# Patient Record
Sex: MALE | Race: WHITE | Employment: OTHER | URBAN - METROPOLITAN AREA
[De-identification: names, ages, dates, MRNs, and addresses within clinical notes are randomized per-mention and may not be internally consistent; named-entity substitution may affect disease eponyms.]

---

## 2022-05-06 ENCOUNTER — APPOINTMENT (OUTPATIENT)
Dept: CT IMAGING | Age: 87
DRG: 023 | End: 2022-05-06
Payer: MEDICARE

## 2022-05-06 ENCOUNTER — HOSPITAL ENCOUNTER (OUTPATIENT)
Age: 87
Discharge: HOME | End: 2022-05-06
Payer: MEDICARE

## 2022-05-06 ENCOUNTER — HOSPITAL ENCOUNTER (INPATIENT)
Age: 87
LOS: 11 days | Discharge: NURSING, REHAB OR LONG-TERM CARE | DRG: 023 | End: 2022-05-18
Attending: EMERGENCY MEDICINE | Admitting: INTERNAL MEDICINE
Payer: MEDICARE

## 2022-05-06 DIAGNOSIS — I63.9 CEREBROVASCULAR ACCIDENT (CVA), UNSPECIFIED MECHANISM (HCC): Primary | ICD-10-CM

## 2022-05-06 DIAGNOSIS — R41.82 ALTERED MENTAL STATUS, UNSPECIFIED ALTERED MENTAL STATUS TYPE: ICD-10-CM

## 2022-05-06 DIAGNOSIS — R53.1 RIGHT SIDED WEAKNESS: ICD-10-CM

## 2022-05-06 DIAGNOSIS — D64.9 ANEMIA, UNSPECIFIED TYPE: ICD-10-CM

## 2022-05-06 PROCEDURE — A0427 ALS1-EMERGENCY: HCPCS

## 2022-05-06 PROCEDURE — 6360000002 HC RX W HCPCS: Performed by: STUDENT IN AN ORGANIZED HEALTH CARE EDUCATION/TRAINING PROGRAM

## 2022-05-06 PROCEDURE — 82565 ASSAY OF CREATININE: CPT

## 2022-05-06 PROCEDURE — 6360000004 HC RX CONTRAST MEDICATION: Performed by: FAMILY MEDICINE

## 2022-05-06 PROCEDURE — 4A03X5D MEASUREMENT OF ARTERIAL FLOW, INTRACRANIAL, EXTERNAL APPROACH: ICD-10-PCS | Performed by: PSYCHIATRY & NEUROLOGY

## 2022-05-06 PROCEDURE — 99285 EMERGENCY DEPT VISIT HI MDM: CPT

## 2022-05-06 PROCEDURE — 51702 INSERT TEMP BLADDER CATH: CPT

## 2022-05-06 PROCEDURE — 0042T CT BRAIN PERFUSION: CPT

## 2022-05-06 PROCEDURE — 70450 CT HEAD/BRAIN W/O DYE: CPT

## 2022-05-06 PROCEDURE — A0425 GROUND MILEAGE: HCPCS

## 2022-05-06 PROCEDURE — 70498 CT ANGIOGRAPHY NECK: CPT

## 2022-05-06 PROCEDURE — 36620 INSERTION CATHETER ARTERY: CPT

## 2022-05-06 PROCEDURE — 99223 1ST HOSP IP/OBS HIGH 75: CPT | Performed by: PSYCHIATRY & NEUROLOGY

## 2022-05-06 PROCEDURE — 96365 THER/PROPH/DIAG IV INF INIT: CPT

## 2022-05-06 RX ADMIN — IOPAMIDOL 125 ML: 755 INJECTION, SOLUTION INTRAVENOUS at 23:54

## 2022-05-06 RX ADMIN — ALTEPLASE 7.4 MG: KIT at 23:11

## 2022-05-07 ENCOUNTER — ANESTHESIA (OUTPATIENT)
Dept: INTERVENTIONAL RADIOLOGY/VASCULAR | Age: 87
DRG: 023 | End: 2022-05-07
Payer: MEDICARE

## 2022-05-07 ENCOUNTER — APPOINTMENT (OUTPATIENT)
Dept: INTERVENTIONAL RADIOLOGY/VASCULAR | Age: 87
DRG: 023 | End: 2022-05-07
Payer: MEDICARE

## 2022-05-07 ENCOUNTER — ANESTHESIA EVENT (OUTPATIENT)
Dept: INTERVENTIONAL RADIOLOGY/VASCULAR | Age: 87
DRG: 023 | End: 2022-05-07
Payer: MEDICARE

## 2022-05-07 ENCOUNTER — APPOINTMENT (OUTPATIENT)
Dept: CT IMAGING | Age: 87
DRG: 023 | End: 2022-05-07
Payer: MEDICARE

## 2022-05-07 ENCOUNTER — APPOINTMENT (OUTPATIENT)
Dept: MRI IMAGING | Age: 87
DRG: 023 | End: 2022-05-07
Payer: MEDICARE

## 2022-05-07 VITALS
RESPIRATION RATE: 7 BRPM | OXYGEN SATURATION: 97 % | DIASTOLIC BLOOD PRESSURE: 87 MMHG | SYSTOLIC BLOOD PRESSURE: 139 MMHG

## 2022-05-07 PROBLEM — I63.9 ACUTE ISCHEMIC STROKE (HCC): Status: ACTIVE | Noted: 2022-05-07

## 2022-05-07 LAB
ABSOLUTE EOS #: 0.04 K/UL (ref 0–0.44)
ABSOLUTE EOS #: 0.31 K/UL (ref 0–0.44)
ABSOLUTE IMMATURE GRANULOCYTE: 0.17 K/UL (ref 0–0.3)
ABSOLUTE IMMATURE GRANULOCYTE: 0.24 K/UL (ref 0–0.3)
ABSOLUTE LYMPH #: 1.69 K/UL (ref 1.1–3.7)
ABSOLUTE LYMPH #: 2.33 K/UL (ref 1.1–3.7)
ABSOLUTE MONO #: 0.65 K/UL (ref 0.1–1.2)
ABSOLUTE MONO #: 0.73 K/UL (ref 0.1–1.2)
ANION GAP SERPL CALCULATED.3IONS-SCNC: 12 MMOL/L (ref 9–17)
ANION GAP SERPL CALCULATED.3IONS-SCNC: 8 MMOL/L (ref 9–17)
BASOPHILS # BLD: 0 % (ref 0–2)
BASOPHILS # BLD: 0 % (ref 0–2)
BASOPHILS ABSOLUTE: <0.03 K/UL (ref 0–0.2)
BASOPHILS ABSOLUTE: <0.03 K/UL (ref 0–0.2)
BILIRUBIN URINE: NEGATIVE
BUN BLDV-MCNC: 16 MG/DL (ref 8–23)
BUN BLDV-MCNC: 20 MG/DL (ref 8–23)
CALCIUM SERPL-MCNC: 8.5 MG/DL (ref 8.6–10.4)
CALCIUM SERPL-MCNC: 9.1 MG/DL (ref 8.6–10.4)
CHLORIDE BLD-SCNC: 109 MMOL/L (ref 98–107)
CHLORIDE BLD-SCNC: 112 MMOL/L (ref 98–107)
CHOLESTEROL/HDL RATIO: 2.6
CHOLESTEROL: 133 MG/DL
CO2: 20 MMOL/L (ref 20–31)
CO2: 26 MMOL/L (ref 20–31)
COLOR: YELLOW
COMMENT UA: ABNORMAL
CREAT SERPL-MCNC: 0.68 MG/DL (ref 0.7–1.2)
CREAT SERPL-MCNC: 0.96 MG/DL (ref 0.7–1.2)
EOSINOPHILS RELATIVE PERCENT: 1 % (ref 1–4)
EOSINOPHILS RELATIVE PERCENT: 7 % (ref 1–4)
GFR AFRICAN AMERICAN: >60 ML/MIN
GFR AFRICAN AMERICAN: >60 ML/MIN
GFR NON-AFRICAN AMERICAN: >60 ML/MIN
GFR NON-AFRICAN AMERICAN: >60 ML/MIN
GFR SERPL CREATININE-BSD FRML MDRD: ABNORMAL ML/MIN/{1.73_M2}
GFR SERPL CREATININE-BSD FRML MDRD: ABNORMAL ML/MIN/{1.73_M2}
GLUCOSE BLD-MCNC: 126 MG/DL (ref 70–99)
GLUCOSE BLD-MCNC: 137 MG/DL (ref 70–99)
GLUCOSE URINE: NEGATIVE
HCT VFR BLD CALC: 32.8 % (ref 40.7–50.3)
HCT VFR BLD CALC: 34.4 % (ref 40.7–50.3)
HCT VFR BLD CALC: 34.8 % (ref 40.7–50.3)
HDLC SERPL-MCNC: 52 MG/DL
HEMOGLOBIN: 10.9 G/DL (ref 13–17)
HEMOGLOBIN: 11 G/DL (ref 13–17)
HEMOGLOBIN: 11.4 G/DL (ref 13–17)
IMMATURE GRANULOCYTES: 3 %
IMMATURE GRANULOCYTES: 5 %
INR BLD: 1.1
KETONES, URINE: NEGATIVE
LDL CHOLESTEROL: 71 MG/DL (ref 0–130)
LEUKOCYTE ESTERASE, URINE: NEGATIVE
LV EF: 13 %
LVEF MODALITY: NORMAL
LYMPHOCYTES # BLD: 31 % (ref 24–43)
LYMPHOCYTES # BLD: 50 % (ref 24–43)
MCH RBC QN AUTO: 31.5 PG (ref 25.2–33.5)
MCH RBC QN AUTO: 31.6 PG (ref 25.2–33.5)
MCH RBC QN AUTO: 32.5 PG (ref 25.2–33.5)
MCHC RBC AUTO-ENTMCNC: 32 G/DL (ref 28.4–34.8)
MCHC RBC AUTO-ENTMCNC: 32.8 G/DL (ref 28.4–34.8)
MCHC RBC AUTO-ENTMCNC: 33.2 G/DL (ref 28.4–34.8)
MCV RBC AUTO: 95.1 FL (ref 82.6–102.9)
MCV RBC AUTO: 98.6 FL (ref 82.6–102.9)
MCV RBC AUTO: 99.1 FL (ref 82.6–102.9)
MONOCYTES # BLD: 12 % (ref 3–12)
MONOCYTES # BLD: 16 % (ref 3–12)
MYOGLOBIN: 38 NG/ML (ref 28–72)
NITRITE, URINE: NEGATIVE
NRBC AUTOMATED: 0 PER 100 WBC
PARTIAL THROMBOPLASTIN TIME: 21.9 SEC (ref 20.5–30.5)
PDW BLD-RTO: 12.7 % (ref 11.8–14.4)
PDW BLD-RTO: 12.8 % (ref 11.8–14.4)
PDW BLD-RTO: 12.8 % (ref 11.8–14.4)
PH UA: 7 (ref 5–8)
PLATELET # BLD: ABNORMAL K/UL (ref 138–453)
PLATELET, FLUORESCENCE: 126 K/UL (ref 138–453)
PLATELET, FLUORESCENCE: 127 K/UL (ref 138–453)
PLATELET, FLUORESCENCE: 130 K/UL (ref 138–453)
PLATELET, IMMATURE FRACTION: 4.1 % (ref 1.1–10.3)
PLATELET, IMMATURE FRACTION: 5.1 % (ref 1.1–10.3)
PLATELET, IMMATURE FRACTION: 6.2 % (ref 1.1–10.3)
POTASSIUM SERPL-SCNC: 4.1 MMOL/L (ref 3.7–5.3)
POTASSIUM SERPL-SCNC: 4.3 MMOL/L (ref 3.7–5.3)
PROTEIN UA: NEGATIVE
PROTHROMBIN TIME: 11.5 SEC (ref 9.1–12.3)
RBC # BLD: 3.45 M/UL (ref 4.21–5.77)
RBC # BLD: 3.49 M/UL (ref 4.21–5.77)
RBC # BLD: 3.51 M/UL (ref 4.21–5.77)
SARS-COV-2, RAPID: NOT DETECTED
SEG NEUTROPHILS: 23 % (ref 36–65)
SEG NEUTROPHILS: 53 % (ref 36–65)
SEGMENTED NEUTROPHILS ABSOLUTE COUNT: 1.07 K/UL (ref 1.5–8.1)
SEGMENTED NEUTROPHILS ABSOLUTE COUNT: 2.9 K/UL (ref 1.5–8.1)
SODIUM BLD-SCNC: 143 MMOL/L (ref 135–144)
SODIUM BLD-SCNC: 144 MMOL/L (ref 135–144)
SPECIFIC GRAVITY UA: 1.05 (ref 1–1.03)
SPECIMEN DESCRIPTION: NORMAL
THYROXINE, FREE: 1.19 NG/DL (ref 0.93–1.7)
TOTAL CK: 113 U/L (ref 39–308)
TRIGL SERPL-MCNC: 50 MG/DL
TROPONIN, HIGH SENSITIVITY: 229 NG/L (ref 0–22)
TROPONIN, HIGH SENSITIVITY: 250 NG/L (ref 0–22)
TROPONIN, HIGH SENSITIVITY: 269 NG/L (ref 0–22)
TROPONIN, HIGH SENSITIVITY: 330 NG/L (ref 0–22)
TSH SERPL DL<=0.05 MIU/L-ACNC: 1.3 UIU/ML (ref 0.3–5)
TURBIDITY: CLEAR
URINE HGB: NEGATIVE
UROBILINOGEN, URINE: NORMAL
WBC # BLD: 4.7 K/UL (ref 3.5–11.3)
WBC # BLD: 5 K/UL (ref 3.5–11.3)
WBC # BLD: 5.5 K/UL (ref 3.5–11.3)

## 2022-05-07 PROCEDURE — 84443 ASSAY THYROID STIM HORMONE: CPT

## 2022-05-07 PROCEDURE — 3700000000 HC ANESTHESIA ATTENDED CARE

## 2022-05-07 PROCEDURE — 6370000000 HC RX 637 (ALT 250 FOR IP): Performed by: STUDENT IN AN ORGANIZED HEALTH CARE EDUCATION/TRAINING PROGRAM

## 2022-05-07 PROCEDURE — 2580000003 HC RX 258: Performed by: HEALTH CARE PROVIDER

## 2022-05-07 PROCEDURE — 6360000002 HC RX W HCPCS: Performed by: NURSE ANESTHETIST, CERTIFIED REGISTERED

## 2022-05-07 PROCEDURE — C1769 GUIDE WIRE: HCPCS

## 2022-05-07 PROCEDURE — 82553 CREATINE MB FRACTION: CPT

## 2022-05-07 PROCEDURE — 85027 COMPLETE CBC AUTOMATED: CPT

## 2022-05-07 PROCEDURE — C1889 IMPLANT/INSERT DEVICE, NOC: HCPCS

## 2022-05-07 PROCEDURE — 2500000003 HC RX 250 WO HCPCS: Performed by: NURSE PRACTITIONER

## 2022-05-07 PROCEDURE — 81003 URINALYSIS AUTO W/O SCOPE: CPT

## 2022-05-07 PROCEDURE — 80048 BASIC METABOLIC PNL TOTAL CA: CPT

## 2022-05-07 PROCEDURE — 82550 ASSAY OF CK (CPK): CPT

## 2022-05-07 PROCEDURE — 2580000003 HC RX 258: Performed by: NURSE PRACTITIONER

## 2022-05-07 PROCEDURE — 85610 PROTHROMBIN TIME: CPT

## 2022-05-07 PROCEDURE — 70551 MRI BRAIN STEM W/O DYE: CPT

## 2022-05-07 PROCEDURE — 83874 ASSAY OF MYOGLOBIN: CPT

## 2022-05-07 PROCEDURE — 85025 COMPLETE CBC W/AUTO DIFF WBC: CPT

## 2022-05-07 PROCEDURE — 61645 PERQ ART M-THROMBECT &/NFS: CPT

## 2022-05-07 PROCEDURE — 85730 THROMBOPLASTIN TIME PARTIAL: CPT

## 2022-05-07 PROCEDURE — 3700000001 HC ADD 15 MINUTES (ANESTHESIA)

## 2022-05-07 PROCEDURE — 80061 LIPID PANEL: CPT

## 2022-05-07 PROCEDURE — 6370000000 HC RX 637 (ALT 250 FOR IP): Performed by: PSYCHIATRY & NEUROLOGY

## 2022-05-07 PROCEDURE — 84439 ASSAY OF FREE THYROXINE: CPT

## 2022-05-07 PROCEDURE — 6360000004 HC RX CONTRAST MEDICATION: Performed by: PSYCHIATRY & NEUROLOGY

## 2022-05-07 PROCEDURE — 2580000003 HC RX 258: Performed by: NURSE ANESTHETIST, CERTIFIED REGISTERED

## 2022-05-07 PROCEDURE — 93306 TTE W/DOPPLER COMPLETE: CPT

## 2022-05-07 PROCEDURE — 03CG3Z7 EXTIRPATION OF MATTER FROM INTRACRANIAL ARTERY USING STENT RETRIEVER, PERCUTANEOUS APPROACH: ICD-10-PCS | Performed by: PSYCHIATRY & NEUROLOGY

## 2022-05-07 PROCEDURE — 61645 PERQ ART M-THROMBECT &/NFS: CPT | Performed by: PSYCHIATRY & NEUROLOGY

## 2022-05-07 PROCEDURE — 6360000002 HC RX W HCPCS: Performed by: HEALTH CARE PROVIDER

## 2022-05-07 PROCEDURE — 93005 ELECTROCARDIOGRAM TRACING: CPT | Performed by: NURSE PRACTITIONER

## 2022-05-07 PROCEDURE — 6360000002 HC RX W HCPCS: Performed by: NURSE PRACTITIONER

## 2022-05-07 PROCEDURE — 2709999900 HC NON-CHARGEABLE SUPPLY

## 2022-05-07 PROCEDURE — C1887 CATHETER, GUIDING: HCPCS

## 2022-05-07 PROCEDURE — 2500000003 HC RX 250 WO HCPCS: Performed by: NURSE ANESTHETIST, CERTIFIED REGISTERED

## 2022-05-07 PROCEDURE — 84484 ASSAY OF TROPONIN QUANT: CPT

## 2022-05-07 PROCEDURE — 2000000003 HC NEURO ICU R&B

## 2022-05-07 PROCEDURE — C1760 CLOSURE DEV, VASC: HCPCS

## 2022-05-07 PROCEDURE — 6360000002 HC RX W HCPCS: Performed by: STUDENT IN AN ORGANIZED HEALTH CARE EDUCATION/TRAINING PROGRAM

## 2022-05-07 PROCEDURE — 92523 SPEECH SOUND LANG COMPREHEN: CPT

## 2022-05-07 PROCEDURE — 83036 HEMOGLOBIN GLYCOSYLATED A1C: CPT

## 2022-05-07 PROCEDURE — C1894 INTRO/SHEATH, NON-LASER: HCPCS

## 2022-05-07 PROCEDURE — 70450 CT HEAD/BRAIN W/O DYE: CPT

## 2022-05-07 PROCEDURE — 99291 CRITICAL CARE FIRST HOUR: CPT | Performed by: PSYCHIATRY & NEUROLOGY

## 2022-05-07 PROCEDURE — 2500000003 HC RX 250 WO HCPCS: Performed by: HEALTH CARE PROVIDER

## 2022-05-07 PROCEDURE — 85055 RETICULATED PLATELET ASSAY: CPT

## 2022-05-07 PROCEDURE — 87635 SARS-COV-2 COVID-19 AMP PRB: CPT

## 2022-05-07 RX ORDER — FENTANYL CITRATE 50 UG/ML
INJECTION, SOLUTION INTRAMUSCULAR; INTRAVENOUS PRN
Status: DISCONTINUED | OUTPATIENT
Start: 2022-05-07 | End: 2022-05-07 | Stop reason: SDUPTHER

## 2022-05-07 RX ORDER — IODIXANOL 270 MG/ML
100 INJECTION, SOLUTION INTRAVASCULAR
Status: COMPLETED | OUTPATIENT
Start: 2022-05-07 | End: 2022-05-07

## 2022-05-07 RX ORDER — MELOXICAM 15 MG/1
15 TABLET ORAL DAILY
Status: ON HOLD | COMMUNITY
End: 2022-05-17 | Stop reason: HOSPADM

## 2022-05-07 RX ORDER — LABETALOL HYDROCHLORIDE 5 MG/ML
10 INJECTION, SOLUTION INTRAVENOUS EVERY 4 HOURS PRN
Status: DISCONTINUED | OUTPATIENT
Start: 2022-05-07 | End: 2022-05-18 | Stop reason: HOSPADM

## 2022-05-07 RX ORDER — ATORVASTATIN CALCIUM 80 MG/1
80 TABLET, FILM COATED ORAL NIGHTLY
Status: DISCONTINUED | OUTPATIENT
Start: 2022-05-07 | End: 2022-05-18 | Stop reason: HOSPADM

## 2022-05-07 RX ORDER — SODIUM CHLORIDE, SODIUM LACTATE, POTASSIUM CHLORIDE, CALCIUM CHLORIDE 600; 310; 30; 20 MG/100ML; MG/100ML; MG/100ML; MG/100ML
INJECTION, SOLUTION INTRAVENOUS CONTINUOUS
Status: DISCONTINUED | OUTPATIENT
Start: 2022-05-07 | End: 2022-05-07

## 2022-05-07 RX ORDER — ONDANSETRON 4 MG/1
4 TABLET, ORALLY DISINTEGRATING ORAL EVERY 8 HOURS PRN
Status: DISCONTINUED | OUTPATIENT
Start: 2022-05-07 | End: 2022-05-09 | Stop reason: SDUPTHER

## 2022-05-07 RX ORDER — HYDRALAZINE HYDROCHLORIDE 20 MG/ML
10 INJECTION INTRAMUSCULAR; INTRAVENOUS EVERY 6 HOURS PRN
Status: DISCONTINUED | OUTPATIENT
Start: 2022-05-07 | End: 2022-05-18 | Stop reason: HOSPADM

## 2022-05-07 RX ORDER — MIDAZOLAM HYDROCHLORIDE 2 MG/2ML
1 INJECTION, SOLUTION INTRAMUSCULAR; INTRAVENOUS ONCE
Status: COMPLETED | OUTPATIENT
Start: 2022-05-07 | End: 2022-05-07

## 2022-05-07 RX ORDER — CLOBETASOL PROPIONATE 0.5 MG/G
CREAM TOPICAL 2 TIMES DAILY
COMMUNITY

## 2022-05-07 RX ORDER — ASPIRIN 81 MG/1
81 TABLET ORAL DAILY
Status: DISCONTINUED | OUTPATIENT
Start: 2022-05-08 | End: 2022-05-07

## 2022-05-07 RX ORDER — FENTANYL CITRATE 50 UG/ML
50 INJECTION, SOLUTION INTRAMUSCULAR; INTRAVENOUS ONCE
Status: COMPLETED | OUTPATIENT
Start: 2022-05-07 | End: 2022-05-07

## 2022-05-07 RX ORDER — ASPIRIN 300 MG/1
600 SUPPOSITORY RECTAL ONCE
Status: COMPLETED | OUTPATIENT
Start: 2022-05-07 | End: 2022-05-07

## 2022-05-07 RX ORDER — ONDANSETRON 2 MG/ML
4 INJECTION INTRAMUSCULAR; INTRAVENOUS EVERY 6 HOURS PRN
Status: DISCONTINUED | OUTPATIENT
Start: 2022-05-07 | End: 2022-05-09 | Stop reason: SDUPTHER

## 2022-05-07 RX ORDER — ACETAMINOPHEN 325 MG/1
650 TABLET ORAL EVERY 4 HOURS PRN
Status: DISCONTINUED | OUTPATIENT
Start: 2022-05-07 | End: 2022-05-07 | Stop reason: SDUPTHER

## 2022-05-07 RX ORDER — ACETAMINOPHEN 325 MG/1
650 TABLET ORAL EVERY 4 HOURS PRN
Status: DISCONTINUED | OUTPATIENT
Start: 2022-05-07 | End: 2022-05-18 | Stop reason: HOSPADM

## 2022-05-07 RX ORDER — ONDANSETRON 2 MG/ML
4 INJECTION INTRAMUSCULAR; INTRAVENOUS EVERY 6 HOURS PRN
Status: DISCONTINUED | OUTPATIENT
Start: 2022-05-07 | End: 2022-05-18 | Stop reason: HOSPADM

## 2022-05-07 RX ORDER — ASPIRIN 300 MG/1
300 SUPPOSITORY RECTAL DAILY
Status: DISCONTINUED | OUTPATIENT
Start: 2022-05-08 | End: 2022-05-07

## 2022-05-07 RX ORDER — SODIUM CHLORIDE 9 MG/ML
INJECTION, SOLUTION INTRAVENOUS CONTINUOUS PRN
Status: DISCONTINUED | OUTPATIENT
Start: 2022-05-07 | End: 2022-05-07 | Stop reason: SDUPTHER

## 2022-05-07 RX ORDER — ONDANSETRON 4 MG/1
4 TABLET, ORALLY DISINTEGRATING ORAL EVERY 8 HOURS PRN
Status: DISCONTINUED | OUTPATIENT
Start: 2022-05-07 | End: 2022-05-18 | Stop reason: HOSPADM

## 2022-05-07 RX ORDER — POLYETHYLENE GLYCOL 3350 17 G/17G
17 POWDER, FOR SOLUTION ORAL DAILY PRN
Status: DISCONTINUED | OUTPATIENT
Start: 2022-05-07 | End: 2022-05-18 | Stop reason: HOSPADM

## 2022-05-07 RX ORDER — LIDOCAINE HYDROCHLORIDE 10 MG/ML
INJECTION, SOLUTION EPIDURAL; INFILTRATION; INTRACAUDAL; PERINEURAL PRN
Status: DISCONTINUED | OUTPATIENT
Start: 2022-05-07 | End: 2022-05-07 | Stop reason: SDUPTHER

## 2022-05-07 RX ORDER — CALCIPOTRIENE 0.05 MG/ML
SOLUTION TOPICAL 2 TIMES DAILY
COMMUNITY

## 2022-05-07 RX ORDER — SODIUM CHLORIDE 9 MG/ML
INJECTION, SOLUTION INTRAVENOUS CONTINUOUS
Status: DISCONTINUED | OUTPATIENT
Start: 2022-05-07 | End: 2022-05-09

## 2022-05-07 RX ADMIN — SODIUM CHLORIDE 75 ML/HR: 9 INJECTION, SOLUTION INTRAVENOUS at 07:00

## 2022-05-07 RX ADMIN — LIDOCAINE HYDROCHLORIDE 2 ML: 10 INJECTION, SOLUTION EPIDURAL; INFILTRATION; INTRACAUDAL; PERINEURAL at 00:53

## 2022-05-07 RX ADMIN — Medication 2000 MG: at 09:23

## 2022-05-07 RX ADMIN — FENTANYL CITRATE 50 MCG: 50 INJECTION, SOLUTION INTRAMUSCULAR; INTRAVENOUS at 09:22

## 2022-05-07 RX ADMIN — ASPIRIN 600 MG: 300 SUPPOSITORY RECTAL at 01:45

## 2022-05-07 RX ADMIN — OLANZAPINE 5 MG: 10 INJECTION, POWDER, LYOPHILIZED, FOR SOLUTION INTRAMUSCULAR at 22:40

## 2022-05-07 RX ADMIN — MIDAZOLAM HYDROCHLORIDE 1 MG: 1 INJECTION, SOLUTION INTRAMUSCULAR; INTRAVENOUS at 16:15

## 2022-05-07 RX ADMIN — SODIUM CHLORIDE, POTASSIUM CHLORIDE, SODIUM LACTATE AND CALCIUM CHLORIDE: 600; 310; 30; 20 INJECTION, SOLUTION INTRAVENOUS at 04:03

## 2022-05-07 RX ADMIN — Medication 10 MG: at 18:30

## 2022-05-07 RX ADMIN — SODIUM CHLORIDE: 9 INJECTION, SOLUTION INTRAVENOUS at 00:41

## 2022-05-07 RX ADMIN — Medication 10 MG: at 08:07

## 2022-05-07 RX ADMIN — HYDRALAZINE HYDROCHLORIDE 10 MG: 20 INJECTION, SOLUTION INTRAMUSCULAR; INTRAVENOUS at 21:11

## 2022-05-07 RX ADMIN — FENTANYL CITRATE 25 MCG: 50 INJECTION, SOLUTION INTRAMUSCULAR; INTRAVENOUS at 01:08

## 2022-05-07 RX ADMIN — FENTANYL CITRATE 25 MCG: 50 INJECTION, SOLUTION INTRAMUSCULAR; INTRAVENOUS at 01:45

## 2022-05-07 RX ADMIN — IODIXANOL 59 ML: 270 INJECTION, SOLUTION INTRAVASCULAR at 01:42

## 2022-05-07 RX ADMIN — FENTANYL CITRATE 50 MCG: 50 INJECTION, SOLUTION INTRAMUSCULAR; INTRAVENOUS at 01:29

## 2022-05-07 RX ADMIN — ATORVASTATIN CALCIUM 80 MG: 80 TABLET, FILM COATED ORAL at 20:53

## 2022-05-07 ASSESSMENT — PULMONARY FUNCTION TESTS
PIF_VALUE: 0
PIF_VALUE: 1
PIF_VALUE: 0
PIF_VALUE: 4
PIF_VALUE: 0

## 2022-05-07 ASSESSMENT — PAIN - FUNCTIONAL ASSESSMENT: PAIN_FUNCTIONAL_ASSESSMENT: NONE - DENIES PAIN

## 2022-05-07 ASSESSMENT — PAIN SCALES - GENERAL: PAINLEVEL_OUTOF10: 0

## 2022-05-07 NOTE — SEDATION DOCUMENTATION
Transferred over to IR table, monitors applied, artline being placed by anesthesia, bilateral pedal pulses marked, 2+ bilaterally awake, alert, expressive and receptive aphasia continues, at times will say \"ok\":,told family \"see you later\" ,  otherwise makes sounds and some words that do not make sense , speech slurred, right facial droop, right arm drift, does not follow any commands, does not say yes or nod to correct orientation questions when choices given

## 2022-05-07 NOTE — ANESTHESIA PROCEDURE NOTES
Arterial Line:    An arterial line was placed using ultrasound guidance and surface landmarks, in the OR for the following indication(s): continuous blood pressure monitoring and blood sampling needed. A 20 gauge (size), 1 and 3/4 inch (length), Arrow (type) catheter was placed, Seldinger technique used, into the left radial artery, secured by Tegaderm and tape. Anesthesia type: Local  Local infiltration: Injection    Events:  patient tolerated procedure well with no complications. 5/6/2022 11:50 PM5/6/2022 11:55 PM  Anesthesiologist: Peter Grigsby MD  Preanesthetic Checklist  Completed: patient identified, IV checked, site marked, risks and benefits discussed, surgical consent, monitors and equipment checked, pre-op evaluation, timeout performed, anesthesia consent given, oxygen available and patient being monitored

## 2022-05-07 NOTE — PROGRESS NOTES
Speech Language Pathology  Facility/Department: [address]  Initial Speech/Language/Cognitive Assessment    NAME: Imelda Mayes  : 1934   MRN: [id number]  ADMISSION DATE: 2022  ADMITTING DIAGNOSIS: has Cerebrovascular accident (CVA) ([facility] [address] and Acute ischemic stroke ([facility] [address] on their problem list.      Date of Eval: 2022   Evaluating Therapist: Lilliam Mattson      Primary Complaint:   The patient is a 80 y.o. male with no significant past medical history, not on anticoagulation who was brought into the emergency department via mobile stroke unit after waking up confused, having difficulty speaking, facial droop and weakness per wife. Last known well was 9:30 PM.  Initial NIH was found to be 12 per mobile stroke team.  Contra CT head done in route and tPA started prior to arrival.  Patient is awake and moving all extremities, but unable to obtain history from patient due to confusion and dysarthria      Pain:  Pain Assessment  Pain Assessment: None - Denies Pain  Pain Level: 0    Assessment:  Aphasia Diagnosis: Several global aphasia   Diagnosis: Pt presents with severe global aphasia. Difficulty with confrontation and convergent naming. Did not attempt divergent naming as some direction following is also impaired. Benefits from small chunks of infomration presented to him at a time, also benefits from repetition. Attempt strategies to assist with confrontation naming such as using pictures directly in front of him. Difficulty with simple yes/no questions -- answered \"yes\" for \"is a lemon sweet? \" and \"I don't know\" for \"is a talita a flower? \" even after prompted with picture of a talita. ST will continue to follow and provide therapy at this time. Recommendations:  Requires SLP Intervention: Yes     D/C Recommendations: Further therapy recommended at discharge. Goals:  Short-term Goals  Goal 1: Pt will complete confrontation naming tasks with 80% accuracy.   Goal 2: Pt will answer simple yes/no questions with 80% accuracy. Goal 3: Pt will orient x4. Goal 4: Pt will use word finding strategies with 80% accuracy. Goal 5: Pt will complete automatic speech tasks with 80% accuracy. Patient/family involved in developing goals and treatment plan: Discussed with pt and RN. Subjective:     General  Chart Reviewed: Yes  Family / Caregiver Present: No  Subjective  Subjective: Pt oriented to self. Alert and cooperative throughout evaluation. Reviewed information with RN prior to and after evaluation. Social/Functional History  Type of Home: House  Occupation: Retired  Vision  Vision: Within Functional Limits  Hearing  Hearing: Within functional limits           Objective:     Oral/Motor  Oral Hygiene: Moist;Clean  Gag: No Impairment    Auditory Comprehension  Comprehension: Exceptions  Basic Questions: Severe  One Step Commands: Moderate  Two Step Commands: Severe  Common Objects: Moderate  Pictures: Severe  L/R Discrimination: Moderate  Conversation: Severe         Expression  Primary Mode of Expression: Verbal    Verbal Expression  Verbal Expression: Exceptions to functional limits  Repetition: [facility]  Automatic Speech: Moderate (counting to 10 independent; when given the first target word for days/month, able to complete days of the week independently; months of the year is able to say to \"May\" after initial prompt then starts backs at [place]")  Confrontation: Moderate (2/5 items; pen and watch)  Convergent: Severe (extreme difficulty searching for items in room; if not in direct eye sight, unable to identifying.)  Divergent: Severe  Conversation: Severe  Interfering Components: Jargon;Paraphasia; Perseverations  Effective Techniques: Provide extra time; Word retrived strategies (use of pictures on phone for items out of sight, continued to have difficulty with this strategy)    Written Expression  Written Expression: Unable to assess              Cognition:      Orientation  Overall Orientation Status:

## 2022-05-07 NOTE — PROGRESS NOTES
Endovascular Neurosurgery Progress Note    SUBJECTIVE:   No acute events post procedure. Patient denied headache, no focal weakness and numbness. Review of Systems:  CONSTITUTIONAL:  negative for fevers, chills, fatigue and malaise    EYES:  negative for double vision, blurred vision and photophobia     HEENT:  negative for tinnitus, epistaxis and sore throat    RESPIRATORY:  negative for cough, shortness of breath, wheezing    CARDIOVASCULAR:  negative for chest pain, palpitations, syncope, edema    GASTROINTESTINAL:  negative for nausea, vomiting    GENITOURINARY:  negative for incontinence    MUSCULOSKELETAL:  negative for neck or back pain    NEUROLOGICAL:  Negative for weakness and tingling  negative for headaches and dizziness    PSYCHIATRIC:  negative for anxiety      Review of systems otherwise negative. OBJECTIVE:     Vitals:    05/07/22 0640   BP: (!) 165/87   Pulse: 82   Resp: 14   Temp: 98 °F (36.7 °C)   SpO2: 95%        General:  Gen: normal habitus, NAD  HEENT: NCAT, mucosa moist  Cvs: RRR, S1 S2 normal  Resp: symmetric unlabored breathing  Abd: s/nd/nt  Ext: no edema  Skin: no lesions seen, warm and dry    Neuro:  Gen: awake and alert, oriented x1  Lang/speech: Mild aphasia, naming impaired, follow some commands intermittently, dysarthria  CN: PERRL, EOMI, VFF, V1-3 intact, right facial weakness, hearing intact, shoulder shrug symmetric, tongue midline  Motor: Right UE 4/5, Right LE 4/5, Left UE and LE 5/5   Sense: reduced sensation to touch in right UE and LE  Coord: impaired on right UE and LE  DTR: deferred  Gait: narrow base gait    NIH Stroke Scale:   1a  Level of consciousness: 0 - alert; keenly responsive   1b. LOC questions:  2 - answers neither question correctly   1c. LOC commands: 1 - performs one task correctly   2. Best Gaze: 0 - normal   3. Visual: 0 - no visual loss   4. Facial Palsy: 1 - minor paralysis (flattened nasolabial fold, asymmetric on smiling)   5a.  Motor left arm: 0 - no drift, limb holds 90 (or 45) degrees for full 10 seconds   5b. Motor right arm: 1 - drift, limb holds 90 (or 45) degrees but drifts down before full 10 seconds: does not hit bed   6a. Motor left le - no drift; leg holds 30 degree position for full 5 seconds   6b  Motor right le - drift; leg falls by the end of the 5 second period but does not hit bed   7. Limb Ataxia: 0 - absent   8. Sensory: 1 - mild to moderate sensory loss; patient feels pinprick is less sharp or is dull on the affected side; there is a loss of superficial pain with pinprick but patient is aware of being touched    9. Best Language:  2 - severe aphasia; all communication is through fragmentary expression; great need for inference, questioning, and guessing by the listener. Range of information that can be exchanged is limited; listener carries burden of communication. Examiner cannot identify materials provided from patient response. 10. Dysarthria: 1 - mild to moderate, patient slurs at least some words and at worst, can be understood with some difficulty   11. Extinction and Inattention: 0 - no abnormality         Total:   10     MRS: 05      LABS:   Reviewed. Lab Results   Component Value Date    HGB 11.0 (L) 2022    WBC 5.0 2022    PLT See Reflexed IPF Result 2022     2022    BUN 16 2022    CREATININE 0.68 (L) 2022    APTT 21.9 2022    INR 1.1 2022      Lab Results   Component Value Date    COVID19 Not Detected 2022       RADIOLOGY:   Images were personally reviewed including:     IR:   1. Acute Left MCA proximal M-1 occlusion TICI score 0   2. The above lesion was treated with 8 fr short sheath, flow gate bgc, prowler 21 microcatheter, embo trap 5 mm x 37 mm stent retriever first pass. Final reperfusion score TICI 03      CTA head and neck:   1. No acute intracranial abnormality.    2. Left MCA territory perfusion mismatch with ratio 11.1 and mismatch volume   142 mL. 3. 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 4. 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 5. No significant stenosis or occlusion of the cervical carotid arteries. 6. Thyroid nodules measuring up to 1.3 cm.  No further imaging follow-up is   required. ASSESSMENT:   79 y/o M with pmh significant for LEXII presented with acute onset speech difficulty, right sided weakness, NIHSS 10, left MCA M1 acute occlusion s/p tPA,  emergent MT with TICI 03 reperfusion score, first pass on 05/07. Post-procedure NIHSS remained 10. CT head 05/07: no acute infract or intracranial hemorrhage. Femoral sheath removed. Etiology: underlying symptomatic ICAD vs cardioembolic  PLAN:   --Post tPA management per protocol   --24 hr post tPA ct head, if no hemorrhage then load with Plavix tonight   --c/w ASA 81 mg and Plavix 75 mg from tomorrow. --MRI brain   --Further stroke work up including TTE, Lipid panel, HbA1C   --PT/OT     Case discussed with Dr. Shaina Lilly attending.     Alejo Swartz MD  Stroke, [facility]  [address] [place]  Electronically signed 5/7/2022 at 10:41 AM

## 2022-05-07 NOTE — BRIEF OP NOTE
Brief Postoperative Note                    [facility]    NEUROENDOVASCULAR SERVICE: POST-OP NOTE: 5/7/2022    Pt Name: Benjie Zaman  MRN: [id number]  YOB: 1934  Date of Procedure: 5/7/2022  Primary Care Physician: Ethelene Bumpers, MD        Pre-Procedural Diagnosis:Left MCA M-1 occlusion   Post-Procedural Diagnosis:same as above       Procedure Performed:Cerebral angiogram with mechanical thrombectomy of the left MCA M-1 thrombus     Surgeon:   Daymon Kocher, MD    Fellow:  Nisha Ya MD     Assisting Tech:  Jennifer Lloyd    PRE-PROCEDURAL EXAM:  Prestroke baseline mRS MODIFIED PETER SCORE: 0 - No symptoms at all. Neurological exam performed and unchanged from initial H&P or consult  CT head ASPECT score: 9    Anesthesia: MAC anesthesia  Complications: none    Intra-Operative EXAM:  Neurological exam performed and unchanged from initial H&P or consult    EBL: < less than 50       Cc            Specimens: Were not Obtained  Contrast:     Visipaque 270 low osmolar 59  Cc             Fluoro: 14.2 min    Findings:  Please see dictated Radiology note for further details  1. Acute Left MCA proximal M-1 occlusion TICI score 0   2. The above lesion was treated with 8 fr short sheath, flow gate bgc, prowler 21 microcatheter, embo trap 5 mm x 37 mm stent retriever first pass. Final reperfusion score TICI 03      POST-PROCEDURAL EXAM :   Stable neurological Exam  Neurological exam performed and unchanged from initial H&P or consult    Sheath was sutured and maintained in place. POST-PROCEDURAL MONITORING : see orders  Disposition: Neuro ICU      Recommendations:  1. Back to Neuro ICU  2. Do not bend right leg for 3 hours. 3. Groin checks per protocol. 4. Peripheral pulse checks per protocol. 5. SBP goal 130-170 mm hg   6. Rectal  mg x 1 (administered in angio suite)  7. CT head in am  8.  Follow up with Nisha Ya MD  2 weeks after discharge and Dr. Tonny Haynes 3 months after discharge.         MD Blanca Vigil MD   Pager: [phone]  Stroke, [facility]  [facility] [address]  Electronically signed 5/7/2022 at 1:57 AM

## 2022-05-07 NOTE — PLAN OF CARE
Problem: Safety - Adult  Goal: Free from fall injury  Outcome: Progressing     Problem: ABCDS Injury Assessment  Goal: Absence of physical injury  Outcome: Progressing   Patient assessed for fall risk and fall precautions initiated as needed. Patient and family instructed about safety devices and allowed to make decisions related to safey. Environment kept free of clutter and adequate lighting provided. Bed in lowest position with brakes locked. Call light in reach. Patient ID band correct and in place. Patient transferred with appropriate methods. Will continue to monitor. Problem: Skin/Tissue Integrity  Goal: Absence of new skin breakdown  Description: 1. Monitor for areas of redness and/or skin breakdown  2. Assess vascular access sites hourly  3. Every 4-6 hours minimum:  Change oxygen saturation probe site  4. Every 4-6 hours:  If on nasal continuous positive airway pressure, respiratory therapy assess nares and determine need for appliance change or resting period. Outcome: Progressing   Skin assessed for breakdown and redness, patient turned regularly, and heels elevated off of bed on pillows.

## 2022-05-07 NOTE — FLOWSHEET NOTE
[facility] CARE DEPARTMENT - [facility]     Emergency/Trauma Note    PATIENT NAME: Stephen Connors    Shift date: 5/06/2022  Shift day: Friday   Shift # 3    Room # CT SCAN   Name: Stephen Connors            Age: 80 y.o. Gender: male          Mandaen: [employer]   Place of Anglican: Unknown    Trauma/Incident type: Stroke Alert  Admit Date & Time: 5/6/2022 11:29 PM  TRAUMA NAME: N/A    ADVANCE DIRECTIVES IN CHART? No    NAME OF DECISION MAKER: Per next of kin [facility] [place] ([phone]), is patient's decision maker, unless otherwise specified. RELATIONSHIP OF DECISION MAKER TO PATIENT: Patient's Spouse    PATIENT/EVENT DESCRIPTION:  Stephen Connors is an 80 y.o. male who arrived to CT Scan from Mobile Stroke Unit and was paged out as a \"Stroke Alert Critical.\" Patient was taken to [facility] for intervention. \" Patient was later admitted to the hospital and transported to [facility] [address]. Patient was resting when  attempted visit. Pt to be admitted to 0521/0521-01. SPIRITUAL ASSESSMENT/INTERVENTION:   responded to page after patient had been moved off of the floor.  visited with bedside nurse on NeuroICU and learned about patient's procedure. Per nurse, patient is \"showing signs of improvement. \" Per nurse, patient's spouse had been at bedside and had just left the unit to go back home. Patient was sleeping when  attempted visit and he did not wake upon  calling his name. PATIENT BELONGINGS:  No belongings noted    ANY BELONGINGS OF SIGNIFICANT VALUE NOTED:  N/A    REGISTRATION STAFF NOTIFIED? Yes      WHAT IS YOUR SPIRITUAL CARE PLAN FOR THIS PATIENT?:  [facility] can make follow-up visit, per request. Kika Alcocer can be reached 24/7 via [facility].     Electronically signed by Norbert Mccullough on 5/7/2022 at 8:03 AM.  Tom Chase  [phone]

## 2022-05-07 NOTE — ED NOTES
..    5/7/2022 12:40 AM    Patient: Chema Nelson, 80 y.o., male Race: Caucasion  Patient Address: [address] [address]  Incident Address:  [employer] [address] [address].     [] [facility]  [] [facility]  [] [facility] AT [place]   [x] KELLY BROOKE JR. [facility]  [] [facility]  Patient Phone #: [phone]   Insurance: Payor: /   Artie Anika:  []  Yes   [x]  No  Emergency Contact: Extended Emergency Contact Information  Primary Emergency Contact: Renée Harriet  Address: [facility] [place]note 2[facility], [address] [place] [address] Phone: [phone]  Mobile Phone: [phone]  Relation: Spouse  Secondary Emergency Contact: [address] [place] [address], [id number] [place] [address] Phone: [phone]  Relation: Child  MRN: [id number]  # [id number]  YOB: 1934  Primary Care Physician: Laquita Lu MD  Advance Directive: [x] Full Code   []DNR-CC   []DNR-CCA    MSU Crew: TOMMIE Magallon - CT TechKO - Paramedic, Eli Sullivan - KODY    Pt Transported To:  [x] [facility]  [] [facility]  [] [facility]  [] Ramona Cleary   [] [facility]  [] [facility]  [facility] [] [facility] [] [facility]    Was patient transported to closest facility? []Yes  [x]No (if No specify reason)   []   Patient/family request    [x]   Divert to specialist       Response Code   [] 2  [x] 3  []   Change  [] 2  [] 3   Transport Code   [x] 2  [] 3  []   Change  [] 2  [] 3     Mileage:  [address] [facility] [id number]   Total Miles 22.4     Call Received 5/6/2022 2223   Dispatched 5/6/2022 2224   Enroute 5/6/2022 2227   Arrived Scene 5/6/2022 2247   At Patient 5/6/2022 2250   Decision to Scan 5/6/2022 2253   Scan 5/6/2022 2257   Departed Scene 5/6/2022 [address] 5/6/2022 2337   Departed Hospital 5/7/2022 0001   In Service 5/7/2022 0001         Allergies  [] Updated/Reviewed by MSU  [x] Historical Data Only  [] Unavailable  has no allergies on file.   Medications  [] Updated/Reviewed by MSU  [x] Historical Data Only  [] Unavailable  Prior to Admission medications    Not on File      Past Medical History  [] Updated/Reviewed by MSU  [x] Historical Data Only  [] Unavailable   has no past medical history on file. Patient Weight: 180 lbs   [x]   Estimated   []   Stated          VITALS          Time BP Pulse   Resp  Rate N-normal  S-shallow  D-deep Monitor SpO2 O2    LPM BGL   Temp Pain   2255 150/84 80 21 N NSR 96%  96.4    2300 148/80 80 21 N NSR 97% RA      2315 139/68 81 23 N NSR 94% RA      2330 130/79 78 21 N NSR 95% RA                      Pupils GCS   Time R L E  4 V  5 M  6 T  15   2255 2 3 4 3 6 13   2300 2 3 4 3 6 13   2315 2 3 4 3 6 13   2330 2 3 4 3 6 13                NIH -   record on all transports and Q15 min after tPA administration    NIHSS       Time 2255 2315 2330    1a. LOC - Arousal Status 0 0 0    1b. LOC - Questions 2 2 2    1c. LOC - Commands 0 0 0    2. Eye Movements (gaze) 0 0 0    3. Visual Fields 2 2 2    4. Facial Palsy 1 1 1    5a. Motor Left Arm 0 0 0    5b. Motor Right Arm 2 2 2    6a. Motor Left Leg 0 0 0    6b. Motor Right Leg 1 1 1    7. Limb Ataxia 0 0 0    8. Sensory 0 0 0    9. Language/Aphasia 2 2 2    10. Dysarthria 2 2 2    11. Neglect 0 0 0    TOTAL 12 12 12        Research:    RACE Score: 6 Time: 5015  StrokeVAN Score: POS Time: 5157    Time Last Known Well: 2130 5/6/22    Narrative:    Dispatched to possible CVA per  dispatch to meet [address] [facility]. Arrived to find Jerrod Ann, 80 y.o., male c/o right sided deficits, aphasia. Report received from [address] [place] [address] EMS at patients side. Per EMS, pt was sat in chair at 2130 and fell asleep. When he woke his wife reported that pt was confused, and had trouble using right side. EMS called and MSU called to rendezvous. Upon MSU arrival pt found in back of [facility]. Pt noted to be leaning to the right. Right sided facial droop, right arm drift to bed, right leg with weakness. Pt also has dysarthria, and expressive aphasia.  Unable to states name or wifes name. Unable to recognize and speak name of objects shown. Decision made to scan. Dr. Ramona Abdalla at patient side via telemedicine unit. Decision made to give tPA. Risks and benefits explained to pt. Pt verbally states \"YES\" when asked permission to administer medication. Remainder of transport, pt has no change in condition. Patient received the following medications prior to MSU arrival: NONE  Patient has the following lines prior to MSU arrival: 18g RT [facility] [[email or web address]]R [facility] [place]  Patient has the following airway placed prior to MSU arrival: NONE    Patient was transferred to cot via 2 person assist and secured with straps x3. Zoll ECG, SpO2, and NIBP applied and monitored throughout encounter. HOB maintained at 30 degrees. Patient was transferred to ambulance, cot secured in scan position. Non contrast CT scan performed. After scan cot secured in transport position. IV tPA inclusion/exclusion criteria reviewed with patient and physician. Candidate for IV tPA therapy? [x] Yes   [] No - due to the following exclusion criteria:    [] ICH   [] Taking anticoagulant -   [] Beyond last time known well window  [] At or returned to baseline   [] Marked improvement of symptoms  [] No disabling symptoms  [] Other -     Patient is being transported to [place] [address] . During transport patient rests comfortably. Cabin temp maintained at 70-72 °F throughout transport. Patient was transferred to CT scanner via 4 person sheet lift. Patient care handoff completed with Hien Suárez and Stroke Team at bedside.        Imaging:  Images were obtained onboard the MSU including:  [x] CT brain without contrast - see results below:      CT HEAD WO CONTRAST    Result Date: 5/6/2022  EXAMINATION: CT OF THE HEAD WITHOUT CONTRAST  5/6/2022 10:52 pm TECHNIQUE: CT of the head was performed without the administration of intravenous contrast. Dose modulation, iterative reconstruction, and/or weight based adjustment of the mA/kV was utilized to reduce the radiation dose to as low as reasonably achievable. COMPARISON: None. HISTORY: ORDERING SYSTEM PROVIDED HISTORY: Stroke TECHNOLOGIST PROVIDED HISTORY: Stroke Decision Support Exception - unselect if not a suspected or confirmed emergency medical condition->Emergency Medical Condition (MA) FINDINGS: BRAIN/VENTRICLES:Motion degraded exam. Coronal and sagittal reformats were not available at the time of this dictation. No acute hemorrhage given limitation. Periventricular and subcortical hypoattenuation is nonspecific and may be related to microvascular disease. Artifact partially obscures the vianey. Artifact partially obscures the cerebellum. Slight relative decreased attenuation of the right temporal lobe with overall suboptimal evaluation for gray-white differentiation due to motion artifact. Slight relative decreased attenuation of the left temporal lobe anteriorly. Ventricles are within normal limits in size. There is no midline shift. Basal cisterns appear patent. ORBITS: Visualized orbits appear unremarkable on this unenhanced exam. SINUSES: Mild mucosal thickening of the ethmoid and left maxillary sinuses. Visualized mastoid air cells appear clear. SOFT TISSUES/SKULL: No depressed calvarial fracture. Motion degraded exam. No acute hemorrhage given limitation. No midline shift. Slight relative decreased attenuation of the right temporal lobe with overall suboptimal evaluation for gray-white differentiation due to motion artifact. Slight relative decreased attenuation of the left temporal lobe anteriorly. Correlate with presenting symptoms.        Physical assessment performed:    Neuro: Right sided facial droop, right arm drift, right leg weakness, expressive aphasia, dysarthria  Resp: lungs clear to auscultation bilaterally, normal effort  Cardiac: regular rate, no murmur, 12 lead ECG shows NSR wit possible RBBB  Muscular/skeletal/peripheral vascular: no edema, no redness or tenderness in the calves, pulses present in 4 extremities   Abd/GI/: abd flat, soft, non tender   Skin: normal color, warm, dry      IV LINES   Time Size Site # Attempts Performed By   2300 18g LT AC 1 NIKI Payan RN                     Total Amount of IV Fluids Infused: 150 ml    MEDS / ELECTRICAL RX   Time Therapy Dosage Route Response Performed By   8561 .9 NS 1000ml IV  KO Hagen EMT-P                                               TPA Administration    Time Bolus Dose Infusion Dose Waste   2311 7.4     2313  66.2 26.4       [x] tPA dosing double checked by: David Spears EMT-P        ACUTE STROKE DYSPHAGIA SCREEN              YES NO   1 GCS less than 13? X   2 Facial Asymmetry or Weakness? X    3 Tongue Asymmetry or Weakness? 4 Palatal Asymmetry or Weakness? 5 Signs of aspiration during 3oz water test?*                 If answers to questions 1-4 are NO proceed to 3oz water test               *Administer 3oz of water for sequential drinks, note any throat clearing, cough, or change in vocal quality immediately after and 1 minute following the swallow.                If answers to questions 1-5 are NO proceed with ordered PO meds       POC LABS      TIME 2309     Test (reference range)      FSBG ()      PT (11-13.5)      INR (0.8-1.1)      Na (138-146)      K (3.5-4.9)      Cl ()      iCa (1.2-1.32)      TCO2 (24-29)      Glu ()      BUN (8.0-26)      Crea (0.6-1.3) 1.1     Hct (38-51)      Hb (12.0-17)      AnGap 10.0-20)                Assistance:   []    Fire -     []    Police    [x]    Other EMS (See Below)    Abrahan Kyle Notification:    Galina Antonio 15 -  [x] Vinicius Agustin 83  [] [facility]  [] [facility]  [] [facility]  [] [facility] [] [facility] [] [facility] [] [facility]  [] [employer]     [x]    [employer]:     []    Cell Phone     Med Control Orders Received:   [x] No  []  Yes:     Med Control Physician (if on line medical direction received)  -        Hospital Team Alert:   []    Trauma Alert    []    STEMI Alert         []    Stroke Alert    [x]    RACE Alert      Description Of Valuables: Blue Vest, wallet in back left pocket    Patient Valuables:   [x]    With Patient    []    Not Received    [x]    ER / Lab     Call Outcome:   [x]    Transport to Facility    []    Care Transferred to [facility]    []    Cancelled    []    Patient Refusal    []                           Mobile Stroke Unit    Electronically signed by Olive Jovel RN on 5/7/22 at 12:40 AM EDT     Olive Jovel RN  05/07/22 0106

## 2022-05-07 NOTE — PROCEDURES
Procedure Note      Date: 5/7/2022 10:40 AM  Procedure: right groin access closure  Proceduralist: Lucho Mancuso    Description:  Patient identity and procedure site confirmed in time out. Antibiotics (Ancef 2g IV) and pain medication (fentanyl 25ug iv) were given prior to procedure. The right groin was cleaned and draped in a sterile fashion. The sheath was then removed from the right groin and an Angiosesal 8F device was used to close the access site. Pressure was held for 20 minutes to achieve hemostasis. The wound was covered with QuickClot, gauze, Tegaderm, and safeguard. Patient tolerated the procedure well with no complications.     Complications:  None    Blood loss:  Minimal    Lucho Mancuso MD  Stroke, [facility]  [address] [address]  Electronically signed 5/7/2022 at 10:40 AM

## 2022-05-07 NOTE — ANESTHESIA POSTPROCEDURE EVALUATION
Department of Anesthesiology  Postprocedure Note    Patient: Leonidas Dunn  MRN: [id number]  YOB: 1934  Date of evaluation: 5/7/2022  Time:  7:38 AM     Procedure Summary     Date: 05/07/22 Room / Location: [facility] Special Procedures    Anesthesia Start: 0041 Anesthesia Stop: 9142    Procedure: IR ANGIOGRAM CAROTID CEREBRAL BILATERAL Diagnosis: (thrombectomy)    Scheduled Providers:  Responsible Provider: Jesus Castelan MD    Anesthesia Type: MAC, TIVA ASA Status: 4 - Emergent          Anesthesia Type: No value filed. Jonatan Phase I:      Jonatan Phase II:      Last vitals: Reviewed and per EMR flowsheets.      POST-OP ANESTHESIA NOTE       BP (!) 165/87   Pulse 82   Temp 98 °F (36.7 °C) (Oral)   Resp 14   Ht 5' 9\" (1.753 m)   Wt 175 lb (79.4 kg)   SpO2 95%   BMI 25.84 kg/m²    Pain Assessment: None - Denies Pain            Anesthesia Post Evaluation    Patient location during evaluation: ICU  Patient participation: complete - patient cannot participate  Level of consciousness: awake and obtunded/minimal responses  Pain score: 0  Airway patency: patent  Nausea & Vomiting: no nausea and no vomiting  Complications: no  Cardiovascular status: hemodynamically stable  Respiratory status: acceptable  Hydration status: stable

## 2022-05-07 NOTE — ED PROVIDER NOTES
[facility]  Emergency Department Encounter  EmergencyMedicine Resident     Pt Name:Yunior Martinez  MRN: [id number]  [place] 1934  Date of evaluation: 5/6/22  PCP:  Angy Hannon MD    This patient was evaluated in the Emergency Department for symptoms described in the history of present illness. The patient was evaluated in the context of the global COVID-19 pandemic, which necessitated consideration that the patient might be at risk for infection with the SARS-CoV-2 virus that causes COVID-19. Institutional protocols and algorithms that pertain to the evaluation of patients at risk for COVID-19 are in a state of rapid change based on information released by regulatory bodies including the CDC and federal and state organizations. These policies and algorithms were followed during the patient's care in the ED. CHIEF COMPLAINT       Chief Complaint   Patient presents with    Altered Mental Status       HISTORY OF PRESENT ILLNESS  (Location/Symptom, Timing/Onset, Context/Setting, Quality, Duration, Modifying Factors, Severity.)      Cammy Shah is a 80 y.o. male who presents with [facility] for concern for stroke and right sided deficits. LKW 2100. Patient went to take a nap in a recliner and upon waking at 2130 wasn't able to get up from his chair. Family noted right-sided deficits. At the time mobile stroke unit made contact reporting dysarthria and aphasia, right upper and lower extremity weakness, visual field deficit, and reported an NIH score of 12. No history of anticoagulant use. tPA was started PTA. Neuro was present upon arrival.     PAST MEDICAL / SURGICAL / SOCIAL / FAMILY HISTORY      has no past medical history on file. has no past surgical history on file.       Social History     Socioeconomic History    Marital status:      Spouse name: Not on file    Number of children: Not on file    Years of education: Not on file    Highest education level: Not on file Occupational History    Not on file   Tobacco Use    Smoking status: Not on file    Smokeless tobacco: Not on file   Substance and Sexual Activity    Alcohol use: Not on file    Drug use: Not on file    Sexual activity: Not on file   Other Topics Concern    Not on file   Social History Narrative    Not on file     Social Determinants of Health     Financial Resource Strain:     Difficulty of Paying Living Expenses: Not on file   Food Insecurity:     Worried About Running Out of Food in the Last Year: Not on file    Irving of Food in the Last Year: Not on file   Transportation Needs:     Lack of Transportation (Medical): Not on file    Lack of Transportation (Non-Medical): Not on file   Physical Activity:     Days of Exercise per Week: Not on file    Minutes of Exercise per Session: Not on file   Stress:     Feeling of Stress : Not on file   Social Connections:     Frequency of Communication with Friends and Family: Not on file    Frequency of Social Gatherings with Friends and Family: Not on file    Attends [employer] Services: Not on file    Active Member of [address] [place] [address] or Organizations: Not on file    Attends Club or Organization Meetings: Not on file    Marital Status: Not on file   Intimate Partner Violence:     Fear of Current or Ex-Partner: Not on file    Emotionally Abused: Not on file    Physically Abused: Not on file    Sexually Abused: Not on file   Housing Stability:     Unable to Pay for Housing in the Last Year: Not on file    Number of [place] in the Last Year: Not on file    Unstable Housing in the Last Year: Not on file       No family history on file. Allergies:  Patient has no allergy information on record.     Home Medications:  Prior to Admission medications    Not on File       REVIEW OF SYSTEMS    (2-9 systems for level 4, 10 or more for level 5)      Review of Systems   Unable to perform ROS: Other   - confusion, dysarthria, aphasia    PHYSICAL EXAM   (up to 7 for level 4, 8 or more for level 5)      INITIAL VITALS:   BP (!) 164/73   Pulse 73   Temp 98 °F (36.7 °C) (Oral)   Resp 15   Ht 5' 9\" (1.753 m)   Wt 175 lb (79.4 kg)   SpO2 95%   BMI 25.84 kg/m²     Physical Exam  Vitals reviewed. Constitutional:       General: He is not in acute distress. HENT:      Head: Normocephalic and atraumatic. Ears:      Comments: Hearing grossly normal     Nose: Nose normal.      Mouth/Throat:      Mouth: Mucous membranes are moist.      Pharynx: Oropharynx is clear. Eyes:      General: No scleral icterus. Conjunctiva/sclera: Conjunctivae normal.      Pupils: Pupils are equal, round, and reactive to light. Cardiovascular:      Rate and Rhythm: Normal rate and regular rhythm. Pulses: Normal pulses. Pulmonary:      Effort: Pulmonary effort is normal. No respiratory distress. Breath sounds: Normal breath sounds. Abdominal:      General: There is no distension. Palpations: Abdomen is soft. Tenderness: There is no abdominal tenderness. There is no guarding. Musculoskeletal:      Cervical back: No muscular tenderness. Right lower leg: No edema. Left lower leg: No edema. Skin:     General: Skin is warm and dry. Capillary Refill: Capillary refill takes less than 2 seconds. Neurological:      General: No focal deficit present. Mental Status: He is alert and oriented to person, place, and time. Mental status is at baseline. Comments: Patient is alert and responding to questions and commands but is unable to answer any questions correctly or perform tasks.   Visually tracking across midline  Minor flattening of the right nasolabial fold, asymmetric smile  Left upper and left lower extremity and right leg without motor drift  Right upper extremity is not flaccid but there is motor drift  Difficulty performing cerebellar tests  No sensory deficit  Severe aphasia, disarthria   No extinction    NIH on arrival of 9 DIFFERENTIAL  DIAGNOSIS     PLAN (LABS / IMAGING / EKG):  Orders Placed This Encounter   Procedures    COVID-19, Rapid    CT HEAD WO CONTRAST    CTA HEAD NECK W CONTRAST    CT HEAD WO CONTRAST    CT BRAIN PERFUSION    IR ANGIOGRAM CAROTID C EREBRAL BILATERAL    CT HEAD WO CONTRAST    STROKE PANEL    Immature Platelet Fraction    Urinalysis    CBC    Hemoglobin A1c    Lipid panel - fasting    Basic Metabolic Panel w/ Reflex to MG    Diet NPO    Intake and output    Neuro checks    Vital signs (specify frequency)    Notify physician    Bedrest    Do Not Elevate Head of Bed    Do not bend leg with puncture site    Avoid Active Movement in Bed    No lifting over 10 lbs for 24 hours    Assess  Arterial/Venous Catheter Site and Distal Pulses    Misc nursing order (specify)    Vital signs during and post alteplase (tPA)    Notify physician during and post alteplase (tPA)    Notify physician during and post alteplase (tPA)    Bedrest during and post alteplase (tPA)    Adv Diet as Tolerated (nurse communication)    NIH Stroke Scale (NIHSS) during and post alteplase (tPA)    Implement alteplase (tPA) hemorrhage/bleeding precautions    Avoid antiplatelet and antithrombotic medications for 24 hours post administration of alteplase (tPA)    No Anticoagulants for 24 hours after alteplase (tPA)    Implement suspected intracerebral hemorrhage (ICH) guidelines    Swallow screen by nursing before diet and oral medications started.     Stroke education    Encourage deep breathing and coughing every two hours while awake    Place intermittent pneumatic compression device    Telemetry monitoring - continuous duration    Insert indwelling urinary catheter    Discontinue indwelling urinary catheter when documented indications no longer apply per hospital policy    Full Code    Inpatient consult to Neurocritical care    Inpatient consult to Stroke Team    Consult to Physical Medicine [address] [place] [address] to Dose: Other - See Comments: The pharmacist will review this patient's medication profile to evaluate IV medications and change all base solutions to 0.9% sodium chloride if possible based on compatibility and product availability. This. .. [address] to change base solutions.  OT eval and treat    PT evaluation and treat    Initiate Oxygen Therapy Protocol    Pulse Oximetry Spot Check    Initiate Oxygen Therapy Protocol    Incentive spirometry    Pulse oximetry, continuous    Speech Language Pathology (SLP) eval and treat    EKG 12 Lead    Initiate minimum 2 IV lines for alteplase (tPA) infusion    ADMIT TO INPATIENT    ADMIT TO INPATIENT    Restraints non-violent or non-self-destructive    Fall precautions       MEDICATIONS ORDERED:  Orders Placed This Encounter   Medications    alteplase (ACTIVASE) injection    alteplase (ACTIVASE) injection    iopamidol (ISOVUE-370) 76 % injection 125 mL    acetaminophen (TYLENOL) tablet 650 mg    OR Linked Order Group     ondansetron (ZOFRAN-ODT) disintegrating tablet 4 mg     ondansetron (ZOFRAN) injection 4 mg    iodixanol (VISIPAQUE) injection 100 mL    OR Linked Order Group     ondansetron (ZOFRAN-ODT) disintegrating tablet 4 mg     ondansetron (ZOFRAN) injection 4 mg    polyethylene glycol (GLYCOLAX) packet 17 g    OR Linked Order Group     aspirin EC tablet 81 mg     aspirin suppository 300 mg    atorvastatin (LIPITOR) tablet 80 mg    aspirin suppository 600 mg    lactated ringers infusion       DIAGNOSTIC RESULTS / EMERGENCY DEPARTMENT COURSE / MDM   LAB RESULTS:  Results for orders placed or performed during the hospital encounter of 05/06/22   COVID-19, Rapid    Specimen: Nasopharyngeal Swab   Result Value Ref Range    Specimen Description . NASOPHARYNGEAL SWAB     SARS-CoV-2, Rapid Not Detected Not Detected   STROKE PANEL   Result Value Ref Range    Glucose 137 (H) 70 - 99 mg/dL    BUN 20 8 - 23 mg/dL CREATININE 0.96 0.70 - 1.20 mg/dL    Calcium 9.1 8.6 - 10.4 mg/dL    Sodium 143 135 - 144 mmol/L    Potassium 4.1 3.7 - 5.3 mmol/L    Chloride 109 (H) 98 - 107 mmol/L    CO2 26 20 - 31 mmol/L    Anion Gap 8 (L) 9 - 17 mmol/L    GFR Non-African American >60 >60 mL/min    GFR African American >60 >60 mL/min    GFR Comment          WBC 4.7 3.5 - 11.3 k/uL    RBC 3.51 (L) 4.21 - 5.77 m/uL    Hemoglobin 11.4 (L) 13.0 - 17.0 g/dL    Hematocrit 34.8 (L) 40.7 - 50.3 %    MCV 99.1 82.6 - 102.9 fL    MCH 32.5 25.2 - 33.5 pg    MCHC 32.8 28.4 - 34.8 g/dL    RDW 12.7 11.8 - 14.4 %    Platelets See Reflexed IPF Result 138 - 453 k/uL    NRBC Automated 0.0 0.0 per 100 WBC    Total  39 - 308 U/L    Seg Neutrophils 23 (L) 36 - 65 %    Lymphocytes 50 (H) 24 - 43 %    Monocytes 16 (H) 3 - 12 %    Eosinophils % 7 (H) 1 - 4 %    Basophils 0 0 - 2 %    Immature Granulocytes 5 (H) 0 %    Segs Absolute 1.07 (L) 1.50 - 8.10 k/uL    Absolute Lymph # 2.33 1.10 - 3.70 k/uL    Absolute Mono # 0.73 0.10 - 1.20 k/uL    Absolute Eos # 0.31 0.00 - 0.44 k/uL    Basophils Absolute <0.03 0.00 - 0.20 k/uL    Absolute Immature Granulocyte 0.24 0.00 - 0.30 k/uL    Myoglobin 38 28 - 72 ng/mL    Protime 11.5 9.1 - 12.3 sec    INR 1.1     PTT 21.9 20.5 - 30.5 sec    Troponin, High Sensitivity 330 (HH) 0 - 22 ng/L   Immature Platelet Fraction   Result Value Ref Range    Platelet, Immature Fraction 6.2 1.1 - 10.3 %    Platelet, Fluorescence 127 (L) 138 - 453 k/uL       IMPRESSION: Zoe Weber is a 80 y.o. man presenting for acute CVA s/p tPA. Initial NIH of 12 improved to 9 on arrival after initiation of tPA. No airway concerns. Neuro was present upon arrival for assessment. No history of anticoagulation use. Will obtain CTA, stroke work-up and anticipate admission to neuro ICU for post tPA protocol. RADIOLOGY:  CT HEAD WO CONTRAST    Result Date: 5/7/2022  1. No acute intracranial abnormality.  2. Left MCA territory perfusion mismatch with ratio 11.1 and mismatch volume 142 mL. 3. 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 4. 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 5. No significant stenosis or occlusion of the cervical carotid arteries. 6. Thyroid nodules measuring up to 1.3 cm. No further imaging follow-up is required. Critical results were called by Dr. May Heller MD to Dr. Eric Hughes On 5/7/2022 at 00:25. CT HEAD WO CONTRAST    Result Date: 5/6/2022  Motion degraded exam. No acute hemorrhage given limitation. No midline shift. Slight relative decreased attenuation of the right temporal lobe with overall suboptimal evaluation for gray-white differentiation due to motion artifact. Slight relative decreased attenuation of the left temporal lobe anteriorly. Correlate with presenting symptoms. CT BRAIN PERFUSION    Result Date: 5/7/2022  1. No acute intracranial abnormality. 2. Left MCA territory perfusion mismatch with ratio 11.1 and mismatch volume 142 mL. 3. 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 4. 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 5. No significant stenosis or occlusion of the cervical carotid arteries. 6. Thyroid nodules measuring up to 1.3 cm. No further imaging follow-up is required. Critical results were called by Dr. May Heller MD to Dr. Eric Hughes On 5/7/2022 at 00:25. CTA HEAD NECK W CONTRAST    Result Date: 5/7/2022  1. No acute intracranial abnormality. 2. Left MCA territory perfusion mismatch with ratio 11.1 and mismatch volume 142 mL. 3. 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 4. 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 5. No significant stenosis or occlusion of the cervical carotid arteries. 6. Thyroid nodules measuring up to 1.3 cm. No further imaging follow-up is required. Critical results were called by Dr. May Heller MD to Dr. Eric Hughes On 5/7/2022 at 00:25. EKG  Normal rate, sinus, occassional PVC, normal intervals, T wave inversions in I, aVR,  And V4-V6, left axis, No ST segment elevations or depressions, no prior EKG for comparison    All EKG's are interpreted by the Emergency Department Physician who either signs or Co-signs this chart in the absence of a cardiologist.    EMERGENCY DEPARTMENT COURSE:  Patient seen and evaluated, VSS and nontoxic in appearance. Airway intact. On arrival went to CTA and CT perfusion which demonstrates left MCA territory perfusion mismatch and complete occlusion of the left M1 MCA. Neuro present on arrival.  Biochemical assessment demonstrates no leukocytosis, hemoglobin 11.4, normal kidney function and electrolytes, normal coags, initial troponin 330. Seen by neuro endovascular and transported to lab for angiogram and mechanical thrombectomy. Admitted to neuro critical care for post tPA and post thrombectomy care. No notes of EC Admission Criteria type on file. PROCEDURES:  none    CONSULTS:  IP CONSULT TO NEUROCRITICAL CARE  IP CONSULT TO STROKE TEAM  IP CONSULT TO PHYSICAL MEDICINE REHAB  IP CONSULT TO PHARMACY  PHARMACY TO CHANGE BASE FLUIDS    CRITICAL CARE:  See attending note    FINAL IMPRESSION      1. Cerebrovascular accident (CVA), unspecified mechanism ([address]    2. Altered mental status, unspecified altered mental status type    3. Right sided weakness          DISPOSITION / PLAN     DISPOSITION Admitted 05/07/2022 02:19:52 AM      PATIENT REFERRED TO:  No follow-up provider specified. DISCHARGE MEDICATIONS:  There are no discharge medications for this patient.       Maile Pinedo DO  Emergency Medicine Resident    (Please note that portions of thisnote were completed with a voice recognition program.  Efforts were made to edit the dictations but occasionally words are mis-transcribed.)       Maile Pinedo DO  Resident  05/07/22 1101

## 2022-05-07 NOTE — CONSULTS
Department of Endovascular Neurosurgery  Resident Consult Note  Stroke Alert paged @ 1128PM ETA 8 min  ER Room # 15  Arrival to patient bedside @ 1140PM        Reason for Consult:  Stroke alert  Requesting Physician:  ED  Endovascular Neurosurgeon:   []Dr. Delmer Bal  [x]Dr. Katerina Brooks  []Dr. Farzana June   []    History Obtained From:  patient, spouse, electronic medical record    CHIEF COMPLAINT:       Rt sided weakness, dysarthria    HISTORY OF PRESENT ILLNESS:       The patient is a 80 y.o. male with no significant PMH and not presently on [facility], [place] who presented via EMS after being noted to have acute onset right sided weakness and speech difficulty. Wife reports he fell asleep on the recliner at 9:30pm and woke up around 10pm and couldn't get up from the chair. Family also noted right sided weakness. EMS was called and activated stroke alert. Patient was evaluated by mobile stroke unit. He was noted to have aphasia, dysarthria, and right arm and leg weakness. Last know well: 5/6/2022 @ 2130    On presentation:  BP: 155/97  BSL: 137    Prior to arrival patient was on  Antiplatelets/anticoagulants: no  Statins: no       PAST MEDICAL HISTORY :       Past Medical History:    No past medical history on file. Past Surgical History:    No past surgical history on file.     Social History:   Social History     Socioeconomic History    Marital status:      Spouse name: Not on file    Number of children: Not on file    Years of education: Not on file    Highest education level: Not on file   Occupational History    Not on file   Tobacco Use    Smoking status: Not on file    Smokeless tobacco: Not on file   Substance and Sexual Activity    Alcohol use: Not on file    Drug use: Not on file    Sexual activity: Not on file   Other Topics Concern    Not on file   Social History Narrative    Not on file     Social Determinants of Health     Financial Resource Strain:     Difficulty of Paying Living Expenses: Not on file Food Insecurity:     Worried About Running Out of Food in the Last Year: Not on file    [place] of Food in the Last Year: Not on file   Transportation Needs:     Lack of Transportation (Medical): Not on file    Lack of Transportation (Non-Medical): Not on file   Physical Activity:     Days of Exercise per Week: Not on file    Minutes of Exercise per Session: Not on file   Stress:     Feeling of Stress : Not on file   Social Connections:     Frequency of Communication with Friends and Family: Not on file    Frequency of Social Gatherings with Friends and Family: Not on file    Attends Latter-day Services: Not on file    Active Member of [address] [place] [address] [employer] or Organizations: Not on file    Attends Club or Organization Meetings: Not on file    Marital Status: Not on file   Intimate Partner Violence:     Fear of Current or Ex-Partner: Not on file    Emotionally Abused: Not on file    Physically Abused: Not on file    Sexually Abused: Not on file   Housing Stability:     Unable to Pay for Housing in the Last Year: Not on file    Number of [place] in the Last Year: Not on file    Unstable Housing in the Last Year: Not on file       Family History:   No family history on file. Allergies:  Patient has no allergy information on record.     Home Medications:  Prior to Admission medications    Not on File       Current Medications:   Current Facility-Administered Medications: alteplase (ACTIVASE) injection, , IntraVENous, Continuous PRN  alteplase (ACTIVASE) injection, , IntraVENous, Continuous PRN    REVIEW OF SYSTEMS:       CONSTITUTIONAL: negative for fatigue and malaise   EYES: negative for double vision and photophobia    HEENT: negative for tinnitus and sore throat   RESPIRATORY: negative for cough, shortness of breath   CARDIOVASCULAR: negative for chest pain, palpitations   GASTROINTESTINAL: negative for nausea, vomiting   GENITOURINARY: negative for incontinence   MUSCULOSKELETAL: negative for neck or back pain   NEUROLOGICAL: negative for seizures   PSYCHIATRIC: negative for fatigue     Review of systems otherwise negative. PHYSICAL EXAM:       BP (!) 161/96   Pulse 94   Temp 98 °F (36.7 °C)   Resp 21   Ht 5' 9\" (1.753 m)   Wt 175 lb (79.4 kg)   SpO2 93%   BMI 25.84 kg/m²     CONSTITUTIONAL:  Well developed, well nourished, alert (orientation unable to assess due to dysarthria), in no acute distress. GCS 15, nontoxic. + dysarthria, + aphasia. HEAD:  normocephalic, atraumatic    EYES:  PERRLA, EOMI.   ENT:  moist mucous membranes   NECK:  supple, symmetric, no midline tenderness to palpation    BACK:  without midline tenderness, step-offs or deformities    LUNGS:  Equal air entry bilaterally   CARDIOVASCULAR:  normal s1 / s2   ABDOMEN:  Soft, no rigidity   NEUROLOGIC:  Mental Status:  Alert, orientation questions hard to asses due to dysarthria             Cranial Nerves:    cranial nerves II-XII are grossly intact except for rt facial droop    Motor Exam:    Drift:  present - RUE  Tone:  normal    Motor exam is 5 out of 5 right upper and right lower extremities  Motor exam is 3 out of 5 left upper and left lower extremities    Sensory:    Touch:    Right Upper Extremity:  normal  Left Upper Extremity:  normal  Right Lower Extremity:  normal  Left Lower Extremity:  normal    Deep Tendon Reflexes:    Right Bicep:  2+  Left Bicep:  2+  Right Knee:  2+  Left Knee:  2+    Plantar Response:  Right:  downgoing  Left:  downgoing    Clonus:  N/A  Brandon's:  N/A    Coordination/Dysmetria:  Unable to assess due to condition    Gait:  Unable to assess due to condition    INITIAL NIH STROKE SCALE:    Time Performed:  1797 PM     1a. Level of consciousness:  0 - alert; keenly responsive  1b. Level of consciousness questions:  2 - answers neither question correctly  1c. Level of consciousness questions:  2 - performs neither task correctly  2. Best Gaze:  0 - normal  3. Visual:  0 - no visual loss  4. Facial Palsy:  1 - minor paralysis (flattened nasolabial fold, asymmetric on smiling)  5a. Motor left arm:  0 - no drift, limb holds 90 (or 45) degrees for full 10 seconds  5b. Motor right arm:  1 - drift, limb holds 90 (or 45) degrees but drifts down before full 10 seconds: does not hit bed  6a. Motor left le - no drift; leg holds 30 degree position for full 5 seconds  6b. Motor right le - no drift; leg holds 30 degree position for full 5 seconds  7. Limb Ataxia:  0 - absent  8. Sensory:  1 - mild to moderate sensory loss; patient feels pinprick is less sharp or is dull on the affected side; there is a loss of superficial pain with pinprick but patient is aware of being touched   9. Best Language:  2 - severe aphasia; all communication is through fragmentary expression; great need for inference, questioning, and guessing by the listener. Range of information that can be exchanged is limited; listener carries burden of communication. Examiner cannot identify materials provided from patient response. 10.  Dysarthria:  1 - mild to moderate, patient slurs at least some words and at worst, can be understood with some difficulty  11.   Extinction and Inattention:  0 - no abnormality    TOTAL:  10     SKIN:  no rash      Modified Tu Score Scale:     [x] Zero: No symptoms at all   [] 1: No significant disability despite symptoms; able to carry out all usual duties and activities   [] 2: Slight disability; unable to carry out all previous activities, but able to look after own affairs without assistance   [] 3:Moderate disability; requiring some help, but able to walk without assistance   [] 4: Moderately severe disability; unable to walk and attend to bodily needs without assistance   [] 5:Severe disability; bedridden, incontinent and requiring constant nursing care and attention    LABS AND IMAGING:     CBC with Differential:    Lab Results   Component Value Date    WBC 4.7 2022    RBC 3.51 05/07/2022    HGB 11.4 05/07/2022    HCT 34.8 05/07/2022    PLT See Reflexed IPF Result 05/07/2022    MCV 99.1 05/07/2022    MCH 32.5 05/07/2022    MCHC 32.8 05/07/2022    RDW 12.7 05/07/2022    LYMPHOPCT 50 05/07/2022    MONOPCT 16 05/07/2022    BASOPCT 0 05/07/2022    MONOSABS 0.73 05/07/2022    LYMPHSABS 2.33 05/07/2022    EOSABS 0.31 05/07/2022    BASOSABS <0.03 05/07/2022         BMP:    Lab Results   Component Value Date    NA PENDING 05/07/2022    K PENDING 05/07/2022    CL PENDING 05/07/2022    CO2 PENDING 05/07/2022    BUN PENDING 05/07/2022    CREATININE PENDING 05/07/2022    CALCIUM PENDING 05/07/2022    GFRAA PENDING 05/07/2022    LABGLOM PENDING 05/07/2022    GLUCOSE PENDING 05/07/2022       Radiology Review:    1.) CT brain without contrast:   Impression   1. No acute intracranial abnormality. 2. Left MCA territory perfusion mismatch with ratio 11.1 and mismatch volume   142 mL. 3. 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 4. 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 5. No significant stenosis or occlusion of the cervical carotid arteries. 6. Thyroid nodules measuring up to 1.3 cm.  No further imaging follow-up is   required. Critical results were called by Dr. Ana Maria Joshi MD to Dr. Juani Duke On   5/7/2022 at 00:25.       2.) CTA Head/Neck:  Impression   1. No acute intracranial abnormality. 2. Left MCA territory perfusion mismatch with ratio 11.1 and mismatch volume   142 mL. 3. 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 4. 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 5. No significant stenosis or occlusion of the cervical carotid arteries. 6. Thyroid nodules measuring up to 1.3 cm.  No further imaging follow-up is   required. Critical results were called by Dr. Ana Maria Joshi MD to Dr. Juani Duke On   5/7/2022 at 00:25.       3.) CT Brain Perfusion:  Impression   1.  No acute intracranial protocol  - Atorvastatin 40 mg nightly     Labs  - Fasting Lipid panel  - HgbA1c lab      - PT, OT, Speech eval   - Hydration  - Telemetry   - Neuro checks per protocol  - We recommend SBP <180  - Blood glucose goal less than 180  - Please avoid dextrose containing solutions        Additional recommendations may follow    Please contact [facility] with any changes in patients neurologic status. Thank you for your consult.        Arely Spencer MD   PGY 3 Neurology Resident  5/6/2022 @ 1145PM

## 2022-05-07 NOTE — CONSULTS
Endovascular Neurosurgery Consult    Pt Name: Hieu Edwards  MRN: [id number]  YOB: 1934  Date of evaluation: 5/7/2022  Primary Care Physician: Sunny Lemus MD  Patient evaluated at the request of ED   Reason for evaluation: stroke    SUBJECTIVE:   History of Chief Complaint:    Hieu Edwards is a 80 y.o. male with no significant PMH and not presently on [facility] who presented via EMS after being noted to have acute onset right sided weakness and speech difficulty. Wife reports he fell asleep on the recliner at 9:30pm and woke up around 10om and couldn't get up from the chair. Family also noted right sided weakness. EMS was called and activated stroke alert. Patient was evaluated by mobile stroke unit. He was noted to have aphasia, dysarthria, and right arm and leg weakness. LKW: 9:30PM  NIH: 14    Non contrast CT Head obtained. Was given tPa bolus dose at 2311 and infusion started 2313. Allergies  has no allergies on file. Medications  Prior to Admission medications    Not on File    Scheduled Meds:  Continuous Infusions:   alteplase      alteplase Stopped (05/07/22 0026)     PRN Meds:.alteplase, alteplase  Past Medical History   has no past medical history on file. Past Surgical History   has no past surgical history on file. Social History   has no history on file for tobacco use.   has no history on file for alcohol use.   has no history on file for drug use. Family History  family history is not on file.     Review of Systems:  CONSTITUTIONAL:  negative for fevers, chills, fatigue and malaise    EYES:  negative for double vision, blurred vision and photophobia     HEENT:  negative for tinnitus, epistaxis and sore throat    RESPIRATORY:  negative for cough, shortness of breath, wheezing    CARDIOVASCULAR:  negative for chest pain, palpitations, syncope, edema    GASTROINTESTINAL:  negative for nausea, vomiting    GENITOURINARY:  negative for incontinence    MUSCULOSKELETAL:  negative for neck or back pain    NEUROLOGICAL:  Negative for weakness and tingling  negative for headaches and dizziness    PSYCHIATRIC:  negative for anxiety      Review of systems otherwise negative. OBJECTIVE:   Vitals: BP (!) 161/96   Pulse 94   Temp 98 °F (36.7 °C)   Resp 21   Ht 5' 9\" (1.753 m)   Wt 175 lb (79.4 kg)   SpO2 93%   BMI 25.84 kg/m²   General appearance: Lying in bed, NAD  HEENT: Head: Normal, normocephalic, atraumatic. Neck: supple, symmetrical, trachea midline  Lungs: clear to auscultation bilaterally  Heart: regular rate and rhythm, S1, S2 normal, no murmur, click, rub or gallop  Abdomen: soft, non-tender; nondistended, bowel sounds normal  Extremities: extremities normal, atraumatic, no cyanosis or edema  Neurologic: Mental status: Alert, orientation question hard to assess due to dysarthria  CN: 2-12 intact except for rt facial droop   MOTOR: 5/5 LUE and LLE, RUE 3/5 RLE 3/5  SENSORY: intact to light touch  COORDINATION: unable to assess    NIH Stroke Scale Total (if not done complete detailed one below):    1a.  Level of consciousness:  0 - alert; keenly responsive  1b. Level of consciousness questions:  2 - answers neither question correctly  1c. Level of consciousness questions:  2 - performs neither task correctly  2. Best Gaze:  0 - normal  3. Visual:  0 - no visual loss  4. Facial Palsy:  1 - minor paralysis (flattened nasolabial fold, asymmetric on smiling)  5a. Motor left arm:  0 - no drift, limb holds 90 (or 45) degrees for full 10 seconds  5b. Motor right arm:  1 - drift, limb holds 90 (or 45) degrees but drifts down before full 10 seconds: does not hit bed  6a. Motor left le - no drift; leg holds 30 degree position for full 5 seconds  6b. Motor right le - no drift; leg holds 30 degree position for full 5 seconds  7. Limb Ataxia:  0 - absent  8. Sensory:  0 - normal; no sensory loss  9.     Best Language:  2 - severe aphasia; all communication is through fragmentary expression; great need for inference, questioning, and guessing by the listener. Range of information that can be exchanged is limited; listener carries burden of communication. Examiner cannot identify materials provided from patient response. 10.  Dysarthria:  1 - mild to moderate, patient slurs at least some words and at worst, can be understood with some difficulty  11. Extinction and Inattention:  0 - no abnormality     Total: 9    Modified [place] Score Scale:      [x]? Zero: No symptoms at all   []? 1: No significant disability despite symptoms; able to carry out all usual duties and activities   []? 2: Slight disability; unable to carry out all previous activities, but able to look after own affairs without assistance   []? 3:Moderate disability; requiring some help, but able to walk without assistance   []? 4: Moderately severe disability; unable to walk and attend to bodily needs without assistance   []? 5:Severe disability; bedridden, incontinent and requiring constant nursing care and attention    LABS:     Recent Labs     05/07/22  0000   WBC 4.7   HGB 11.4*   HCT 34.8*   PLT See Reflexed IPF Result   NA PENDING   K PENDING   CL PENDING   CO2 PENDING   BUN PENDING   CREATININE PENDING   CALCIUM PENDING   INR 1.1     No results for input(s): ALKPHOS, ALT, AST, BILITOT, BILIDIR, LABALBU, AMYLASE, LIPASE in the last 72 hours. RADIOLOGY:   Images were personally reviewed including:  CT brain without contrast:  Impression   1. No acute intracranial abnormality. 2. Left MCA territory perfusion mismatch with ratio 11.1 and mismatch volume   142 mL. 3. 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 4. 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 5. No significant stenosis or occlusion of the cervical carotid arteries. 6. Thyroid nodules measuring up to 1.3 cm.  No further imaging follow-up is   required.    Critical results were called by Dr. Jason Garcia Aldo Ricks MD to Dr. Eric Hughes On   5/7/2022 at 00:25. CTA/CTP imaging:  Impression   1. No acute intracranial abnormality. 2. Left MCA territory perfusion mismatch with ratio 11.1 and mismatch volume   142 mL. 3. 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 4. 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 5. No significant stenosis or occlusion of the cervical carotid arteries. 6. Thyroid nodules measuring up to 1.3 cm.  No further imaging follow-up is   required. Critical results were called by Dr. May Heller MD to Dr. Eric Hughes On   5/7/2022 at 00:25. IMPRESSIONS:   80 y.o. male with no significant PMH and not presently on [facility] who presented via EMS after being noted to have acute onset right sided weakness and speech difficulty. 1. Initial NIH 14 and given tpA  2. Left M1 occlusion  3. 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 PLANS:   1. Given tpA  2. Will take to lab for angiogram and MT  3. Consent obtained    Thank you for the interesting evaluation. Further recommendations to follow.         Amanda Ramey MD  Pager [phone]  Neurology PGY-3  Endovascular Neurosurgery Service  Electronically signed 5/7/2022 at 12:30 AM

## 2022-05-07 NOTE — SEDATION DOCUMENTATION
Transferred over to bed, all lines and tubes maintained  Awake, alert, slight right facial droop, slurred speech, global aphasia, at times will get simple words out but mostly not able to understand what he is saying, mostly responds yes to all questions, no following of commands but at times will mimic, slight is any drift of right arm noted, unable to assess right leg mobility due to sheath being in place and need to keep leg straight,   Right access site with sheath sutured in place, thrombix with some bloody drainage noted, Dr. Zan Larson aware, no hematoma noted, right pedal pulse 2+, feel cool

## 2022-05-07 NOTE — PROGRESS NOTES
Physical Therapy        Physical Therapy Cancel Note      DATE: 2022    NAME: Kenyatta Domingo  MRN: [id number]   : 1934      Patient not seen this date for Physical Therapy due to: Other: Bedrest until 1235, will check back on .       Electronically signed by Avery Alexis PT on 2022 at 11:38 AM

## 2022-05-07 NOTE — ANESTHESIA PRE PROCEDURE
Department of Anesthesiology  Preprocedure Note       Name:  Jluis Barber   Age:  80 y.o.  :  1934                                          MRN:  [id number]         Date:  2022      Surgeon: * No surgeons listed *    Procedure: IR ANGIOGRAM CAROTID CEREBRAL BILATERAL        Medications prior to admission:   Prior to Admission medications    Not on File       Current medications:    Current Facility-Administered Medications   Medication Dose Route Frequency Provider Last Rate Last Admin    alteplase (ACTIVASE) injection   IntraVENous Continuous PRN Adrienne Rodas MD   Stopped at 22 0032    alteplase (ACTIVASE) injection   IntraVENous Continuous PRN Adrienne Rodas MD   Stopped at 22 0026     No current outpatient medications on file. Allergies:  Not on File    Problem List:    Patient Active Problem List   Diagnosis Code    Acute ischemic stroke ([facility] [address] I63.9       Past Medical History:  No past medical history on file. Past Surgical History:  No past surgical history on file. Social History:    Social History     Tobacco Use    Smoking status: Not on file    Smokeless tobacco: Not on file   Substance Use Topics    Alcohol use: Not on file                                Counseling given: Not Answered      Vital Signs (Current):   Vitals:    22 0000 22 0009   BP: (!) 155/97 (!) 161/96   Pulse: 85 94   Resp: 20 21   Temp:  98 °F (36.7 °C)   SpO2: 94% 93%   Weight:  175 lb (79.4 kg)   Height:  5' 9\" (1.753 m)                                              BP Readings from Last 3 Encounters:   22 (!) 161/96       NPO Status:                                                                                 BMI:   Wt Readings from Last 3 Encounters:   22 175 lb (79.4 kg)     Body mass index is 25.84 kg/m².     CBC:   Lab Results   Component Value Date    WBC 4.7 2022    RBC 3.51 2022    HGB 11.4 2022    HCT 34.8 2022    MCV 99.1 2022 RDW 12.7 05/07/2022    PLT See Reflexed IPF Result 05/07/2022       CMP:   Lab Results   Component Value Date     05/07/2022    K 4.1 05/07/2022     05/07/2022    CO2 26 05/07/2022    BUN 20 05/07/2022    CREATININE 0.96 05/07/2022    GFRAA >60 05/07/2022    LABGLOM >60 05/07/2022    GLUCOSE 137 05/07/2022    CALCIUM 9.1 05/07/2022       POC Tests: No results for input(s): POCGLU, POCNA, POCK, POCCL, POCBUN, POCHEMO, POCHCT in the last 72 hours. Coags:   Lab Results   Component Value Date    PROTIME 11.5 05/07/2022    INR 1.1 05/07/2022    APTT 21.9 05/07/2022       HCG (If Applicable): No results found for: PREGTESTUR, PREGSERUM, HCG, HCGQUANT     ABGs: No results found for: PHART, PO2ART, FLI9VBT, MTH7IGM, BEART, X3LIFZDM     Type & Screen (If Applicable):  No results found for: LABABO, LABRH    Drug/Infectious Status (If Applicable):  No results found for: HIV, HEPCAB    COVID-19 Screening (If Applicable):   Lab Results   Component Value Date    COVID19 Not Detected 05/07/2022           Anesthesia Evaluation    Airway: Mallampati: III  TM distance: >3 FB     Mouth opening: > = 3 FB Dental:    (+) other      Pulmonary:normal exam                               Cardiovascular:                      Neuro/Psych:   (+) CVA:,             GI/Hepatic/Renal:             Endo/Other:                     Abdominal:             Vascular: Other Findings:             Anesthesia Plan      MAC and TIVA     ASA 4 - emergent       Induction: intravenous. arterial line  MIPS: Postoperative opioids intended. Anesthetic plan and risks discussed with patient. Plan discussed with CRNA.                   Cora Oliver MD   5/7/2022

## 2022-05-07 NOTE — VIRTUAL HEALTH
Consults  Patient Location:  [facility] Stroke Unit    Provider Location (City/State): [address] [facility] Stroke and Vascular Neurology Consult for  [facility] Stroke Unit Stroke Alert through [address] @ 10:41 pm  5/6/2022 11:07 PM  Pt Name: Kameron Mack  MRN: [id number]  YOB: 1934  Date of evaluation: 5/6/2022  Primary Care Physician: No primary care provider on file. Reason for Evaluation: Stroke Evaluation with Discussion with Ed or primary team with Telemedicine and stroke evaluation with Review of imaging and labs    Kameron Mack is a 80 y.o. male with no significant past medical history, not on any anticoagulation presented with acute onset right sided weakness and speech difficulty. Patient was at his usual state of health at around 9:30 pm when he went to take a nap, and then could not get up from his chair. Family noticed right sided weakness. EMS was called who activated stroke alert. Patient was examined by me in [facility]. Patient was noted to have aphasia, dysarthria and right arm and leg weakness. LKW: 09:30 pm   NIH:  10    Allergies  has no allergies on file. Medications  Prior to Admission medications    Not on File    Scheduled Meds:  Continuous Infusions:  PRN Meds:.  Past Medical History   has no past medical history on file.   Social History  Social History     Socioeconomic History    Marital status: Not on file     Spouse name: Not on file    Number of children: Not on file    Years of education: Not on file    Highest education level: Not on file   Occupational History    Not on file   Tobacco Use    Smoking status: Not on file    Smokeless tobacco: Not on file   Substance and Sexual Activity    Alcohol use: Not on file    Drug use: Not on file    Sexual activity: Not on file   Other Topics Concern    Not on file   Social History Narrative    Not on file     Social Determinants of Health Financial Resource Strain:     Difficulty of Paying Living Expenses: Not on file   Food Insecurity:     Worried About Running Out of Food in the Last Year: Not on file    [place] of Food in the Last Year: Not on file   Transportation Needs:     Lack of Transportation (Medical): Not on file    Lack of Transportation (Non-Medical): Not on file   Physical Activity:     Days of Exercise per Week: Not on file    Minutes of Exercise per Session: Not on file   Stress:     Feeling of Stress : Not on file   Social Connections:     Frequency of Communication with Friends and Family: Not on file    Frequency of Social Gatherings with Friends and Family: Not on file    Attends Adventism Services: Not on file    Active Member of [address] [place] [address] [employer] or Organizations: Not on file    Attends Club or Organization Meetings: Not on file    Marital Status: Not on file   Intimate Partner Violence:     Fear of Current or Ex-Partner: Not on file    Emotionally Abused: Not on file    Physically Abused: Not on file    Sexually Abused: Not on file   Housing Stability:     Unable to Pay for Housing in the Last Year: Not on file    Number of [place] in the Last Year: Not on file    Unstable Housing in the Last Year: Not on file     Family History  No family history on file. OBJECTIVE  There were no vitals taken for this visit. Pre-Morbid mRS: 0    Imaging:  Images were personally reviewed with CHERYL. STEVE used to review images including:  CT brain without contrast: reviewed, no evidence of acute intracranial hemorrhage   CTA imaging: pending     Assessment  79 y/o M with no significant pmh presented with acute onset right sided weakness, aphasia, dysarthria and right visual field loss, NIHSS was 10  Patient was a candidate for IV tPA. Recommendations:  Patient is an IV-tPA candidate:    No history of recent bleeding, no history of brain bleed/aneurysm/brain tumor, or recent surgery/trauma. IV-tPA Consent:  I have informed the patient and/or family of all the associated risks including 6% of sich/death and 1-3% of angioedema, benefits, and alternatives to IV t-PA. The patient and/or family voluntarily consent to the administration of IV t-PA. --Admit the patient to an intensive care or stroke unit for monitoring. --If the patient develops severe headache, acute hypertension, nausea, or vomiting or has a worsening neurological examination, discontinue the infusion (if IV rtPA is being administered) and obtain emergent CT scan. --Measure blood pressure and perform neurological assessments every 15 minutes during and after IV rtPA infusion for 2 hours, then every 30 minutes for 6 hours, then hourly until 24 hours after IV rtPA treatment. --Increase the frequency of blood pressure measurements if systolic blood pressure is >180 mmHg or if diastolic blood pressure is >105 mmHg; administer antihypertensive medications to maintain blood pressure at or below these levels  --Delay placement of nasogastric tubes, indwelling bladder catheters, or intra-arterial pressure catheters if the patient can be safely managed without them. --Obtain a follow-up CT or MRI scan at 24 hours after IV rtPA before starting anticoagulants or antiplatelet agents. No antiplatelet agent or SQ heparinoids for 24 hrs. --Keep HOB < 15 degrees  --Keep NPO unless passes bedside dysphagia screen or swallow evaluation.   --Obtain MRI brain w/o contrast, MRA or CTA of head and neck (carotid u/s if MRA and CTA not available), transthoracic echo-to r/o structural heart disease that can lead to source of emboli, fasting lipid panel, HgbA1c.   --Smoking cessation education and nicotine patch if indicated  --IVF with NS @ 100cc per hour  --PT/OT/SLP    - If stroke, initiate antiplatelet therapy (ASA 325mg po qday or Plavix 75mg po qday) after 24 hrs s/p TPA and imaging confirming no hemorrhagic transformation  - If stroke/TIA due to large vessel severe INTRACRANIAL stenosis: Consider  mg + Plavix 75 mg x 90 days, then ASA 81 mg or Plavix ALONE; administer after 24 hrs s/p TPA and imaging confirming no hemorrhagic transformation  - If stroke/TIA due to atrial fibrillation,  then in general: antiplatelet bridge for 6-74 days, then (re) anticoagulate (timing depending on size of stroke, blood on f/u imaging, TTE)  - If stroke/TIA and no clear etiology is found, consider outpatient 30 day event monitor to r/o PAFIB, especially if patient has CV risk factors  - Initiation of statin therapy is recommended to reduce risk of stroke and CV events, in patients with stroke/tia presumed to be of atherosclerotic origin and a LDL level >100mg/dl w/ or w/o evidence of CV disease. - Initiation of blood pressure therapy is indicated for previously untreated patients with stroke/tia who after the first several days have an established BP >995GGZK systolic or >13WYIQ diastolic. Recommend HCTZ +/- ACE-I/ARB for greater stroke prevention        Discussed with Stroke team    At least 24 min of critical care time spent through telemedicine robot at patient bedside (via interactive/real-time software) as patient is in imminent and life threatening deterioration with further treatment and evaluation. This Virtual Visit was conducted with patient's (and/or legal guardian's) consent, to provide telestroke consultation and necessary medical care. Time spent examining patient, reviewing the images personally, reviewing the chart, perform high complexity decision making and speaking with the nursing staff regarding recommendations      Anais Morel MD, MD   Stroke, Neurocritical Care And/or [address] [place] [address] Stroke [facility]   Electronically signed 5/6/2022 at 11:07 PM      This virtual visit was conducted via interactive/real-time audio/video.

## 2022-05-07 NOTE — H&P
Neuro ICU History & Physical    Patient Name: Heavenly Harris  Patient : 1934  Room/Bed: 15/15  Code Status: No Order  Allergies: Not on File    CHIEF COMPLAINT     Acute stroke    HPI    History Obtained From: EMR    The patient is a 80 y.o. male with no significant past medical history, not on anticoagulation who was brought into the emergency department via mobile stroke unit after waking up confused, having difficulty speaking, facial droop and weakness per wife. Last known well was 9:30 PM.  Initial NIH was found to be 12 per mobile stroke team.  Contra CT head done in route and tPA started prior to arrival.  Patient is awake and moving all extremities, but unable to obtain history from patient due to confusion and dysarthria. Admitted to ICU From: Endovascular lab  Reason for ICU Admission: Acute stroke status post tPA and endovascular intervention       PATIENT HISTORY   Past Medical History:    No past medical history on file. Past Surgical History:    No past surgical history on file. Social History:   Social History     Socioeconomic History    Marital status:      Spouse name: Not on file    Number of children: Not on file    Years of education: Not on file    Highest education level: Not on file   Occupational History    Not on file   Tobacco Use    Smoking status: Not on file    Smokeless tobacco: Not on file   Substance and Sexual Activity    Alcohol use: Not on file    Drug use: Not on file    Sexual activity: Not on file   Other Topics Concern    Not on file   Social History Narrative    Not on file     Social Determinants of Health     Financial Resource Strain:     Difficulty of Paying Living Expenses: Not on file   Food Insecurity:     Worried About Running Out of Food in the Last Year: Not on file    [place] of Food in the Last Year: Not on file   Transportation Needs:     Lack of Transportation (Medical):  Not on file    Lack of Transportation (Non-Medical): Not on file   Physical Activity:     Days of Exercise per Week: Not on file    Minutes of Exercise per Session: Not on file   Stress:     Feeling of Stress : Not on file   Social Connections:     Frequency of Communication with Friends and Family: Not on file    Frequency of Social Gatherings with Friends and Family: Not on file    Attends Sikhism Services: Not on file    Active Member of [address] [place] [address] or Organizations: Not on file    Attends Club or Organization Meetings: Not on file    Marital Status: Not on file   Intimate Partner Violence:     Fear of Current or Ex-Partner: Not on file    Emotionally Abused: Not on file    Physically Abused: Not on file    Sexually Abused: Not on file   Housing Stability:     Unable to Pay for Housing in the Last Year: Not on file    Number of [place] in the Last Year: Not on file    Unstable Housing in the Last Year: Not on file       Family History:   No family history on file. Allergies:    Patient has no allergy information on record. Medications Prior to Admission:    Not in a hospital admission. Current Medications:  Current Facility-Administered Medications: alteplase (ACTIVASE) injection, , IntraVENous, Continuous PRN  alteplase (ACTIVASE) injection, , IntraVENous, Continuous PRN    REVIEW OF SYSTEMS     Unable to obtain due to acuity of condition    PHYSICAL EXAM:     BP (!) 161/96   Pulse 94   Temp 98 °F (36.7 °C)   Resp 21   Ht 5' 9\" (1.753 m)   Wt 175 lb (79.4 kg)   SpO2 93%   BMI 25.84 kg/m²     PHYSICAL EXAM:  CONSTITUTIONAL:  Well developed, well nourished, alert    HEAD:  normocephalic, atraumatic    EYES:  PERRLA, EOMI.  Visual acuity and peripheral vision intact b/l   ENT:  moist mucous membranes   NECK:  supple, symmetric   LUNGS:  Equal air entry bilaterally   CARDIOVASCULAR:  normal s1 / s2, RRR, distal pulses intact   ABDOMEN:  Soft, no rigidity   NEUROLOGIC:  Mental Status: Awake and alert, but confused Cranial Nerves:    II: Visual acuity:  normal  II: Visual fields:  normal  III: Pupils:  equal, round, reactive to light  III,IV,VI: Extra Ocular Movements: intact  V: Facial sensation:  intact  VII: Facial strength: abnormal flattening of nasolabial fold  VIII: Hearing:  intact  IX: Palate:  intact  XI: Shoulder shrug:  intact  XII: Tongue movement:  normal    Motor Exam:    Drift:  present -right upper extremity drift, does not hit bed  Tone:  normal    MOTOR:  RUE: 3/5  LUE: 5/5  RLE: 5/5  LLE: 5/5    Sensory:    Touch:    Right Upper Extremity:  normal  Left Upper Extremity:  normal  Right Lower Extremity:  normal  Left Lower Extremity:  normal    Deep Tendon Reflexes:    Right Bicep:  2+  Left Bicep:  2+  Right Knee:  2+  Left Knee:  2+    Plantar Response:  Right:  downgoing  Left:  downgoing    Clonus:  absent         NIH Stroke Scale Total (if not done complete detailed one below):    1a.  Level of consciousness:  0 - alert; keenly responsive  1b. Level of consciousness questions:  2 - answers neither question correctly  1c. Level of consciousness questions:  2 - performs neither task correctly  2. Best Gaze:  1 - partial gaze palsy  3. Visual:  0 - no visual loss  4. Facial Palsy:  1 - minor paralysis (flattened nasolabial fold, asymmetric on smiling)  5a. Motor left arm:  0 - no drift, limb holds 90 (or 45) degrees for full 10 seconds  5b. Motor right arm:  1 - drift, limb holds 90 (or 45) degrees but drifts down before full 10 seconds: does not hit bed  6a. Motor left le - no drift; leg holds 30 degree position for full 5 seconds  6b. Motor right le - no drift; leg holds 30 degree position for full 5 seconds  7. Limb Ataxia:  0 - absent  8. Sensory:  0 - normal; no sensory loss  9. Best Language:  2 - severe aphasia; all communication is through fragmentary expression; great need for inference, questioning, and guessing by the listener.   Range of information that can be exchanged is limited; listener carries burden of communication. Examiner cannot identify materials provided from patient response. 10.  Dysarthria:  2 - severe; patient speech is so slurred as to be unintelligible in the absence of or our of proportion to any dysphagia, or is mute/anarthric   11. Extinction and Inattention:  0 - no abnormality    NIH = 11    LABS AND IMAGING:     RECENT LABS:  CBC with Differential:    Lab Results   Component Value Date    WBC 4.7 05/07/2022    RBC 3.51 05/07/2022    HGB 11.4 05/07/2022    HCT 34.8 05/07/2022    PLT See Reflexed IPF Result 05/07/2022    MCV 99.1 05/07/2022    MCH 32.5 05/07/2022    MCHC 32.8 05/07/2022    RDW 12.7 05/07/2022    LYMPHOPCT 50 05/07/2022    MONOPCT 16 05/07/2022    BASOPCT 0 05/07/2022    MONOSABS 0.73 05/07/2022    LYMPHSABS 2.33 05/07/2022    EOSABS 0.31 05/07/2022    BASOSABS <0.03 05/07/2022     BMP:    Lab Results   Component Value Date     05/07/2022    K 4.1 05/07/2022     05/07/2022    CO2 26 05/07/2022    BUN 20 05/07/2022    CREATININE 0.96 05/07/2022    CALCIUM 9.1 05/07/2022    GFRAA >60 05/07/2022    LABGLOM >60 05/07/2022    GLUCOSE 137 05/07/2022       RADIOLOGY:  CT HEAD WO CONTRAST   Final Result   Motion degraded exam. No acute hemorrhage given limitation. No midline shift. Slight relative decreased attenuation of the right temporal lobe with overall   suboptimal evaluation for gray-white differentiation due to motion artifact. Slight relative decreased attenuation of the left temporal lobe anteriorly. Correlate with presenting symptoms. CTA HEAD NECK W CONTRAST    (Results Pending)   CT HEAD WO CONTRAST    (Results Pending)   CT BRAIN PERFUSION    (Results Pending)             Labs and Images reviewed with:    [] Kashmir Valentine MD    [x] Segundo Lafleur MD  [] Hugh Wing MD  --[] there are no new interval images to review.      ASSESSMENT AND PLAN:         ASSESSMENT:     This is a 80 y.o. male

## 2022-05-07 NOTE — CARE COORDINATION
Social work is following to complete a PHQ 9 depression screening when pt is able to participate. Clothing

## 2022-05-07 NOTE — CODE DOCUMENTATION
Pt to ED straight to CT via mobile stroke ambulance with EMS team due to Stroke like symptoms. With an on going TPA infusion administered upon arrival at right [facility] gauge. 18. Wife witnessed slurring of speech, body weakness, confusion and facial droop. LKW at 2130H as per wife. Mobile stroke reports NIH stroke at 15, reports right side deficit and facial droop, loss of vision and aphasia. IV x2 PTA creatinine level of 1.1.

## 2022-05-07 NOTE — CARE COORDINATION
Case Management Initial Discharge Plan  Kusum Lee,             Talked  with:spouse/[place] on the phone to discuss discharge plans. Information verified: address, contacts, phone number, , insurance Yes  Insurance Provider: [facility]    Emergency Contact/Next of Kin name & number: wife Dcesqayb--5, daughter [phone]  Who are involved in patient's support system? Wife and daughter    PCP: Garrison Gallegos MD  Date of last visit: May 2022      Discharge Planning    Living Arrangements:    home with wife    Home has 2 stories  5 stairs to climb to get into front door  Location of bedroom/bathroom in home main level    Patient able to perform ADL's:Assisted    Current Services (outpatient & in home) none  DME equipment: none  DME provider:     Is patient receiving oral anticoagulation therapy? No      Does patient have any issues/concerns obtaining medications? No      Is there a preferred Pharmacy after hours or on weekends? Yes    If yes, which pharmacy? [facility]    Potential Assistance Needed:       Patient agreeable to home care: No  [place] of choice provided:  n/a    Prior SNF/Rehab Placement and Facility: N/A  Agreeable to SNF/Rehab: Yes  [place] of choice provided: yes     Evaluation: n/a    Expected Discharge date:       Patient expects to be discharged to:   home, open to SNF and ARU    If home: is the family and/or caregiver wiling & able to provide support at home? yes  Who will be providing this support? wife*    Follow Up Appointment: Best Day/ Time:      Transportation provider: daughter or ambulance  Transportation arrangements needed for discharge: Yes    Readmission Risk              Risk of Unplanned Readmission:  11             Does patient have a readmission risk score greater than 14?: No  If yes, follow-up appointment must be made within 7 days of discharge.      Goals of Care: safety      Educated wu Quiroz on transitional

## 2022-05-08 ENCOUNTER — APPOINTMENT (OUTPATIENT)
Dept: CT IMAGING | Age: 87
DRG: 023 | End: 2022-05-08
Payer: MEDICARE

## 2022-05-08 PROBLEM — I50.9 CHF (CONGESTIVE HEART FAILURE) (HCC): Status: ACTIVE | Noted: 2022-05-08

## 2022-05-08 PROBLEM — I21.4 NSTEMI (NON-ST ELEVATED MYOCARDIAL INFARCTION) (HCC): Status: ACTIVE | Noted: 2022-05-08

## 2022-05-08 LAB
ABSOLUTE EOS #: 0 K/UL (ref 0–0.4)
ABSOLUTE IMMATURE GRANULOCYTE: 0.2 K/UL (ref 0–0.3)
ABSOLUTE LYMPH #: 1.77 K/UL (ref 1–4.8)
ABSOLUTE MONO #: 0.82 K/UL (ref 0.1–0.8)
ANION GAP SERPL CALCULATED.3IONS-SCNC: 8 MMOL/L (ref 9–17)
BASOPHILS # BLD: 0 % (ref 0–2)
BASOPHILS ABSOLUTE: 0 K/UL (ref 0–0.2)
BUN BLDV-MCNC: 9 MG/DL (ref 8–23)
CALCIUM SERPL-MCNC: 8.6 MG/DL (ref 8.6–10.4)
CHLORIDE BLD-SCNC: 109 MMOL/L (ref 98–107)
CO2: 21 MMOL/L (ref 20–31)
CREAT SERPL-MCNC: 0.67 MG/DL (ref 0.7–1.2)
EOSINOPHILS RELATIVE PERCENT: 0 % (ref 1–4)
ESTIMATED AVERAGE GLUCOSE: 148 MG/DL
GFR AFRICAN AMERICAN: >60 ML/MIN
GFR NON-AFRICAN AMERICAN: >60 ML/MIN
GFR SERPL CREATININE-BSD FRML MDRD: ABNORMAL ML/MIN/{1.73_M2}
GLUCOSE BLD-MCNC: 131 MG/DL (ref 70–99)
HBA1C MFR BLD: 6.8 % (ref 4–6)
HCT VFR BLD CALC: 33.6 % (ref 40.7–50.3)
HEMOGLOBIN: 11 G/DL (ref 13–17)
IMMATURE GRANULOCYTES: 3 %
LYMPHOCYTES # BLD: 26 % (ref 24–44)
MCH RBC QN AUTO: 31.5 PG (ref 25.2–33.5)
MCHC RBC AUTO-ENTMCNC: 32.7 G/DL (ref 28.4–34.8)
MCV RBC AUTO: 96.3 FL (ref 82.6–102.9)
MONOCYTES # BLD: 12 % (ref 1–7)
MORPHOLOGY: NORMAL
NRBC AUTOMATED: 0 PER 100 WBC
PDW BLD-RTO: 13.1 % (ref 11.8–14.4)
PLATELET # BLD: ABNORMAL K/UL (ref 138–453)
PLATELET, FLUORESCENCE: 121 K/UL (ref 138–453)
PLATELET, IMMATURE FRACTION: 4.5 % (ref 1.1–10.3)
POTASSIUM SERPL-SCNC: 4 MMOL/L (ref 3.7–5.3)
RBC # BLD: 3.49 M/UL (ref 4.21–5.77)
SEG NEUTROPHILS: 59 % (ref 36–66)
SEGMENTED NEUTROPHILS ABSOLUTE COUNT: 4.01 K/UL (ref 1.8–7.7)
SODIUM BLD-SCNC: 138 MMOL/L (ref 135–144)
TROPONIN, HIGH SENSITIVITY: 236 NG/L (ref 0–22)
TROPONIN, HIGH SENSITIVITY: 279 NG/L (ref 0–22)
WBC # BLD: 6.8 K/UL (ref 3.5–11.3)

## 2022-05-08 PROCEDURE — 99233 SBSQ HOSP IP/OBS HIGH 50: CPT | Performed by: PSYCHIATRY & NEUROLOGY

## 2022-05-08 PROCEDURE — 97162 PT EVAL MOD COMPLEX 30 MIN: CPT

## 2022-05-08 PROCEDURE — 2500000003 HC RX 250 WO HCPCS: Performed by: NURSE PRACTITIONER

## 2022-05-08 PROCEDURE — 85055 RETICULATED PLATELET ASSAY: CPT

## 2022-05-08 PROCEDURE — 2580000003 HC RX 258: Performed by: HEALTH CARE PROVIDER

## 2022-05-08 PROCEDURE — 85025 COMPLETE CBC W/AUTO DIFF WBC: CPT

## 2022-05-08 PROCEDURE — 6370000000 HC RX 637 (ALT 250 FOR IP): Performed by: PSYCHIATRY & NEUROLOGY

## 2022-05-08 PROCEDURE — 6360000002 HC RX W HCPCS: Performed by: NURSE PRACTITIONER

## 2022-05-08 PROCEDURE — 6370000000 HC RX 637 (ALT 250 FOR IP): Performed by: NURSE PRACTITIONER

## 2022-05-08 PROCEDURE — 97116 GAIT TRAINING THERAPY: CPT

## 2022-05-08 PROCEDURE — 2500000003 HC RX 250 WO HCPCS: Performed by: HEALTH CARE PROVIDER

## 2022-05-08 PROCEDURE — 97535 SELF CARE MNGMENT TRAINING: CPT

## 2022-05-08 PROCEDURE — 2060000000 HC ICU INTERMEDIATE R&B

## 2022-05-08 PROCEDURE — 70450 CT HEAD/BRAIN W/O DYE: CPT

## 2022-05-08 PROCEDURE — 84484 ASSAY OF TROPONIN QUANT: CPT

## 2022-05-08 PROCEDURE — 80048 BASIC METABOLIC PNL TOTAL CA: CPT

## 2022-05-08 PROCEDURE — 97166 OT EVAL MOD COMPLEX 45 MIN: CPT

## 2022-05-08 PROCEDURE — 92610 EVALUATE SWALLOWING FUNCTION: CPT

## 2022-05-08 RX ORDER — ENOXAPARIN SODIUM 100 MG/ML
40 INJECTION SUBCUTANEOUS DAILY
Status: DISCONTINUED | OUTPATIENT
Start: 2022-05-08 | End: 2022-05-09

## 2022-05-08 RX ORDER — LISINOPRIL 5 MG/1
5 TABLET ORAL DAILY
Status: DISCONTINUED | OUTPATIENT
Start: 2022-05-08 | End: 2022-05-15

## 2022-05-08 RX ORDER — TAMSULOSIN HYDROCHLORIDE 0.4 MG/1
0.4 CAPSULE ORAL DAILY
Status: DISCONTINUED | OUTPATIENT
Start: 2022-05-08 | End: 2022-05-18 | Stop reason: HOSPADM

## 2022-05-08 RX ORDER — ASPIRIN 81 MG/1
81 TABLET, CHEWABLE ORAL DAILY
Status: DISCONTINUED | OUTPATIENT
Start: 2022-05-08 | End: 2022-05-18 | Stop reason: HOSPADM

## 2022-05-08 RX ADMIN — TAMSULOSIN HYDROCHLORIDE 0.4 MG: 0.4 CAPSULE ORAL at 10:40

## 2022-05-08 RX ADMIN — OLANZAPINE 5 MG: 10 INJECTION, POWDER, LYOPHILIZED, FOR SOLUTION INTRAMUSCULAR at 00:18

## 2022-05-08 RX ADMIN — METOPROLOL TARTRATE 25 MG: 25 TABLET ORAL at 10:40

## 2022-05-08 RX ADMIN — METOPROLOL TARTRATE 25 MG: 25 TABLET ORAL at 21:30

## 2022-05-08 RX ADMIN — ATORVASTATIN CALCIUM 80 MG: 80 TABLET, FILM COATED ORAL at 22:35

## 2022-05-08 RX ADMIN — LISINOPRIL 5 MG: 5 TABLET ORAL at 10:40

## 2022-05-08 RX ADMIN — Medication 10 MG: at 01:10

## 2022-05-08 RX ADMIN — ASPIRIN 81 MG: 81 TABLET, CHEWABLE ORAL at 10:40

## 2022-05-08 RX ADMIN — ENOXAPARIN SODIUM 40 MG: 100 INJECTION SUBCUTANEOUS at 10:40

## 2022-05-08 ASSESSMENT — PAIN SCALES - GENERAL
PAINLEVEL_OUTOF10: 0
PAINLEVEL_OUTOF10: 0

## 2022-05-08 NOTE — PLAN OF CARE
Pt assessed as a fall risk this shift. Remains free from falls and accidental injury at this time. Fall precautions in place, including falling star sign and fall risk band on pt. Floor free from obstacles, and bed is locked and in lowest position. Adequate lighting provided. Pt encouraged to call before getting Out Of Bed for any need. Bed alarm activated. Will continue to monitor needs during hourly rounding, and reinforce education on use of call light. Pain level assessed q shift and PRN. Patient able to state tolerable level of pain. PRN meds given per patient request. Pain reassessed after pain interventions. Will continue to monitor patient.

## 2022-05-08 NOTE — PLAN OF CARE
Problem: Discharge Planning  Goal: Discharge to home or other facility with appropriate resources  Outcome: Progressing     Problem: Chronic Conditions and Co-morbidities  Goal: Patient's chronic conditions and co-morbidity symptoms are monitored and maintained or improved  Outcome: Progressing     Problem: Safety - Adult  Goal: Free from fall injury  5/8/2022 0546 by Vinnie Vázquez RN  Outcome: Progressing  5/7/2022 1854 by Betty Silva RN  Outcome: Progressing     Problem: ABCDS Injury Assessment  Goal: Absence of physical injury  5/8/2022 0546 by Vinnie Vázquez RN  Outcome: Progressing  5/7/2022 1854 by Betty Silva RN  Outcome: Progressing     Problem: Skin/Tissue Integrity  Goal: Absence of new skin breakdown  Description: 1. Monitor for areas of redness and/or skin breakdown  2. Assess vascular access sites hourly  3. Every 4-6 hours minimum:  Change oxygen saturation probe site  4. Every 4-6 hours:  If on nasal continuous positive airway pressure, respiratory therapy assess nares and determine need for appliance change or resting period. 5/8/2022 0546 by Vinnie Vázquez RN  Outcome: Progressing  5/7/2022 1854 by Betty Silva RN  Outcome: Progressing     Problem: Safety - Medical Restraint  Goal: Remains free of injury from restraints (Restraint for Interference with Medical Device)  Description: INTERVENTIONS:  1. Determine that other, less restrictive measures have been tried or would not be effective before applying the restraint  2. Evaluate the patient's condition at the time of restraint application  3. Inform patient/family regarding the reason for restraint  4.  Q2H: Monitor safety, psychosocial status, comfort, nutrition and hydration  Outcome: Progressing  Flowsheets (Taken 5/8/2022 0008)  Remains free of injury from restraints (restraint for interference with medical device): Every 2 hours: Monitor safety, psychosocial status, comfort, nutrition and hydration

## 2022-05-08 NOTE — PLAN OF CARE
NIH Stroke Scale  Interval: Other (Comment)  Level of Consciousness (1a): Alert  LOC Questions (1b): Answers neither question correctly  LOC Commands (1c): Performs both tasks correctly  Best Gaze (2): Normal  Visual (3): No visual loss  Facial Palsy (4): Normal symmetrical movement  Motor Arm, Left (5a): No drift  Motor Arm, Right (5b): No drift  Motor Leg, Left (6a): No drift  Motor Leg, Right (6b):  No drift  Limb Ataxia (7): (!) Present in one limb  Sensory (8): Normal  Best Language (9): Mild to moderate aphasia  Dysarthria (10): Mild to moderate, slurs some words  Extinction and Inattention (11): No abnormality  Total: 5

## 2022-05-08 NOTE — CONSULTS
consulted because of elevated troponin. Troponin 330, 229 and 269  ECG showed normal sinus rhythm with Q waves in inferior leads and anterior leads along with ST depression in lateral leads. No history of coronary artery disease in the past.  Denies any chest pain or shortness of breath. Hemodynamically stable. Past Medical History:   has a past medical history of Arthritis and Psoriasis. Past Surgical History:   has a past surgical history that includes Prostate surgery. Home Medications:    Prior to Admission medications    Medication Sig Start Date End Date Taking? Authorizing Provider   calcipotriene (DOVONEX) 0.005 % solution Apply topically 2 times daily psoriasis   Yes Historical Provider, MD   clobetasol (TEMOVATE) 0.05 % cream Apply topically 2 times daily Indications: Psoriasis Apply topically 2 times daily. Yes Historical Provider, MD   meloxicam (MOBIC) 15 MG tablet Take 15 mg by mouth daily   Yes Historical Provider, MD      Current Facility-Administered Medications: ondansetron (ZOFRAN-ODT) disintegrating tablet 4 mg, 4 mg, Oral, Q8H PRN **OR** ondansetron (ZOFRAN) injection 4 mg, 4 mg, IntraVENous, Q6H PRN  ondansetron (ZOFRAN-ODT) disintegrating tablet 4 mg, 4 mg, Oral, Q8H PRN **OR** ondansetron (ZOFRAN) injection 4 mg, 4 mg, IntraVENous, Q6H PRN  polyethylene glycol (GLYCOLAX) packet 17 g, 17 g, Oral, Daily PRN  atorvastatin (LIPITOR) tablet 80 mg, 80 mg, Oral, Nightly  0.9 % sodium chloride infusion, , IntraVENous, Continuous  labetalol (NORMODYNE;TRANDATE) injection 10 mg, 10 mg, IntraVENous, Q4H PRN  acetaminophen (TYLENOL) tablet 650 mg, 650 mg, Oral, Q4H PRN    Allergies:  Patient has no known allergies. Social History:   reports that he has been smoking pipe. He does not have any smokeless tobacco history on file. Family History: family history is not on file. No h/o sudden cardiac death. No for premature CAD    REVIEW OF SYSTEMS:    · Constitutional: there has been no unanticipated weight loss. There's been No change in energy level, No change in activity level. · Eyes: No visual changes or diplopia. No scleral icterus. · ENT: No Headaches  · Cardiovascular: No cardiac history  · Respiratory: No previous pulmonary problems, No cough  · Gastrointestinal: No abdominal pain. No change in bowel or bladder habits. · Genitourinary: No dysuria, trouble voiding, or hematuria. · Musculoskeletal:  No gait disturbance, No weakness or joint complaints. · Integumentary: No rash or pruritis. PHYSICAL EXAM:      /72   Pulse 76   Temp 99.3 °F (37.4 °C) (Oral)   Resp 12   Ht 5' 9\" (1.753 m)   Wt 175 lb (79.4 kg)   SpO2 96%   BMI 25.84 kg/m²    Constitutional and General Appearance: alert, cooperative, no distress and appears stated age  HEENT: PERRL, no cervical lymphadenopathy. No masses palpable. Normal oral mucosa  Respiratory:  · Normal excursion and expansion without use of accessory muscles  · Resp Auscultation: Good respiratory effort. No for increased work of breathing. On auscultation: clear to auscultation bilaterally  Cardiovascular:  · The apical impulse is not displaced  · Heart tones are crisp and normal. regular S1 and S2.  · Jugular venous pulsation Normal  · The carotid upstroke is normal in amplitude and contour without delay or bruit  · Peripheral pulses are symmetrical and full   Abdomen:   · No masses or tenderness  · Bowel sounds present  Extremities:  ·  No Cyanosis or Clubbing  ·  Lower extremity edema: No  ·  Skin: Warm and dry      CBC:   Recent Labs     05/07/22  0630 05/07/22  1237   WBC 5.0 5.5   HGB 11.0* 10.9*   HCT 34.4* 32.8*   PLT See Reflexed IPF Result See Reflexed IPF Result     BMP:   Recent Labs     05/07/22  0000 05/07/22  0630    144   K 4.1 4.3   CO2 26 20   BUN 20 16   CREATININE 0.96 0.68*   LABGLOM >60 >60   GLUCOSE 137* 126*     BNP: No results for input(s): BNP in the last 72 hours.   PT/INR:   Recent Labs 05/07/22  0000   PROTIME 11.5   INR 1.1     APTT:  Recent Labs     05/07/22  0000   APTT 21.9     CARDIAC ENZYMES:  Recent Labs     05/07/22  0000   CKTOTAL 113     FASTING LIPID PANEL:  Lab Results   Component Value Date    HDL 52 05/07/2022    TRIG 50 05/07/2022     LIVER PROFILE:No results for input(s): AST, ALT, LABALBU in the last 72 hours. IMPRESSION:    Patient Active Problem List   Diagnosis    Cerebrovascular accident (CVA) ([facility] [address]    Acute ischemic stroke ([facility] [address]       Assessment:    1. Acute CVA s/p tPA and mechanical thrombectomy of left MCA thrombus  2 elevated troponin likely NSTEMI type I.    RECOMMENDATIONS:  1. ECG showed normal sinus rhythm with Q waves in inferior and anterior leads along with ST depression in V4 to V6. No history of coronary artery disease. 2. Obtain 2D echocardiography to rule out any structural and functional abnormality of the heart. 3. Needs clearance for antiplatelet(DAPT) and anticoagulation from neurology/neuro endovascular. 4. If patient is cleared for anticoagulation and antiplatelets we will plan for left heart cath tomorrow. Discussed with patient and Nurse.     Electronically signed by Sloan Cranker, MD on 5/7/2022 at 8:58 PM    [facility]      [phone]

## 2022-05-08 NOTE — PROGRESS NOTES
Progress Note  Neurology Service     Patient Name: Tricia Samano  Patient : 1934  Room/Bed: 0526/0526-01  Code Status: Full code   Allergies: No Known Allergies    CHIEF COMPLAINT:     Facial droop, acute onset right-sided weakness, difficulty speaking     INTERVAL HISTORY    Initial Presentation (Admitted 2022):  Chema Nelson is a 80 y.o. male with no significant PMH and not presently on [facility] who presented via EMS after being noted to have acute onset right sided weakness and speech difficulty. Wife reports he fell asleep on the recliner at 9:30pm and woke up around 10om and couldn't get up from the chair. Family also noted right sided weakness. EMS was called and activated stroke alert. Patient was evaluated by mobile stroke unit. He was noted to have aphasia, dysarthria, and right arm and leg weakness. LKW: 9:30PM  NIH: 14     Non contrast CT Head obtained. Negative for any acute bleed  Was given tPa bolus dose at 2311 and infusion started 2313. Hospital Course:   CTA head and neck: Showed left M1 occlusion. Patient was taken for mechanical thrombectomy for M1 occlusion, TICI 03. Patient was given 600 mg aspirin x1. Overnight patient become confused, agitated requiring 2 dose of Zyprexa 5 mg. Requiring bilateral soft mitten. Patient requiring blood pressure medication hydralazine x2, labetalol x1 for blood pressure control. CT head: Evolving left temporal lobe ischemia, with small focus of hemorrhagic conversion. Recommend only to start aspirin and DVT prophylaxis for now. Elevated troponin, Plavix and heparin were held due to new hemorrhagic transformation. Cardiology is following for low ejection fraction.       CURRENT MEDICATIONS:  SCHEDULED MEDICATIONS:   aspirin  81 mg Oral Daily    metoprolol tartrate  25 mg Oral BID    lisinopril  5 mg Oral Daily    enoxaparin  40 mg SubCUTAneous Daily    tamsulosin  0.4 mg Oral Daily    atorvastatin  80 mg Oral Nightly     CONTINUOUS INFUSIONS:   sodium chloride 50 mL/hr at 22 1512     PRN MEDICATIONS:   ondansetron **OR** ondansetron, ondansetron **OR** ondansetron, polyethylene glycol, labetalol, acetaminophen, hydrALAZINE    VITALS:  Temperature Range: Temp: 99.9 °F (37.7 °C) Temp  Av °F (37.2 °C)  Min: 98.3 °F (36.8 °C)  Max: 99.9 °F (37.7 °C)  BP Range: Systolic (85MUO), UIE:964 , Min:118 , NVU:213     Diastolic (92DCR), PDT:40, Min:72, Max:92    Pulse Range: Pulse  Av.3  Min: 75  Max: 111  Respiration Range: Resp  Av.7  Min: 11  Max: 29  Current Pulse Ox: SpO2: 99 %  24HR Pulse Ox Range: SpO2  Av.3 %  Min: 93 %  Max: 99 %  Patient Vitals for the past 12 hrs:   BP Temp Temp src Pulse Resp SpO2   22 1500 138/85 -- -- 75 13 99 %   22 0739 (!) 129/92 99.9 °F (37.7 °C) Axillary 109 29 98 %   22 0700 -- -- -- 85 22 96 %   22 0600 -- -- -- 84 21 95 %     Estimated body mass index is 25.84 kg/m² as calculated from the following:    Height as of this encounter: 5' 9\" (1.753 m).     Weight as of this encounter: 175 lb (79.4 kg).  []<16 Severe malnutrition  []16-16.99 Moderate malnutrition  []17-18.49 Mild malnutrition  []18.5-24.9 Normal  [x]25-29.9 Overweight (not obese)  []30-34.9 Obese class 1 (Low Risk)  []35-39.9 Obese class 2 (Moderate Risk)  []?40 Obese class 3 (High Risk)    RECENT LABS:   Lab Results   Component Value Date    WBC 6.8 2022    HGB 11.0 (L) 2022    HCT 33.6 (L) 2022    PLT See Reflexed IPF Result 2022    CHOL 133 2022    TRIG 50 2022    HDL 52 2022     2022    K 4.0 2022     (H) 2022    CREATININE 0.67 (L) 2022    BUN 9 2022    CO2 21 2022    TSH 1.30 2022    INR 1.1 2022    LABA1C 6.8 (H) 2022     24 HOUR INTAKE/OUTPUT:    Intake/Output Summary (Last 24 hours) at 2022 1609  Last data filed at 2022 1100  Gross per 24 hour   Intake 1986.53 ml   Output 1765 ml   Net 221.53 ml        IMAGING:   CT head: 5/8/2022  Evolving subacute ischemic infarct of the anterior left temporal lobe small focus of hemorrhagic conversion has not appreciably changed. PHYSICAL EXAM       CONSTITUTIONAL:   Patient drowsy, open eyes to voice, does follow command intermittently, irritable. HEAD:  normocephalic, atraumatic    EYES:  PERRLA, EOMI.   ENT:  moist mucous membranes   NECK:  supple, symmetric   LUNGS:  Equal air entry bilaterally   CARDIOVASCULAR:  normal s1 / s2, RRR, distal pulses intact   ABDOMEN:  Soft, no rigidity   NEUROLOGIC:  Mental Status:     Patient drowsy, open eyes to voice. Irritable not participating in exam.     Answer's I dnot know to all questions   Patient moving all 4 extremities antigravity with significant strength            Gait: Unable to assess          ASSESSMENT AND PLAN:         NEUROLOGIC:  -Left M1 complete occlusion s/p mechanical thrombectomy TICI 03  -CT head: Evolving left anterior left  temporal lobe infarction with small hemorrhagic    -Aspirin 81  -Lipitor 80  -Hold Plavix, heparin.   -Will repeat CT head in 24 hours, if stable, the patient is okay to start with low-dose heparin drip without bolus, cardiology is okay with cardiac cath. Will discuss with the family tomorrow   -Goal -170  -Cardiology is following for ejection fraction of 13%, pending plan for cardiac cath. We will continue to follow along. Ryanne Marmolejo MD  PGY-4, Neurology resident  [facility].   5:11 PM 5/8/2022

## 2022-05-08 NOTE — PROGRESS NOTES
Daily Progress Note  Neuro Critical Care    Patient Name: Josh Chamorro  Patient : 1934  Room/Bed: 0526/0526-01  Code Status: Full code   Allergies: No Known Allergies    CHIEF COMPLAINT:     Facial droop, acute onset right-sided weakness, difficulty speaking     INTERVAL HISTORY    Initial Presentation (Admitted 2022):  Nadia Cavazos is a 80 y.o. male with no significant PMH and not presently on [facility] who presented via EMS after being noted to have acute onset right sided weakness and speech difficulty. Wife reports he fell asleep on the recliner at 9:30pm and woke up around 10om and couldn't get up from the chair. Family also noted right sided weakness. EMS was called and activated stroke alert. Patient was evaluated by mobile stroke unit. He was noted to have aphasia, dysarthria, and right arm and leg weakness. LKW: 9:30PM  NIH: 14     Non contrast CT Head obtained. Negative for any acute bleed  Was given tPa bolus dose at 2311 and infusion started 2313. Hospital Course:   CTA head and neck: Showed left M1 occlusion. Patient was taken for mechanical thrombectomy for M1 occlusion, TICI 03. Patient was given 600 mg aspirin x1. Transfer to neuro ICU for further management. Last 24h:   Overnight patient become confused, agitated requiring 2 dose of Zyprexa 5 mg. Requiring bilateral soft mitten. Patient requiring blood pressure medication hydralazine x2, labetalol x1 for blood pressure control. CT head: Evolving left temporal lobe ischemia, with small focus of hemorrhagic conversion. Recommend only to start aspirin and DVT prophylaxis for now.       CURRENT MEDICATIONS:  SCHEDULED MEDICATIONS:   aspirin  81 mg Oral Daily    metoprolol tartrate  25 mg Oral BID    lisinopril  5 mg Oral Daily    enoxaparin  40 mg SubCUTAneous Daily    tamsulosin  0.4 mg Oral Daily    atorvastatin  80 mg Oral Nightly     CONTINUOUS INFUSIONS:   sodium chloride 75 mL/hr at 22 0714     PRN MEDICATIONS:   ondansetron **OR** ondansetron, ondansetron **OR** ondansetron, polyethylene glycol, labetalol, acetaminophen, hydrALAZINE    VITALS:  Temperature Range: Temp: 99.9 °F (37.7 °C) Temp  Av.9 °F (37.2 °C)  Min: 98.1 °F (36.7 °C)  Max: 99.9 °F (37.7 °C)  BP Range: Systolic (91CER), ZLK:077 , Min:118 , DIV:502     Diastolic (63CSG), BO, Min:61, Max:95    Pulse Range: Pulse  Av  Min: 68  Max: 111  Respiration Range: Resp  Av.3  Min: 10  Max: 29  Current Pulse Ox: SpO2: 98 %  24HR Pulse Ox Range: SpO2  Av.1 %  Min: 93 %  Max: 99 %  Patient Vitals for the past 12 hrs:   BP Temp Temp src Pulse Resp SpO2   22 0739 (!) 129/92 99.9 °F (37.7 °C) Axillary 109 29 98 %   22 0700 -- -- -- 85 22 96 %   22 0600 -- -- -- 84 21 95 %   22 0400 -- 98.3 °F (36.8 °C) Oral 93 17 95 %   22 0300 -- -- -- 83 11 97 %   22 0200 -- -- -- 85 16 99 %   22 0100 -- -- -- 89 20 95 %   22 0024 -- -- -- 104 22 95 %   22 0000 -- 98.8 °F (37.1 °C) Oral 111 21 93 %   22 2300 -- -- -- 91 20 98 %     Estimated body mass index is 25.84 kg/m² as calculated from the following:    Height as of this encounter: 5' 9\" (1.753 m).     Weight as of this encounter: 175 lb (79.4 kg).  []<16 Severe malnutrition  []16-16.99 Moderate malnutrition  []17-18.49 Mild malnutrition  []18.5-24.9 Normal  [x]25-29.9 Overweight (not obese)  []30-34.9 Obese class 1 (Low Risk)  []35-39.9 Obese class 2 (Moderate Risk)  []?40 Obese class 3 (High Risk)    RECENT LABS:   Lab Results   Component Value Date    WBC 6.8 2022    HGB 11.0 (L) 2022    HCT 33.6 (L) 2022    PLT See Reflexed IPF Result 2022    CHOL 133 2022    TRIG 50 2022    HDL 52 2022     2022    K 4.0 2022     (H) 2022    CREATININE 0.67 (L) 2022    BUN 9 2022    CO2 21 2022    TSH 1.30 2022    INR 1.1 2022    LABA1C 6.8 (H) 05/07/2022     24 HOUR INTAKE/OUTPUT:    Intake/Output Summary (Last 24 hours) at 5/8/2022 1047  Last data filed at 5/8/2022 0800  Gross per 24 hour   Intake 1986.53 ml   Output 3360 ml   Net -1373.47 ml        IMAGING:   CT head: 5/8/2022  Evolving subacute ischemic infarct of the anterior left temporal lobe small focus of hemorrhagic conversion has not appreciably changed. Labs and Images reviewed with:  [] Dr. Melquiades Gallegos    [x] Dr. Reyna Waddell  [] Dr. Laina Wallace  [] There are no new interval images to review. PHYSICAL EXAM       CONSTITUTIONAL:   Patient drowsy, open eyes to voice, does not follow command, irritable. HEAD:  normocephalic, atraumatic    EYES:  PERRLA, EOMI.   ENT:  moist mucous membranes   NECK:  supple, symmetric   LUNGS:  Equal air entry bilaterally   CARDIOVASCULAR:  normal s1 / s2, RRR, distal pulses intact   ABDOMEN:  Soft, no rigidity   NEUROLOGIC:  Mental Status:     Patient drowsy, open eyes to voice. Irritable not participating in exam.    Answer's I dnot know to all questions   Patient moving all 4 extremities            Gait: Unable to assess      TOTAL:     DRAINS:  [x] There are no drains for Neuro Critical Care to monitor at this time. ASSESSMENT AND PLAN:       59-year-old male without significant past medical history presented with acute onset of speech difficulty, facial droop, left. Received tPA. CTA head and neck showed left M1 complete occlusion, underwent mechanical thrombectomy TICI 03. Patient troponin elevated, cardiology consulted recommending cardiac cath once improved mental status.     NEUROLOGIC:  -Left M1 complete occlusion s/p mechanical thrombectomy TICI 03  -CT head: Evolving left anterior left  temporal lobe infarction with small hemorrhagic    -Aspirin 81  -Lipitor 80  -Hold Plavix, heparin  - Goal -170  - Neuro checks per protocol       CARDIOVASCULAR:  - Goal -170  -Lisinopril 5 mg twice daily  -Metoprolol 25 mg twice daily  -Troponin 270  -Cardiology following   -Recommended cardiac cath once mental condition improved  -Echo: Pending  - Continue telemetry    PULMONARY:   -maintaining oxygen saturation on 2 L nasal cannula  -Encourage incentive spirometry      RENAL/FLUID/ELECTROLYTE:  - BUN 16/ Creatinine 0.67    Intake/Output Summary (Last 24 hours) at 5/8/2022 0904  Last data filed at 5/8/2022 0800  Gross per 24 hour   Intake 1986.53 ml   Output 3360 ml   Net -1373.47 ml   - Flomax 0.4  - Replace electrolytes PRN  - Daily BMP      GI/NUTRITION:  NUTRITION:  ADULT DIET; Regular  - Bowel regimen: Dulcolax   - GI prophylaxis:  none    ID:  - Tmax  99.9  - WBC  6.8   - Continue to monitor for fevers  - Daily CBC    HEME:    - H&H 11. & 33.6  - Platelets  346  - Daily CBC    ENDOCRINE:  - Continue to monitor blood glucose, goal <180  -      OTHER:  - PT/OT/ST    - Code Status:  Full    PROPHYLAXIS:  Stress ulcer: none     DVT PROPHYLAXIS:  - SCD sleeves - Thigh High   -Lovenox     DISPOSITION:  [x] To remain ICU:   [] OK for out of ICU from Neuro Critical Care standpoint    We will continue to follow along. For any changes in exam or patient status please contact Neuro Critical Care.       Qi England MD  Neuro Critical Care  Pager [phone]  5/8/2022     10:47 AM

## 2022-05-08 NOTE — PROGRESS NOTES
Physical Therapy  Facility/Department: [address]  Physical Therapy Initial Assessment    Name: Marek Flores  : 1934  MRN: [id number]  Date of Service: 2022  The patient is [phone] y. o. male with no significant past medical history, not on anticoagulation who was brought into the emergency department via mobile stroke unit after waking up confused, having difficulty speaking, facial droop and weakness per wife. Zhao Johnson known well was 9:30 PM.  Initial NIH was found to be 12 per mobile stroke team. Seras Pluck CT head done in route and tPA started prior to arrival.  Patient is awake and moving all extremities, but unable to obtain history from patient due to confusion and dysarthria.     Discharge Recommendations:  Patient would benefit from continued therapy after discharge          Patient Diagnosis(es): The primary encounter diagnosis was Cerebrovascular accident (CVA), unspecified mechanism ([facility] [address] Diagnoses of Altered mental status, unspecified altered mental status type and Right sided weakness were also pertinent to this visit. Past Medical History:  has a past medical history of Arthritis and Psoriasis. Past Surgical History:  has a past surgical history that includes Prostate surgery. Assessment   Body Structures, Functions, Activity Limitations Requiring Skilled Therapeutic Intervention: Decreased functional mobility ; Decreased coordination;Decreased balance;Decreased strength;Decreased safe awareness;Decreased cognition;Decreased endurance;Decreased posture  Assessment: Initially agitated and combative to nurse during eval, but was able to calm down and receive physical therapy evaluation. Gait, transfer, and balance deficits are present limiting safety and causing a high fall risk. Patient's wife aware of deficits and agrees with writer that he'll need further PT.   Therapy Prognosis: Good  Decision Making: Medium Complexity  Clinical Presentation: evolving  Requires PT Follow-Up: Yes  Activity Tolerance  Activity Tolerance: Treatment limited secondary to agitation;Treatment limited secondary to decreased cognition     Plan   Plan  Plan: 6-7 times per week  Current Treatment Recommendations: Strengthening,Balance training,Functional mobility training,Transfer training,Gait training,Neuromuscular re-education,Home exercise program,Cognitive reorientation,Safety education & training,Patient/Caregiver education & training,Equipment evaluation, education, & procurement,Therapeutic activities  Safety Devices  Type of Devices: All fall risk precautions in place,Call light within reach,Gait belt,Nurse notified,Left in chair,Chair alarm in place     Restrictions  Position Activity Restriction  Other position/activity restrictions: SBP less than 160. CVA, s/p tPA, s/p thrombectomy. Combative before and at beginning of eval, not to therapist but to nurse. Subjective   General  Chart Reviewed: Yes  Patient assessed for rehabilitation services?: Yes  Family / Caregiver Present: Yes (Wife and daughter)         Social/Functional History  Social/Functional History  Lives With: Spouse  Type of Home: House  Home Layout: Two level,Able to Live on Main level with bedroom/bathroom  Home Access: Stairs to enter with rails  Entrance Stairs - Number of Steps: 4  Entrance Stairs - Rails: Right  ADL Assistance: Independent  Homemaking Assistance: Independent  Ambulation Assistance: Independent  Transfer Assistance: Independent  Active : Yes  Occupation: Retired  Additional Comments: Wife reports 0 falls in the last 6 months. Prior to CVA, independent gait with no difficulty. Community ambulator. Vision/Hearing Vision not tested due to limited cooperation. Cognition   Orientation  Orientation Level: Oriented to person;Disoriented to place; Disoriented to time;Disoriented to situation  Cognition  Overall Cognitive Status: Exceptions  Arousal/Alertness: Inconsistent responses to stimuli  Following Commands:  Follows one step commands with increased time; Follows one step commands with repetition; Inconsistently follows commands  Attention Span: Difficulty attending to directions  Memory: Decreased short term memory;Decreased recall of recent events (Able to provide his  and say how many years he's been . Wife confirms he was accurate.)  Safety Judgement: Decreased awareness of need for assistance;Decreased awareness of need for safety  Problem Solving: Decreased awareness of errors  Insights: Not aware of deficits  Initiation: Requires cues for all  Sequencing: Requires cues for all  Cognition Comment: At onset of evaluation and during beginning of eval, patient combative with nurse, arguing about getting out of bed, urinating. Refuses to use urinal, smacking the nurse. Patient was helped to calm down and then given instructions not to hit. His wife was kept within view to help him keep calm despite his confusion during care and during mobility. Objective   Pulse: 75  SpO2: 99 %  O2 Device: None (Room air)     Observation/Palpation  Observation: Tall and thin. Bruise on head near right ear. Incontinent of urine. Patient's wife says he uses a clip to prevent constant incontinence. Strength RLE  Comment: Limited cooperation. DF were manual muscle tested as essentially equal 4+/5 bilaterally. Remaining MMT deferred in lieu of function. Bed mobility  Supine to Sit: Minimal assistance  Transfers  Sit to Stand: Moderate Assistance;Contact guard assistance  Stand to sit: Moderate Assistance;Contact guard assistance  Bed to Chair: Moderate assistance  Comment: Transfers fluctuating due to his use of arms to stand and position on the chair. Initial sit to stand and pivot to chair mod A due to him trying to rise up without a lean forward and attempting to pull himself upright with knee extension only. Another pivot transfer was done needing only CGA.   Ambulation  Surface: level tile  Device: Rolling Walker  Assistance: Minimal assistance;2 Person assistance  Quality of Gait: Scissoring gait, unequal step lengths, unsafe use of walker  Gait Deviations: Staggers;Decreased step length  Distance: 72'  Comments: Decreased base of support due to scissoring. Poor coordination of foot placement. Balance  Sitting - Static: Fair;-  Sitting - Dynamic: Fair;-  Standing - Static: Poor;+  Standing - Dynamic: Poor;+  Tandem Stance R Le  Tandem Stance L Le  Single Leg Stance R Le  Single Leg Stance L Le                AM-PAC Score  AM-PAC Inpatient Mobility Raw Score : 12 (22 1505)  AM-PAC Inpatient T-Scale Score : 35.33 (22 1505)  Mobility Inpatient CMS 0-100% Score: 68.66 (22 1505)  Mobility Inpatient CMS G-Code Modifier : CL (22 150)          Goals  Short Term Goals  Time Frame for Short term goals: 14 visits  Short term goal 1: Supine to/from sit with SBA. Short term goal 2: Sit to/from stand with SBA. Short term goal 3: Ambulate 150' with walker with SBA. Short term goal 4: Improve standing balance to stand with fair + balance.        Therapy Time   Individual Concurrent Group Co-treatment   Time In 1325         Time Out 1358         Minutes 33         Timed Code Treatment Minutes: 8 Minutes       Balwinder Velasco PT

## 2022-05-08 NOTE — PROGRESS NOTES
- no drift, limb holds 90 (or 45) degrees for full 10 seconds   5b. Motor right arm: 1 - drift, limb holds 90 (or 45) degrees but drifts down before full 10 seconds: does not hit bed   6a. Motor left le - no drift; leg holds 30 degree position for full 5 seconds   6b  Motor right le - drift; leg falls by the end of the 5 second period but does not hit bed   7. Limb Ataxia: 0 - absent   8. Sensory: 1 - mild to moderate sensory loss; patient feels pinprick is less sharp or is dull on the affected side; there is a loss of superficial pain with pinprick but patient is aware of being touched    9. Best Language:  2 - severe aphasia; all communication is through fragmentary expression; great need for inference, questioning, and guessing by the listener. Range of information that can be exchanged is limited; listener carries burden of communication. Examiner cannot identify materials provided from patient response. 10. Dysarthria: 1 - mild to moderate, patient slurs at least some words and at worst, can be understood with some difficulty   11. Extinction and Inattention: 0 - no abnormality         Total:   10     MRS: 05      LABS:   Reviewed. Lab Results   Component Value Date    HGB 11.0 (L) 2022    WBC 6.8 2022    PLT See Reflexed IPF Result 2022     2022    BUN 9 2022    CREATININE 0.67 (L) 2022    APTT 21.9 2022    INR 1.1 2022      Lab Results   Component Value Date    COVID19 Not Detected 2022       RADIOLOGY:   Images were personally reviewed including:     CT Head :   Evolving subacute ischemic infarct of the anterior left temporal lobe.  Small   focus of hemorrhagic conversion has not appreciably changed. MRI Brain :   1. Acute infarction in the left anterior temporal lobe and left basal   ganglia, consistent with MCA occlusion. 2. Punctate foci of acute infarction in the right frontal cortex.    3. Mild

## 2022-05-08 NOTE — PROGRESS NOTES
Neuro Critical Care Sign Out to Internal Medicine      Date and time: 5/8/2022 2:26 PM  Patient's name:  Jasiel Phoenix Record Number: [id number]  Patient's account/billing number: [de-identified]  Patient's YOB: 1934  Age: 80 y.o. Date of Admission: 5/6/2022 11:29 PM  Length of stay during current admission: 1    Primary Care Physician: Belia Zuñiga MD    Code Status: Full Code    Mode of physician to physician communication:        [] Via telephone   [x] In person     Date and time of sign-out: 5/8/2022 2:26 PM    Accepting Internal Medicine Resident: Dr. Louis Lieberman     Accepting Medicine Team: IM Team  Med 2    Accepting Team's Attending: Dr. Dilip Covarrubias     Patient's current ICU Bed:  [phone] [id number]    Patient's assigned bed on floor:          [] Med-Surg Monitored [x] Step-down       [] Psychiatry ICU       [] Psych floor     Reason for ICU admission:     S/p mechanical thrombectomy for left MCA occlusion    ICU course summary:      Initial Presentation (Admitted 5/7/2022):  Giancarlo Sosa a 80 y. o. male with no significant PMH and not presently on AC who presented via EMS after being noted to have acute onset right sided weakness and speech difficulty. Wife reports he fell asleep on the recliner at 9:30pm and woke up around 10om and couldn't get up from the chair. Family also noted right sided weakness. EMS was called and activated stroke alert. Patient was evaluated by mobile stroke unit. He was noted to have aphasia, dysarthria, and right arm and leg weakness.     LKW: 9:30PM  NIH: 14     Non contrast CT Head obtained. Negative for any acute bleed  Was given tPa bolus dose at 2311 and infusion started 2313. Hospital Course:   CTA head and neck: Showed left M1 occlusion. Patient was taken for mechanical thrombectomy for M1 occlusion, TICI 03. Patient was given 600 mg aspirin x1.   Transfer to neuro ICU for further management.        Last 24h:   Overnight patient become confused, agitated requiring 2 dose of Zyprexa 5 mg. Requiring bilateral soft mitten. Patient requiring blood pressure medication hydralazine x2, labetalol x1 for blood pressure control. CT head: Evolving left temporal lobe ischemia, with small focus of hemorrhagic conversion. Recommend only to start aspirin and DVT prophylaxis for now. Procedures during patient's ICU stay:     Mechanical thrombectomy for left M1 occlusion    Current Vitals:     BP (!) 129/92   Pulse 109   Temp 99.9 °F (37.7 °C) (Axillary)   Resp 29   Ht 5' 9\" (1.753 m)   Wt 175 lb (79.4 kg)   SpO2 98%   BMI 25.84 kg/m²       Cultures:     Blood cultures:                 [x] None drawn      [] Negative             []  Positive (Details:  )  Urine Culture:                   [x] None drawn      [] Negative             []  Positive (Details:  )  Sputum Culture:               [x] None drawn       [] Negative             []  Positive (Details:  )   Endotracheal aspirate:     [x] None drawn       [] Negative             []  Positive (Details:  )       Consults:     1. Neurology   2. Cardiology     Assessment:     Patient Active Problem List    Diagnosis Date Noted    Cerebrovascular accident (CVA) ([facility] [address] 05/07/2022    Acute ischemic stroke ([facility] [address] 05/07/2022       Additional assessment:    1. Left MCA infract   2. Left M1 occlusion s/p mechanical thrombectomy   3. CHF   4. Elevated troponin possible MI     Recommended Follow-up:     1. Continue ASA 81  2. Lipitor 80   3. Hold of heparin and plavix for now   4. Neurology Follow up   5. Rest of the management as per primary         Above mentioned assessment and plan was discussed by me with the admitting medicine resident. The medicine team assigned to the patient by medicine admitting resident will be following up the patient from now onwards on the floor.      Tee Willingham MD  Neuro Critical Care  5/8/2022, 2:26 PM

## 2022-05-08 NOTE — PROGRESS NOTES
Occupational Therapy  Facility/Department: [address]  Occupational Therapy Initial Assessment    Name: Marek Flores  : 1934  MRN: [id number]  Date of Service: 2022  Chief Complaint   Patient presents with    Altered Mental Status       Discharge Recommendations: Pt unsafe to return to PLOF at this time d/t confusion, unsteadiness, ADL/IADL deficits. CTA. Patient would benefit from continued therapy after discharge      Patient Diagnosis(es): The primary encounter diagnosis was Cerebrovascular accident (CVA), unspecified mechanism ([address] Diagnoses of Altered mental status, unspecified altered mental status type and Right sided weakness were also pertinent to this visit. Past Medical History:  has a past medical history of Arthritis and Psoriasis. Past Surgical History:  has a past surgical history that includes Prostate surgery. Assessment   Performance deficits / Impairments: Decreased functional mobility ; Decreased endurance;Decreased ADL status; Decreased balance;Decreased high-level IADLs;Decreased safe awareness;Decreased cognition  Prognosis: Good  Decision Making: Medium Complexity  REQUIRES OT FOLLOW-UP: Yes  Activity Tolerance  Activity Tolerance: Patient limited by fatigue;Treatment limited secondary to decreased cognition        Plan   Plan  Times per Week: 4-5x     Restrictions  Restrictions/Precautions  Restrictions/Precautions: Fall Risk,General Precautions  Required Braces or Orthoses?: No  Position Activity Restriction  Other position/activity restrictions: SBP goal less than 160. CVA, s/p tPA, s/p thrombectomy.     Subjective   General  Patient assessed for rehabilitation services?: Yes  Family / Caregiver Present: Yes (Wife, dtr)  Diagnosis: TPA, Subacute anterior L temporal lobe infarct  Pain: 0/10 pain level during session  Social/Functional History  Social/Functional History  Lives With: Spouse  Type of Home: House  Home Layout: Two level,Able to Live on Main level with bedroom/bathroom  Home Access: Stairs to enter with rails  Entrance Stairs - Number of Steps: 4  Entrance Stairs - Rails: Right  ADL Assistance: Independent  Homemaking Assistance: Independent  Ambulation Assistance: Independent  Transfer Assistance: Independent  Active : Yes  Occupation: Retired  Additional Comments: Wife reports 0 falls in the last 6 months. Prior to CVA, independent gait with no difficulty. Community ambulator. Objective   SpO2: 99 %  O2 Device: None (Room air)  Vision Exceptions: Wears glasses at all times  Hearing: Within functional limits       Observation/Palpation  Observation: Tall and thin. Bruise on head near right ear. Incontinent of urine. Patient's wife says he uses a clip to prevent constant incontinence. Safety Devices  Type of Devices: All fall risk precautions in place;Call light within reach;Gait belt;Nurse notified; Left in chair;Chair alarm in place  Restraints  Restraints Initially in Place: No  Bed Mobility Training  Bed Mobility Training: No  Balance  Sitting: Intact  Standing: Impaired  Transfer Training  Transfer Training: Yes  Sit to Stand: Minimum assistance (Pt was requiring max A until pt educated to use armrests appropriately)  Stand to Sit: Moderate assistance  Gait  Overall Level of Assistance: Minimum assistance  Base of Support: Narrowed  Speed/Floresita: Slow  Assistive Device: Walker, rolling     AROM: Within functional limits  PROM: Within functional limits  Strength: Within functional limits  Coordination: Generally decreased, functional (Decreased speed)  Tone: Normal  Sensation: Intact  ADL  Feeding: Stand by assistance;Setup; Increased time to complete;Verbal cueing  Grooming: Contact guard assistance;Setup;Verbal cueing; Increased time to complete  UE Bathing: Minimal assistance;Setup;Verbal cueing; Increased time to complete  LE Bathing: Moderate assistance;Setup; Increased time to complete;Verbal cueing  UE Dressing: Minimal assistance; Increased time to complete;Verbal cueing;Setup  LE Dressing: Verbal cueing;Setup; Increased time to complete;Maximum assistance  Toileting: Moderate assistance; Increased time to complete;Setup  Additional Comments: Pt with some confusion this date, pt dribbling urine during func mob around unit with OT/PT, pt had significant difficulty doffing/donning L sock this date d/t flexibility concerns despite normally (I) at baseline, pt feeding self apple crisp/pudding with RUE and spoon without spilling yet close to making a mess     Activity Tolerance  Activity Tolerance: Treatment limited secondary to agitation;Treatment limited secondary to decreased cognition        Cognition  Overall Cognitive Status: Exceptions  Arousal/Alertness: Inconsistent responses to stimuli  Following Commands: Follows one step commands with increased time; Follows one step commands with repetition  Attention Span: Difficulty dividing attention; Difficulty attending to directions  Memory: Decreased recall of recent events;Decreased recall of precautions  Safety Judgement: Decreased awareness of need for assistance  Problem Solving: Decreased awareness of errors  Insights: Decreased awareness of deficits  Initiation: Requires cues for some  Sequencing: Requires cues for some  Cognition Comment: PT reports pt agitated prior to OT arriving, no agitation noted during therapy session however, pt quiet for most of session, cooperative with therapists          Education Given To: Patient; Family  Education Provided: Role of Therapy;Plan of Care;ADL Adaptive Strategies  Education Method: Demonstration;Verbal  Barriers to Learning: Cognition  Education Outcome: Continued education needed  LUE AROM (degrees)  LUE AROM : [facility]  RUE AROM (degrees)  RUE AROM : [facility]            AM-PAC Score        AM-PAC Inpatient Daily Activity Raw Score: 15 (05/08/22 1645)  AM-PAC Inpatient ADL T-Scale Score : 34.69 (05/08/22 1645)  ADL Inpatient CMS 0-100% Score: 56.46 (05/08/22 2125)  ADL Inpatient CMS G-Code Modifier : CK (05/08/22 0549)    Goals  Short Term Goals  Time Frame for Short term goals: Pt will by discharge  Short Term Goal 1: demo good safety awareness during func mob around room at SUP and LRD PRN  Short Term Goal 2: demo ADL UB bathing/dressing activity at mod I with 2 cues  Short Term Goal 3: demo ADL LB bathing/dressing activity at SBA, AD PRN, with 2 cues  Short Term Goal 4: demo SUP for all bed mobility using bed rails PRN  Short Term Goal 5: demo activity tolerance for 35 min+       Therapy Time   Individual Concurrent Group Co-treatment   Time In 1348         Time Out 1414         Minutes 26         Timed Code Treatment Minutes: 15 Minutes       JASE Low/L

## 2022-05-08 NOTE — PROGRESS NOTES
Speech Language Pathology  [address]   CLINICAL BEDSIDE SWALLOW EVALUATION    NAME: Varsha Sparks  : 1934  MRN: [id number]    ADMISSION DATE: 2022  ADMITTING DIAGNOSIS: has Cerebrovascular accident (CVA) ([facility] [address] and Acute ischemic stroke ([facility] [address] on their problem list.    Recent Chest Xray/CT of Chest: N/A    Date of Eval: 2022  Evaluating Therapist: LIVAN Granados    Current Diet level:  Current Diet : Regular    Primary Complaint  The patient is a 80 y.o. male with no significant past medical history, not on anticoagulation who was brought into the emergency department via mobile stroke unit after waking up confused, having difficulty speaking, facial droop and weakness per wife. Last known well was 9:30 PM.  Initial NIH was found to be 12 per mobile stroke team.  Contra CT head done in route and tPA started prior to arrival.  Patient is awake and moving all extremities, but unable to obtain history from patient due to confusion and dysarthria. Pain:  Pain Assessment  Pain Assessment: None - Denies Pain  Pain Level: 0    Reason for Referral  Varsha Sparks was referred for a bedside swallow evaluation to assess the efficiency of his swallow function, identify signs and symptoms of aspiration and make recommendations regarding safe dietary consistencies, effective compensatory strategies, and safe eating environment. Impression  Patient presents with probable safe swallow for Dysphagia III: Soft and Bite Sized diet  as evidenced by no overt s/s of aspiration noted with consistencies tested. ST unable to make liquids recommendation as pt agitated, refusing trials of liquids despite max encouragement and education from [facility] Cathi Rocha, RN, and pt's family. Per chart, pt current on Regular diet w/ thin liquids.  Treatment team may choose to resume thin liquids if pt not exhibitng overt s/s of aspiration (e.g. coughing, choking, throat clearing, watering eyes, etc.) until ST able to formally assess as pt more cooperative. Pt only accepting x1 trial of pudding and x1 trial of soft solid. Question if pt will meet adequate nutrition given current disinterest in PO. ST to reassess as pt more cooperative. Recommend small sips and bites, only feed when alert and awake and upright at 90 degrees for all PO intake. Recommend close monitoring for overt/clinical s/s of aspiration and D/C PO intake and complete Modified Barium Swallow Study should they occur. Results and recommendations reported to RN. Dysphagia Diagnosis: Swallow function appears [facility]  Dysphagia Outcome Severity Scale: Level 6: Within functional limits/Modified independence     Treatment Plan  Requires SLP Intervention: Yes  D/C Recommendations: Ongoing speech therapy is recommended during this hospitalization; Ongoing speech therapy is recommended at next level of care  Frequency: 3-5x per week    Recommended Diet and Intervention  Recommended Form of Meds: Meds in puree  Therapeutic Interventions: Diet tolerance monitoring;Patient/Family education  Dysphagia III: Soft and Bite Sized diet  Liquids: TBA     Compensatory Swallowing Strategies  Compensatory Swallowing Strategies : Upright as possible for all oral intake;Small bites/sips    Treatment/Goals  Dysphagia Goals: The patient will tolerate recommended diet without observed clinical signs of aspiration; The patient will tolerate repeat bedside swallowing evaluation when able. General  Chart Reviewed: Yes  Behavior/Cognition: Alert; Cooperative  Temperature Spikes Noted: No  Respiratory Status: Room air  O2 Device: None (Room air)  Communication Observation: Aphasia  Dentition: Some missing teeth  Patient Positioning: Upright in bed  Baseline Vocal Quality: Normal  Consistencies Administered: Soft and Bite-Sized;Pureed    Vision/Hearing  Vision  Vision: Within Functional Limits  Hearing  Hearing: Within functional limits    Oral Phase Dysfunction  Oral Phase  Oral Phase: [facility] Pharyngeal Phase:   No overt s/s of aspiration w/ consistencies tested. However, limited number of trials of each consistency accepted. Prognosis  Individuals consulted  Consulted and agree with results and recommendations: Patient;RN;Family member  Family member consulted: pt's daughter    Education  Patient Education: yes  Patient Education Response: Needs reinforcement; No evidence of learning       Therapy Time  [address], SLP  5/8/2022 1:50 PM

## 2022-05-09 ENCOUNTER — APPOINTMENT (OUTPATIENT)
Dept: CT IMAGING | Age: 87
DRG: 023 | End: 2022-05-09
Payer: MEDICARE

## 2022-05-09 LAB
ABSOLUTE EOS #: 0.08 K/UL (ref 0–0.4)
ABSOLUTE IMMATURE GRANULOCYTE: 0 K/UL (ref 0–0.3)
ABSOLUTE LYMPH #: 2.53 K/UL (ref 1–4.8)
ABSOLUTE MONO #: 1.58 K/UL (ref 0.1–0.8)
ANION GAP SERPL CALCULATED.3IONS-SCNC: 11 MMOL/L (ref 9–17)
BASOPHILS # BLD: 0 % (ref 0–2)
BASOPHILS ABSOLUTE: 0 K/UL (ref 0–0.2)
BUN BLDV-MCNC: 12 MG/DL (ref 8–23)
CALCIUM SERPL-MCNC: 8.7 MG/DL (ref 8.6–10.4)
CHLORIDE BLD-SCNC: 107 MMOL/L (ref 98–107)
CO2: 21 MMOL/L (ref 20–31)
CREAT SERPL-MCNC: 0.84 MG/DL (ref 0.7–1.2)
EKG ATRIAL RATE: 70 BPM
EKG P AXIS: 64 DEGREES
EKG P-R INTERVAL: 174 MS
EKG Q-T INTERVAL: 430 MS
EKG QRS DURATION: 100 MS
EKG QTC CALCULATION (BAZETT): 464 MS
EKG R AXIS: 2 DEGREES
EKG T AXIS: 108 DEGREES
EKG VENTRICULAR RATE: 70 BPM
EOSINOPHILS RELATIVE PERCENT: 1 % (ref 1–4)
GFR AFRICAN AMERICAN: >60 ML/MIN
GFR NON-AFRICAN AMERICAN: >60 ML/MIN
GFR SERPL CREATININE-BSD FRML MDRD: ABNORMAL ML/MIN/{1.73_M2}
GLUCOSE BLD-MCNC: 134 MG/DL (ref 70–99)
HCT VFR BLD CALC: 32 % (ref 40.7–50.3)
HCT VFR BLD CALC: 32.8 % (ref 40.7–50.3)
HCT VFR BLD CALC: 33.1 % (ref 40.7–50.3)
HEMOGLOBIN: 10.8 G/DL (ref 13–17)
HEMOGLOBIN: 11.1 G/DL (ref 13–17)
HEMOGLOBIN: 11.2 G/DL (ref 13–17)
IMMATURE GRANULOCYTES: 0 %
LYMPHOCYTES # BLD: 32 % (ref 24–44)
MAGNESIUM: 1.8 MG/DL (ref 1.6–2.6)
MAGNESIUM: 2 MG/DL (ref 1.6–2.6)
MAGNESIUM: 2 MG/DL (ref 1.6–2.6)
MCH RBC QN AUTO: 32.2 PG (ref 25.2–33.5)
MCH RBC QN AUTO: 32.3 PG (ref 25.2–33.5)
MCH RBC QN AUTO: 32.6 PG (ref 25.2–33.5)
MCHC RBC AUTO-ENTMCNC: 33.8 G/DL (ref 28.4–34.8)
MCV RBC AUTO: 95.1 FL (ref 82.6–102.9)
MCV RBC AUTO: 95.4 FL (ref 82.6–102.9)
MCV RBC AUTO: 96.7 FL (ref 82.6–102.9)
MONOCYTES # BLD: 20 % (ref 1–7)
MORPHOLOGY: NORMAL
NRBC AUTOMATED: 0 PER 100 WBC
PARTIAL THROMBOPLASTIN TIME: 24.4 SEC (ref 20.5–30.5)
PARTIAL THROMBOPLASTIN TIME: 24.4 SEC (ref 20.5–30.5)
PDW BLD-RTO: 12.6 % (ref 11.8–14.4)
PDW BLD-RTO: 12.7 % (ref 11.8–14.4)
PDW BLD-RTO: 12.8 % (ref 11.8–14.4)
PLATELET # BLD: 109 K/UL (ref 138–453)
PLATELET # BLD: ABNORMAL K/UL (ref 138–453)
PLATELET # BLD: ABNORMAL K/UL (ref 138–453)
PLATELET, FLUORESCENCE: 114 K/UL (ref 138–453)
PLATELET, FLUORESCENCE: 118 K/UL (ref 138–453)
PLATELET, IMMATURE FRACTION: 5 % (ref 1.1–10.3)
PLATELET, IMMATURE FRACTION: 5 % (ref 1.1–10.3)
PMV BLD AUTO: 11.8 FL (ref 8.1–13.5)
POC CREATININE: 1.1 MG/DL (ref 0.6–1.4)
POTASSIUM SERPL-SCNC: 4 MMOL/L (ref 3.7–5.3)
RBC # BLD: 3.31 M/UL (ref 4.21–5.77)
RBC # BLD: 3.45 M/UL (ref 4.21–5.77)
RBC # BLD: 3.47 M/UL (ref 4.21–5.77)
SEG NEUTROPHILS: 47 % (ref 36–66)
SEGMENTED NEUTROPHILS ABSOLUTE COUNT: 3.71 K/UL (ref 1.8–7.7)
SODIUM BLD-SCNC: 139 MMOL/L (ref 135–144)
TROPONIN, HIGH SENSITIVITY: 711 NG/L (ref 0–22)
TROPONIN, HIGH SENSITIVITY: 736 NG/L (ref 0–22)
TROPONIN, HIGH SENSITIVITY: 770 NG/L (ref 0–22)
WBC # BLD: 7.6 K/UL (ref 3.5–11.3)
WBC # BLD: 7.6 K/UL (ref 3.5–11.3)
WBC # BLD: 7.9 K/UL (ref 3.5–11.3)

## 2022-05-09 PROCEDURE — 80048 BASIC METABOLIC PNL TOTAL CA: CPT

## 2022-05-09 PROCEDURE — 97530 THERAPEUTIC ACTIVITIES: CPT

## 2022-05-09 PROCEDURE — 99221 1ST HOSP IP/OBS SF/LOW 40: CPT | Performed by: STUDENT IN AN ORGANIZED HEALTH CARE EDUCATION/TRAINING PROGRAM

## 2022-05-09 PROCEDURE — 6360000002 HC RX W HCPCS: Performed by: SURGERY

## 2022-05-09 PROCEDURE — 70450 CT HEAD/BRAIN W/O DYE: CPT

## 2022-05-09 PROCEDURE — 97110 THERAPEUTIC EXERCISES: CPT

## 2022-05-09 PROCEDURE — 84484 ASSAY OF TROPONIN QUANT: CPT

## 2022-05-09 PROCEDURE — 6370000000 HC RX 637 (ALT 250 FOR IP): Performed by: NURSE PRACTITIONER

## 2022-05-09 PROCEDURE — 83735 ASSAY OF MAGNESIUM: CPT

## 2022-05-09 PROCEDURE — 2580000003 HC RX 258: Performed by: SURGERY

## 2022-05-09 PROCEDURE — 85730 THROMBOPLASTIN TIME PARTIAL: CPT

## 2022-05-09 PROCEDURE — 99232 SBSQ HOSP IP/OBS MODERATE 35: CPT | Performed by: INTERNAL MEDICINE

## 2022-05-09 PROCEDURE — 36415 COLL VENOUS BLD VENIPUNCTURE: CPT

## 2022-05-09 PROCEDURE — 92507 TX SP LANG VOICE COMM INDIV: CPT

## 2022-05-09 PROCEDURE — 2060000000 HC ICU INTERMEDIATE R&B

## 2022-05-09 PROCEDURE — 2580000003 HC RX 258: Performed by: STUDENT IN AN ORGANIZED HEALTH CARE EDUCATION/TRAINING PROGRAM

## 2022-05-09 PROCEDURE — 2500000003 HC RX 250 WO HCPCS

## 2022-05-09 PROCEDURE — 99233 SBSQ HOSP IP/OBS HIGH 50: CPT | Performed by: PSYCHIATRY & NEUROLOGY

## 2022-05-09 PROCEDURE — 97116 GAIT TRAINING THERAPY: CPT

## 2022-05-09 PROCEDURE — 6370000000 HC RX 637 (ALT 250 FOR IP)

## 2022-05-09 PROCEDURE — 6370000000 HC RX 637 (ALT 250 FOR IP): Performed by: SURGERY

## 2022-05-09 PROCEDURE — 85055 RETICULATED PLATELET ASSAY: CPT

## 2022-05-09 PROCEDURE — 85025 COMPLETE CBC W/AUTO DIFF WBC: CPT

## 2022-05-09 PROCEDURE — 85027 COMPLETE CBC AUTOMATED: CPT

## 2022-05-09 PROCEDURE — 6370000000 HC RX 637 (ALT 250 FOR IP): Performed by: PSYCHIATRY & NEUROLOGY

## 2022-05-09 PROCEDURE — 97129 THER IVNTJ 1ST 15 MIN: CPT

## 2022-05-09 PROCEDURE — 2580000003 HC RX 258

## 2022-05-09 RX ORDER — HEPARIN SODIUM 1000 [USP'U]/ML
4000 INJECTION, SOLUTION INTRAVENOUS; SUBCUTANEOUS PRN
Status: DISCONTINUED | OUTPATIENT
Start: 2022-05-09 | End: 2022-05-11

## 2022-05-09 RX ORDER — HEPARIN SODIUM 1000 [USP'U]/ML
2000 INJECTION, SOLUTION INTRAVENOUS; SUBCUTANEOUS PRN
Status: DISCONTINUED | OUTPATIENT
Start: 2022-05-09 | End: 2022-05-11

## 2022-05-09 RX ORDER — ALPRAZOLAM 0.5 MG/1
0.5 TABLET ORAL ONCE
Status: COMPLETED | OUTPATIENT
Start: 2022-05-09 | End: 2022-05-09

## 2022-05-09 RX ADMIN — OLANZAPINE 5 MG: 10 INJECTION, POWDER, LYOPHILIZED, FOR SOLUTION INTRAMUSCULAR at 23:23

## 2022-05-09 RX ADMIN — METOPROLOL TARTRATE 25 MG: 25 TABLET ORAL at 10:37

## 2022-05-09 RX ADMIN — HEPARIN SODIUM 12 UNITS/KG/HR: 5000 INJECTION, SOLUTION INTRAVENOUS; SUBCUTANEOUS at 18:18

## 2022-05-09 RX ADMIN — ALPRAZOLAM 0.5 MG: 0.5 TABLET ORAL at 21:34

## 2022-05-09 RX ADMIN — METOPROLOL TARTRATE 25 MG: 25 TABLET ORAL at 20:23

## 2022-05-09 RX ADMIN — ATORVASTATIN CALCIUM 80 MG: 80 TABLET, FILM COATED ORAL at 20:23

## 2022-05-09 RX ADMIN — SODIUM CHLORIDE: 9 INJECTION, SOLUTION INTRAVENOUS at 17:00

## 2022-05-09 RX ADMIN — SODIUM CHLORIDE: 9 INJECTION, SOLUTION INTRAVENOUS at 08:48

## 2022-05-09 ASSESSMENT — ENCOUNTER SYMPTOMS
BLURRED VISION: 0
ABDOMINAL PAIN: 0
DOUBLE VISION: 0
BACK PAIN: 1
SHORTNESS OF BREATH: 0

## 2022-05-09 NOTE — PLAN OF CARE
Problem: Discharge Planning  Goal: Discharge to home or other facility with appropriate resources  5/9/2022 0503 by Yenny Ricci RN  Outcome: Progressing  5/9/2022 0503 by Yenny Ricci RN  Outcome: Progressing  5/8/2022 1913 by Vivi Herr RN  Outcome: Progressing  5/8/2022 1912 by Vivi Herr RN  Outcome: Progressing     Problem: Chronic Conditions and Co-morbidities  Goal: Patient's chronic conditions and co-morbidity symptoms are monitored and maintained or improved  5/9/2022 0503 by Yenny Ricci RN  Outcome: Progressing  5/9/2022 0503 by Yenny Ricci RN  Outcome: Progressing  5/8/2022 1913 by Vivi Herr RN  Outcome: Progressing  5/8/2022 1912 by Vivi Herr RN  Outcome: Progressing     Problem: Safety - Adult  Goal: Free from fall injury  5/9/2022 0503 by Yenny Ricci RN  Outcome: Progressing  5/9/2022 0503 by Yenny Ricci RN  Outcome: Progressing  5/8/2022 1913 by Vivi Herr RN  Outcome: Progressing  5/8/2022 1912 by Vivi Herr RN  Outcome: Progressing     Problem: ABCDS Injury Assessment  Goal: Absence of physical injury  5/9/2022 0503 by Yenny Ricci RN  Outcome: Progressing  5/9/2022 0503 by Yenny Ricci RN  Outcome: Progressing  5/8/2022 1913 by Vivi Herr RN  Outcome: Progressing  5/8/2022 1912 by Vivi Herr RN  Outcome: Progressing     Problem: Skin/Tissue Integrity  Goal: Absence of new skin breakdown  Description: 1. Monitor for areas of redness and/or skin breakdown  2. Assess vascular access sites hourly  3. Every 4-6 hours minimum:  Change oxygen saturation probe site  4. Every 4-6 hours:  If on nasal continuous positive airway pressure, respiratory therapy assess nares and determine need for appliance change or resting period.   5/9/2022 0503 by Yenny Ricci RN  Outcome: Progressing  5/9/2022 0503 by Yenny Ricci RN  Outcome: Progressing  5/8/2022 1913 by Vivi Herr RN  Outcome: Progressing  5/8/2022 1912 by Farideh Archer RN  Outcome: Progressing

## 2022-05-09 NOTE — PROGRESS NOTES
[facility]  Internal Medicine Teaching Residency Program  Inpatient Daily Progress Note  ______________________________________________________________________________    Patient: Angela Pathak  YOB: 1934   VCI:[id number]    Acct: [de-identified]     Room: [address] [place] [address]  Admit date: 5/6/2022  Today's date: 05/09/22  Number of days in the hospital: 2    SUBJECTIVE   Admitting Diagnosis: Cerebrovascular accident (CVA) ([facility] [address]  CC: Right sided weakness with speech difficulty     - Pt examined at bedside. Chart & results reviewed. - Overnight, pt troponin's increased to 770  - Pt denies any chest pain or SOB  - Resting comfortably in bed   - Pt A&OX4 much improved from yesterday   - Afebrile   - VSS      Plan for today:  - Follow up on EKG   - Continue to trend troponin's  - Cardiology following > follow up on recommendations    ROS:  Constitutional:  negative for chills, fevers, sweats  Respiratory:  negative for cough, dyspnea on exertion, hemoptysis, shortness of breath, wheezing  Cardiovascular:  negative for chest pain, chest pressure/discomfort, lower extremity edema, palpitations  Gastrointestinal:  negative for abdominal pain, constipation, diarrhea, nausea, vomiting  Neurological:  negative for dizziness, headache    BRIEF HISTORY     Sissy Sapp a 80 y. o. male with no significant PMH and not presently on AC who presented via EMS after being noted to have acute onset right sided weakness and speech difficulty. Wife reports he fell asleep on the recliner at 9:30pm and woke up around 10om and couldn't get up from the chair. Family also noted right sided weakness. EMS was called and activated stroke alert. Patient was evaluated by mobile stroke unit.  He was noted to have aphasia, dysarthria, and right arm and leg weakness.     LKW: 9:30PM  NIH: 14     Non contrast CT Head obtained.  Negative for any acute bleed  Was given tPa bolus dose at 2311 and infusion started [address] Course:   CTA head and neck: Showed left M1 occlusion.  Patient was taken for mechanical thrombectomy for M1 occlusion. Patient was given 600 mg aspirin x1.  Transfer to neuro ICU for further management. Pt found to have hemorrhagic conversion. Plan for repeat CT head in 24 hours. Cardiology planning for cardiac cath. No heparin at this time. We will continue with Lovenox.     OBJECTIVE     Vital Signs:  BP 93/64   Pulse 75   Temp 99.2 °F (37.3 °C) (Oral)   Resp 14   Ht 5' 9\" (1.753 m)   Wt 175 lb (79.4 kg)   SpO2 91%   BMI 25.84 kg/m²     Temp (24hrs), Av.2 °F (37.3 °C), Min:98.9 °F (37.2 °C), Max:99.9 °F (37.7 °C)    In: 2703.4   Out: 1665 [Urine:1665]    Physical Exam:  General appearance - alert, in no distress  Mental status - alert, oriented to person, place, and time  Eyes - pupils equal and reactive, extraocular eye movements intact  Mouth - mucous membranes moist, pharynx normal without lesions  Neck - supple, no significant adenopathy  Chest - clear to auscultation, no wheezes  Heart - normal rate, regular rhythm, normal S1, S2  Abdomen - soft, nontender, nondistended  Neurological - normal speech No focal deficits due to patient's noncompliance with examination   Extremities - peripheral pulses normal, no pedal edema  Skin - normal coloration and turgor, no rashes      Medications:  Scheduled Medications:    aspirin  81 mg Oral Daily    metoprolol tartrate  25 mg Oral BID    lisinopril  5 mg Oral Daily    enoxaparin  40 mg SubCUTAneous Daily    tamsulosin  0.4 mg Oral Daily    atorvastatin  80 mg Oral Nightly     Continuous Infusions:    sodium chloride 50 mL/hr at 22 1512     PRN Medicationsondansetron, 4 mg, Q8H PRN   Or  ondansetron, 4 mg, Q6H PRN  ondansetron, 4 mg, Q8H PRN   Or  ondansetron, 4 mg, Q6H PRN  polyethylene glycol, 17 g, Daily PRN  labetalol, 10 mg, Q4H PRN  acetaminophen, 650 mg, Q4H PRN  hydrALAZINE, 10 mg, Q6H PRN        Diagnostic Labs:  CBC:   Recent Labs     05/07/22  0630 05/07/22  1237 05/08/22  0558   WBC 5.0 5.5 6.8   RBC 3.49* 3.45* 3.49*   HGB 11.0* 10.9* 11.0*   HCT 34.4* 32.8* 33.6*   MCV 98.6 95.1 96.3   RDW 12.8 12.8 13.1   PLT See Reflexed IPF Result See Reflexed IPF Result See Reflexed IPF Result     BMP:   Recent Labs     05/07/22  0000 05/07/22  0630 05/08/22  0558    144 138   K 4.1 4.3 4.0   * 112* 109*   CO2 26 20 21   BUN 20 16 9   CREATININE 0.96 0.68* 0.67*     BNP: No results for input(s): BNP in the last 72 hours. PT/INR:   Recent Labs     05/07/22  0000   PROTIME 11.5   INR 1.1     APTT:   Recent Labs     05/07/22  0000   APTT 21.9     CARDIAC ENZYMES: No results for input(s): CKMB, CKMBINDEX, TROPONINI in the last 72 hours. Invalid input(s): CKTOTAL;3  FASTING LIPID PANEL:  Lab Results   Component Value Date    CHOL 133 05/07/2022    HDL 52 05/07/2022    TRIG 50 05/07/2022     LIVER PROFILE: No results for input(s): AST, ALT, ALB, BILIDIR, BILITOT, ALKPHOS in the last 72 hours. MICROBIOLOGY: No results found for: CULTURE    Imaging:    CT HEAD WO CONTRAST    Result Date: 5/8/2022  Evolving subacute ischemic infarct of the anterior left temporal lobe. Small focus of hemorrhagic conversion has not appreciably changed. Findings were discussed with BRANDIN Pineda at 5:34 am on 5/8/2022 by Dr. Anh Weaver. CT HEAD WO CONTRAST    Result Date: 5/7/2022  1. No infarct evident within the left MCA territory. 2. No acute intracranial hemorrhage. CT HEAD WO CONTRAST    Result Date: 5/7/2022  1. No acute intracranial abnormality. 2. Left MCA territory perfusion mismatch with ratio 11.1 and mismatch volume 142 mL. 3. 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 4. 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 5. No significant stenosis or occlusion of the cervical carotid arteries. 6. Thyroid nodules measuring up to 1.3 cm.   No further imaging follow-up is required. Critical results were called by Dr. May Heller MD to Dr. Eric Hughes On 5/7/2022 at 00:25. CT HEAD WO CONTRAST    Result Date: 5/6/2022  Motion degraded exam. No acute hemorrhage given limitation. No midline shift. Slight relative decreased attenuation of the right temporal lobe with overall suboptimal evaluation for gray-white differentiation due to motion artifact. Slight relative decreased attenuation of the left temporal lobe anteriorly. Correlate with presenting symptoms. CT BRAIN PERFUSION    Result Date: 5/7/2022  1. No acute intracranial abnormality. 2. Left MCA territory perfusion mismatch with ratio 11.1 and mismatch volume 142 mL. 3. 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 4. 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 5. No significant stenosis or occlusion of the cervical carotid arteries. 6. Thyroid nodules measuring up to 1.3 cm. No further imaging follow-up is required. Critical results were called by Dr. May Heller MD to Dr. Eric Hughes On 5/7/2022 at 00:25. CTA HEAD NECK W CONTRAST    Result Date: 5/7/2022  1. No acute intracranial abnormality. 2. Left MCA territory perfusion mismatch with ratio 11.1 and mismatch volume 142 mL. 3. 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 4. 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 5. No significant stenosis or occlusion of the cervical carotid arteries. 6. Thyroid nodules measuring up to 1.3 cm. No further imaging follow-up is required. Critical results were called by Dr. May Heller MD to Dr. Eric Hughes On 5/7/2022 at 00:25. MRI BRAIN WO CONTRAST    Result Date: 5/7/2022  1. Acute infarction in the left anterior temporal lobe and left basal ganglia, consistent with MCA occlusion. 2. Punctate foci of acute infarction in the right frontal cortex. 3. Mild microangiopathic change.        ASSESSMENT & PLAN     Assessment and

## 2022-05-09 NOTE — PLAN OF CARE
Per neuro, waiting on repeat Ct scan to look for any expansion of hemorrhage, anticoagulation recommendations pending repeat CT. Cards plan cardiac cath tomorrow am if cleared by neuro.      Dana Blackmon MD  INTERNAL MEDICINE RESIDENT, [facility] [place], [place]   5/9/2022

## 2022-05-09 NOTE — CARE COORDINATION
[facility] Quality Flow/Interdisciplinary Rounds Progress Note    Quality Flow Rounds held on May 9, 2022 at [address] Attending:  Bedside Nurse, ,  and Nursing Unit Leadership    Barriers to Discharge: placement     Anticipated Discharge Date:       Anticipated Discharge Disposition: IP Rehab. Readmission Risk              Risk of Unplanned Readmission:  9           Discussed patient goal for the day, patient clinical progression, and barriers to discharge. The following Goal(s) of the Day/Commitment(s) have been identified:  Referral- IP Rehab. Spoke with patient's wife regarding placement. [facility]  May 9, 2022

## 2022-05-09 NOTE — PROGRESS NOTES
Physical Therapy  Facility/Department: [address] STEP DOWN  Daily Treatment Note     Name: Belgica Luna  : 1934  MRN: [id number]  Date of Service: 2022    Discharge Recommendations:  Patient would benefit from continued therapy after discharge   PT Equipment Recommendations  Equipment Needed: No      Patient Diagnosis(es): The primary encounter diagnosis was Cerebrovascular accident (CVA), unspecified mechanism ([address] Diagnoses of Altered mental status, unspecified altered mental status type and Right sided weakness were also pertinent to this visit. Assessment   Body Structures, Functions, Activity Limitations Requiring Skilled Therapeutic Intervention: Decreased functional mobility ; Decreased coordination;Decreased balance;Decreased strength;Decreased safe awareness;Decreased cognition;Decreased endurance;Decreased posture  Assessment: Pt requiring min-A for supine to sit along with increased time and effort. Pt sat EOB for 15 mins while performing dynamic activities with min-A/mod-A to maintain upright posture due to posterior lean. Pt ambulated 100 ft. in the dominguez with CGA using a RW. Pt is limited by decreased balance, decreased coordination, and decreased cognition. Pt would benefit from continued PT following discharge to address functional deficits.   Therapy Prognosis: Good  Decision Making: Medium Complexity  Clinical Presentation: evolving  Requires PT Follow-Up: Yes  Activity Tolerance  Activity Tolerance: Treatment limited secondary to agitation;Treatment limited secondary to decreased cognition     Plan   Plan  Plan: 6-7 times per week  Current Treatment Recommendations: Strengthening,Balance training,Functional mobility training,Transfer training,Gait training,Neuromuscular re-education,Home exercise program,Cognitive reorientation,Safety education & training,Patient/Caregiver education & training,Equipment evaluation, education, & procurement,Therapeutic activities  Safety Devices  Type of Devices: All fall risk precautions in place,Call light within reach,Gait belt,Nurse notified,Chair alarm in place,Left in bed  Restraints  Restraints Initially in Place: No     Restrictions  Restrictions/Precautions  Restrictions/Precautions: Fall Risk,General Precautions  Required Braces or Orthoses?: No  Position Activity Restriction  Other position/activity restrictions: SBP goal less than 160. CVA, s/p tPA, s/p thrombectomy. Subjective   General  Chart Reviewed: Yes  Family / Caregiver Present: No  Follows Commands: Within Functional Limits    Cognition   Orientation  Overall Orientation Status: Impaired  Orientation Level: Oriented to person;Disoriented to place; Disoriented to time;Disoriented to situation  Cognition  Overall Cognitive Status: Exceptions  Arousal/Alertness: Inconsistent responses to stimuli  Following Commands: Follows one step commands with increased time; Follows one step commands with repetition  Attention Span: Difficulty dividing attention; Difficulty attending to directions  Memory: Decreased recall of recent events;Decreased recall of precautions  Safety Judgement: Decreased awareness of need for assistance  Problem Solving: Decreased awareness of errors  Insights: Decreased awareness of deficits  Initiation: Requires cues for some  Sequencing: Requires cues for some     Objective   Pulse: 71  Heart Rate Source: Monitor  BP: 117/70  BP Location: Right upper arm  Patient Position: Supine  MAP (Calculated): 85.67  Resp: 22  SpO2: 94 %  O2 Device: None (Room air)   Bed mobility  Supine to Sit: Minimal assistance  Transfers  Sit to Stand: Minimal Assistance  Stand to sit: Minimal Assistance  Bed to Chair: Unable to assess  Ambulation  Surface: level tile  Device: Rolling Walker  Assistance: Minimal assistance;Contact guard assistance  Quality of Gait: Scissoring gait, unequal step lengths, unsafe use of walker  Gait Deviations: Staggers;Decreased step length  Distance: 100 ft. Balance  Posture: Fair  Sitting - Static: Fair;-  Sitting - Dynamic: Fair;-  Standing - Static: Poor;+  Standing - Dynamic: Poor;+  Comments: Assessed with RW.  A/AROM Exercises: Ankle pumps- x10 BLE, LAQs- x10 BLE,        AM-PAC Score  AM-PAC Inpatient Mobility Raw Score : 12 (05/09/22 1059)  AM-PAC Inpatient T-Scale Score : 35.33 (05/09/22 1059)  Mobility Inpatient CMS 0-100% Score: 68.66 (05/09/22 1059)  Mobility Inpatient CMS G-Code Modifier : CL (05/09/22 1059)          Goals  Short Term Goals  Time Frame for Short term goals: 14 visits  Short term goal 1: Supine to/from sit with SBA. Short term goal 2: Sit to/from stand with SBA. Short term goal 3: Ambulate 150' with walker with SBA. Short term goal 4: Improve standing balance to stand with fair + balance.        Therapy Time   Individual Concurrent Group Co-treatment   Time In 1000         Time Out 1039         Minutes 39         Timed Code Treatment Minutes: [address] [facility], [address]

## 2022-05-09 NOTE — CONSULTS
Physical Medicine & Rehabilitation  Consult Note      Admitting Physician:  Marisol Graf MD    Primary Care Provider:  Cyndi Enamorado MD     Reason for Consult:  Acute Inpatient Rehabilitation    Chief Complaint:  Weakness, speech changes    History of Present Illness:  Referring Provider is requesting an evaluation for appropriate placement upon discharge from acute care. History from chart review and patient. Jeannette Morales is a 80 y.o. male with history of psoriasis admitted to [facility] on 5/6/2022. He initially presented with speech changes and weakness. NIHSS 12.  He was given tPA by the mobile stroke unit. Imaging showed a left MCA territory perfusion mismatch and complete occlusion of the left M1 MCA. He underwent mechanical thrombectomy of the left MCA M1 thrombus on 5/7/22 (Dr. Moshe Valderrama). MRI brain showed acute infarction in the left anterior temporal lobe and left basal ganglia, consistent with MCA occlusion; it also showed punctate foci of acute infarction in the right frontal cortex. Repeat CT head showed small focus of hemorrhagic conversion. Hospital course has been complicated by agitation and NSTEMI. Echo showed EF 13%. Cardiology planning for possible cardiac cath. He primarily reports low back pain at this time. He denies any headache, changes in vision, numbness/tingling, focal weakness, and changes in bladder or bowel control. Review of Systems:  Review of Systems   Constitutional: Negative for fever. HENT: Negative for hearing loss. Eyes: Negative for blurred vision and double vision. Respiratory: Negative for shortness of breath. Cardiovascular: Negative for chest pain. Gastrointestinal: Negative for abdominal pain. No change in bowel control   Genitourinary:        No change in bladder control   Musculoskeletal: Positive for back pain. Skin: Negative for rash. Neurological: Negative for sensory change, focal weakness and headaches. Premorbid function:  Independent    Current function:    PT:  Restrictions/Precautions: Fall Risk,General Precautions  Other position/activity restrictions: SBP goal less than 160. CVA, s/p tPA, s/p thrombectomy. Transfers  Sit to Stand: Minimal Assistance  Stand to sit: Minimal Assistance  Bed to Chair: Unable to assess  Comment: Transfers fluctuating due to his use of arms to stand and position on the chair. Initial sit to stand and pivot to chair mod A due to him trying to rise up without a lean forward and attempting to pull himself upright with knee extension only. Another pivot transfer was done needing only CGA. Ambulation  Surface: level tile  Device: Rolling Walker  Assistance: Minimal assistance,Contact guard assistance  Quality of Gait: Scissoring gait, unequal step lengths, unsafe use of walker  Gait Deviations: Staggers,Decreased step length  Distance: 100 ft. Comments: Decreased base of support due to scissoring. Poor coordination of foot placement. Transfers  Sit to Stand: Minimal Assistance  Stand to sit: Minimal Assistance  Bed to Chair: Unable to assess  Comment: Transfers fluctuating due to his use of arms to stand and position on the chair. Initial sit to stand and pivot to chair mod A due to him trying to rise up without a lean forward and attempting to pull himself upright with knee extension only. Another pivot transfer was done needing only CGA. Ambulation  Surface: level tile  Device: Rolling Walker  Assistance: Minimal assistance,Contact guard assistance  Quality of Gait: Scissoring gait, unequal step lengths, unsafe use of walker  Gait Deviations: Staggers,Decreased step length  Distance: 100 ft. Comments: Decreased base of support due to scissoring. Poor coordination of foot placement.     Surface: level tile  Ambulation  Surface: level tile  Device: Rolling Walker  Assistance: Minimal assistance,Contact guard assistance  Quality of Gait: Scissoring gait, unequal step Recall: Pt able to recall 3/5 of children's names, unable to recall # of grandchildren or events of AM.      Recall w/ Distractions, 1 functional unit (year): 0/1 x1 increased to 1/1 w/ max verbal and visual cues. Encouraged pt to utilize whiteboard in room to ID year (however, pt did not have glasses and per wife likely unable to see whiteboard from bed). 1/1 x1 independently      Other: Unable to complete diet tolerance monitoring as pt NPO for possible procedure. ST will continue to follow. Past Medical History:        Diagnosis Date    Arthritis     Psoriasis        Past Surgical History:        Procedure Laterality Date    PROSTATE SURGERY         Allergies:    No Known Allergies     Current Medications:   Current Facility-Administered Medications: metoprolol tartrate (LOPRESSOR) tablet 25 mg, 25 mg, Oral, BID  heparin (porcine) injection 4,000 Units, 4,000 Units, IntraVENous, PRN  heparin (porcine) injection 2,000 Units, 2,000 Units, IntraVENous, PRN  heparin 25,000 units in 0.9% sodium chloride 250 mL infusion, 5-30 Units/kg/hr, IntraVENous, Continuous  OLANZapine (ZYPREXA) 5 mg in sterile water 1 mL injection, 5 mg, IntraMUSCular, Once  aspirin chewable tablet 81 mg, 81 mg, Oral, Daily  lisinopril (PRINIVIL;ZESTRIL) tablet 5 mg, 5 mg, Oral, Daily  tamsulosin (FLOMAX) capsule 0.4 mg, 0.4 mg, Oral, Daily  ondansetron (ZOFRAN-ODT) disintegrating tablet 4 mg, 4 mg, Oral, Q8H PRN **OR** ondansetron (ZOFRAN) injection 4 mg, 4 mg, IntraVENous, Q6H PRN  polyethylene glycol (GLYCOLAX) packet 17 g, 17 g, Oral, Daily PRN  atorvastatin (LIPITOR) tablet 80 mg, 80 mg, Oral, Nightly  labetalol (NORMODYNE;TRANDATE) injection 10 mg, 10 mg, IntraVENous, Q4H PRN  acetaminophen (TYLENOL) tablet 650 mg, 650 mg, Oral, Q4H PRN  hydrALAZINE (APRESOLINE) injection 10 mg, 10 mg, IntraVENous, Q6H PRN    Family History:   No family history on file.     Social History:  Lives With: Spouse  Type of Home: House  Home Layout: Two level,Able to Live on Main level with bedroom/bathroom  Home Access: Stairs to enter with rails  Entrance Stairs - Number of Steps: 4  Entrance Stairs - Rails: Right  ADL Assistance: Independent  Homemaking Assistance: Independent  Ambulation Assistance: Independent  Transfer Assistance: Independent  Active : Yes  Occupation: Retired  Additional Comments: Wife reports 0 falls in the last 6 months. Prior to CVA, independent gait with no difficulty. Community ambulator. Social History     Socioeconomic History    Marital status:      Spouse name: None    Number of children: None    Years of education: None    Highest education level: None   Occupational History    None   Tobacco Use    Smoking status: Current Some Day Smoker     Types: Pipe    Smokeless tobacco: None   Substance and Sexual Activity    Alcohol use: None    Drug use: None    Sexual activity: None   Other Topics Concern    None   Social History Narrative    None     Social Determinants of Health     Financial Resource Strain:     Difficulty of Paying Living Expenses: Not on file   Food Insecurity:     Worried About Running Out of Food in the Last Year: Not on file    Riving of Food in the Last Year: Not on file   Transportation Needs:     Lack of Transportation (Medical): Not on file    Lack of Transportation (Non-Medical):  Not on file   Physical Activity:     Days of Exercise per Week: Not on file    Minutes of Exercise per Session: Not on file   Stress:     Feeling of Stress : Not on file   Social Connections:     Frequency of Communication with Friends and Family: Not on file    Frequency of Social Gatherings with Friends and Family: Not on file    Attends Je[employer] witness Services: Not on file    Active Member of Clubs or Organizations: Not on file    Attends Club or Organization Meetings: Not on file    Marital Status: Not on file   Intimate Partner Violence:     Fear of Current or Ex-Partner: Not on file   Kalpana Mendoza Emotionally Abused: Not on file    Physically Abused: Not on file    Sexually Abused: Not on file   Housing Stability:     Unable to Pay for Housing in the Last Year: Not on file    Number of Places Lived in the Last Year: Not on file    Unstable Housing in the Last Year: Not on file       Physical Exam:  /67   Pulse 75   Temp 98.2 °F (36.8 °C) (Oral)   Resp 26   Ht 5' 9\" (1.753 m)   Wt 175 lb (79.4 kg)   SpO2 96%   BMI 25.84 kg/m²     GEN: Well developed, well nourished, no acute distress  HEENT: NCAT. EOMI. Hearing grossly intact. Mucous membranes pink and moist.  RESP: Normal breath sounds with no wheezing, rales, or rhonchi. Respirations WNL and unlabored. CV: Regular rate and rhythm. No murmurs, rubs, or gallops. ABD: Soft, non-distended, BS+ and equal.  NEURO: Alert. No facial droop. Symmetrical shoulder shrug. Midline tongue protrusion. Sensation to light touch intact. MSK:  Muscle tone and bulk are normal bilaterally. Strength 4+/5 in bilateral upper limbs. Strength 4+/5 with bilateral ankle dorsiflexion and plantarflexion. LIMBS: No edema in bilateral lower limbs. SKIN: Warm and dry with good turgor. PSYCH: Mood WNL. Affect WNL. Appropriately interactive. Diagnostics:    CBC:   Recent Labs     05/09/22  0348 05/09/22  1653 05/09/22  1804   WBC 7.9 7.6 7.6   RBC 3.31* 3.47* 3.45*   HGB 10.8* 11.2* 11.1*   HCT 32.0* 33.1* 32.8*   MCV 96.7 95.4 95.1   RDW 12.8 12.6 12.7   * See Reflexed IPF Result See Reflexed IPF Result     BMP:   Recent Labs     05/07/22  0630 05/08/22  0558 05/09/22  0348    138 139   K 4.3 4.0 4.0   * 109* 107   CO2 20 21 21   BUN 16 9 12   CREATININE 0.68* 0.67* 0.84   GLUCOSE 126* 131* 134*      HbA1c:   Lab Results   Component Value Date    LABA1C 6.8 (H) 05/07/2022     BNP: No results for input(s): BNP in the last 72 hours.   PT/INR:   Recent Labs     05/07/22  0000   PROTIME 11.5   INR 1.1     APTT:   Recent Labs     05/07/22  0000 05/09/22  1653 05/09/22  1804   APTT 21.9 24.4 24.4     CARDIAC ENZYMES: No results for input(s): CKMB, CKMBINDEX, TROPONINT in the last 72 hours. Invalid input(s): CKTOTAL;3  FASTING LIPID PANEL:  Lab Results   Component Value Date    CHOL 133 05/07/2022    HDL 52 05/07/2022    TRIG 50 05/07/2022     LIVER PROFILE: No results for input(s): AST, ALT, ALB, BILIDIR, BILITOT, ALKPHOS in the last 72 hours. Radiology:  CT HEAD WO CONTRAST   Final Result   Evolving left temporal lobe infarct anteriorly with stable superimposed focus   of hemorrhage. CT HEAD WO CONTRAST   Final Result   Evolving subacute ischemic infarct of the anterior left temporal lobe. Small   focus of hemorrhagic conversion has not appreciably changed. Findings were discussed with BRANDIN Lopez at 5:34 am on 5/8/2022 by   Dr. Rupal Louie. MRI BRAIN WO CONTRAST   Final Result   1. Acute infarction in the left anterior temporal lobe and left basal   ganglia, consistent with MCA occlusion. 2. Punctate foci of acute infarction in the right frontal cortex. 3. Mild microangiopathic change. CT HEAD WO CONTRAST   Final Result   1. No infarct evident within the left MCA territory. 2. No acute intracranial hemorrhage. IR ANGIOGRAM CAROTID C EREBRAL BILATERAL   Final Result      CTA HEAD NECK W CONTRAST   Final Result   1. No acute intracranial abnormality. 2. Left MCA territory perfusion mismatch with ratio 11.1 and mismatch volume   142 mL. 3. 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 4. 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 5. No significant stenosis or occlusion of the cervical carotid arteries. 6. Thyroid nodules measuring up to 1.3 cm. No further imaging follow-up is   required. Critical results were called by Dr. Cindy Aviles MD to Dr. Nilesh Morrell On   5/7/2022 at 00:25. CT BRAIN PERFUSION   Final Result   1.  No acute intracranial abnormality. 2. Left MCA territory perfusion mismatch with ratio 11.1 and mismatch volume   142 mL. 3. 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 4. 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 5. No significant stenosis or occlusion of the cervical carotid arteries. 6. Thyroid nodules measuring up to 1.3 cm. No further imaging follow-up is   required. Critical results were called by Dr. Danetta Burkitt, MD to Dr. Miladis Escalante On   5/7/2022 at 00:25. CT HEAD WO CONTRAST   Final Result   1. No acute intracranial abnormality. 2. Left MCA territory perfusion mismatch with ratio 11.1 and mismatch volume   142 mL. 3. 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 4. 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 5. No significant stenosis or occlusion of the cervical carotid arteries. 6. Thyroid nodules measuring up to 1.3 cm. No further imaging follow-up is   required. Critical results were called by Dr. Danetta Burkitt, MD to Dr. Miladis Escalante On   5/7/2022 at 00:25. CT HEAD WO CONTRAST   Final Result   Motion degraded exam. No acute hemorrhage given limitation. No midline shift. Slight relative decreased attenuation of the right temporal lobe with overall   suboptimal evaluation for gray-white differentiation due to motion artifact. Slight relative decreased attenuation of the left temporal lobe anteriorly. Correlate with presenting symptoms. Impression:    1. 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 NSTEMI  3. Systolic heart failure, EF 13 %  4. Agitation  5. Anemia  6. Thrombocytopenia  7. Psoriasis    Recommendations:    1. Diagnosis:  Left MCA CVA with hemorrhagic converison  2. Therapy: Has PT/OT/SLP needs  3. Medical Necessity: As above  4. Support: Lives with spouse  5.  Rehab Recommendation:  The patient will benefit from acute inpatient rehabilitation once medically stable per primary service. Anticipate he will be able to tolerate 3 hours of therapy per day in rehabilitation. The patient requires multidisciplinary rehabilitation treatment including medical management by a PM&R physician, 24 hour rehabilitation nursing, physical therapy, occupational therapy, speech therapy, rehabilitation social work, and nutrition services. Patient and family also require education in post-hospital precautions and home exercise routine, adaptive techniques and deficit compensation strategies, strengthening and conditioning, equipment prescription and instructions in use. 6. DVT Prophylaxis:  On heparin drip    It was my pleasure to evaluate Imelda Mayes today. Please call with questions.     Ulysses Hones, MD

## 2022-05-09 NOTE — CARE COORDINATION
PHQ-9  Pt presents with acute stroke  Met with pt this date was awake and answered questions appropriately. Pt states he lives with his wife. Has 1 son ,4 dtrs, and a lot of grandchildren and great grandchildren. Pt states he has a large family and a lot of support  Pt denies having any feelings of depression or suicidal ideations. Patient Health Questionnaire-9 (PHQ-9)    Over the last 2 weeks, how often have you been bothered by any of the following problems? 1. Little Interest or pleasure in doing things? [x] Not at all  [] Several Days  [] More than half the day  []  Nearly every day    2. Feeling down, depressed or hopeless? [x] Not at all  [] Several Days  [] More than half the day  []  Nearly every day    3. Trouble falling or staying asleep, or sleeping too much? [x] Not at all  [] Several Days  [] More than half the day  []  Nearly every day    4. Feeling tired or having little energy? [] Not at all  [x] Several Days  [] More than half the day  []  Nearly every day    5. Poor apettite or overeating? [x] Not at all  [] Several Days  [] More than half the day  []  Nearly every day    6. Feeling bad about yourself-or that you are a failure or have let yourself or your family down? [x] Not at all  [] Several Days  [] More than half the day  []  Nearly every day    7. Trouble concentrating on things, such as reading the newspaper or watching television? [x] Not at all  [] Several Days  [] More than half the day  []  Nearly every day    8. Moving or speaking so slowly that other people could have noticed? Or the opposite-being so fidgety or restless that you have been moving around a lot more than usual?   [x] Not at all  [] Several Days  [] More than half the day  []  Nearly every day    9. Thoughts that you would be better off dead or of hurting yourself in some way?    [x] Not at all  [] Several Days  [] More than half the day  []  Nearly every day    Total Score: 1  If you checked off any problems, how difficult have these problems made it for you to do your work, take care of things at home, or get along with other people?    [x] Not difficult at all  [] Somewhat Difficult  [] Very Difficult  []  Extremely Difficult

## 2022-05-09 NOTE — PROGRESS NOTES
[address] [place] [address]     Department of Internal Medicine - Staff Internal Medicine Service   ICU PATIENT TRANSFER NOTE        Patient:  Kenyatta Domingo  YOB: 1934  MRN: [id number]     Acct: [de-identified]     Admit date: 5/6/2022    Code Status:-  Full code     Reason for ICU Admission:-       SUPPORT DEVICES: [] Ventilator [] BIPAP  [] Nasal Cannula [x] Room Air    Consultations:- [] Cardiology [] Nephrology  [] Hemo onco  [] GI                               [] ID [] ENT  [] Rheum [] Endo   []Physiotherapy                                 Others:-Neuro/neuro endovascular    NUTRITION:  [] NPO [] Tube Feeding (Specify: ) [] TPN  [x] PO    Central Lines:- [x] No   [] Yes           If yes - Days/Date of Insertion. Pt seen,examined and Chart reviewed. ICU COURSE:    Mag Jack a 80 y. o. male with no significant PMH and not presently on AC who presented via EMS after being noted to have acute onset right sided weakness and speech difficulty. Wife reports he fell asleep on the recliner at 9:30pm and woke up around 10om and couldn't get up from the chair. Family also noted right sided weakness. EMS was called and activated stroke alert. Patient was evaluated by mobile stroke unit. He was noted to have aphasia, dysarthria, and right arm and leg weakness.     LKW: 9:30PM  NIH: 14     Non contrast CT Head obtained.  Negative for any acute bleed  Was given tPa bolus dose at 2311 and infusion started [address] Course:   CTA head and neck: Showed left M1 occlusion.  Patient was taken for mechanical thrombectomy for M1 occlusion. Patient was given 600 mg aspirin x1.  Transfer to neuro ICU for further management. Pt found to have hemorrhagic conversion. Plan for repeat CT head in 24 hours. Cardiology planning for cardiac cath. No heparin at this time. We will continue with Lovenox. Currently, patient not responding to questions asked.   Was told by nursing staff patient has been ignoring most physicians. Physical Exam:   Vitals: /70   Pulse 93   Temp 99 °F (37.2 °C) (Axillary)   Resp 18   Ht 5' 9\" (1.753 m)   Wt 175 lb (79.4 kg)   SpO2 98%   BMI 25.84 kg/m²   24 hour intake/output:  Intake/Output Summary (Last 24 hours) at 5/8/2022 2127  Last data filed at 5/8/2022 1600  Gross per 24 hour   Intake 2703.37 ml   Output 1515 ml   Net 1188.37 ml     Last 3 weights:   Wt Readings from Last 3 Encounters:   05/07/22 175 lb (79.4 kg)     Difficult to evaluate due to patient being uncooperative  General appearance - alert, in no distress  Mental status - alert, oriented to person, place, and time  Eyes - pupils equal and reactive, extraocular eye movements intact  Mouth - mucous membranes moist, pharynx normal without lesions  Neck - supple, no significant adenopathy  Chest - clear to auscultation, no wheezes  Heart - normal rate, regular rhythm, normal S1, S2  Abdomen - soft, nontender, nondistended  Neurological -patient not being interactive and ignoring questions being asked but did tell me his name and appeared to have normal speech difficult to assess any focal deficits due to patient's noncompliance with examination   Extremities - peripheral pulses normal, no pedal edema  Skin - normal coloration and turgor, no rashes      Medications:Current Inpatient  Scheduled Meds:   aspirin  81 mg Oral Daily    metoprolol tartrate  25 mg Oral BID    lisinopril  5 mg Oral Daily    enoxaparin  40 mg SubCUTAneous Daily    tamsulosin  0.4 mg Oral Daily    atorvastatin  80 mg Oral Nightly     Continuous Infusions:   sodium chloride 50 mL/hr at 05/08/22 1512     PRN Meds:ondansetron **OR** ondansetron, ondansetron **OR** ondansetron, polyethylene glycol, labetalol, acetaminophen, hydrALAZINE    Objective:    CBC:   Recent Labs     05/07/22  0630 05/07/22  1237 05/08/22  0558   WBC 5.0 5.5 6.8   HGB 11.0* 10.9* 11.0*   PLT See Reflexed IPF Result See Reflexed IPF Result See Reflexed IPF Result     BMP:    Recent Labs     05/07/22  0000 05/07/22  0630 05/08/22  0558    144 138   K 4.1 4.3 4.0   * 112* 109*   CO2 26 20 21   BUN 20 16 9   CREATININE 0.96 0.68* 0.67*   GLUCOSE 137* 126* 131*     Calcium:  Recent Labs     05/08/22  0558   CALCIUM 8.6     Ionized Calcium:No results for input(s): IONCA in the last 72 hours. Magnesium:No results for input(s): MG in the last 72 hours. Phosphorus:No results for input(s): PHOS in the last 72 hours. BNP:No results for input(s): BNP in the last 72 hours. Glucose:No results for input(s): POCGLU in the last 72 hours. HgbA1C:   Recent Labs     05/07/22  0630   LABA1C 6.8*     INR:   Recent Labs     05/07/22  0000   INR 1.1     Hepatic: No results for input(s): ALKPHOS, ALT, AST, PROT, BILITOT, BILIDIR, LABALBU in the last 72 hours. Amylase and Lipase:No results for input(s): LACTA, AMYLASE in the last 72 hours. Lactic Acid: No results for input(s): LACTA in the last 72 hours. CARDIAC ENZYMES:  Recent Labs     05/07/22  0000   CKTOTAL 113     BNP: No results for input(s): BNP in the last 72 hours. Lipids:   Recent Labs     05/07/22  0630   CHOL 133   TRIG 50   HDL 52     ABGs: No results found for: PH, PCO2, PO2, HCO3, O2SAT  Thyroid:   Lab Results   Component Value Date    TSH 1.30 05/07/2022        Urinalysis:   Recent Labs     05/07/22  0149   COLORU Yellow   PHUR 7.0   PROTEINU NEGATIVE   SPECGRAV 1.048*   BILIRUBINUR NEGATIVE   NITRU NEGATIVE   LEUKOCYTESUR NEGATIVE   GLUCOSEU NEGATIVE           Assessment:  Principal Problem:    Cerebrovascular accident (CVA) ([facility] [address]  Active Problems:    Acute ischemic stroke ([facility] [address]    Altered mental status    CHF (congestive heart failure) (HCC)    NSTEMI (non-ST elevated myocardial infarction) ([facility] [address]  Resolved Problems:    * No resolved hospital problems.  *          Plan:    Left MCA infarct with left M1 occlusion status post mechanical thrombectomy  - CT of the head shows evolving left anterior, left temporal lobe infarction with small hemorrhagic conversion  -Continue with aspirin  - Continue with Lipitor 80 mg  - Hold Plavix and heparin per neurology  - Repeat CT head in 24 hours, if stable, neurology okay with low-dose heparin drip without bolus  - Goal systolic pressure 618-120     NSTEMI  - Troponins have been 229, 250, 269, 236, 279  - We will continue to trend troponin every 6 hours  - Neurology not recommending heparin at this time due to hemorrhagic conversion and will continue with Lovenox. - Cardiology following and planning for cardiac cath tomorrow     Systolic congestive heart failure  - Echocardiogram done yesterday showing dilated left ventricle with severely reduced systolic function. EF of 13%. Moderate mitral regurgitation. Negative bubble study. - Cardiology following     Moderate mitral regurgitation  - Cardiology following     Diet: Regular diet. NPO at midnight  DVT ppx : Lovenox  GI ppx: Not indicated     PT/OT/SW: Consulted  Discharge Planning: In process        Maynor Tong MD             Department of Internal Medicine  [facility]           5/8/2022, 9:27 PM    Please note that part of this chart was generated using voice recognition dictation software. Although every effort was made to ensure the accuracy of this automated transcription, some errors in transcription may have occurred.

## 2022-05-09 NOTE — PROGRESS NOTES
[facility]  Progress Note                   Date:   5/9/2022  Patient name: Kusum Lee  Date of admission:  5/6/2022 11:29 PM  MRN:   [id number]  YOB: 1934  PCP: Garrison Gallegos MD    Reason for Admission: Acute ischemic stroke [facility]) [I63.9]  Right sided weakness [R53.1]  Altered mental status, unspecified altered mental status type [R41.82]  Cerebrovascular accident (CVA), unspecified mechanism ([address] [I63.9]    Subjective:       Clinical Changes /Abnormalities: Patient seen and examined on RA and no distress  alert , garbled speech , Denies chest pain or shortness of breath. Tele/vitals/labs reviewed . Discussed case with RN.  on monitor with infrequent PVC's    Review of Systems    Medications:   Scheduled Meds:   aspirin  81 mg Oral Daily    metoprolol tartrate  25 mg Oral BID    lisinopril  5 mg Oral Daily    [Held by provider] enoxaparin  40 mg SubCUTAneous Daily    tamsulosin  0.4 mg Oral Daily    atorvastatin  80 mg Oral Nightly     Continuous Infusions:   sodium chloride 50 mL/hr at 05/09/22 0848     CBC:   Recent Labs     05/07/22  1237 05/08/22  0558 05/09/22  0348   WBC 5.5 6.8 7.9   HGB 10.9* 11.0* 10.8*   PLT See Reflexed IPF Result See Reflexed IPF Result 109*     BMP:    Recent Labs     05/07/22  0630 05/08/22  0558 05/09/22  0348    138 139   K 4.3 4.0 4.0   * 109* 107   CO2 20 21 21   BUN 16 9 12   CREATININE 0.68* 0.67* 0.84   GLUCOSE 126* 131* 134*     Hepatic:No results for input(s): AST, ALT, ALB, BILITOT, ALKPHOS in the last 72 hours. Troponin:   Recent Labs     05/08/22  0558 05/09/22  0032 05/09/22  0348   TROPHS 279* 711* 770*     BNP: No results for input(s): BNP in the last 72 hours. Lipids:   Recent Labs     05/07/22  0630   CHOL 133   HDL 52     INR:   Recent Labs     05/07/22  0000   INR 1.1     ECHO 5/9/2022      Summary  Dilated left ventricle with severely reduced systolic function.   Calculated ejection fraction is 13 %  Moderate mitral regurgitation. Bubble study was negative for shunt.     Signature  ----------------------------------------------------------------------------   Electronically signed by Alexey Gómez(Sonographer) on 05/07/2022 03:59   PM  ----------------------------------------------------------------------------     ----------------------------------------------------------------------------   Electronically signed by Kashmir Carrington(Interpreting physician) on   05/08/2022 10:56 AM  ----------------------------------------------------------------------------    Objective:   Vitals: BP (!) 121/93   Pulse 88   Temp 98.8 °F (37.1 °C) (Oral)   Resp 19   Ht 5' 9\" (1.753 m)   Wt 175 lb (79.4 kg)   SpO2 97%   BMI 25.84 kg/m²   General appearance: alert and cooperative with exam   HEENT: Head: Normocephalic, no lesions, without obvious abnormality. Neck:no JVD, trachea midline, no adenopathy  Lungs: Clear to auscultation  Heart: Regular rate and rhythm, s1/s2 auscultated, no murmurs  Abdomen: soft, non-tender, bowel sounds active  Extremities: no edema  Neurologic: not done        Assessment / Acute Cardiac Problems:   1. Acute CVA s/p tPA and mechanical thrombectomy of left MCA thrombus  2 elevated troponin likely NSTEMI type I.  3.  ECG showed normal sinus rhythm with Q waves in inferior and anterior leads along with ST depression in V4 to V6. No history of coronary artery disease. 4.  Cardiomyopathy with  ejection fraction 13%      Patient Active Problem List:     Cerebrovascular accident (CVA) ([facility]     Acute ischemic stroke ([facility]     Altered mental status     CHF (congestive heart failure) ([facility]     NSTEMI (non-ST elevated myocardial infarction) ([facility]      Plan of Treatment:   1. continue BB to help with PVC as BP and HR tolerate with parameter , aspirin and statin   2. ECHO reviewed as above-no previous cardiac history, will evaluate for LifeVest once his neuro status improved   3.  Per Dr. Regino Rees

## 2022-05-09 NOTE — PROGRESS NOTES
Speech Language Pathology  Speech Language Pathology  [address] [facility]    Speech Language/Cognitive Treatment Note    Date: 2022  Patients Name: Kayleen Daniel  MRN: [id number]  Diagnosis:   Patient Active Problem List   Diagnosis Code    [facility] I63.9    Acute ischemic stroke ([facility] I63.9    Altered mental status R41.82    CHF (congestive heart failure) ([facility] I50.9    NSTEMI (non-ST elevated myocardial infarction) ([facility] I21.4     Pain: 0/10    Speech and Language Treatment  Treatment time: 4582-4791    Subjective: [x] Alert [x] Cooperative     [] Confused     [] Agitated    [] Lethargic    Objective/Assessment:    Auditory Comprehension:   1-step commands: 6/6 independently, increased processing time noted. 2-step commands: 0/2 increased to 2/2 w/ mod verbal cues and visual models     Basic Y/N questions: 3/6 increased to 6/6 w/ mod verbal cues, increased processing time noted. Verbal Expression:   Automatic Language Tasks, Rhymes: 100% independently     Confrontation Namin/8 increased to 8/8 w/ mod-max semantic and phonemic cues     Orientation:  Pt oriented to name, age, and date and month of birth (oriented to birth year w/ mod verbal cues), and current month. Pt disoriented to year and place despite max verbal cues. Recall: Pt able to recall 3/5 of children's names, unable to recall # of grandchildren or events of AM.     Recall w/ Distractions, 1 functional unit (year): 0/1 x1 increased to 1/1 w/ max verbal and visual cues. Encouraged pt to utilize whiteboard in room to ID year (however, pt did not have glasses and per wife likely unable to see whiteboard from bed). 1/1 x1 independently     Other: Unable to complete diet tolerance monitoring as pt NPO for possible procedure. ST will continue to follow. Plan:  [x] Continue ST services    [] Discharge from ST:      Discharge recommendations: []  Further therapy recommended at discharge. The patient should be able to tolerate at least 3 hours of therapy per day over 5 days or 15 hours over 7 days. [x] Further therapy recommended at discharge. [] No therapy recommended at discharge.     Treatment completed by: Mohan Flores, SLP

## 2022-05-09 NOTE — PROGRESS NOTES
Neurology Resident Progress Note      SUBJECTIVE:  This is a 80 y.o.  male admitted 5/6/2022 for Acute ischemic stroke [facility]) [I63.9]  Right sided weakness [R53.1]  Altered mental status, unspecified altered mental status type [R41.82]  Cerebrovascular accident (CVA), unspecified mechanism ([facility] [address] [I63.9]  This is a follow-up neurology progress note. The patient was seen and examined and the chart was reviewed. There were no acute events overnight. ROS  Constitutional: no fever, chills, fatigue  HENT: No change in vision or hearing   Respiratory: No cough, SOB, wheezing. Cardiovascular:  No chest pain, palpitations, leg swelling. Gastrointestinal: No nausea, vomiting, diarrhea. Genitourinary: No increased frequency, urgency. Musculoskeletal: No myalgia or arthralgia. Skin: No rashes or scarring or bruises. Neurological: No headache, paresthesia, or focal weakness. Endo/Heme/Allergies: Negative for itchy eyes or runny nose. Psychiatric/Behavioral: No anxiety or depressed mood. HPI  See H&P     aspirin  81 mg Oral Daily    metoprolol tartrate  25 mg Oral BID    lisinopril  5 mg Oral Daily    [Held by provider] enoxaparin  40 mg SubCUTAneous Daily    tamsulosin  0.4 mg Oral Daily    atorvastatin  80 mg Oral Nightly       Past Medical History:   Diagnosis Date    Arthritis     Psoriasis        Past Surgical History:   Procedure Laterality Date    PROSTATE SURGERY         PHYSICAL EXAM:     Blood pressure (!) 121/93, pulse 88, temperature 98.8 °F (37.1 °C), temperature source Oral, resp. rate 19, height 5' 9\" (1.753 m), weight 175 lb (79.4 kg), SpO2 97 %. CONSTITUTIONAL:   Patient drowsy, open eyes to voice, does follow command intermittently, irritable.     HEAD:  normocephalic, atraumatic    EYES:  PERRLA, EOMI.   ENT:  moist mucous membranes   NECK:  supple, symmetric   LUNGS:  Equal air entry bilaterally   CARDIOVASCULAR:  normal s1 / s2, RRR, distal pulses intact   ABDOMEN:  Soft, no rigidity   NEUROLOGIC:  Mental Status:     Patient drowsy, open eyes to voice.   Irritable not participating in exam.      Answer's I dnot know to all questions   Patient moving all 4 extremities antigravity with significant strength                 Gait: Unable to assess           Investigations:      Laboratory Testing:  Recent Results (from the past 24 hour(s))   Troponin    Collection Time: 05/09/22 12:32 AM   Result Value Ref Range    Troponin, High Sensitivity 711 (HH) 0 - 22 ng/L   CBC with Auto Differential    Collection Time: 05/09/22  3:48 AM   Result Value Ref Range    WBC 7.9 3.5 - 11.3 k/uL    RBC 3.31 (L) 4.21 - 5.77 m/uL    Hemoglobin 10.8 (L) 13.0 - 17.0 g/dL    Hematocrit 32.0 (L) 40.7 - 50.3 %    MCV 96.7 82.6 - 102.9 fL    MCH 32.6 25.2 - 33.5 pg    MCHC 33.8 28.4 - 34.8 g/dL    RDW 12.8 11.8 - 14.4 %    Platelets 847 (L) 922 - 453 k/uL    MPV 11.8 8.1 - 13.5 fL    NRBC Automated 0.0 0.0 per 100 WBC    Immature Granulocytes 0 0 %    Seg Neutrophils 47 36 - 66 %    Lymphocytes 32 24 - 44 %    Monocytes 20 (H) 1 - 7 %    Eosinophils % 1 1 - 4 %    Basophils 0 0 - 2 %    Absolute Immature Granulocyte 0.00 0.00 - 0.30 k/uL    Segs Absolute 3.71 1.8 - 7.7 k/uL    Absolute Lymph # 2.53 1.0 - 4.8 k/uL    Absolute Mono # 1.58 (H) 0.1 - 0.8 k/uL    Absolute Eos # 0.08 0.0 - 0.4 k/uL    Basophils Absolute 0.00 0.0 - 0.2 k/uL    Morphology Normal    Basic Metabolic Panel    Collection Time: 05/09/22  3:48 AM   Result Value Ref Range    Glucose 134 (H) 70 - 99 mg/dL    BUN 12 8 - 23 mg/dL    CREATININE 0.84 0.70 - 1.20 mg/dL    Calcium 8.7 8.6 - 10.4 mg/dL    Sodium 139 135 - 144 mmol/L    Potassium 4.0 3.7 - 5.3 mmol/L    Chloride 107 98 - 107 mmol/L    CO2 21 20 - 31 mmol/L    Anion Gap 11 9 - 17 mmol/L    GFR Non-African American >60 >60 mL/min    GFR African American >60 >60 mL/min    GFR Comment         Troponin    Collection Time: 05/09/22  3:48 AM   Result Value Ref Range    Troponin, High Sensitivity 770 (HH) 0 - 22 ng/L       Imaging/Diagnostics:  ECHO Complete 2D W Doppler W Color    Result Date: 5/8/2022  Transthoracic Echocardiography Report (TTE)  Patient Name Santino Whiting      Date of Study               05/07/2022               SHIRA CACERES   Date of      1934  Gender                      Male  Birth   Age          80 year(s)  Race                           Room Number  0526        Height:                     69 inch, 175.26 cm   Corporate ID [id number]   Weight:                     175 pounds, 79.4 kg  #   Patient Acct [de-identified]   BSA:          1.95 m^2      BMI:      25.84  #                                                              kg/m^2   MR #         [id number]     Sonographer                 Jose Laurent   Accession #  [id number]  Interpreting Physician      Kashmir Carrington   Fellow                   Referring Nurse                           Practitioner   Interpreting             Referring Physician         Mónica Blackwell MD  Fellow  Type of Study   TTE procedure:2D Echocardiogram, M-Mode, Doppler, Color Doppler, Bubble  Study. Procedure Date Date: 05/07/2022 Start: 01:21 PM Study Location: [facility] Indications:CVA. History / Tech. Comments: CVA, Altered Mental Status Patient Status: Inpatient Height: 69 inches Weight: 175 pounds BSA: 1.95 m^2 BMI: 25.84 kg/m^2 HR: 74 bpm CONCLUSIONS Summary Dilated left ventricle with severely reduced systolic function. Calculated ejection fraction is 13 % Moderate mitral regurgitation. Bubble study was negative for shunt.  Signature ----------------------------------------------------------------------------  Electronically signed by Alexey Velazquez(Sonographer) on 05/07/2022 03:59  PM ---------------------------------------------------------------------------- ----------------------------------------------------------------------------  Electronically signed by Charo CarringtonInterpreting physician) on  05/08/2022 10:56 AM ---------------------------------------------------------------------------- FINDINGS Left Atrium Left atrium is mildly enlarged. Left Ventricle Dilated left ventricle with severely reduced systolic function. Calculated ejection fraction is 13 % Right Atrium Right atrium is normal in size. Right Ventricle Normal right ventricular size and function. TAPSE measures 2.1 cm Mitral Valve Thickening of mitral valve leaflets without stenosis. Moderate mitral regurgitation. Vena Contract width 0.5 cm Aortic Valve Aortic valve is trileaflet. Aortic sclerosis without stenosis. Mild aortic insufficiency. Tricuspid Valve Normal tricuspid valve structure and function. No tricuspid regurgitation was seen. Pulmonic Valve The pulmonic valve is normal in structure. Pericardial Effusion No significant pericardial effusion is seen. Miscellaneous Normal aortic root dimension. E/E' average = 29. Bubble study was negative.  M-mode / 2D Measurements & Calculations:   LVIDd:6.91 cm(3.7 - 5.6 cm)       Diastolic AUKDSU:504 ml  IVSd:1 cm(0.6 - 1.1 cm)           Systolic DFRLCQ:186 ml  CJXGU:8.3 cm(0.6 - 1.1 cm)        Aortic Root:3.9 cm(2.0 - 3.7 cm)                                    LA Dimension: 4.2 cm(1.9 - 4.0 cm)  Calculated LVEF (%): 13.11 %      LA volume/Index: 94.73 ml /49m^2                                    LVOT:1.9 cm                                    RVDd:1.9 cm   Mitral:                                 Aortic   Valve Area (P1/2-Time): 3.93 cm^2       Peak Velocity: 1.78 m/s  Peak E-Wave: 1.10 m/s                   Mean Velocity: 1.11 m/s  Peak A-Wave: 1.43 m/s                   Peak Gradient: 12.67 mmHg  E/A Ratio: 0.77                         Mean Gradient: 6 mmHg  Peak Gradient: 4.84 mmHg  Mean Gradient: 4 mmHg  Deceleration Time: 200 msec             Area (continuity): 1.09 cm^2  P1/2t: 56 msec                          AV VTI: 41.3 cm   Area (continuity): 1.11 cm^2  Mean Velocity: 0.93 m/s Pulmonic:                                           Peak Velocity: 0.77 m/s                                          Peak Gradient: 2.4 mmHg  Diastology / Tissue Doppler Septal Wall E' velocity:0.03 m/s Septal Wall E/E':38 Lateral Wall E' velocity:0.05 m/s Lateral Wall E/E':20    CT HEAD WO CONTRAST    Result Date: 5/8/2022  EXAMINATION: CT OF THE HEAD WITHOUT CONTRAST  5/8/2022 5:03 am TECHNIQUE: CT of the head was performed without the administration of intravenous contrast. Dose modulation, iterative reconstruction, and/or weight based adjustment of the mA/kV was utilized to reduce the radiation dose to as low as reasonably achievable. COMPARISON: CT and MRI May 7, 2022 HISTORY: ORDERING SYSTEM PROVIDED HISTORY: 24 hour stability CT head TECHNOLOGIST PROVIDED HISTORY: 24 hour stability CT head FINDINGS: BRAIN/VENTRICLES: Left anterior temporal lobe hypoattenuation with loss of cortical differentiation compatible with evolving encephalomalacia. 1.0 cm by 0.6 cm hyperdense focus in the left anterior temporal lobe compatible with hemorrhagic conversion. This has not appreciably changed since comparison MRI. No significant mass effect or midline shift. Ventricular system is unchanged in size. Basilar cisterns remain patent. ORBITS: The visualized portion of the orbits demonstrate no acute abnormality. SINUSES: The visualized paranasal sinuses and mastoid air cells demonstrate no acute abnormality. SOFT TISSUES/SKULL:  No acute abnormality of the visualized skull or soft tissues. Evolving subacute ischemic infarct of the anterior left temporal lobe. Small focus of hemorrhagic conversion has not appreciably changed. Findings were discussed with BRANDIN Carvajal at 5:34 am on 5/8/2022 by Dr. Lu ePter.      CT HEAD WO CONTRAST    Result Date: 5/7/2022  EXAMINATION: CT OF THE HEAD WITHOUT CONTRAST  5/7/2022 6:38 am TECHNIQUE: CT of the head was performed without the administration of intravenous contrast. Dose modulation, iterative reconstruction, and/or weight based adjustment of the mA/kV was utilized to reduce the radiation dose to as low as reasonably achievable. COMPARISON: 05/06/2022 HISTORY: ORDERING SYSTEM PROVIDED HISTORY: Post-thrombectomy TECHNOLOGIST PROVIDED HISTORY: Post-thrombectomy Reason for Exam: Post-thrombectomy FINDINGS: BRAIN/VENTRICLES: There is no acute infarct or acute intracranial hemorrhage present. There is no mass effect or midline shift present. There is no ventriculomegaly or abnormal extra-axial fluid collection present. There is mild diffuse cerebral volume loss. Mild periventricular hypoattenuation is present. ORBITS: Limited evaluation of the orbits is unremarkable. SINUSES: The paranasal sinuses and mastoid air cells are clear. SOFT TISSUES/SKULL:  No lytic or blastic osseous lesions are identified. 1. No infarct evident within the left MCA territory. 2. No acute intracranial hemorrhage. CT HEAD WO CONTRAST    Result Date: 5/7/2022  EXAMINATION: CT OF THE HEAD WITHOUT CONTRAST; CTA OF THE HEAD AND NECK WITH CONTRAST; CTA OF THE HEAD WITH CONTRAST WITH PERFUSION 5/6/2022 11:40 pm; 5/6/2022 11:47 pm: TECHNIQUE: CT of the head was performed without the administration of intravenous contrast. Dose modulation, iterative reconstruction, and/or weight based adjustment of the mA/kV was utilized to reduce the radiation dose to as low as reasonably achievable.; CTA of the head and neck was performed with the administration of intravenous contrast. Multiplanar reformatted images are provided for review. MIP images are provided for review. Stenosis of the internal carotid arteries measured using NASCET criteria.  Dose modulation, iterative reconstruction, and/or weight based adjustment of the mA/kV was utilized to reduce the radiation dose to as low as reasonably achievable.; CTA of the head/brain was performed with the administration of intravenous contrast. Multiplanar reformatted images are provided for review. MIP images are provided for review. Dose modulation, iterative reconstruction, and/or weight based adjustment of the mA/kV was utilized to reduce the radiation dose to as low as reasonably achievable. Noncontrast CT of the head with reconstructed 2-D images are also provided for review. COMPARISON: None. HISTORY: ORDERING SYSTEM PROVIDED HISTORY: stroke TECHNOLOGIST PROVIDED HISTORY: stroke Decision Support Exception - unselect if not a suspected or confirmed emergency medical condition->Emergency Medical Condition (MA); ORDERING SYSTEM PROVIDED HISTORY: TPA TECHNOLOGIST PROVIDED HISTORY: TPA Decision Support Exception - unselect if not a suspected or confirmed emergency medical condition->Emergency Medical Condition (MA); ORDERING SYSTEM PROVIDED HISTORY: stroke TECHNOLOGIST PROVIDED HISTORY: stroke Decision Support Exception - unselect if not a suspected or confirmed emergency medical condition->Emergency Medical Condition (MA) FINDINGS: CT HEAD: BRAIN/VENTRICLES:  No acute intracranial hemorrhage or extraaxial fluid collection. Grey-white differentiation is maintained. No evidence of mass, mass effect or midline shift. No evidence of hydrocephalus. Periventricular and subcortical white matter hypoattenuation, consistent microangiopathic change. There is mild parenchymal volume loss. ORBITS: The visualized portion of the orbits demonstrate no acute abnormality. SINUSES:  The visualized paranasal sinuses and mastoid air cells demonstrate no acute abnormality. SOFT TISSUES/SKULL: No acute abnormality of the visualized skull or soft tissues. CTA NECK: AORTIC ARCH/ARCH VESSELS: No dissection or arterial injury. No significant stenosis of the brachiocephalic or subclavian arteries. CAROTID ARTERIES: No dissection, arterial injury, or hemodynamically significant stenosis by NASCET criteria.  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 Right vertebral artery is hypoplastic, but patent. SOFT TISSUES: The lung apices are clear. Mildly enlarged superior mediastinal lymph nodes. .  The larynx and pharynx are unremarkable. No acute abnormality of the salivary glands. Thyroid nodules measure up to 1.3 cm. BONES: Multilevel degenerative changes of the cervical spine. CTA HEAD: ANTERIOR CIRCULATION: The intracranial ICAs are patent and normal in caliber. The ACAs are patent and normal in caliber. There is occlusion of the left M1 MCA with some reconstituted flow distally. The right MCA is patent. There is atherosclerosis with mild narrowing in the right supraclinoid ICA. POSTERIOR CIRCULATION: No significant stenosis of the vertebral, basilar, or posterior cerebral arteries. No aneurysm. Fetal configuration of the left PCA. OTHER: No dural venous sinus thrombosis on this non-dedicated study. CT PERFUSION: EXAM QUALITY: The examination is diagnostic with appropriate arterial inflow and venous outflow curves, and diagnostic perfusion maps. CORE INFARCT: The total area of ischemic core is 14 mL (CBF<30% volume). TOTAL HYPOPERFUSION: The total area of hypoperfusion is 156 mL (Tmax>6s volume). PENUMBRA: The penumbra (mismatch) volume is 142 mL and is located in the left MCA territory. The mismatch ratio is 11.1.     1. No acute intracranial abnormality. 2. Left MCA territory perfusion mismatch with ratio 11.1 and mismatch volume 142 mL. 3. 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 4. 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 5. No significant stenosis or occlusion of the cervical carotid arteries. 6. Thyroid nodules measuring up to 1.3 cm. No further imaging follow-up is required. Critical results were called by Dr. Evans Little MD to Dr. Mack Kearney On 5/7/2022 at 00:25.      CT HEAD WO CONTRAST    Result Date: 5/6/2022  EXAMINATION: CT OF THE HEAD WITHOUT CONTRAST  5/6/2022 10:52 pm TECHNIQUE: CT of the head was performed without the administration of intravenous contrast. Dose modulation, iterative reconstruction, and/or weight based adjustment of the mA/kV was utilized to reduce the radiation dose to as low as reasonably achievable. COMPARISON: None. HISTORY: ORDERING SYSTEM PROVIDED HISTORY: Stroke TECHNOLOGIST PROVIDED HISTORY: Stroke Decision Support Exception - unselect if not a suspected or confirmed emergency medical condition->Emergency Medical Condition (MA) FINDINGS: BRAIN/VENTRICLES:Motion degraded exam. Coronal and sagittal reformats were not available at the time of this dictation. No acute hemorrhage given limitation. Periventricular and subcortical hypoattenuation is nonspecific and may be related to microvascular disease. Artifact partially obscures the vianey. Artifact partially obscures the cerebellum. Slight relative decreased attenuation of the right temporal lobe with overall suboptimal evaluation for gray-white differentiation due to motion artifact. Slight relative decreased attenuation of the left temporal lobe anteriorly. Ventricles are within normal limits in size. There is no midline shift. Basal cisterns appear patent. ORBITS: Visualized orbits appear unremarkable on this unenhanced exam. SINUSES: Mild mucosal thickening of the ethmoid and left maxillary sinuses. Visualized mastoid air cells appear clear. SOFT TISSUES/SKULL: No depressed calvarial fracture. Motion degraded exam. No acute hemorrhage given limitation. No midline shift. Slight relative decreased attenuation of the right temporal lobe with overall suboptimal evaluation for gray-white differentiation due to motion artifact. Slight relative decreased attenuation of the left temporal lobe anteriorly. Correlate with presenting symptoms.      IR ANGIOGRAM CAROTID C EREBRAL BILATERAL    Result Date: 5/8/2022  Date of Service: 05/07/2022 Diagnosis: Left MCA Acute Ischemic Stroke with acute left MCA proximal M-1 occlusion Patient arrived to the angio suite at: 12:41 am Puncture obtained at: 1:03 am Valdez: Chidi Encarnacion MD Procedures: 1. Mechanical thrombectomy of the Left M1 middle cerebral artery (MCA) with Stent retriever and Combined Suctioning 2. Left Internal Carotid Artery (ICA) Cerebral Angiography 3. Right Common Femoral Artery (CFA) Angiogram Neurointerventionalist: Mario Leiva MD, MS Access: Right Common Femoral Artery Anesthesia: MAC anesthesia. Fluoro time: 14.2 minutes Contrast amount: 59 cc of Visipaque-270 Indication and Clinical History: 81 y/o M with pmh significant for LEXII presented with acute onset speech difficulty, right sided weakness, NIHSS, left MCA M1 acute occlusion s/p tPA. CTA head showed Left MCA M1 proximal occlusion. Therefore mechanical thrombectomy was indicated. Consent: after explaining the risks and benefit to the family, including but not limited to vessel injury or perforation, stroke, intracranial hemorrhage, radiation, dye allergic reaction, groin hematoma and death, an informed consent was obtained, signed and witnessed. Technique and Interpretations: The patient was brought to the interventional suite and placed in a supine position by the stroke and anesthesia team. The patient's right groin was prepped and draped in standard sterile fashion. Fluoroscopy was used to localize the right common femoral  artery. The right common femoral artery was accessed with a micropuncture technique under local anesthesia with conscious sedation, and a 8 [place] short sheath was placed within the right common femoral artery, establishing arterial access using Seldinger technique. A 5F Gasca select catheter and a 8F Flowgate balloon guide catheter (BGC) were introduced coaxially with a guidewire. Patient was noted to have Type III aortic arch. 5 fr Gasca catheter was then replaced with VTK diagnostic catheter.  Left CCA was then selectively catheterized. Flowgate BGC was then slowly and carefully advanced over the diagnostic catheter into the distal left cervical ICA. VTK diagnostic catheter was then removed. Left ICA Technique: The Left ICA was selectively catheterized under fluoroscopic guidance. Intracranial images were obtained in standard biplane projections. Left ICA Interpretation: The Left ICA images demonstrated complete occlusion of the proximal  left MCA M-1 segment. Grade 0 collateral blood supply to the Left MCA territory per the American Society of Interventional and Therapeutic Neuroradiology Radiology/Society of interventional Radiology(ASITN/SIR) collateral grade scale. There is normal antegrade filling of the distal internal carotid artery, ophthalmic artery, anterior cerebral artery. FIRST PASS MECHANICAL THROMBECTOMY OF LEFT M1 CLOT : A Synchro2 microwire and Prowler select microcatheter were advanced through the clot to the superior left MCA M2, the microwire was removed, and a 5 mm x 37 mm Embotrap stent retriever was advanced under fluoroscopic guidance, unsheathed spanning the Left M1 MCA clot, and allowed to integrate with the clot for at least 5 minutes. The microcatheter was \"stripped off. \" The BGC was inflated. The stent retriever was then removed under fluoroscopic guidance from the body while applying continuous aspiration from the balloon guide catheter side port (using manual aspiration through a 60 cc syringe). Thrombus was visualized on the stent retriever. FINAL ANGIOGRAPHIC RUN: In this injection, complete reperfusion of the left MCA territory was visualized. Final TICI score 3. Left MCA M-1 segment was noted to have stenosis measuring about 60% by NASCET criteria. The BGC was then removed, and digital subtraction angiography of the right CFA was performed in a frontal oblique projection from the short sheath. There was no evidence of arterial stenosis, dissection, or pseudoaneurysm.  The arterial puncture site was well placed above the femoral bifurcation. The sheath was then sutured in the place. No immediate complications were experienced. Patient was re-examined after the procedure with no change noted in their neurologic examination, with distal pulses present. The patient was subsequently discharged from the neurointerventional suite to the neurointensive care unit. Impression: 1. Acute Left MCA proximal M-1 occlusion TICI score 0 2. The above lesion was treated with 8 fr short sheath, flow gate bgc, prowler 21 microcatheter, embo trap 5 mm x 37 mm stent retriever first pass. Final reperfusion score TICI 03 3. 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 Liban Ortega dictated this invasive procedure. Dr. Tara Velásquez was present for all procedural and imaging components of this case. Examination was reviewed and reported findings confirmed and evaluated by Dr. Tara Velásquez. Final report electronically signed by Stephanie Hess M.D. on 5/8/2022 5:45 PM    CT BRAIN PERFUSION    Result Date: 5/7/2022  EXAMINATION: CT OF THE HEAD WITHOUT CONTRAST; CTA OF THE HEAD AND NECK WITH CONTRAST; CTA OF THE HEAD WITH CONTRAST WITH PERFUSION 5/6/2022 11:40 pm; 5/6/2022 11:47 pm: TECHNIQUE: CT of the head was performed without the administration of intravenous contrast. Dose modulation, iterative reconstruction, and/or weight based adjustment of the mA/kV was utilized to reduce the radiation dose to as low as reasonably achievable.; CTA of the head and neck was performed with the administration of intravenous contrast. Multiplanar reformatted images are provided for review. MIP images are provided for review. Stenosis of the internal carotid arteries measured using NASCET criteria.  Dose modulation, iterative reconstruction, and/or weight based adjustment of the mA/kV was utilized to reduce the radiation dose to as low as reasonably achievable.; CTA of the head/brain was performed with the administration of intravenous contrast. Multiplanar reformatted images are provided for review. MIP images are provided for review. Dose modulation, iterative reconstruction, and/or weight based adjustment of the mA/kV was utilized to reduce the radiation dose to as low as reasonably achievable. Noncontrast CT of the head with reconstructed 2-D images are also provided for review. COMPARISON: None. HISTORY: ORDERING SYSTEM PROVIDED HISTORY: stroke TECHNOLOGIST PROVIDED HISTORY: stroke Decision Support Exception - unselect if not a suspected or confirmed emergency medical condition->Emergency Medical Condition (MA); ORDERING SYSTEM PROVIDED HISTORY: TPA TECHNOLOGIST PROVIDED HISTORY: TPA Decision Support Exception - unselect if not a suspected or confirmed emergency medical condition->Emergency Medical Condition (MA); ORDERING SYSTEM PROVIDED HISTORY: stroke TECHNOLOGIST PROVIDED HISTORY: stroke Decision Support Exception - unselect if not a suspected or confirmed emergency medical condition->Emergency Medical Condition (MA) FINDINGS: CT HEAD: BRAIN/VENTRICLES:  No acute intracranial hemorrhage or extraaxial fluid collection. Grey-white differentiation is maintained. No evidence of mass, mass effect or midline shift. No evidence of hydrocephalus. Periventricular and subcortical white matter hypoattenuation, consistent microangiopathic change. There is mild parenchymal volume loss. ORBITS: The visualized portion of the orbits demonstrate no acute abnormality. SINUSES:  The visualized paranasal sinuses and mastoid air cells demonstrate no acute abnormality. SOFT TISSUES/SKULL: No acute abnormality of the visualized skull or soft tissues. CTA NECK: AORTIC ARCH/ARCH VESSELS: No dissection or arterial injury. No significant stenosis of the brachiocephalic or subclavian arteries. CAROTID ARTERIES: No dissection, arterial injury, or hemodynamically significant stenosis by NASCET criteria.  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 Right vertebral artery is hypoplastic, but patent. SOFT TISSUES: The lung apices are clear. Mildly enlarged superior mediastinal lymph nodes. .  The larynx and pharynx are unremarkable. No acute abnormality of the salivary glands. Thyroid nodules measure up to 1.3 cm. BONES: Multilevel degenerative changes of the cervical spine. CTA HEAD: ANTERIOR CIRCULATION: The intracranial ICAs are patent and normal in caliber. The ACAs are patent and normal in caliber. There is occlusion of the left M1 MCA with some reconstituted flow distally. The right MCA is patent. There is atherosclerosis with mild narrowing in the right supraclinoid ICA. POSTERIOR CIRCULATION: No significant stenosis of the vertebral, basilar, or posterior cerebral arteries. No aneurysm. Fetal configuration of the left PCA. OTHER: No dural venous sinus thrombosis on this non-dedicated study. CT PERFUSION: EXAM QUALITY: The examination is diagnostic with appropriate arterial inflow and venous outflow curves, and diagnostic perfusion maps. CORE INFARCT: The total area of ischemic core is 14 mL (CBF<30% volume). TOTAL HYPOPERFUSION: The total area of hypoperfusion is 156 mL (Tmax>6s volume). PENUMBRA: The penumbra (mismatch) volume is 142 mL and is located in the left MCA territory. The mismatch ratio is 11.1.     1. No acute intracranial abnormality. 2. Left MCA territory perfusion mismatch with ratio 11.1 and mismatch volume 142 mL. 3. 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 4. 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 5. No significant stenosis or occlusion of the cervical carotid arteries. 6. Thyroid nodules measuring up to 1.3 cm. No further imaging follow-up is required. Critical results were called by Dr. Lei Sanchez MD to Dr. Liban Ortega On 5/7/2022 at 00:25.      CTA HEAD NECK W CONTRAST    Result Date: 5/7/2022  EXAMINATION: CT OF THE HEAD WITHOUT CONTRAST; CTA OF THE HEAD AND NECK WITH CONTRAST; CTA OF THE HEAD WITH CONTRAST WITH PERFUSION 5/6/2022 11:40 pm; 5/6/2022 11:47 pm: TECHNIQUE: CT of the head was performed without the administration of intravenous contrast. Dose modulation, iterative reconstruction, and/or weight based adjustment of the mA/kV was utilized to reduce the radiation dose to as low as reasonably achievable.; CTA of the head and neck was performed with the administration of intravenous contrast. Multiplanar reformatted images are provided for review. MIP images are provided for review. Stenosis of the internal carotid arteries measured using NASCET criteria. Dose modulation, iterative reconstruction, and/or weight based adjustment of the mA/kV was utilized to reduce the radiation dose to as low as reasonably achievable.; CTA of the head/brain was performed with the administration of intravenous contrast. Multiplanar reformatted images are provided for review. MIP images are provided for review. Dose modulation, iterative reconstruction, and/or weight based adjustment of the mA/kV was utilized to reduce the radiation dose to as low as reasonably achievable. Noncontrast CT of the head with reconstructed 2-D images are also provided for review. COMPARISON: None.  HISTORY: ORDERING SYSTEM PROVIDED HISTORY: stroke TECHNOLOGIST PROVIDED HISTORY: stroke Decision Support Exception - unselect if not a suspected or confirmed emergency medical condition->Emergency Medical Condition (MA); ORDERING SYSTEM PROVIDED HISTORY: TPA TECHNOLOGIST PROVIDED HISTORY: TPA Decision Support Exception - unselect if not a suspected or confirmed emergency medical condition->Emergency Medical Condition (MA); ORDERING SYSTEM PROVIDED HISTORY: stroke TECHNOLOGIST PROVIDED HISTORY: stroke Decision Support Exception - unselect if not a suspected or confirmed emergency medical condition->Emergency Medical Condition (MA) FINDINGS: CT HEAD: BRAIN/VENTRICLES:  No acute intracranial hemorrhage or extraaxial fluid collection. Grey-white differentiation is maintained. No evidence of mass, mass effect or midline shift. No evidence of hydrocephalus. Periventricular and subcortical white matter hypoattenuation, consistent microangiopathic change. There is mild parenchymal volume loss. ORBITS: The visualized portion of the orbits demonstrate no acute abnormality. SINUSES:  The visualized paranasal sinuses and mastoid air cells demonstrate no acute abnormality. SOFT TISSUES/SKULL: No acute abnormality of the visualized skull or soft tissues. CTA NECK: AORTIC ARCH/ARCH VESSELS: No dissection or arterial injury. No significant stenosis of the brachiocephalic or subclavian arteries. CAROTID ARTERIES: No dissection, arterial injury, or hemodynamically significant stenosis by NASCET criteria. 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 Right vertebral artery is hypoplastic, but patent. SOFT TISSUES: The lung apices are clear. Mildly enlarged superior mediastinal lymph nodes. .  The larynx and pharynx are unremarkable. No acute abnormality of the salivary glands. Thyroid nodules measure up to 1.3 cm. BONES: Multilevel degenerative changes of the cervical spine. CTA HEAD: ANTERIOR CIRCULATION: The intracranial ICAs are patent and normal in caliber. The ACAs are patent and normal in caliber. There is occlusion of the left M1 MCA with some reconstituted flow distally. The right MCA is patent. There is atherosclerosis with mild narrowing in the right supraclinoid ICA. POSTERIOR CIRCULATION: No significant stenosis of the vertebral, basilar, or posterior cerebral arteries. No aneurysm. Fetal configuration of the left PCA. OTHER: No dural venous sinus thrombosis on this non-dedicated study.  CT PERFUSION: EXAM QUALITY: The examination is diagnostic with appropriate arterial inflow and venous outflow curves, and diagnostic perfusion maps. CORE INFARCT: The total area of ischemic core is 14 mL (CBF<30% volume). TOTAL HYPOPERFUSION: The total area of hypoperfusion is 156 mL (Tmax>6s volume). PENUMBRA: The penumbra (mismatch) volume is 142 mL and is located in the left MCA territory. The mismatch ratio is 11.1.     1. No acute intracranial abnormality. 2. Left MCA territory perfusion mismatch with ratio 11.1 and mismatch volume 142 mL. 3. 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 4. 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 5. No significant stenosis or occlusion of the cervical carotid arteries. 6. Thyroid nodules measuring up to 1.3 cm. No further imaging follow-up is required. Critical results were called by Dr. Marilyn Fitch MD to Dr. Zan Larson On 5/7/2022 at 00:25. MRI BRAIN WO CONTRAST    Result Date: 5/7/2022  EXAMINATION: MRI OF THE BRAIN WITHOUT CONTRAST  5/7/2022 4:54 pm TECHNIQUE: Multiplanar multisequence MRI of the brain was performed without the administration of intravenous contrast. COMPARISON: Same-day CT HISTORY: ORDERING SYSTEM PROVIDED HISTORY: L M1 occlusion TECHNOLOGIST PROVIDED HISTORY: L M1 occlusion Reason for Exam: L M1 occlusion FINDINGS: Examination is degraded by motion. INTRACRANIAL STRUCTURES/VENTRICLES: There is diffusion restriction in the left basal ganglia and anterior temporal lobe. There are small foci of diffusion restriction in the right frontal cortex. No mass effect or midline shift. No evidence of an acute intracranial hemorrhage. The ventricles and sulci are normal in size and configuration. The sellar/suprasellar regions appear unremarkable. The normal signal voids within the major intracranial vessels appear maintained.  There are scattered periventricular and deep subcortical white matter T2/FLAIR hyperintensities, consistent with microangiopathic change. There is mild parenchymal volume loss. ORBITS: The visualized portion of the orbits demonstrate no acute abnormality. SINUSES: Scattered mucosal thickening in the paranasal sinuses. Right mastoid tip effusion. BONES/SOFT TISSUES: The bone marrow signal intensity appears normal. The soft tissues demonstrate no acute abnormality. 1. Acute infarction in the left anterior temporal lobe and left basal ganglia, consistent with MCA occlusion. 2. Punctate foci of acute infarction in the right frontal cortex. 3. Mild microangiopathic change. Assessment & Differential Dx:      Primary Problem  Cerebrovascular accident (CVA) [facility])    Active Hospital Problems    Diagnosis Date Noted    CHF (congestive heart failure) ([facility] [address] [I50.9] 05/08/2022     Priority: Medium    NSTEMI (non-ST elevated myocardial infarction) ([facility] [address] [I21.4] 05/08/2022     Priority: Medium    Altered mental status [R41.82]      Priority: Medium    Cerebrovascular accident (CVA) ([facility] [address] [I63.9] 05/07/2022     Priority: Medium    Acute ischemic stroke ([facility] [address] [I63.9] 05/07/2022     Priority: Medium       Case of 80-year-old male with no significant PMH not on any AC, presented via EMS after acute onset right sided weakness and speech difficulty, evaluated by mobile stroke unit, noted to be aphasic dysarthric, right arm and leg weakness, NIH: 14, CT head: Negative for acute bleed, patient given tPA  -CTA head and neck: Left M2 1 occlusion, taken for mechanical thrombectomy for M1 occlusion, TICI 3 overnight patient became confused agitated    Impression:  -  Left M1 complete occlusion status post mechanical thrombectomy, TICI 3  -  CT head evolving left anterior left temporal lobe infarction with a small hemorrhage  -  NSTEMI: Troponin 770, continue trending troponin for 6 hours    Plan:     -  Aspirin 81  -  Lipitor 80  -  Hold on Plavix and heparin  -  Repeat CT head neck is stable patient.   On low-dose heparin drip without bolus  -  Cardiology is okay for cardiac cath  -  Goal SBP between 130 and 170  -  Ejection fraction of 13%      Layne Becerril MD, MD, 5/9/2022 9:22 AM

## 2022-05-09 NOTE — PLAN OF CARE
Neuro assessment completed, fall precautions in place, aspirations precautions in place, assess for barriers in communication and mobility, interventions to assist in communication and mobility in place, encouraged to call for assistance, adaptive devices used as needed, assess emotional state and support offered, encouraged patient to communicate by available means, and support systems included in patient care. Problem: Discharge Planning  Goal: Discharge to home or other facility with appropriate resources  5/9/2022 1725 by Kayec Britt RN  Outcome: Progressing  5/9/2022 0503 by Maryjane Khan RN  Outcome: Progressing  5/9/2022 0503 by Maryjane Khan RN  Outcome: Progressing     Problem: Chronic Conditions and Co-morbidities  Goal: Patient's chronic conditions and co-morbidity symptoms are monitored and maintained or improved  5/9/2022 1725 by Kayce Britt RN  Outcome: Progressing  5/9/2022 0503 by Maryjane Khan RN  Outcome: Progressing  5/9/2022 0503 by Maryjane Khan RN  Outcome: Progressing     Problem: Safety - Adult  Goal: Free from fall injury  5/9/2022 1725 by Kayce Britt RN  Outcome: Progressing  5/9/2022 0503 by Maryjane Khan RN  Outcome: Progressing  5/9/2022 0503 by Maryjane Khan RN  Outcome: Progressing     Problem: ABCDS Injury Assessment  Goal: Absence of physical injury  5/9/2022 1725 by Kayce Britt RN  Outcome: Progressing  5/9/2022 0503 by Maryjane Khan RN  Outcome: Progressing  5/9/2022 0503 by Maryjane Khan RN  Outcome: Progressing     Problem: Skin/Tissue Integrity  Goal: Absence of new skin breakdown  Description: 1. Monitor for areas of redness and/or skin breakdown  2. Assess vascular access sites hourly  3. Every 4-6 hours minimum:  Change oxygen saturation probe site  4.   Every 4-6 hours:  If on nasal continuous positive airway pressure, respiratory therapy assess nares and determine need for appliance change or resting

## 2022-05-09 NOTE — PROGRESS NOTES
Endovascular Neurosurgery Progress Note    SUBJECTIVE:   No reported events overnight. This am pt is well and denies any symptoms. He has mild aphasia, and has some difficulty making his needs known. Review of Systems:  CONSTITUTIONAL:  negative for fevers, chills, fatigue and malaise    EYES:  negative for double vision, blurred vision and photophobia     HEENT:  negative for tinnitus, epistaxis and sore throat    RESPIRATORY:  negative for cough, shortness of breath, wheezing    CARDIOVASCULAR:  negative for chest pain, palpitations, syncope, edema    GASTROINTESTINAL:  negative for nausea, vomiting    GENITOURINARY:  negative for incontinence    MUSCULOSKELETAL:  negative for neck or back pain    NEUROLOGICAL:  Negative for weakness and tingling  negative for headaches and dizziness    PSYCHIATRIC:  negative for anxiety      Review of systems otherwise negative. OBJECTIVE:     Vitals:    22 1034   BP: 117/70   Pulse: 71   Resp: 22   Temp:    SpO2: 94%        General:  Gen: normal habitus, NAD  HEENT: NCAT, mucosa moist  Cvs: RRR, S1 S2 normal  Resp: symmetric unlabored breathing  Abd: s/nd/nt  Ext: no edema  Skin: no lesions seen, warm and dry    Neuro:  Gen: awake and alert, oriented to self age and location, not oriented to month  Lang/speech: Mild aphasia, naming impaired, follows commands, mild dysarthria  CN: PERRL, EOMI, VFF, V1-3 intact, face symmetric, hearing intact  Motor: Right UE 4+/5, Right LE 4+/5, Left UE and LE 5/5   Sense: LT intact all 4 ext. Coord: impaired on right UE and LE  DTR: deferred  Gait: deferred    NIH Stroke Scale:   1a  Level of consciousness: 0 - alert; keenly responsive   1b. LOC questions:  1 - answers one question correctly   1c. LOC commands: 0 - performs both tasks correctly   2. Best Gaze: 0 - normal   3. Visual: 0 - no visual loss   4. Facial Palsy: 0 - normal symmetric movement   5a. Motor left arm: 0   5b. Motor right arm: 0   6a.  Motor left le 6b  Motor right le   7. Limb Ataxia: 0 - absent   8. Sensory: 0   9. Best Language:  1   10. Dysarthria: 1 - mild to moderate, patient slurs at least some words and at worst, can be understood with some difficulty   11. Extinction and Inattention: 0 - no abnormality         Total:   3     MRS: 04    LABS:   Reviewed. Lab Results   Component Value Date    HGB 10.8 (L) 2022    WBC 7.9 2022     (L) 2022     2022    BUN 12 2022    CREATININE 0.84 2022    APTT 21.9 2022    INR 1.1 2022      Lab Results   Component Value Date    COVID19 Not Detected 2022       RADIOLOGY:   Images were personally reviewed including:     MRI Brain 2022:   1. Acute infarction in the left anterior temporal lobe and left basal   ganglia, consistent with MCA occlusion. 2. Punctate foci of acute infarction in the right frontal cortex. 3. Mild microangiopathic change. IR 2022:   1. Acute Left MCA proximal M-1 occlusion TICI score 0   2. The above lesion was treated with 8 fr short sheath, flow gate bgc, prowler 21 microcatheter, embo trap 5 mm x 37 mm stent retriever first pass. Final reperfusion score TICI 03    TTE 2022: EF 10%    ASSESSMENT:   81 y/o M with pmh significant for LEXII presented 2022 with left M1 acute occlusion s/p tPA and MT TICI 03, first pass. 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 Workup shows HF EF 10%    Pt has residual mild R candace weakness, mild exp aphasia and mild dysarthria. MRI 2022 shows L MCA stroke and punctate hemo conversion. Given low ef, plans for cardiac cath and life vest    PLAN:   --c/w ASA 81 mg daily, start plavix 75 when stable to do so. --continue lipitor 80  ---140  --repeat ct head as per neuro. If stable plan to start hep gtt.   --plans to start hep gtt, perform cardiac cath with possible PCI.    --F/up with Dr. Dany Montano in 2 weeks after discharge and f/up with Dr. Jose Cruz Khan in 3 months     Case discussed with Dr. Gwyn Valerio attending.     Bre Arias MD  Stroke, [facility]  [id number]  Electronically signed 5/9/2022 at 2:54 PM

## 2022-05-10 ENCOUNTER — APPOINTMENT (OUTPATIENT)
Dept: CARDIAC CATH/INVASIVE PROCEDURES | Age: 87
DRG: 023 | End: 2022-05-10
Payer: MEDICARE

## 2022-05-10 LAB
ABSOLUTE EOS #: 0.09 K/UL (ref 0–0.4)
ABSOLUTE IMMATURE GRANULOCYTE: 0.09 K/UL (ref 0–0.3)
ABSOLUTE LYMPH #: 1.78 K/UL (ref 1–4.8)
ABSOLUTE MONO #: 2.49 K/UL (ref 0.1–0.8)
ANION GAP SERPL CALCULATED.3IONS-SCNC: 15 MMOL/L (ref 9–17)
BASOPHILS # BLD: 0 % (ref 0–2)
BASOPHILS ABSOLUTE: 0 K/UL (ref 0–0.2)
BUN BLDV-MCNC: 17 MG/DL (ref 8–23)
CALCIUM SERPL-MCNC: 8.8 MG/DL (ref 8.6–10.4)
CHLORIDE BLD-SCNC: 104 MMOL/L (ref 98–107)
CO2: 17 MMOL/L (ref 20–31)
CREAT SERPL-MCNC: 0.79 MG/DL (ref 0.7–1.2)
EOSINOPHILS RELATIVE PERCENT: 1 % (ref 1–4)
GFR AFRICAN AMERICAN: >60 ML/MIN
GFR NON-AFRICAN AMERICAN: >60 ML/MIN
GFR SERPL CREATININE-BSD FRML MDRD: ABNORMAL ML/MIN/{1.73_M2}
GLUCOSE BLD-MCNC: 127 MG/DL (ref 70–99)
HCT VFR BLD CALC: 33.5 % (ref 40.7–50.3)
HEMOGLOBIN: 11.2 G/DL (ref 13–17)
IMMATURE GRANULOCYTES: 1 %
LYMPHOCYTES # BLD: 20 % (ref 24–44)
MCH RBC QN AUTO: 31.3 PG (ref 25.2–33.5)
MCHC RBC AUTO-ENTMCNC: 33.4 G/DL (ref 28.4–34.8)
MCV RBC AUTO: 93.6 FL (ref 82.6–102.9)
MONOCYTES # BLD: 28 % (ref 1–7)
MORPHOLOGY: NORMAL
NRBC AUTOMATED: 0 PER 100 WBC
PARTIAL THROMBOPLASTIN TIME: 25.8 SEC (ref 20.5–30.5)
PARTIAL THROMBOPLASTIN TIME: 27.1 SEC (ref 20.5–30.5)
PARTIAL THROMBOPLASTIN TIME: 40.2 SEC (ref 20.5–30.5)
PDW BLD-RTO: 12.5 % (ref 11.8–14.4)
PLATELET # BLD: ABNORMAL K/UL (ref 138–453)
PLATELET, FLUORESCENCE: 117 K/UL (ref 138–453)
PLATELET, IMMATURE FRACTION: 4.6 % (ref 1.1–10.3)
POTASSIUM SERPL-SCNC: 3.9 MMOL/L (ref 3.7–5.3)
RBC # BLD: 3.58 M/UL (ref 4.21–5.77)
SEG NEUTROPHILS: 50 % (ref 36–66)
SEGMENTED NEUTROPHILS ABSOLUTE COUNT: 4.45 K/UL (ref 1.8–7.7)
SODIUM BLD-SCNC: 136 MMOL/L (ref 135–144)
TROPONIN, HIGH SENSITIVITY: 679 NG/L (ref 0–22)
TROPONIN, HIGH SENSITIVITY: 789 NG/L (ref 0–22)
WBC # BLD: 8.9 K/UL (ref 3.5–11.3)

## 2022-05-10 PROCEDURE — 2709999900 HC NON-CHARGEABLE SUPPLY

## 2022-05-10 PROCEDURE — 2060000000 HC ICU INTERMEDIATE R&B

## 2022-05-10 PROCEDURE — 99222 1ST HOSP IP/OBS MODERATE 55: CPT | Performed by: FAMILY MEDICINE

## 2022-05-10 PROCEDURE — 2500000003 HC RX 250 WO HCPCS

## 2022-05-10 PROCEDURE — 99232 SBSQ HOSP IP/OBS MODERATE 35: CPT | Performed by: PHYSICAL MEDICINE & REHABILITATION

## 2022-05-10 PROCEDURE — 99233 SBSQ HOSP IP/OBS HIGH 50: CPT | Performed by: PSYCHIATRY & NEUROLOGY

## 2022-05-10 PROCEDURE — 85055 RETICULATED PLATELET ASSAY: CPT

## 2022-05-10 PROCEDURE — C1760 CLOSURE DEV, VASC: HCPCS

## 2022-05-10 PROCEDURE — 2580000003 HC RX 258

## 2022-05-10 PROCEDURE — C1892 INTRO/SHEATH,FIXED,PEEL-AWAY: HCPCS

## 2022-05-10 PROCEDURE — 84484 ASSAY OF TROPONIN QUANT: CPT

## 2022-05-10 PROCEDURE — 6370000000 HC RX 637 (ALT 250 FOR IP): Performed by: SURGERY

## 2022-05-10 PROCEDURE — 6360000004 HC RX CONTRAST MEDICATION

## 2022-05-10 PROCEDURE — 99232 SBSQ HOSP IP/OBS MODERATE 35: CPT | Performed by: INTERNAL MEDICINE

## 2022-05-10 PROCEDURE — 85730 THROMBOPLASTIN TIME PARTIAL: CPT

## 2022-05-10 PROCEDURE — 36415 COLL VENOUS BLD VENIPUNCTURE: CPT

## 2022-05-10 PROCEDURE — 6370000000 HC RX 637 (ALT 250 FOR IP): Performed by: NURSE PRACTITIONER

## 2022-05-10 PROCEDURE — 6370000000 HC RX 637 (ALT 250 FOR IP): Performed by: PSYCHIATRY & NEUROLOGY

## 2022-05-10 PROCEDURE — 85025 COMPLETE CBC W/AUTO DIFF WBC: CPT

## 2022-05-10 PROCEDURE — 93458 L HRT ARTERY/VENTRICLE ANGIO: CPT

## 2022-05-10 PROCEDURE — 80048 BASIC METABOLIC PNL TOTAL CA: CPT

## 2022-05-10 PROCEDURE — 97530 THERAPEUTIC ACTIVITIES: CPT

## 2022-05-10 PROCEDURE — 99232 SBSQ HOSP IP/OBS MODERATE 35: CPT | Performed by: PSYCHIATRY & NEUROLOGY

## 2022-05-10 PROCEDURE — 6370000000 HC RX 637 (ALT 250 FOR IP): Performed by: STUDENT IN AN ORGANIZED HEALTH CARE EDUCATION/TRAINING PROGRAM

## 2022-05-10 PROCEDURE — C1894 INTRO/SHEATH, NON-LASER: HCPCS

## 2022-05-10 RX ORDER — CLOPIDOGREL BISULFATE 75 MG/1
75 TABLET ORAL DAILY
Status: DISCONTINUED | OUTPATIENT
Start: 2022-05-10 | End: 2022-05-16

## 2022-05-10 RX ORDER — SODIUM CHLORIDE 0.9 % (FLUSH) 0.9 %
5-40 SYRINGE (ML) INJECTION EVERY 12 HOURS SCHEDULED
Status: DISCONTINUED | OUTPATIENT
Start: 2022-05-10 | End: 2022-05-18 | Stop reason: HOSPADM

## 2022-05-10 RX ORDER — ACETAMINOPHEN 325 MG/1
650 TABLET ORAL EVERY 4 HOURS PRN
Status: DISCONTINUED | OUTPATIENT
Start: 2022-05-10 | End: 2022-05-18 | Stop reason: HOSPADM

## 2022-05-10 RX ORDER — SODIUM CHLORIDE 9 MG/ML
INJECTION, SOLUTION INTRAVENOUS PRN
Status: DISCONTINUED | OUTPATIENT
Start: 2022-05-10 | End: 2022-05-18 | Stop reason: HOSPADM

## 2022-05-10 RX ORDER — SODIUM CHLORIDE 0.9 % (FLUSH) 0.9 %
5-40 SYRINGE (ML) INJECTION PRN
Status: DISCONTINUED | OUTPATIENT
Start: 2022-05-10 | End: 2022-05-18 | Stop reason: HOSPADM

## 2022-05-10 RX ADMIN — OLANZAPINE 5 MG: 10 INJECTION, POWDER, LYOPHILIZED, FOR SOLUTION INTRAMUSCULAR at 01:17

## 2022-05-10 RX ADMIN — CLOPIDOGREL 75 MG: 75 TABLET, FILM COATED ORAL at 16:49

## 2022-05-10 RX ADMIN — METOPROLOL TARTRATE 25 MG: 25 TABLET ORAL at 15:20

## 2022-05-10 RX ADMIN — METOPROLOL TARTRATE 25 MG: 25 TABLET ORAL at 20:21

## 2022-05-10 RX ADMIN — ASPIRIN 81 MG: 81 TABLET, CHEWABLE ORAL at 15:20

## 2022-05-10 RX ADMIN — TAMSULOSIN HYDROCHLORIDE 0.4 MG: 0.4 CAPSULE ORAL at 15:19

## 2022-05-10 RX ADMIN — ATORVASTATIN CALCIUM 80 MG: 80 TABLET, FILM COATED ORAL at 20:21

## 2022-05-10 NOTE — PROGRESS NOTES
Physical Therapy        Physical Therapy Cancel Note      DATE: 5/10/2022    NAME: Marek Neal  MRN: [id number]   : 1934      Patient not seen this date for Physical Therapy due to: Other: pt currently out of room at this time in CATH LAB, will check back this afternoon.       Electronically signed by Yomi Kumar PTA on 5/10/2022 at 10:44 AM

## 2022-05-10 NOTE — PROGRESS NOTES
Post PCI:  - start plavix 75 mg daily in addition to aspirin 81 mg daily once cleared by neurology. - discontinue heparin gtt.    - consult palliative care    Jacobo Field MD  Cardiology

## 2022-05-10 NOTE — PROGRESS NOTES
Physical Therapy  Facility/Department: [address] STEP DOWN  Physical Therapy Daily Treatment Note    Name: Jake Green  : 1934  MRN: [id number]  Date of Service: 5/10/2022    Discharge Recommendations:  Patient would benefit from continued therapy after discharge   PT Equipment Recommendations  Equipment Needed: No (CTA)      Patient Diagnosis(es): The primary encounter diagnosis was Cerebrovascular accident (CVA), unspecified mechanism ([facility] [address] Diagnoses of Altered mental status, unspecified altered mental status type and Right sided weakness were also pertinent to this visit. Past Medical History:  has a past medical history of Arthritis and Psoriasis. Past Surgical History:  has a past surgical history that includes Prostate surgery. Assessment   Body Structures, Functions, Activity Limitations Requiring Skilled Therapeutic Intervention: Decreased functional mobility ; Decreased coordination;Decreased balance;Decreased strength;Decreased safe awareness;Decreased cognition;Decreased endurance;Decreased posture  Assessment: Pt with mobility deficits requiring mod-A to perform bed mobility, min-A to perform sit<>stand transfer and to ambulate 5 feet with a RW. Pt demonstrates significantly impaired safety awareness, pt with difficulty following simple one-step commands this date. Pt would benefit from additional therapy upon discharge to address balance, BLE strength, and coodination deficits. Pt will require strict 24/7 supervision and assistance with mobility upon discharge secondary to high fall risk. Therapy Prognosis: Good  Decision Making: Medium Complexity  Activity Tolerance  Activity Tolerance: Treatment limited secondary to decreased cognition;Treatment limited secondary to medical complications  Activity Tolerance Comments: Limited secondary to heart rhythm, monitor reading Vtach, high PVC count.      Plan   Plan  Plan:  (5-6x/week)  Current Treatment Recommendations: Strengthening,Balance training,Functional mobility training,Transfer training,Gait training,Neuromuscular re-education,Home exercise program,Cognitive reorientation,Safety education & training,Patient/Caregiver education & training,Equipment evaluation, education, & procurement,Therapeutic activities  Safety Devices  Type of Devices: All fall risk precautions in place,Call light within reach,Gait belt,Nurse notified,Chair alarm in place,Left in chair,Sitter present  Restraints  Restraints Initially in Place: Yes  Restraints: 4 bed rails     Restrictions  Restrictions/Precautions  Restrictions/Precautions: Fall Risk,General Precautions  Required Braces or Orthoses?: No  Position Activity Restriction  Other position/activity restrictions: SBP goal less than 160. CVA, s/p tPA, s/p thrombectomy. Subjective   General  Patient assessed for rehabilitation services?: Yes  Family / Caregiver Present: No  Follows Commands: Impaired (inconsistently follows simple commands.)  Subjective  Subjective: Pt supine in bed and agreeable to therapy, RN agreeable to therapy. Pt able to follow few commands this date. Social/Functional History  Social/Functional History  Lives With: Spouse  Type of Home: House  Home Layout: Two level,Able to Live on Main level with bedroom/bathroom  Home Access: Stairs to enter with rails  Entrance Stairs - Number of Steps: 4  Entrance Stairs - Rails: Right  ADL Assistance: Independent  Homemaking Assistance: Independent  Ambulation Assistance: Independent  Transfer Assistance: Independent  Active : Yes  Occupation: Retired  Additional Comments: Wife reports 0 falls in the last 6 months. Prior to CVA, independent gait with no difficulty. Community ambulator. Cognition   Cognition  Overall Cognitive Status: Exceptions  Arousal/Alertness: Inconsistent responses to stimuli  Following Commands: Inconsistently follows commands; Follows one step commands with increased time; Follows one step commands with repetition  Attention Span: Difficulty dividing attention; Difficulty attending to directions  Memory: Decreased recall of recent events;Decreased recall of precautions  Safety Judgement: Decreased awareness of need for assistance;Decreased awareness of need for safety  Problem Solving: Decreased awareness of errors  Insights: Not aware of deficits  Initiation: Requires cues for all  Sequencing: Requires cues for all     Objective                              Bed mobility  Supine to Sit: Moderate assistance  Sit to Supine:  (pt retired up to a chair at the conclusion of today's session.)  Scooting: Moderate assistance  Bed Mobility Comments: HOB elevated ~30 degrees, significantly increased time/effort to perform. Pt with difficulty following simple one-step commands this date. Transfers  Sit to Stand: Minimal Assistance  Stand to sit: Minimal Assistance  Comment: Transfers performed 2x this date. Pt impulsive with transfers. Pt reaching for objects on the ground frequently, attempting to pull at cords. Ambulation  Surface: level tile  Device: Rolling Walker  Assistance: Minimal assistance  Quality of Gait: unsteady, increased trunk sway. Gait Deviations: Staggers;Decreased step length  Distance: 5 feet  Comments: Ambulation distance limited secondary to monitor frequently reading Vtach, high PVC count.   More Ambulation?: No  Stairs/Curb  Stairs?: No     Balance  Posture: Fair  Sitting - Static: Fair;-  Sitting - Dynamic: Fair;-  Standing - Static: Poor;+  Standing - Dynamic: Poor;+  Comments: Assessed with RW.                                            AM-PAC Score  AM-PAC Inpatient Mobility Raw Score : 12 (05/10/22 1608)  AM-PAC Inpatient T-Scale Score : 35.33 (05/10/22 1608)  Mobility Inpatient CMS 0-100% Score: 68.66 (05/10/22 1608)  Mobility Inpatient CMS G-Code Modifier : CL (05/10/22 1608)          Goals  Short Term Goals  Time Frame for Short term goals: 14 visits  Short term goal 1: Supine to/from sit with SBA. Short term goal 2: Sit to/from stand with SBA. Short term goal 3: Ambulate 150' with walker with SBA. Short term goal 4: Improve standing balance to stand with fair + balance.   Additional Goals?: No       Therapy Time   Individual Concurrent Group Co-treatment   Time In 1527         Time Out 1551         Minutes 24         Timed Code Treatment Minutes: 25 Minutes       Yenni Traore, PT

## 2022-05-10 NOTE — ACP (ADVANCE CARE PLANNING)
Advance Care Planning     Advance Care Planning (ACP) Physician/NP/PA (Provider) Conversation      Date of ACP Conversation: 5/6/2022    Conversation Conducted with:   Patient currently does not have decision-making [facility]:  Patient's legal spokesperson is patient's wife Gray aguilar [place] ([phone]) as per [facility] laws    Care Preferences:    Hospitalization: \"If your health worsens and it becomes clear that your chance of recovery is unlikely, what would your preference be regarding hospitalization? \"  Currently hospitalized    Ventilation: \"If you were in your present state of health and suddenly became very ill and were unable to breathe on your own, what would your preference be about the use of a ventilator (breathing machine) if it was available to you? \"    Full code    \"If your health worsens and it becomes clear that your chance of recovery is unlikely, what would your preference be about the use of a ventilator (breathing machine) if it was available to you? \"   Full code    Resuscitation:  \"CPR works best to restart the heart when there is a sudden event, like a heart attack, in someone who is otherwise healthy. Unfortunately, CPR does not typically restart the heart for people who have serious health conditions or who are very sick. \"    \"In the event your heart stopped as a result of an underlying serious health condition, would you want attempts to be made to restart your heart (answer \"yes\" for attempt to resuscitate) or would you prefer a natural death (answer \"no\" for do not attempt to resuscitate)? \"   Full code    Conversation Outcomes / Follow-Up Plan:   Patient's legal spokesperson is patient's wife Nathalia Gallegos ([phone]) as per [facility] laws    Full code      Length of Voluntary ACP Conversation in minutes:  16 minutes       Latasha Prakash MD

## 2022-05-10 NOTE — CONSULTS
Palliative Care Inpatient Consult    NAME:  [id number]  Jessica 18 RECORD NUMBER:  [id number]  AGE: 80 y.o.    GENDER: male  : 1934  TODAY'S DATE:  5/10/2022    Reasons for Consultation:    Symptom and/or pain management  Provision of information regarding PC and/or hospice philosophies  Complex, time-intensive communication and interdisciplinary psychosocial support  Clarification of goals of care and/or assistance with difficult decision-making  Guidance in regards to resources and transition(s)    Members of PC team contributing to this consultation are :  Dr. 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  Patient's wife [place], daughter and 2 son-in-law's were present in patient's room  I introduced myself to the family and explained them the role of palliative care and told him that palliative care is an extra layer of support and strength for the patient and family  I discussed patient's current medical conditions with the family and explained them that patient has very low EF and also has got multiple vessel disease  Patient's family also understands that patient got acute CVA and underwent thrombectomy  I explained the significance of POA paperwork for health to the family and told [place] that she is patient's legal spokesperson for making healthcare decisions for the patient since currently he is not able to make things for himself and she acknowledged her understanding  I explained the different types of codes in details to [place] and family and recommended that they think about changing patient's CODE STATUS to DNR CCA with no intubation since patient has marked coronary artery disease with very low EF  I told the family that I will continue to follow-up with them and again discuss about CODE STATUS  I offered comfort and emotional support to patient and family      Education/support to staff  Education/support to family  Education/support to patient  Discharge planning/helping to coordinate care  Communications with primary service  Caregiver support/education  Code status clarified: Full Code  Code status clarified: [facility]  Code status clarified: DNRCCA  Other major issues      History of Present Illness     The patient is a 80 y.o. Non- / non  male who presents with Altered Mental Status      Referred to Palliative Care by   [x] Physician   [] Nursing  [] Family Request   [] Other:       He was admitted to the internal medicine service for Acute ischemic stroke [facility]) [I63.9]  Right sided weakness [R53.1]  Altered mental status, unspecified altered mental status type [R41.82]  Cerebrovascular accident (CVA), unspecified mechanism ([facility] [address] [I63.9]. His hospital course has been associated with Cerebrovascular accident (CVA) ([facility] [address] The patient has a complicated medical history and has been hospitalized since 5/6/2022 11:29 PM.  The patient was brought in with complaints of acute onset of right-sided weakness and dysarthria, patient was given tPA by the mobile stroke unit. Patient's brain imagings showed left MCA occlusion and underwent mechanical thrombectomy of the left MCA M1 on 5/7/2022. MRI brain showed acute infarction in the left anterior temporal lobe and left basal ganglia, consistent with MCA occlusion and also showed punctate foci of acute infarction in the right frontal cortex. Repeat CT head showed small foci of hemorrhagic conversion. Patient also had elevated troponins and NSTEMI, echo showed EF 13% and cardiology was consulted and patient underwent cardiac cath today.   Palliative care consulted for goals of care, CODE STATUS discussion and family support. Active Hospital Problems    Diagnosis Date Noted    Intracranial bleed ([facility] [I62.9]      Priority: Medium    Right sided weakness [R53.1]      Priority: Medium    [facility] [I50.9] 05/08/2022     Priority: Medium    NSTEMI (non-ST elevated myocardial infarction) ([facility] [I21.4] 05/08/2022     Priority: Medium    Altered mental status [R41.82]      Priority: Medium    Cerebrovascular accident (CVA) ([facility] [I63.9] 05/07/2022     Priority: Medium    Acute ischemic stroke ([facility] [I63.9] 05/07/2022     Priority: Medium       PAST MEDICAL HISTORY      Diagnosis Date    Arthritis     Psoriasis        PAST SURGICAL HISTORY  Past Surgical History:   Procedure Laterality Date    PROSTATE SURGERY         SOCIAL HISTORY  Social History     Tobacco Use    Smoking status: Current Some Day Smoker     Types: Pipe    Smokeless tobacco: Not on file   Substance Use Topics    Alcohol use: Not on file    Drug use: Not on file       ALLERGIES  No Known Allergies      MEDICATIONS  Current Medications    metoprolol tartrate  25 mg Oral BID    aspirin  81 mg Oral Daily    lisinopril  5 mg Oral Daily    tamsulosin  0.4 mg Oral Daily    atorvastatin  80 mg Oral Nightly     heparin (porcine), heparin (porcine), ondansetron **OR** ondansetron, polyethylene glycol, labetalol, acetaminophen, hydrALAZINE  IV Drips/Infusions    Home Medications  No current facility-administered medications on file prior to encounter. Current Outpatient Medications on File Prior to Encounter   Medication Sig Dispense Refill    calcipotriene (DOVONEX) 0.005 % solution Apply topically 2 times daily psoriasis      clobetasol (TEMOVATE) 0.05 % cream Apply topically 2 times daily Indications: Psoriasis Apply topically 2 times daily.       meloxicam (MOBIC) 15 MG tablet Take 15 mg by mouth daily         Data         /81   Pulse 90   Temp 98.2 °F (36.8 °C) (Oral)   Resp 16   Ht 5' 9\" (1.753 m)   Wt 175 lb (79.4 kg)   SpO2 93%   BMI 25.84 kg/m²     Wt Readings from Last 3 Encounters:   05/07/22 175 lb (79.4 kg)        Code Status: Full Code     ADVANCED CARE PLANNING:  Patient has capacity for medical decisions: no  Health Care Power of : no  Living Will: no     Personal, Social, and Family History  Marital Status:   Living situation: with family:  spouse  Does patient understand diagnosis/treatment? no  Does caregiver understand diagnosis/treatment? yes    Past Medical History:   Diagnosis Date    Arthritis     Psoriasis          No family history on file. Social History     Tobacco Use    Smoking status: Current Some Day Smoker     Types: Pipe    Smokeless tobacco: Not on file   Substance Use Topics    Alcohol use: Not on file    Drug use: Not on file           Assessment        REVIEW OF SYSTEMS  ROS unable to be done due to altered mental status changes  Constitutional: no fever, no chills or weight loss  Eyes: no eye pain or blurred vision  ENT: no hearing loss, congestion, or difficulty swallowing   Respiratory: no wheezing, chest tightness, or shortness of breath   Cardiovascular: no chest pain or pressure, no palpitations, no diaphoresis   Gastrointestinal: no nausea, vomiting,abdominal pain, diarrhea or constipation, no melena   Genitourinary: no dysuria, frequency, hematuria, or nocturia   Musculoskeletal: no myalgias or arthralgias, no back pain   Skin: no rashes or sores   Neurological: no focal weakness, numbness, tingling or headache, no seizures    PHYSICAL ASSESSMENT:  Constitutional: Alert and oriented to person, restless  Head: Normocephalic and atraumatic   Eyes: EOM are normal  Neck: Normal range of motion. Neck supple   Cardiovascular: Normal rate and regular rhythm  Pulmonary/Chest: Effort normal and breath sounds normal  Abdomen:  not distended, no ascites  Musculoskeletal: Right-sided weakness, no edema lower ext.    Neurological: Awake, oriented only to self, appears restless  Skin: Normal turgor, no bleeding, no bruising. Palliative Performance Scale:  ___60%  Ambulation reduced; Significant disease; Can't do hobbies/housework; intake normal or reduced; occasional assist; LOC full/confusion  ___50%  Mainly sit/lie; Extensive disease; Can't do any work; Considerable assist; intake normal or reduced; LOC full/confusion  ___40%  Mainly in bed; Extensive disease; Mainly assist; intake normal or reduced; LOC full/confusion   __x_30%  Bed Bound; Extensive disease; Total care; intake reduced; LOC full/confusion  ___20%  Bed Bound; Extensive disease; Total care; intake minimal; Drowsy/coma  ___10%  Bed Bound; Extensive disease; Total care; Mouth care only; Drowsy/coma  ___0       Death      Principle Problem/Diagnosis:  Cerebrovascular accident (CVA) ([facility]    Additional Assessments:   Principal Problem:    Cerebrovascular accident (CVA) ([facility]  Active Problems:    Acute ischemic stroke ([facility]    Altered mental status    CHF (congestive heart failure) ([facility]    NSTEMI (non-ST elevated myocardial infarction) ([facility]    Intracranial bleed ([facility])    Right sided weakness    1- Symptom management/ pain control     Pain Assessment:  Pain is controlled with current analgesics. Medication(s) being used: acetaminophen. Anxiety:  fatigue                          Dyspnea:  none                          Fatigue:  Weakness and tiredness    We feel the patient symptoms are being controlled. his current regimen is reviewed by myself and discussed with the staff.      We will continue to follow-up with the patient for further goals of care and CODE STATUS discussion  We will continue to provide comfort and support to the patient and family    2- Goals of care evaluation   The patient goals of care are improve or maintain function/quality of life, accomplish a particular personal goal, spiritual needs, strengthening relationships, preserve independence/autonomy/control and support for family/caregiver   Goals of care discussed with:    [x] Patient independently    [] Patient and Family    [] Family or Healthcare DPOA independently    [] Unable to discuss with patient, family/DPOA not present    3- Code Status  Full Code    4- Other recommendations   - We will continue to provide comfort and support to the patient and the family  Please call with any palliative questions or concerns. Palliative Care Team is available via perfect serve or via phone. Palliative Care will continue to follow Mr. Laverne Jang care as needed. Thank you for allowing Palliative Care to participate in the care of Mr. Nabila Hurtado . This note has been dictated by dragon, typing errors may be a possibility.     The total time I spent in seeing the patient, discussing goals of care, advanced directives, code status, greater than 50% time in counseling and other major issues was more than 60 minutes      Electronically signed by   Jose Cruz Mahan MD  Palliative Care Team  on 5/10/2022 at 1:01 PM    Palliative care office: [phone]

## 2022-05-10 NOTE — PROGRESS NOTES
Occupational [address] [employer]  Occupational Therapy Not Seen Note    DATE: 5/10/2022    NAME: Zoe Weber  MRN: [id number]   : 1934      Patient not seen this date for Occupational Therapy due to: In cath lab at this time.     Next Scheduled Treatment: 22    Electronically signed by DERRICK Georges on 5/10/2022 at 11:14 AM

## 2022-05-10 NOTE — PROGRESS NOTES
Neurology Resident Progress Note      SUBJECTIVE:  This is a 80 y.o.  male admitted 5/6/2022 for Acute ischemic stroke [facility]) [I63.9]  Right sided weakness [R53.1]  Altered mental status, unspecified altered mental status type [R41.82]  Cerebrovascular accident (CVA), unspecified mechanism ([facility] [address] [I63.9]  This is a follow-up neurology progress note. The patient was seen and examined and the chart was reviewed. There were no acute events overnight. ROS  Constitutional: no fever, chills, fatigue  HENT: No change in vision or hearing   Respiratory: No cough, SOB, wheezing. Cardiovascular:  No chest pain, palpitations, leg swelling. Gastrointestinal: No nausea, vomiting, diarrhea. Genitourinary: No increased frequency, urgency. Musculoskeletal: No myalgia or arthralgia. Skin: No rashes or scarring or bruises. Neurological: No headache, paresthesia, or focal weakness. Endo/Heme/Allergies: Negative for itchy eyes or runny nose. Psychiatric/Behavioral: No anxiety or depressed mood. HPI  See H&P     metoprolol tartrate  25 mg Oral BID    aspirin  81 mg Oral Daily    lisinopril  5 mg Oral Daily    tamsulosin  0.4 mg Oral Daily    atorvastatin  80 mg Oral Nightly       Past Medical History:   Diagnosis Date    Arthritis     Psoriasis        Past Surgical History:   Procedure Laterality Date    PROSTATE SURGERY         PHYSICAL EXAM:     Blood pressure 112/80, pulse 106, temperature 98.8 °F (37.1 °C), temperature source Oral, resp. rate 26, height 5' 9\" (1.753 m), weight 175 lb (79.4 kg), SpO2 98 %. CONSTITUTIONAL:   Patient drowsy, open eyes to voice, does follow command intermittently, irritable.     HEAD:  normocephalic, atraumatic    EYES:  PERRLA, EOMI.   ENT:  moist mucous membranes   NECK:  supple, symmetric   LUNGS:  Equal air entry bilaterally   CARDIOVASCULAR:  normal s1 / s2, RRR, distal pulses intact   ABDOMEN:  Soft, no rigidity   NEUROLOGIC:  Mental Status:     Patient drowsy, open eyes to voice.   Irritable not participating in exam.      Answer's I dnot know to all questions   Patient moving all 4 extremities antigravity with significant strength                 Gait: Unable to assess           Investigations:      Laboratory Testing:  Recent Results (from the past 24 hour(s))   CBC    Collection Time: 05/09/22  4:53 PM   Result Value Ref Range    WBC 7.6 3.5 - 11.3 k/uL    RBC 3.47 (L) 4.21 - 5.77 m/uL    Hemoglobin 11.2 (L) 13.0 - 17.0 g/dL    Hematocrit 33.1 (L) 40.7 - 50.3 %    MCV 95.4 82.6 - 102.9 fL    MCH 32.3 25.2 - 33.5 pg    MCHC 33.8 28.4 - 34.8 g/dL    RDW 12.6 11.8 - 14.4 %    Platelets See Reflexed IPF Result 138 - 453 k/uL    NRBC Automated 0.0 0.0 per 100 WBC   APTT    Collection Time: 05/09/22  4:53 PM   Result Value Ref Range    PTT 24.4 20.5 - 30.5 sec   Magnesium    Collection Time: 05/09/22  4:53 PM   Result Value Ref Range    Magnesium 2.0 1.6 - 2.6 mg/dL   Immature Platelet Fraction    Collection Time: 05/09/22  4:53 PM   Result Value Ref Range    Platelet, Immature Fraction 5.0 1.1 - 10.3 %    Platelet, Fluorescence 114 (L) 138 - 453 k/uL   Immature Platelet Fraction    Collection Time: 05/09/22  6:04 PM   Result Value Ref Range    Platelet, Immature Fraction 5.0 1.1 - 10.3 %    Platelet, Fluorescence 118 (L) 138 - 453 k/uL   Magnesium    Collection Time: 05/09/22  6:04 PM   Result Value Ref Range    Magnesium 2.0 1.6 - 2.6 mg/dL   APTT    Collection Time: 05/09/22  6:04 PM   Result Value Ref Range    PTT 24.4 20.5 - 30.5 sec   CBC    Collection Time: 05/09/22  6:04 PM   Result Value Ref Range    WBC 7.6 3.5 - 11.3 k/uL    RBC 3.45 (L) 4.21 - 5.77 m/uL    Hemoglobin 11.1 (L) 13.0 - 17.0 g/dL    Hematocrit 32.8 (L) 40.7 - 50.3 %    MCV 95.1 82.6 - 102.9 fL    MCH 32.2 25.2 - 33.5 pg    MCHC 33.8 28.4 - 34.8 g/dL    RDW 12.7 11.8 - 14.4 %    Platelets See Reflexed IPF Result 138 - 453 k/uL    NRBC Automated 0.0 0.0 per 100 WBC   Troponin    Collection Time: 05/09/22  6:04 PM   Result Value Ref Range    Troponin, High Sensitivity 736 (HH) 0 - 22 ng/L   Troponin    Collection Time: 05/09/22 11:36 PM   Result Value Ref Range    Troponin, High Sensitivity 789 (HH) 0 - 22 ng/L   APTT    Collection Time: 05/09/22 11:36 PM   Result Value Ref Range    PTT 25.8 20.5 - 30.5 sec   CBC with Auto Differential    Collection Time: 05/10/22  6:13 AM   Result Value Ref Range    WBC 8.9 3.5 - 11.3 k/uL    RBC 3.58 (L) 4.21 - 5.77 m/uL    Hemoglobin 11.2 (L) 13.0 - 17.0 g/dL    Hematocrit 33.5 (L) 40.7 - 50.3 %    MCV 93.6 82.6 - 102.9 fL    MCH 31.3 25.2 - 33.5 pg    MCHC 33.4 28.4 - 34.8 g/dL    RDW 12.5 11.8 - 14.4 %    Platelets See Reflexed IPF Result 138 - 453 k/uL    NRBC Automated 0.0 0.0 per 100 WBC    Immature Granulocytes 1 (H) 0 %    Seg Neutrophils 50 36 - 66 %    Lymphocytes 20 (L) 24 - 44 %    Monocytes 28 (H) 1 - 7 %    Eosinophils % 1 1 - 4 %    Basophils 0 0 - 2 %    Absolute Immature Granulocyte 0.09 0.00 - 0.30 k/uL    Segs Absolute 4.45 1.8 - 7.7 k/uL    Absolute Lymph # 1.78 1.0 - 4.8 k/uL    Absolute Mono # 2.49 (H) 0.1 - 0.8 k/uL    Absolute Eos # 0.09 0.0 - 0.4 k/uL    Basophils Absolute 0.00 0.0 - 0.2 k/uL    Morphology Normal    Basic Metabolic Panel    Collection Time: 05/10/22  6:13 AM   Result Value Ref Range    Glucose 127 (H) 70 - 99 mg/dL    BUN 17 8 - 23 mg/dL    CREATININE 0.79 0.70 - 1.20 mg/dL    Calcium 8.8 8.6 - 10.4 mg/dL    Sodium 136 135 - 144 mmol/L    Potassium 3.9 3.7 - 5.3 mmol/L    Chloride 104 98 - 107 mmol/L    CO2 17 (L) 20 - 31 mmol/L    Anion Gap 15 9 - 17 mmol/L    GFR Non-African American >60 >60 mL/min    GFR African American >60 >60 mL/min    GFR Comment         APTT    Collection Time: 05/10/22  6:13 AM   Result Value Ref Range    PTT 40.2 (H) 20.5 - 30.5 sec   Troponin    Collection Time: 05/10/22  6:13 AM   Result Value Ref Range    Troponin, High Sensitivity 679 (HH) 0 - 22 ng/L   Immature Platelet Fraction    Collection Time: 05/10/22  6:13 AM   Result Value Ref Range    Platelet, Immature Fraction 4.6 1.1 - 10.3 %    Platelet, Fluorescence 117 (L) 138 - 453 k/uL       Imaging/Diagnostics:  ECHO Complete 2D W Doppler W Color    Result Date: 5/8/2022  Transthoracic Echocardiography Report (TTE)  Patient Name Santino Whiting      Date of Study               05/07/2022               SHIRA W   Date of      1934  Gender                      Male  Birth   Age          80 year(s)  Race                           Room Number  0526        Height:                     69 inch, 175.26 cm   Corporate ID [id number]   Weight:                     175 pounds, 79.4 kg  #   Patient Acct [de-identified]   BSA:          1.95 m^2      BMI:      25.84  #                                                              kg/m^2   MR #         [id number]     Sonographer                 Gayland Curling   Accession #  [id number]  Interpreting Physician      Kashmir Carrington   Fellow                   Referring Nurse                           Practitioner   Interpreting             Referring Physician         Ira Clark MD  Fellow  Type of Study   TTE procedure:2D Echocardiogram, M-Mode, Doppler, Color Doppler, Bubble  Study. Procedure Date Date: 05/07/2022 Start: 01:21 PM Study Location: [facility] Indications:CVA. History / Tech. Comments: CVA, Altered Mental Status Patient Status: Inpatient Height: 69 inches Weight: 175 pounds BSA: 1.95 m^2 BMI: 25.84 kg/m^2 HR: 74 bpm CONCLUSIONS Summary Dilated left ventricle with severely reduced systolic function. Calculated ejection fraction is 13 % Moderate mitral regurgitation. Bubble study was negative for shunt.  Signature ----------------------------------------------------------------------------  Electronically signed by Alexey Doyle(Sonographer) on 05/07/2022 03:59  PM ---------------------------------------------------------------------------- ----------------------------------------------------------------------------  Electronically signed by Charo CarringtonInterpreting physician) on  05/08/2022 10:56 AM ---------------------------------------------------------------------------- FINDINGS Left Atrium Left atrium is mildly enlarged. Left Ventricle Dilated left ventricle with severely reduced systolic function. Calculated ejection fraction is 13 % Right Atrium Right atrium is normal in size. Right Ventricle Normal right ventricular size and function. TAPSE measures 2.1 cm Mitral Valve Thickening of mitral valve leaflets without stenosis. Moderate mitral regurgitation. Vena Contract width 0.5 cm Aortic Valve Aortic valve is trileaflet. Aortic sclerosis without stenosis. Mild aortic insufficiency. Tricuspid Valve Normal tricuspid valve structure and function. No tricuspid regurgitation was seen. Pulmonic Valve The pulmonic valve is normal in structure. Pericardial Effusion No significant pericardial effusion is seen. Miscellaneous Normal aortic root dimension. E/E' average = 29. Bubble study was negative.  M-mode / 2D Measurements & Calculations:   LVIDd:6.91 cm(3.7 - 5.6 cm)       Diastolic IBIZXV:237 ml  IVSd:1 cm(0.6 - 1.1 cm)           Systolic KLEYGX:673 ml  IOLFV:8.6 cm(0.6 - 1.1 cm)        Aortic Root:3.9 cm(2.0 - 3.7 cm)                                    LA Dimension: 4.2 cm(1.9 - 4.0 cm)  Calculated LVEF (%): 13.11 %      LA volume/Index: 94.73 ml /49m^2                                    LVOT:1.9 cm                                    RVDd:1.9 cm   Mitral:                                 Aortic   Valve Area (P1/2-Time): 3.93 cm^2       Peak Velocity: 1.78 m/s  Peak E-Wave: 1.10 m/s                   Mean Velocity: 1.11 m/s  Peak A-Wave: 1.43 m/s                   Peak Gradient: 12.67 mmHg  E/A Ratio: 0.77                         Mean Gradient: 6 mmHg  Peak Gradient: 4.84 mmHg  Mean Gradient: 4 mmHg  Deceleration Time: 200 msec             Area (continuity): 1.09 cm^2  P1/2t: 56 msec                          AV VTI: 41.3 cm   Area (continuity): 1.11 cm^2  Mean Velocity: 0.93 m/s                                           Pulmonic:                                           Peak Velocity: 0.77 m/s                                          Peak Gradient: 2.4 mmHg  Diastology / Tissue Doppler Septal Wall E' velocity:0.03 m/s Septal Wall E/E':38 Lateral Wall E' velocity:0.05 m/s Lateral Wall E/E':20    CT HEAD WO CONTRAST    Result Date: 5/8/2022  EXAMINATION: CT OF THE HEAD WITHOUT CONTRAST  5/8/2022 5:03 am TECHNIQUE: CT of the head was performed without the administration of intravenous contrast. Dose modulation, iterative reconstruction, and/or weight based adjustment of the mA/kV was utilized to reduce the radiation dose to as low as reasonably achievable. COMPARISON: CT and MRI May 7, 2022 HISTORY: ORDERING SYSTEM PROVIDED HISTORY: 24 hour stability CT head TECHNOLOGIST PROVIDED HISTORY: 24 hour stability CT head FINDINGS: BRAIN/VENTRICLES: Left anterior temporal lobe hypoattenuation with loss of cortical differentiation compatible with evolving encephalomalacia. 1.0 cm by 0.6 cm hyperdense focus in the left anterior temporal lobe compatible with hemorrhagic conversion. This has not appreciably changed since comparison MRI. No significant mass effect or midline shift. Ventricular system is unchanged in size. Basilar cisterns remain patent. ORBITS: The visualized portion of the orbits demonstrate no acute abnormality. SINUSES: The visualized paranasal sinuses and mastoid air cells demonstrate no acute abnormality. SOFT TISSUES/SKULL:  No acute abnormality of the visualized skull or soft tissues. Evolving subacute ischemic infarct of the anterior left temporal lobe. Small focus of hemorrhagic conversion has not appreciably changed. Findings were discussed with BRANDIN Orr at 5:34 am on 5/8/2022 by Dr. Keny Leigh.      CT HEAD WO CONTRAST    Result Date: 5/7/2022  EXAMINATION: CT OF THE HEAD WITHOUT CONTRAST  5/7/2022 6:38 am TECHNIQUE: CT of the head was performed without the administration of intravenous contrast. Dose modulation, iterative reconstruction, and/or weight based adjustment of the mA/kV was utilized to reduce the radiation dose to as low as reasonably achievable. COMPARISON: 05/06/2022 HISTORY: ORDERING SYSTEM PROVIDED HISTORY: Post-thrombectomy TECHNOLOGIST PROVIDED HISTORY: Post-thrombectomy Reason for Exam: Post-thrombectomy FINDINGS: BRAIN/VENTRICLES: There is no acute infarct or acute intracranial hemorrhage present. There is no mass effect or midline shift present. There is no ventriculomegaly or abnormal extra-axial fluid collection present. There is mild diffuse cerebral volume loss. Mild periventricular hypoattenuation is present. ORBITS: Limited evaluation of the orbits is unremarkable. SINUSES: The paranasal sinuses and mastoid air cells are clear. SOFT TISSUES/SKULL:  No lytic or blastic osseous lesions are identified. 1. No infarct evident within the left MCA territory. 2. No acute intracranial hemorrhage. CT HEAD WO CONTRAST    Result Date: 5/7/2022  EXAMINATION: CT OF THE HEAD WITHOUT CONTRAST; CTA OF THE HEAD AND NECK WITH CONTRAST; CTA OF THE HEAD WITH CONTRAST WITH PERFUSION 5/6/2022 11:40 pm; 5/6/2022 11:47 pm: TECHNIQUE: CT of the head was performed without the administration of intravenous contrast. Dose modulation, iterative reconstruction, and/or weight based adjustment of the mA/kV was utilized to reduce the radiation dose to as low as reasonably achievable.; CTA of the head and neck was performed with the administration of intravenous contrast. Multiplanar reformatted images are provided for review. MIP images are provided for review. Stenosis of the internal carotid arteries measured using NASCET criteria.  Dose modulation, iterative reconstruction, and/or weight based adjustment of the mA/kV was utilized to reduce the radiation dose to as low as reasonably achievable.; CTA of the head/brain was performed with the administration of intravenous contrast. Multiplanar reformatted images are provided for review. MIP images are provided for review. Dose modulation, iterative reconstruction, and/or weight based adjustment of the mA/kV was utilized to reduce the radiation dose to as low as reasonably achievable. Noncontrast CT of the head with reconstructed 2-D images are also provided for review. COMPARISON: None. HISTORY: ORDERING SYSTEM PROVIDED HISTORY: stroke TECHNOLOGIST PROVIDED HISTORY: stroke Decision Support Exception - unselect if not a suspected or confirmed emergency medical condition->Emergency Medical Condition (MA); ORDERING SYSTEM PROVIDED HISTORY: TPA TECHNOLOGIST PROVIDED HISTORY: TPA Decision Support Exception - unselect if not a suspected or confirmed emergency medical condition->Emergency Medical Condition (MA); ORDERING SYSTEM PROVIDED HISTORY: stroke TECHNOLOGIST PROVIDED HISTORY: stroke Decision Support Exception - unselect if not a suspected or confirmed emergency medical condition->Emergency Medical Condition (MA) FINDINGS: CT HEAD: BRAIN/VENTRICLES:  No acute intracranial hemorrhage or extraaxial fluid collection. Grey-white differentiation is maintained. No evidence of mass, mass effect or midline shift. No evidence of hydrocephalus. Periventricular and subcortical white matter hypoattenuation, consistent microangiopathic change. There is mild parenchymal volume loss. ORBITS: The visualized portion of the orbits demonstrate no acute abnormality. SINUSES:  The visualized paranasal sinuses and mastoid air cells demonstrate no acute abnormality. SOFT TISSUES/SKULL: No acute abnormality of the visualized skull or soft tissues. CTA NECK: AORTIC ARCH/ARCH VESSELS: No dissection or arterial injury. No significant stenosis of the brachiocephalic or subclavian arteries.  CAROTID ARTERIES: No dissection, arterial injury, or hemodynamically significant stenosis by NASCET criteria. 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 Right vertebral artery is hypoplastic, but patent. SOFT TISSUES: The lung apices are clear. Mildly enlarged superior mediastinal lymph nodes. .  The larynx and pharynx are unremarkable. No acute abnormality of the salivary glands. Thyroid nodules measure up to 1.3 cm. BONES: Multilevel degenerative changes of the cervical spine. CTA HEAD: ANTERIOR CIRCULATION: The intracranial ICAs are patent and normal in caliber. The ACAs are patent and normal in caliber. There is occlusion of the left M1 MCA with some reconstituted flow distally. The right MCA is patent. There is atherosclerosis with mild narrowing in the right supraclinoid ICA. POSTERIOR CIRCULATION: No significant stenosis of the vertebral, basilar, or posterior cerebral arteries. No aneurysm. Fetal configuration of the left PCA. OTHER: No dural venous sinus thrombosis on this non-dedicated study. CT PERFUSION: EXAM QUALITY: The examination is diagnostic with appropriate arterial inflow and venous outflow curves, and diagnostic perfusion maps. CORE INFARCT: The total area of ischemic core is 14 mL (CBF<30% volume). TOTAL HYPOPERFUSION: The total area of hypoperfusion is 156 mL (Tmax>6s volume). PENUMBRA: The penumbra (mismatch) volume is 142 mL and is located in the left MCA territory. The mismatch ratio is 11.1.     1. No acute intracranial abnormality. 2. Left MCA territory perfusion mismatch with ratio 11.1 and mismatch volume 142 mL. 3. 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 4. 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 5. No significant stenosis or occlusion of the cervical carotid arteries. 6. Thyroid nodules measuring up to 1.3 cm.   No further imaging follow-up is required. Critical results were called by Dr. Taras Roberts MD to Dr. Ailyn Magdaleno On 5/7/2022 at 00:25. CT HEAD WO CONTRAST    Result Date: 5/6/2022  EXAMINATION: CT OF THE HEAD WITHOUT CONTRAST  5/6/2022 10:52 pm TECHNIQUE: CT of the head was performed without the administration of intravenous contrast. Dose modulation, iterative reconstruction, and/or weight based adjustment of the mA/kV was utilized to reduce the radiation dose to as low as reasonably achievable. COMPARISON: None. HISTORY: ORDERING SYSTEM PROVIDED HISTORY: Stroke TECHNOLOGIST PROVIDED HISTORY: Stroke Decision Support Exception - unselect if not a suspected or confirmed emergency medical condition->Emergency Medical Condition (MA) FINDINGS: BRAIN/VENTRICLES:Motion degraded exam. Coronal and sagittal reformats were not available at the time of this dictation. No acute hemorrhage given limitation. Periventricular and subcortical hypoattenuation is nonspecific and may be related to microvascular disease. Artifact partially obscures the vianey. Artifact partially obscures the cerebellum. Slight relative decreased attenuation of the right temporal lobe with overall suboptimal evaluation for gray-white differentiation due to motion artifact. Slight relative decreased attenuation of the left temporal lobe anteriorly. Ventricles are within normal limits in size. There is no midline shift. Basal cisterns appear patent. ORBITS: Visualized orbits appear unremarkable on this unenhanced exam. SINUSES: Mild mucosal thickening of the ethmoid and left maxillary sinuses. Visualized mastoid air cells appear clear. SOFT TISSUES/SKULL: No depressed calvarial fracture. Motion degraded exam. No acute hemorrhage given limitation. No midline shift. Slight relative decreased attenuation of the right temporal lobe with overall suboptimal evaluation for gray-white differentiation due to motion artifact.  Slight relative decreased attenuation of the left temporal lobe anteriorly. Correlate with presenting symptoms. IR ANGIOGRAM CAROTID C EREBRAL BILATERAL    Result Date: 5/8/2022  Date of Service: 05/07/2022 Diagnosis: Left MCA Acute Ischemic Stroke with acute left MCA proximal M-1 occlusion Patient arrived to the angio suite at: 12:41 am Puncture obtained at: 1:03 am Edgar: Miles Brown MD Procedures: 1. Mechanical thrombectomy of the Left M1 middle cerebral artery (MCA) with Stent retriever and Combined Suctioning 2. Left Internal Carotid Artery (ICA) Cerebral Angiography 3. Right Common Femoral Artery (CFA) Angiogram Neurointerventionalist: Tara Velásquez MD, MS Access: Right Common Femoral Artery Anesthesia: MAC anesthesia. Fluoro time: 14.2 minutes Contrast amount: 59 cc of Visipaque-270 Indication and Clinical History: 79 y/o M with pmh significant for LEXII presented with acute onset speech difficulty, right sided weakness, NIHSS, left MCA M1 acute occlusion s/p tPA. CTA head showed Left MCA M1 proximal occlusion. Therefore mechanical thrombectomy was indicated. Consent: after explaining the risks and benefit to the family, including but not limited to vessel injury or perforation, stroke, intracranial hemorrhage, radiation, dye allergic reaction, groin hematoma and death, an informed consent was obtained, signed and witnessed. Technique and Interpretations: The patient was brought to the interventional suite and placed in a supine position by the stroke and anesthesia team. The patient's right groin was prepped and draped in standard sterile fashion. Fluoroscopy was used to localize the right common femoral  artery. The right common femoral artery was accessed with a micropuncture technique under local anesthesia with conscious sedation, and a 8 Divehi short sheath was placed within the right common femoral artery, establishing arterial access using Seldinger technique.   A 5F Gasca select catheter and a 8F Flowgate balloon guide catheter (BGC) were introduced coaxially with a guidewire. Patient was noted to have Type III aortic arch. 5 fr Gasca catheter was then replaced with VTK diagnostic catheter. Left CCA was then selectively catheterized. Flowgate BGC was then slowly and carefully advanced over the diagnostic catheter into the distal left cervical ICA. VTK diagnostic catheter was then removed. Left ICA Technique: The Left ICA was selectively catheterized under fluoroscopic guidance. Intracranial images were obtained in standard biplane projections. Left ICA Interpretation: The Left ICA images demonstrated complete occlusion of the proximal  left MCA M-1 segment. Grade 0 collateral blood supply to the Left MCA territory per the American Society of Interventional and Therapeutic Neuroradiology Radiology/Society of interventional Radiology(ASITN/SIR) collateral grade scale. There is normal antegrade filling of the distal internal carotid artery, ophthalmic artery, anterior cerebral artery. FIRST PASS MECHANICAL THROMBECTOMY OF LEFT M1 CLOT : A Synchro2 microwire and Prowler select microcatheter were advanced through the clot to the superior left MCA M2, the microwire was removed, and a 5 mm x 37 mm Embotrap stent retriever was advanced under fluoroscopic guidance, unsheathed spanning the Left M1 MCA clot, and allowed to integrate with the clot for at least 5 minutes. The microcatheter was \"stripped off. \" The BGC was inflated. The stent retriever was then removed under fluoroscopic guidance from the body while applying continuous aspiration from the balloon guide catheter side port (using manual aspiration through a 60 cc syringe). Thrombus was visualized on the stent retriever. FINAL ANGIOGRAPHIC RUN: In this injection, complete reperfusion of the left MCA territory was visualized. Final TICI score 3. Left MCA M-1 segment was noted to have stenosis measuring about 60% by NASCET criteria.  The BGC was then removed, and digital subtraction angiography of the right CFA was performed in a frontal oblique projection from the short sheath. There was no evidence of arterial stenosis, dissection, or pseudoaneurysm. The arterial puncture site was well placed above the femoral bifurcation. The sheath was then sutured in the place. No immediate complications were experienced. Patient was re-examined after the procedure with no change noted in their neurologic examination, with distal pulses present. The patient was subsequently discharged from the neurointerventional suite to the neurointensive care unit. Impression: 1. Acute Left MCA proximal M-1 occlusion TICI score 0 2. The above lesion was treated with 8 fr short sheath, flow gate bgc, prowler 21 microcatheter, embo trap 5 mm x 37 mm stent retriever first pass. Final reperfusion score TICI 03 3. 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 Tuan Montgomery dictated this invasive procedure. Dr. Carolina Trevino was present for all procedural and imaging components of this case. Examination was reviewed and reported findings confirmed and evaluated by Dr. Carolina Trevino. Final report electronically signed by Alicia Maher M.D. on 5/8/2022 5:45 PM    CT BRAIN PERFUSION    Result Date: 5/7/2022  EXAMINATION: CT OF THE HEAD WITHOUT CONTRAST; CTA OF THE HEAD AND NECK WITH CONTRAST; CTA OF THE HEAD WITH CONTRAST WITH PERFUSION 5/6/2022 11:40 pm; 5/6/2022 11:47 pm: TECHNIQUE: CT of the head was performed without the administration of intravenous contrast. Dose modulation, iterative reconstruction, and/or weight based adjustment of the mA/kV was utilized to reduce the radiation dose to as low as reasonably achievable.; CTA of the head and neck was performed with the administration of intravenous contrast. Multiplanar reformatted images are provided for review. MIP images are provided for review. Stenosis of the internal carotid arteries measured using NASCET criteria.  Dose modulation, iterative reconstruction, and/or weight based adjustment of the mA/kV was utilized to reduce the radiation dose to as low as reasonably achievable.; CTA of the head/brain was performed with the administration of intravenous contrast. Multiplanar reformatted images are provided for review. MIP images are provided for review. Dose modulation, iterative reconstruction, and/or weight based adjustment of the mA/kV was utilized to reduce the radiation dose to as low as reasonably achievable. Noncontrast CT of the head with reconstructed 2-D images are also provided for review. COMPARISON: None. HISTORY: ORDERING SYSTEM PROVIDED HISTORY: stroke TECHNOLOGIST PROVIDED HISTORY: stroke Decision Support Exception - unselect if not a suspected or confirmed emergency medical condition->Emergency Medical Condition (MA); ORDERING SYSTEM PROVIDED HISTORY: TPA TECHNOLOGIST PROVIDED HISTORY: TPA Decision Support Exception - unselect if not a suspected or confirmed emergency medical condition->Emergency Medical Condition (MA); ORDERING SYSTEM PROVIDED HISTORY: stroke TECHNOLOGIST PROVIDED HISTORY: stroke Decision Support Exception - unselect if not a suspected or confirmed emergency medical condition->Emergency Medical Condition (MA) FINDINGS: CT HEAD: BRAIN/VENTRICLES:  No acute intracranial hemorrhage or extraaxial fluid collection. Grey-white differentiation is maintained. No evidence of mass, mass effect or midline shift. No evidence of hydrocephalus. Periventricular and subcortical white matter hypoattenuation, consistent microangiopathic change. There is mild parenchymal volume loss. ORBITS: The visualized portion of the orbits demonstrate no acute abnormality. SINUSES:  The visualized paranasal sinuses and mastoid air cells demonstrate no acute abnormality. SOFT TISSUES/SKULL: No acute abnormality of the visualized skull or soft tissues. CTA NECK: AORTIC ARCH/ARCH VESSELS: No dissection or arterial injury.   No significant stenosis of the brachiocephalic or subclavian arteries. CAROTID ARTERIES: No dissection, arterial injury, or hemodynamically significant stenosis by NASCET criteria. 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 Right vertebral artery is hypoplastic, but patent. SOFT TISSUES: The lung apices are clear. Mildly enlarged superior mediastinal lymph nodes. .  The larynx and pharynx are unremarkable. No acute abnormality of the salivary glands. Thyroid nodules measure up to 1.3 cm. BONES: Multilevel degenerative changes of the cervical spine. CTA HEAD: ANTERIOR CIRCULATION: The intracranial ICAs are patent and normal in caliber. The ACAs are patent and normal in caliber. There is occlusion of the left M1 MCA with some reconstituted flow distally. The right MCA is patent. There is atherosclerosis with mild narrowing in the right supraclinoid ICA. POSTERIOR CIRCULATION: No significant stenosis of the vertebral, basilar, or posterior cerebral arteries. No aneurysm. Fetal configuration of the left PCA. OTHER: No dural venous sinus thrombosis on this non-dedicated study. CT PERFUSION: EXAM QUALITY: The examination is diagnostic with appropriate arterial inflow and venous outflow curves, and diagnostic perfusion maps. CORE INFARCT: The total area of ischemic core is 14 mL (CBF<30% volume). TOTAL HYPOPERFUSION: The total area of hypoperfusion is 156 mL (Tmax>6s volume). PENUMBRA: The penumbra (mismatch) volume is 142 mL and is located in the left MCA territory. The mismatch ratio is 11.1.     1. No acute intracranial abnormality. 2. Left MCA territory perfusion mismatch with ratio 11.1 and mismatch volume 142 mL. 3. 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 4. 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 5. No significant stenosis or occlusion of the cervical carotid arteries.  6. Thyroid nodules measuring up to 1.3 cm. No further imaging follow-up is required. Critical results were called by Dr. Lei Sanchez MD to Dr. Liban Ortega On 5/7/2022 at 00:25. CTA HEAD NECK W CONTRAST    Result Date: 5/7/2022  EXAMINATION: CT OF THE HEAD WITHOUT CONTRAST; CTA OF THE HEAD AND NECK WITH CONTRAST; CTA OF THE HEAD WITH CONTRAST WITH PERFUSION 5/6/2022 11:40 pm; 5/6/2022 11:47 pm: TECHNIQUE: CT of the head was performed without the administration of intravenous contrast. Dose modulation, iterative reconstruction, and/or weight based adjustment of the mA/kV was utilized to reduce the radiation dose to as low as reasonably achievable.; CTA of the head and neck was performed with the administration of intravenous contrast. Multiplanar reformatted images are provided for review. MIP images are provided for review. Stenosis of the internal carotid arteries measured using NASCET criteria. Dose modulation, iterative reconstruction, and/or weight based adjustment of the mA/kV was utilized to reduce the radiation dose to as low as reasonably achievable.; CTA of the head/brain was performed with the administration of intravenous contrast. Multiplanar reformatted images are provided for review. MIP images are provided for review. Dose modulation, iterative reconstruction, and/or weight based adjustment of the mA/kV was utilized to reduce the radiation dose to as low as reasonably achievable. Noncontrast CT of the head with reconstructed 2-D images are also provided for review. COMPARISON: None.  HISTORY: ORDERING SYSTEM PROVIDED HISTORY: stroke TECHNOLOGIST PROVIDED HISTORY: stroke Decision Support Exception - unselect if not a suspected or confirmed emergency medical condition->Emergency Medical Condition (MA); ORDERING SYSTEM PROVIDED HISTORY: TPA TECHNOLOGIST PROVIDED HISTORY: TPA Decision Support Exception - unselect if not a suspected or confirmed emergency medical condition->Emergency Medical Condition (MA); ORDERING SYSTEM PROVIDED HISTORY: stroke TECHNOLOGIST PROVIDED HISTORY: stroke Decision Support Exception - unselect if not a suspected or confirmed emergency medical condition->Emergency Medical Condition (MA) FINDINGS: CT HEAD: BRAIN/VENTRICLES:  No acute intracranial hemorrhage or extraaxial fluid collection. Grey-white differentiation is maintained. No evidence of mass, mass effect or midline shift. No evidence of hydrocephalus. Periventricular and subcortical white matter hypoattenuation, consistent microangiopathic change. There is mild parenchymal volume loss. ORBITS: The visualized portion of the orbits demonstrate no acute abnormality. SINUSES:  The visualized paranasal sinuses and mastoid air cells demonstrate no acute abnormality. SOFT TISSUES/SKULL: No acute abnormality of the visualized skull or soft tissues. CTA NECK: AORTIC ARCH/ARCH VESSELS: No dissection or arterial injury. No significant stenosis of the brachiocephalic or subclavian arteries. CAROTID ARTERIES: No dissection, arterial injury, or hemodynamically significant stenosis by NASCET criteria. 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 Right vertebral artery is hypoplastic, but patent. SOFT TISSUES: The lung apices are clear. Mildly enlarged superior mediastinal lymph nodes. .  The larynx and pharynx are unremarkable. No acute abnormality of the salivary glands. Thyroid nodules measure up to 1.3 cm. BONES: Multilevel degenerative changes of the cervical spine. CTA HEAD: ANTERIOR CIRCULATION: The intracranial ICAs are patent and normal in caliber. The ACAs are patent and normal in caliber. There is occlusion of the left M1 MCA with some reconstituted flow distally. The right MCA is patent. There is atherosclerosis with mild narrowing in the right supraclinoid ICA.  POSTERIOR CIRCULATION: No significant stenosis of the vertebral, basilar, or posterior cerebral arteries. No aneurysm. Fetal configuration of the left PCA. OTHER: No dural venous sinus thrombosis on this non-dedicated study. CT PERFUSION: EXAM QUALITY: The examination is diagnostic with appropriate arterial inflow and venous outflow curves, and diagnostic perfusion maps. CORE INFARCT: The total area of ischemic core is 14 mL (CBF<30% volume). TOTAL HYPOPERFUSION: The total area of hypoperfusion is 156 mL (Tmax>6s volume). PENUMBRA: The penumbra (mismatch) volume is 142 mL and is located in the left MCA territory. The mismatch ratio is 11.1.     1. No acute intracranial abnormality. 2. Left MCA territory perfusion mismatch with ratio 11.1 and mismatch volume 142 mL. 3. 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 4. 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 5. No significant stenosis or occlusion of the cervical carotid arteries. 6. Thyroid nodules measuring up to 1.3 cm. No further imaging follow-up is required. Critical results were called by Dr. Evans Little MD to Dr. Mack Kearney On 5/7/2022 at 00:25. MRI BRAIN WO CONTRAST    Result Date: 5/7/2022  EXAMINATION: MRI OF THE BRAIN WITHOUT CONTRAST  5/7/2022 4:54 pm TECHNIQUE: Multiplanar multisequence MRI of the brain was performed without the administration of intravenous contrast. COMPARISON: Same-day CT HISTORY: ORDERING SYSTEM PROVIDED HISTORY: L M1 occlusion TECHNOLOGIST PROVIDED HISTORY: L M1 occlusion Reason for Exam: L M1 occlusion FINDINGS: Examination is degraded by motion. INTRACRANIAL STRUCTURES/VENTRICLES: There is diffusion restriction in the left basal ganglia and anterior temporal lobe. There are small foci of diffusion restriction in the right frontal cortex. No mass effect or midline shift. No evidence of an acute intracranial hemorrhage. The ventricles and sulci are normal in size and configuration. The sellar/suprasellar regions appear unremarkable.   The normal signal voids within the major intracranial vessels appear maintained. There are scattered periventricular and deep subcortical white matter T2/FLAIR hyperintensities, consistent with microangiopathic change. There is mild parenchymal volume loss. ORBITS: The visualized portion of the orbits demonstrate no acute abnormality. SINUSES: Scattered mucosal thickening in the paranasal sinuses. Right mastoid tip effusion. BONES/SOFT TISSUES: The bone marrow signal intensity appears normal. The soft tissues demonstrate no acute abnormality. 1. Acute infarction in the left anterior temporal lobe and left basal ganglia, consistent with MCA occlusion. 2. Punctate foci of acute infarction in the right frontal cortex. 3. Mild microangiopathic change.        Assessment & Differential Dx:      Primary Problem  Cerebrovascular accident (CVA) [facility])    Active Hospital Problems    Diagnosis Date Noted    Intracranial bleed ([facility] [address] [I62.9]      Priority: Medium    Right sided weakness [R53.1]      Priority: Medium    CHF (congestive heart failure) ([facility] [address] [I50.9] 05/08/2022     Priority: Medium    NSTEMI (non-ST elevated myocardial infarction) ([facility] [address] [I21.4] 05/08/2022     Priority: Medium    Altered mental status [R41.82]      Priority: Medium    Cerebrovascular accident (CVA) ([address] [I63.9] 05/07/2022     Priority: Medium    Acute ischemic stroke ([address] [I63.9] 05/07/2022     Priority: Medium       Case of 63-year-old male with no significant PMH not on any AC, presented via EMS after acute onset right sided weakness and speech difficulty, evaluated by mobile stroke unit, noted to be aphasic dysarthric, right arm and leg weakness, NIH: 14, CT head: Negative for acute bleed, patient given tPA  -CTA head and neck: Left M2 1 occlusion, taken for mechanical thrombectomy for M1 occlusion, TICI 3 overnight patient became confused agitated  -Repeat CT head: On 5/9 evolving left temporal lobe infarct anteriorly with stable superimposed focus of hemorrhage    Impression:  -  Left M1 complete occlusion status post mechanical thrombectomy, TICI 3  -  CT head evolving left anterior left temporal lobe infarction with a small hemorrhage  -  NSTEMI: Troponin 770, continue trending troponin for 6 hours    Plan:     -  Aspirin 81  -  Lipitor 80  -  Patient is okay for heparin due to NSTEMI  -  Repeat CT head neck is stable patient.   On low-dose heparin drip without bolus  -  Cardiology is okay for cardiac cath  -  Goal SBP between 130 and 170  -  Ejection fraction of 13%  -  PM&R: Patient will benefit from acute rehab once medically stable per primary service  - any changes to pt mention after procedure please order stat CT and notify neurology team   - we are following       Young Bell MD, MD, 5/10/2022 8:59 AM

## 2022-05-10 NOTE — PROGRESS NOTES
Endovascular Neurosurgery Progress Note    SUBJECTIVE:   No reported events overnight. This am pt is more somnolent and confused. He has difficulty making his needs known. Review of Systems:  CONSTITUTIONAL:  negative for fevers, chills, fatigue and malaise    EYES:  negative for double vision, blurred vision and photophobia     HEENT:  negative for tinnitus, epistaxis and sore throat    RESPIRATORY:  negative for cough, shortness of breath, wheezing    CARDIOVASCULAR:  negative for chest pain, palpitations, syncope, edema    GASTROINTESTINAL:  negative for nausea, vomiting    GENITOURINARY:  negative for incontinence    MUSCULOSKELETAL:  negative for neck or back pain    NEUROLOGICAL:  Negative for weakness and tingling  negative for headaches and dizziness    PSYCHIATRIC:  negative for anxiety      Review of systems otherwise negative. OBJECTIVE:     Vitals:    05/10/22 0806   BP: 112/80   Pulse: 90   Resp: 17   Temp: 98.8 °F (37.1 °C)   SpO2:         General:  Gen: normal habitus, NAD  HEENT: NCAT, mucosa moist  Cvs: RRR, S1 S2 normal  Resp: symmetric unlabored breathing  Abd: s/nd/nt  Ext: no edema  Skin: no lesions seen, warm and dry    Neuro:  Gen: somnolent, difficult to arouse, not oriented  Lang/speech: Mild aphasia, naming impaired, follows commands, severe dysarthria. Intermittent commands  CN: PERRL, EOMI, VFF, V1-3 intact, face symmetric, hearing intact  Motor: at least 3/5 all 4 ext. Sense: LT intact all 4 ext. Coord: impaired on right UE and LE  DTR: deferred  Gait: deferred    NIH Stroke Scale:   1a  Level of consciousness: 2 - not alert, requires repeated stimulation to attend, or is obtunded and requires strong or painful stimulation to make movements (not stereotyped)    1b. LOC questions:  2 - answers neither question correctly   1c. LOC commands: 1 - performs one task correctly   2. Best Gaze: 0 - normal   3. Visual: 0 - no visual loss   4.  Facial Palsy: 0 - normal symmetric movement   5a. Motor left arm: 2   5b. Motor right arm: 2   6a. Motor left le   6b  Motor right le   7. Limb Ataxia: 0 - absent   8. Sensory: 0   9. Best Language:  1   10. Dysarthria: 2   11. Extinction and Inattention: 0         Total:   16     MRS: 5    LABS:   Reviewed. Lab Results   Component Value Date    HGB 11.2 (L) 05/10/2022    WBC 8.9 05/10/2022    PLT See Reflexed IPF Result 05/10/2022     05/10/2022    BUN 17 05/10/2022    CREATININE 0.79 05/10/2022    MG 2.0 2022    APTT 40.2 (H) 05/10/2022    INR 1.1 2022      Lab Results   Component Value Date    COVID19 Not Detected 2022       RADIOLOGY:   Images were personally reviewed including:   CT head wo con 2022  Evolving left temporal lobe infarct anteriorly with stable superimposed focus   of hemorrhage. MRI Brain 2022:   1. Acute infarction in the left anterior temporal lobe and left basal   ganglia, consistent with MCA occlusion. 2. Punctate foci of acute infarction in the right frontal cortex. 3. Mild microangiopathic change. IR 2022:   1. Acute Left MCA proximal M-1 occlusion TICI score 0   2. The above lesion was treated with 8 fr short sheath, flow gate bgc, prowler 21 microcatheter, embo trap 5 mm x 37 mm stent retriever first pass. Final reperfusion score TICI 03    TTE 2022: EF 10%    ASSESSMENT:   79 y/o M with pmh significant for LEXII presented 2022 with left M1 acute occlusion s/p tPA and MT TICI 03, first pass. 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 Workup shows HF EF 10%    Pt has residual mild R candace weakness, mild exp aphasia and mild dysarthria. Waxing and waning mentation. MRI 2022 shows L MCA stroke and punctate hemo conversion. Given low ef, plans for cardiac cath and life vest. CT head 2022 stable.     PLAN:   --c/w ASA 81 mg daily, plan start plavix 75 post cardiac cath  --continue lipitor 80  ---140  --on hep gtt, last aptt 40. 2  --plan for cardiac cath with possible PCI.  --F/up with Dr. Josh Koo in 2 weeks after discharge and f/up with Dr. Jose Castellano in 3 months     Case discussed with Dr. Jose Castellano attending.     Rozina Moses MD  Stroke, [facility]  [address] [place]  Electronically signed 5/10/2022 at 11:47 AM

## 2022-05-10 NOTE — PROGRESS NOTES
Notified MARYJANE Hernadez CM, per Dr Dyan Mcclelland pt is approp for ARU. Carolina Arnett states pt is requesting [facility] at this time.

## 2022-05-10 NOTE — PROGRESS NOTES
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 Notes nursing notes was agitated and kicking at nurses. ROS:  Denies fevers, chills, sweats. No chest pain, palpitations, lightheadedness. Denies coughing, wheezing or shortness of breath. Denies abdominal pain, nausea, diarrhea or constipation. No new areas of joint pain. Denies new areas of numbness or weakness. Denies new anxiety or depression issues. No new skin problems. Rehabilitation:   PT:   5/9   Restrictions/Precautions: Fall Risk,General Precautions  Other position/activity restrictions: SBP goal less than 160. CVA, s/p tPA, s/p thrombectomy. Transfers  Sit to Stand: Minimal Assistance  Stand to sit: Minimal Assistance  Bed to Chair: Unable to assess  Comment: Transfers fluctuating due to his use of arms to stand and position on the chair. Initial sit to stand and pivot to chair mod A due to him trying to rise up without a lean forward and attempting to pull himself upright with knee extension only. Another pivot transfer was done needing only CGA.   Ambulation  Surface: level tile  Device: Rolling Walker  Assistance: Minimal assistance,Contact guard assistance  Quality of Gait: Scissoring gait, unequal step lengths, unsafe use of walker  Gait Deviations: Staggers,Decreased step length  Distance: 100 cues. Encouraged pt to utilize whiteboard in room to ID year (however, pt did not have glasses and per wife likely unable to see whiteboard from bed). 1/1 x1 independently      Other: Unable to complete diet tolerance monitoring as pt NPO for possible procedure. ST will       Objective:  /80   Pulse 90   Temp 98.8 °F (37.1 °C) (Oral)   Resp 17   Ht 5' 9\" (1.753 m)   Wt 175 lb (79.4 kg)   SpO2 98%   BMI 25.84 kg/m²  I Body mass index is 25.84 kg/m². I   Wt Readings from Last 1 Encounters:   22 175 lb (79.4 kg)      Temp (24hrs), Av.2 °F (36.8 °C), Min:98 °F (36.7 °C), Max:98.8 °F (37.1 °C)         GEN:, no acute distress  HEENT: Normocephalic atraumatic, EOMI, mucous membranes pink and moist  CV: RRR, no murmurs, rubs or gallops  PULM: Respirations WNL and unlabored  ABD: soft, NT, ND, +BS and equal  NEURO: Oriented only to self did not know year or president and or location. Sensation? MSK: Moves all extremities at least antigravity limited by restraints upper extremities  EXTREMITIES: No calf tenderness to palpation bilaterally.  No edema BLEs  SKIN: warm dry and intact with good turgor  PSYCH: Limited interaction        Medications   Scheduled Meds:   metoprolol tartrate  25 mg Oral BID    aspirin  81 mg Oral Daily    lisinopril  5 mg Oral Daily    tamsulosin  0.4 mg Oral Daily    atorvastatin  80 mg Oral Nightly     Continuous Infusions:   heparin (PORCINE) Infusion 16 Units/kg/hr (05/10/22 0117)     PRN Meds:.heparin (porcine), heparin (porcine), ondansetron **OR** ondansetron, polyethylene glycol, labetalol, acetaminophen, hydrALAZINE     Diagnostics:     CBC:   Recent Labs     22  1653 22  1804 05/10/22  0613   WBC 7.6 7.6 8.9   RBC 3.47* 3.45* 3.58*   HGB 11.2* 11.1* 11.2*   HCT 33.1* 32.8* 33.5*   MCV 95.4 95.1 93.6   RDW 12.6 12.7 12.5   PLT See Reflexed IPF Result See Reflexed IPF Result See Reflexed IPF Result     BMP:   Recent Labs     22  0558 05/09/22  0348 05/10/22  0613    139 136   K 4.0 4.0 3.9   * 107 104   CO2 21 21 17*   BUN 9 12 17   CREATININE 0.67* 0.84 0.79     BNP: No results for input(s): BNP in the last 72 hours. PT/INR: No results for input(s): PROTIME, INR in the last 72 hours. APTT:   Recent Labs     05/09/22  1804 05/09/22  2336 05/10/22  0613   APTT 24.4 25.8 40.2*     CARDIAC ENZYMES: No results for input(s): CKMB, CKMBINDEX, TROPONINT in the last 72 hours. Invalid input(s): CKTOTAL;3  FASTING LIPID PANEL:  Lab Results   Component Value Date    CHOL 133 05/07/2022    HDL 52 05/07/2022    TRIG 50 05/07/2022     LIVER PROFILE: No results for input(s): AST, ALT, ALB, BILIDIR, BILITOT, ALKPHOS in the last 72 hours. I/O (24Hr): Intake/Output Summary (Last 24 hours) at 5/10/2022 1003  Last data filed at 5/9/2022 1600  Gross per 24 hour   Intake 240 ml   Output --   Net 240 ml       Glu last 24 hour  No results for input(s): POCGLU in the last 72 hours. No results for input(s): CLARITYU, COLORU, PHUR, SPECGRAV, PROTEINU, RBCUA, BLOODU, BACTERIA, NITRU, WBCUA, LEUKOCYTESUR, YEAST, GLUCOSEU, BILIRUBINUR in the last 72 hours. Impression:     1. 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 NSTEMI,Systolic heart failure, EF 13 %-aspirin, Lipitor, lisinopril, metoprolol-plan for cath today elevated troponin increased from 250- 770  3. Agitation currently with one-on-one, restraints and telemetry sitter-  4. Anemia  5. Thrombocytopenia 117  6. Psoriasis  7. BPH-Flomax     Recommendations:     1. Diagnosis:  Left MCA CVA with hemorrhagic converison  2. Therapy: Has PT/OT/SLP needs  3. Medical Necessity: As above  4. Support: Lives with spouse  5.  Rehab Recommendation:   In restraints, telemetry sitter and one-on-one, plan for cath today, alert oriented to self only, will follow post cath-  questional baseline ejection fraction-question ability tolerate intensive acute rehab, will follow  6. DVT Prophylaxis:  On heparin drip     It was my pleasure to evaluate Alessandra Jack today. Please call with questions.         Sergei Samuels MD       This note is created with the assistance of a speech recognition program.  While intending to generate a document that actually reflects the content of the visit, the document can still have some errors including those of syntax and sound a like substitutions which may escape proof reading.   In such instances, actual meaning can be extrapolated by contextual diversion

## 2022-05-10 NOTE — PLAN OF CARE
Pt assessed as a fall risk this shift. Remains free from falls and accidental injury at this time. Fall precautions in place, including falling star sign and fall risk band on pt. Floor free from obstacles, and bed is locked and in lowest position. Adequate lighting provided. Pt encouraged to call before getting Out Of Bed for any need. Bed alarm activated. Will continue to monitor needs during hourly rounding, and reinforce education on use of call light. NIH Stroke Scale  Interval: Other (Comment)  Level of Consciousness (1a): Not alert, requires repeated/painful stimulation or obtunded  LOC Questions (1b): Answers neither question correctly  LOC Commands (1c): Performs one task correctly  Best Gaze (2): Normal  Visual (3): No visual loss  Facial Palsy (4): Normal symmetrical movement  Motor Arm, Left (5a): No drift  Motor Arm, Right (5b): No drift  Motor Leg, Left (6a): No drift  Motor Leg, Right (6b):  No drift  Limb Ataxia (7): Absent  Sensory (8): Normal  Best Language (9): Mild to moderate aphasia  Dysarthria (10): Mild to moderate, slurs some words  Extinction and Inattention (11): No abnormality  Total: 6

## 2022-05-10 NOTE — PROGRESS NOTES
[id number]  Speech Language Pathology    Date: 5/10/2022  Patient Name: Jasmeet Ly  YOB: 1934   AGE: 80 y.o. MRN: [id number]        Patient Not Available for Speech Therapy     Due to:  [] Testing  [] Hemodialysis  [] Cancelled by RN  [] Surgery   [] Intubation/Sedation/Pain Medication  [] Medical instability  [x] Other: Pt lethargic, per RN pt did not sleep well last night. Pt heading to Cath Lab this AM as well. ST to continue to follow.      Next scheduled treatment: 5/10 in PM as able; 5/11  Completed by: Elsa Faust, SLP

## 2022-05-10 NOTE — PROGRESS NOTES
[facility]  Internal Medicine Teaching Residency Program  Inpatient Daily Progress Note  ______________________________________________________________________________    Patient: Marek Flores  YOB: 1934   CWE:[id number]    Acct: [de-identified]     Room: [facility]1294-  Admit date: 5/6/2022  Today's date: 05/10/22  Number of days in the hospital: 3    SUBJECTIVE   Admitting Diagnosis: Cerebrovascular accident (CVA) ([facility] [address]  CC: Right sided weakness with speech difficulty     - Pt examined at bedside. Chart & results reviewed. -Repeat CT scan yesterday with minimal punctate hemorrhages in the brain. No reexpansion of hemorrhagic conversion of stroke.  -Overnight, patient is agitated, received Zyprexa. This morning patient is somnolent, not following commands. Intermittently arousable.  -Cardiology plan for cardiac cath today. ,  Currently on heparin drip. ROS: Unable to obtain review of systems today secondary to mentation      BRIEF HISTORY     Aliza Cleveland a 80 y. o. male with no significant PMH and not presently on AC who presented via EMS after being noted to have acute onset right sided weakness and speech difficulty. Wife reports he fell asleep on the recliner at 9:30pm and woke up around 10om and couldn't get up from the chair. Family also noted right sided weakness. EMS was called and activated stroke alert. Patient was evaluated by mobile stroke unit. He was noted to have aphasia, dysarthria, and right arm and leg weakness.     LKW: 9:30PM  NIH: 14     Non contrast CT Head obtained.  Negative for any acute bleed  Was given tPa bolus dose at 2311 and infusion started [address] Course:   CTA head and neck: Showed left M1 occlusion.  Patient was taken for mechanical thrombectomy for M1 occlusion. Patient was given 600 mg aspirin x1.  Transfer to neuro ICU for further management. Pt found to have hemorrhagic conversion.   Plan for repeat CT head in 24 hours. Cardiology planning for cardiac cath. No heparin at this time. We will continue with Lovenox. OBJECTIVE     Vital Signs:  /81   Pulse 90   Temp 98.2 °F (36.8 °C) (Oral)   Resp 16   Ht 5' 9\" (1.753 m)   Wt 175 lb (79.4 kg)   SpO2 93%   BMI 25.84 kg/m²     Temp (24hrs), Av.3 °F (36.8 °C), Min:98 °F (36.7 °C), Max:98.8 °F (37.1 °C)    In: 240   Out: -     Physical Exam:  General appearance -somnolent, not following commands. Mental status -somnolent, Not following commands.   Eyes - pupils equal and reactive, extraocular eye movements intact  Mouth - mucous membranes moist, pharynx normal without lesions  Neck - supple, no significant adenopathy  Chest - clear to auscultation, no wheezes  Heart - normal rate, regular rhythm, normal S1, S2  Abdomen - soft, nontender, nondistended  Neurological - normal speech No focal deficits due to patient's noncompliance with examination   Extremities - peripheral pulses normal, no pedal edema  Skin - normal coloration and turgor, no rashes      Medications:  Scheduled Medications:    metoprolol tartrate  25 mg Oral BID    aspirin  81 mg Oral Daily    lisinopril  5 mg Oral Daily    tamsulosin  0.4 mg Oral Daily    atorvastatin  80 mg Oral Nightly     Continuous Infusions:     PRN Medicationsheparin (porcine), 4,000 Units, PRN  heparin (porcine), 2,000 Units, PRN  ondansetron, 4 mg, Q8H PRN   Or  ondansetron, 4 mg, Q6H PRN  polyethylene glycol, 17 g, Daily PRN  labetalol, 10 mg, Q4H PRN  acetaminophen, 650 mg, Q4H PRN  hydrALAZINE, 10 mg, Q6H PRN        Diagnostic Labs:  CBC:   Recent Labs     22  1653 22  1804 05/10/22  0613   WBC 7.6 7.6 8.9   RBC 3.47* 3.45* 3.58*   HGB 11.2* 11.1* 11.2*   HCT 33.1* 32.8* 33.5*   MCV 95.4 95.1 93.6   RDW 12.6 12.7 12.5   PLT See Reflexed IPF Result See Reflexed IPF Result See Reflexed IPF Result     BMP:   Recent Labs     22  0558 22  0348 05/10/22  0613    139 136   K 4.0 4.0 3.9   * 107 104   CO2 21 21 17*   BUN 9 12 17   CREATININE 0.67* 0.84 0.79     BNP: No results for input(s): BNP in the last 72 hours. PT/INR:   No results for input(s): PROTIME, INR in the last 72 hours. APTT:   Recent Labs     05/09/22  2336 05/10/22  0613 05/10/22  1152   APTT 25.8 40.2* 27.1     CARDIAC ENZYMES: No results for input(s): CKMB, CKMBINDEX, TROPONINI in the last 72 hours. Invalid input(s): CKTOTAL;3  FASTING LIPID PANEL:  Lab Results   Component Value Date    CHOL 133 05/07/2022    HDL 52 05/07/2022    TRIG 50 05/07/2022     LIVER PROFILE: No results for input(s): AST, ALT, ALB, BILIDIR, BILITOT, ALKPHOS in the last 72 hours. MICROBIOLOGY: No results found for: CULTURE    Imaging:    CT HEAD WO CONTRAST    Result Date: 5/8/2022  Evolving subacute ischemic infarct of the anterior left temporal lobe. Small focus of hemorrhagic conversion has not appreciably changed. Findings were discussed with BRANDIN Martinez at 5:34 am on 5/8/2022 by Dr. Patti Fernando. CT HEAD WO CONTRAST    Result Date: 5/7/2022  1. No infarct evident within the left MCA territory. 2. No acute intracranial hemorrhage. CT HEAD WO CONTRAST    Result Date: 5/7/2022  1. No acute intracranial abnormality. 2. Left MCA territory perfusion mismatch with ratio 11.1 and mismatch volume 142 mL. 3. 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 4. 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 5. No significant stenosis or occlusion of the cervical carotid arteries. 6. Thyroid nodules measuring up to 1.3 cm. No further imaging follow-up is required. Critical results were called by Dr. Bea Shen MD to Dr. Aram Diaz On 5/7/2022 at 00:25. CT HEAD WO CONTRAST    Result Date: 5/6/2022  Motion degraded exam. No acute hemorrhage given limitation. No midline shift.  Slight relative decreased attenuation of the right temporal lobe with overall suboptimal evaluation for gray-white differentiation due to motion artifact. Slight relative decreased attenuation of the left temporal lobe anteriorly. Correlate with presenting symptoms. CT BRAIN PERFUSION    Result Date: 5/7/2022  1. No acute intracranial abnormality. 2. Left MCA territory perfusion mismatch with ratio 11.1 and mismatch volume 142 mL. 3. 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 4. 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 5. No significant stenosis or occlusion of the cervical carotid arteries. 6. Thyroid nodules measuring up to 1.3 cm. No further imaging follow-up is required. Critical results were called by Dr. Lei Sanchez MD to Dr. Liban Ortega On 5/7/2022 at 00:25. CTA HEAD NECK W CONTRAST    Result Date: 5/7/2022  1. No acute intracranial abnormality. 2. Left MCA territory perfusion mismatch with ratio 11.1 and mismatch volume 142 mL. 3. 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 4. 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 5. No significant stenosis or occlusion of the cervical carotid arteries. 6. Thyroid nodules measuring up to 1.3 cm. No further imaging follow-up is required. Critical results were called by Dr. Lei Sanchez MD to Dr. Liban Ortega On 5/7/2022 at 00:25. MRI BRAIN WO CONTRAST    Result Date: 5/7/2022  1. Acute infarction in the left anterior temporal lobe and left basal ganglia, consistent with MCA occlusion. 2. Punctate foci of acute infarction in the right frontal cortex. 3. Mild microangiopathic change. ASSESSMENT & PLAN     Assessment and Plan:    Principal Problem:    Cerebrovascular accident (CVA) ([address]  Active Problems:    Acute ischemic stroke ([address]    Altered mental status    CHF (congestive heart failure) ([address]    NSTEMI (non-ST elevated myocardial infarction) ([address]    Intracranial bleed ([address]    Right sided weakness  Resolved Problems:    * No resolved hospital problems.  *      Left MCA infarct with left M1 occlusion status post mechanical thrombectomy  - CT of the head shows evolving left anterior, left temporal lobe infarction with small hemorrhagic conversion. Repeat CT scan reviewed, stable. -Continue with aspirin  - Continue with Lipitor 80 mg  -Currently on heparin drip, okay with neurology     NSTEMI  - Troponins 770  -Cardiology plan for cardiac cath today, will follow-up on recommendations     Systolic congestive heart failure  - Echocardiogram done yesterday showing dilated left ventricle with severely reduced systolic function.  EF of 13%.  Moderate mitral regurgitation.  Negative bubble study.  - Repeat EKG this morning   - Cardiology following, for cardiac cath today     Moderate mitral regurgitation     Diet: Regular diet.  NPO currently, resume diet after cath  DVT ppx : Lovenox  GI ppx: Not indicated     PT/OT/SW: Consulted  Discharge Planning: PM&R consulted, plan for discharge to acute rehab, in process. Repeat      Cory Costa MD  Internal Medicine Resident, PGY-2  [facility];  Anish Yuan   12:57 PM 5/10/2022

## 2022-05-10 NOTE — OP NOTE
[facility]    CARDIAC CATHETERIZATION    Date:   5/10/2022  Patient name:  Kenyatta Domingo  Date of admission:  5/6/2022 11:29 PM  MRN:   [id number]  YOB: 1934    Operators:  Primary:   KO Jennings MD (Attending Physician)    Assistant/CV fellow:  Isaiah Neal MD      Procedure performed:     [x] Left Heart Catheterization. [] Graft Angiography. [x] Left Ventriculography. [] Right Heart Catheterization. [x] Coronary Angiography. [] Aortic Valve Studies. [] PCI:      [] Other:       Pre Procedure Conscious Sedation Data:  ASA Class:    [] I [x] II [] III [] IV    Mallampati Class:  [] I [x] II [] III [] IV      Indication:  [] STEMI      [] + Stress test  [x] ACS      [x] + EKG Changes  [] Non Q MI       [x] Significant Risk Factors  [] Recurrent Angina             [] Diabetes Mellitus    [] New LBBB      [] Uncontrolled HTN. [x] CHF / Low EF changes     [] Abnormal CTA / Ca Score      Procedure:  Access:  [x] Femoral  [] Radial  artery       [] Right  [x] Left    Procedure: After informed consent was obtained with explanation of the risks and benefits, patient was brought to the cath lab. The access area was prepped and draped in sterile fashion. 1% lidocaine was used for local block. The artery was cannulated with 6  Fr sheath with brisk arterial blood return. The side port was frequently flushed and aspirated with normal saline. Findings:    LMCA: has moderate calcification with distal 30% stenosis. LAD: has heavy calcification with ostial 60% stenosis, proximal 80% stenosis   mid 90% stenosis and distal 100% occlusion. The D1 has ostial 90% stenosis   and proximal 90% stenosis. The D2 has ostial 80% stenosis. LCx: has moderate calcification with proximal 100% occlusion. The Om1 has   high origin with ostial 90% stenosis. The OM2 and Om3 are seen via left to   left collaterals. RCA: has mid 100% occlusion with left to right collaterals.  The RV marginal   branch has ostial 70% stenosis.      Coronary Tree        Dominance: Right     The LV gram was performed in the ROSADO 30 position. LVEF: 10-15%    Estimated Blood Loss:            [x] Minimal 10-25 cc   [] Minimal < 25 cc      [] Moderate 25-50 cc         [] >50 cc     Conclusions        Procedure Summary        Severe multivessel coronary artery disease.    Severe cardiomyopathy.        Recommendations        Medical therapy as needed.    Risk factor modification.    Routine Post Diagnostic Cath orders.    Discussed with wife and son-in law in detail. Recommend medical therapy.    Add plavix 75mg po qday if ok with neurology.    Palliative consult. Address code status. Electronically signed on 5/10/2022 at 11:09 AM by:    Delia Gomez MD  Fellow, [address] [place] [address]    I was present during entire procedure and performed all critical elements of the procedure.     Arie Zaman MD

## 2022-05-11 ENCOUNTER — APPOINTMENT (OUTPATIENT)
Dept: CT IMAGING | Age: 87
DRG: 023 | End: 2022-05-11
Payer: MEDICARE

## 2022-05-11 LAB
ABSOLUTE EOS #: 0.15 K/UL (ref 0–0.4)
ABSOLUTE IMMATURE GRANULOCYTE: 0.07 K/UL (ref 0–0.3)
ABSOLUTE LYMPH #: 1.61 K/UL (ref 1–4.8)
ABSOLUTE MONO #: 1.61 K/UL (ref 0.1–0.8)
ANION GAP SERPL CALCULATED.3IONS-SCNC: 14 MMOL/L (ref 9–17)
BASOPHILS # BLD: 0 % (ref 0–2)
BASOPHILS ABSOLUTE: 0 K/UL (ref 0–0.2)
BUN BLDV-MCNC: 13 MG/DL (ref 8–23)
CALCIUM SERPL-MCNC: 8.7 MG/DL (ref 8.6–10.4)
CHLORIDE BLD-SCNC: 106 MMOL/L (ref 98–107)
CO2: 20 MMOL/L (ref 20–31)
CREAT SERPL-MCNC: 0.71 MG/DL (ref 0.7–1.2)
EOSINOPHILS RELATIVE PERCENT: 2 % (ref 1–4)
GFR AFRICAN AMERICAN: >60 ML/MIN
GFR NON-AFRICAN AMERICAN: >60 ML/MIN
GFR SERPL CREATININE-BSD FRML MDRD: ABNORMAL ML/MIN/{1.73_M2}
GLUCOSE BLD-MCNC: 116 MG/DL (ref 70–99)
HCT VFR BLD CALC: 34.3 % (ref 40.7–50.3)
HEMOGLOBIN: 11.8 G/DL (ref 13–17)
IMMATURE GRANULOCYTES: 1 %
LYMPHOCYTES # BLD: 22 % (ref 24–44)
MCH RBC QN AUTO: 31.9 PG (ref 25.2–33.5)
MCHC RBC AUTO-ENTMCNC: 34.4 G/DL (ref 28.4–34.8)
MCV RBC AUTO: 92.7 FL (ref 82.6–102.9)
MONOCYTES # BLD: 22 % (ref 1–7)
MORPHOLOGY: NORMAL
NRBC AUTOMATED: 0 PER 100 WBC
PDW BLD-RTO: 12.4 % (ref 11.8–14.4)
PLATELET # BLD: ABNORMAL K/UL (ref 138–453)
PLATELET, FLUORESCENCE: 129 K/UL (ref 138–453)
PLATELET, IMMATURE FRACTION: 5.1 % (ref 1.1–10.3)
POTASSIUM SERPL-SCNC: 3.5 MMOL/L (ref 3.7–5.3)
RBC # BLD: 3.7 M/UL (ref 4.21–5.77)
SEG NEUTROPHILS: 53 % (ref 36–66)
SEGMENTED NEUTROPHILS ABSOLUTE COUNT: 3.86 K/UL (ref 1.8–7.7)
SODIUM BLD-SCNC: 140 MMOL/L (ref 135–144)
WBC # BLD: 7.3 K/UL (ref 3.5–11.3)

## 2022-05-11 PROCEDURE — B2151ZZ FLUOROSCOPY OF LEFT HEART USING LOW OSMOLAR CONTRAST: ICD-10-PCS | Performed by: INTERNAL MEDICINE

## 2022-05-11 PROCEDURE — 85025 COMPLETE CBC W/AUTO DIFF WBC: CPT

## 2022-05-11 PROCEDURE — 4A023N7 MEASUREMENT OF CARDIAC SAMPLING AND PRESSURE, LEFT HEART, PERCUTANEOUS APPROACH: ICD-10-PCS | Performed by: INTERNAL MEDICINE

## 2022-05-11 PROCEDURE — 99232 SBSQ HOSP IP/OBS MODERATE 35: CPT | Performed by: PSYCHIATRY & NEUROLOGY

## 2022-05-11 PROCEDURE — 6370000000 HC RX 637 (ALT 250 FOR IP): Performed by: NURSE PRACTITIONER

## 2022-05-11 PROCEDURE — 6370000000 HC RX 637 (ALT 250 FOR IP)

## 2022-05-11 PROCEDURE — 70450 CT HEAD/BRAIN W/O DYE: CPT

## 2022-05-11 PROCEDURE — 2060000000 HC ICU INTERMEDIATE R&B

## 2022-05-11 PROCEDURE — 99233 SBSQ HOSP IP/OBS HIGH 50: CPT | Performed by: PSYCHIATRY & NEUROLOGY

## 2022-05-11 PROCEDURE — 92507 TX SP LANG VOICE COMM INDIV: CPT

## 2022-05-11 PROCEDURE — 92526 ORAL FUNCTION THERAPY: CPT

## 2022-05-11 PROCEDURE — 97535 SELF CARE MNGMENT TRAINING: CPT

## 2022-05-11 PROCEDURE — 2580000003 HC RX 258: Performed by: STUDENT IN AN ORGANIZED HEALTH CARE EDUCATION/TRAINING PROGRAM

## 2022-05-11 PROCEDURE — 6370000000 HC RX 637 (ALT 250 FOR IP): Performed by: PSYCHIATRY & NEUROLOGY

## 2022-05-11 PROCEDURE — 6370000000 HC RX 637 (ALT 250 FOR IP): Performed by: STUDENT IN AN ORGANIZED HEALTH CARE EDUCATION/TRAINING PROGRAM

## 2022-05-11 PROCEDURE — B2111ZZ FLUOROSCOPY OF MULTIPLE CORONARY ARTERIES USING LOW OSMOLAR CONTRAST: ICD-10-PCS | Performed by: INTERNAL MEDICINE

## 2022-05-11 PROCEDURE — 6370000000 HC RX 637 (ALT 250 FOR IP): Performed by: SURGERY

## 2022-05-11 PROCEDURE — 85055 RETICULATED PLATELET ASSAY: CPT

## 2022-05-11 PROCEDURE — 36415 COLL VENOUS BLD VENIPUNCTURE: CPT

## 2022-05-11 PROCEDURE — 99232 SBSQ HOSP IP/OBS MODERATE 35: CPT | Performed by: INTERNAL MEDICINE

## 2022-05-11 PROCEDURE — 80048 BASIC METABOLIC PNL TOTAL CA: CPT

## 2022-05-11 PROCEDURE — 97110 THERAPEUTIC EXERCISES: CPT

## 2022-05-11 PROCEDURE — 99232 SBSQ HOSP IP/OBS MODERATE 35: CPT | Performed by: PHYSICAL MEDICINE & REHABILITATION

## 2022-05-11 RX ORDER — MIDODRINE HYDROCHLORIDE 5 MG/1
10 TABLET ORAL
Status: DISCONTINUED | OUTPATIENT
Start: 2022-05-11 | End: 2022-05-16

## 2022-05-11 RX ORDER — CLOPIDOGREL BISULFATE 75 MG/1
75 TABLET ORAL DAILY
Qty: 30 TABLET | Refills: 3 | Status: CANCELLED | OUTPATIENT
Start: 2022-05-12

## 2022-05-11 RX ADMIN — LISINOPRIL 5 MG: 5 TABLET ORAL at 08:34

## 2022-05-11 RX ADMIN — MIDODRINE HYDROCHLORIDE 10 MG: 5 TABLET ORAL at 21:00

## 2022-05-11 RX ADMIN — ATORVASTATIN CALCIUM 80 MG: 80 TABLET, FILM COATED ORAL at 21:00

## 2022-05-11 RX ADMIN — POTASSIUM BICARBONATE 40 MEQ: 782 TABLET, EFFERVESCENT ORAL at 08:42

## 2022-05-11 RX ADMIN — CLOPIDOGREL 75 MG: 75 TABLET, FILM COATED ORAL at 08:35

## 2022-05-11 RX ADMIN — ASPIRIN 81 MG: 81 TABLET, CHEWABLE ORAL at 08:34

## 2022-05-11 RX ADMIN — SODIUM CHLORIDE, PRESERVATIVE FREE 10 ML: 5 INJECTION INTRAVENOUS at 21:00

## 2022-05-11 RX ADMIN — METOPROLOL TARTRATE 25 MG: 25 TABLET ORAL at 08:34

## 2022-05-11 RX ADMIN — TAMSULOSIN HYDROCHLORIDE 0.4 MG: 0.4 CAPSULE ORAL at 08:34

## 2022-05-11 RX ADMIN — SODIUM CHLORIDE, PRESERVATIVE FREE 10 ML: 5 INJECTION INTRAVENOUS at 08:43

## 2022-05-11 ASSESSMENT — PAIN SCALES - GENERAL: PAINLEVEL_OUTOF10: 0

## 2022-05-11 NOTE — PLAN OF CARE
Problem: Discharge Planning  Goal: Discharge to home or other facility with appropriate resources  5/10/2022 2034 by Javier Cullen RN  Outcome: Progressing  5/10/2022 1616 by Shivani Lu RN  Outcome: Progressing     Problem: Chronic Conditions and Co-morbidities  Goal: Patient's chronic conditions and co-morbidity symptoms are monitored and maintained or improved  5/10/2022 2034 by Javier Cullen RN  Outcome: Progressing  5/10/2022 1616 by Shivani Lu RN  Outcome: Progressing     Problem: Safety - Adult  Goal: Free from fall injury  5/10/2022 2034 by Javier Cullen RN  Outcome: Progressing  5/10/2022 1616 by Shivani Lu RN  Outcome: Progressing     Problem: ABCDS Injury Assessment  Goal: Absence of physical injury  5/10/2022 2034 by Javier Cullen RN  Outcome: Progressing  5/10/2022 1616 by Shivani Lu RN  Outcome: Progressing     Problem: Skin/Tissue Integrity  Goal: Absence of new skin breakdown  Description: 1. Monitor for areas of redness and/or skin breakdown  2. Assess vascular access sites hourly  3. Every 4-6 hours minimum:  Change oxygen saturation probe site  4. Every 4-6 hours:  If on nasal continuous positive airway pressure, respiratory therapy assess nares and determine need for appliance change or resting period.   5/10/2022 2034 by Javier Cullen RN  Outcome: Progressing  5/10/2022 1616 by Shivani Lu RN  Outcome: Progressing

## 2022-05-11 NOTE — DISCHARGE INSTR - COC
Continuity of Care Form    Patient Name: Jyoti Contreras   :  1934  MRN:  [id number]    Admit date:  2022  Discharge date:  2022    Code Status Order: Full Code   Advance Directives:      Admitting Physician:  No admitting provider for patient encounter.   PCP: Belia Zuñiga MD    Discharging Nurse: [facility] Unit/Room#: 3593/7254-95  Discharging Unit Phone Number: [phone]    Emergency Contact:   Extended Emergency Contact Information  Primary Emergency Contact: Jacy Hightower  Address: [address] Phone: [phone]  Mobile Phone: [phone]  Relation: Spouse  Secondary Emergency Contact: [address] [place] [address], [address] [place] [address] Phone: [phone]  Relation: Child    Past Surgical History:  Past Surgical History:   Procedure Laterality Date    PROSTATE SURGERY         Immunization History:   Immunization History   Administered Date(s) Administered    COVID-19, Janalberta Esquivel, Primary or Immunocompromised, PF, 100mcg/0.5mL 2021, 2021       Active Problems:  Patient Active Problem List   Diagnosis Code    Cerebrovascular accident (CVA) ([facility] [address] I63.9    Acute ischemic stroke ([facility] [address] I63.9    Altered mental status R41.82    CHF (congestive heart failure) ([facility] [address] I50.9    NSTEMI (non-ST elevated myocardial infarction) ([facility] [address] I21.4    Intracranial bleed ([facility] [address] I62.9    Right sided weakness R53.1       Isolation/Infection:   Isolation            No Isolation          Patient Infection Status       None to display            Nurse Assessment:  Last Vital Signs: /60   Pulse 72   Temp 97.5 °F (36.4 °C) (Oral)   Resp 21   Ht 5' 9\" (1.753 m)   Wt 151 lb 0.2 oz (68.5 kg)   SpO2 100%   BMI 22.30 kg/m²     Last documented pain score (0-10 scale): Pain Level: 0  Last Weight:   Wt Readings from Last 1 Encounters:   22 151 lb 0.2 oz (68.5 kg)     Mental Status:  oriented, alert, and confused at times    IV Access:  - None    Nursing Mobility/ADLs:  Walking   Independent  Transfer  Independent  Bathing  Independent  Dressing  Independent  [address]  Med Delivery   whole    Wound Care Documentation and Therapy:        Elimination:  Continence: Bowel: Continent most of time, has had periods of incontinence at night  Bladder: Continent most of time, has had periods of incontinence at night  Urinary Catheter: None   Colostomy/Ileostomy/Ileal Conduit: No       Date of Last BM: 5/17/2022      Intake/Output Summary (Last 24 hours) at 5/11/2022 1237  Last data filed at 5/11/2022 0718  Gross per 24 hour   Intake 700 ml   Output 450 ml   Net 250 ml     I/O last 3 completed shifts: In: 700 [P.O.:700]  Out: 300 [Urine:300]    Safety Concerns: At Risk for Falls and confused at times, easily redirectable    Impairments/Disabilities:      None    Nutrition Therapy:  Current Nutrition Therapy:   - Oral Diet:  General    Routes of Feeding: Oral  Liquids: No Restrictions  Daily Fluid Restriction: no  Last Modified Barium Swallow with Video (Video Swallowing Test): not done    Treatments at the Time of Hospital Discharge:   Respiratory Treatments: none  Oxygen Therapy:  is not on home oxygen therapy.   Ventilator:    - No ventilator support    Rehab Therapies: Physical Therapy, Occupational Therapy, and Speech/Language Therapy  Weight Bearing Status/Restrictions: No weight bearing restrictions  Other Medical Equipment (for information only, NOT a DME order):  {EQUIPMENT:[id number]}  Other Treatments:       Patient's personal belongings (please select all that are sent with patient):  Shira    RN SIGNATURE:  Electronically signed by Meme Lubin RN on 5/18/22 at 8:56 AM EDT    CASE MANAGEMENT/SOCIAL WORK SECTION    Inpatient Status Date: ***    Readmission Risk Assessment Score:  Readmission Risk              Risk of Unplanned Readmission:  9           Discharging to Facility/ Agency   [facility]  Address:   [facility] [address] [facility] [address]         Phone: [phone]        Phone:  Fax:    Dialysis Facility (if applicable)   Name:  Address:  Dialysis Schedule:  Phone:  Fax:    / signature: Electronically signed by Ebony Ashby RN on 5/18/22 at 8:14 AM EDT    PHYSICIAN SECTION    Prognosis: Fair    Condition at Discharge: Stable    Rehab Potential (if transferring to Rehab): Fair    Recommended Labs or Other Treatments After Discharge: Follow up with PCP in one week   Follow up with Neurology and neuro endovacular in 2 weeks (Dr. Juani Duke in 2 weeks after discharge and f/up with Dr. Toby Vázquez in 3 months )  Follow up with cardiology in 1-2 weeks   You have been prescribed Flomax please take as prescribed and discuss continuing medication with PCP   You have been prescribed  Aspirin 81mg and eliquis avoid NSAID's and be careful of falls as these medications thin the blood and increase the risk of bleeding. If you have signs of bleeding, go to the ED immediatly   You have been started on Lisinopril 5mg and Lipitor 80mg, please take as prescribed   Continue to take medications as prescribed   Follow up with complete blood count in 1 weeks     Physician Certification: I certify the above information and transfer of Aaliyah Berman  is necessary for the continuing treatment of the diagnosis listed and that he requires [facility] for greater 30 days.      Update Admission H&P: No change in H&P    PHYSICIAN SIGNATURE:  Dr. Maurice Abdullahi, 5/17/22

## 2022-05-11 NOTE — PROGRESS NOTES
Physical Therapy  Facility/Department: [facility] 1C STEP DOWN  Daily Treatment Note     Name: Varsha Sparks  : 1934  MRN: [id number]  Date of Service: 2022    Discharge Recommendations:  Patient would benefit from continued therapy after discharge   PT Equipment Recommendations  Equipment Needed: No      Patient Diagnosis(es): The primary encounter diagnosis was Cerebrovascular accident (CVA), unspecified mechanism ([facility] [address] Diagnoses of Altered mental status, unspecified altered mental status type and Right sided weakness were also pertinent to this visit. Past Medical History:  has a past medical history of Arthritis and Psoriasis. Past Surgical History:  has a past surgical history that includes Prostate surgery. Assessment   Body Structures, Functions, Activity Limitations Requiring Skilled Therapeutic Intervention: Decreased functional mobility ; Decreased coordination;Decreased balance;Decreased strength;Decreased safe awareness;Decreased cognition;Decreased endurance;Decreased posture  Assessment: Pt performed therapeutic exercise while supine in bed. Pt refusing to sit EOB or attempt OOB activities today even with max encouragement. Pt demonstrates significantly impaired safety awareness, pt with difficulty following simple one-step commands this date. Pt would benefit from additional therapy upon discharge to address balance, BLE strength, and coodination deficits. Pt will require strict 24/7 supervision and assistance with mobility upon discharge secondary to high fall risk.   Therapy Prognosis: Good  Decision Making: Medium Complexity  Clinical Presentation: evolving  Requires PT Follow-Up: Yes  Activity Tolerance  Activity Tolerance: Treatment limited secondary to decreased cognition;Treatment limited secondary to medical complications     Plan   Plan  Plan:  (5-6x/week)  Current Treatment Recommendations: Strengthening,Balance training,Functional mobility training,Transfer training,Gait training,Neuromuscular re-education,Home exercise program,Cognitive reorientation,Safety education & training,Patient/Caregiver education & training,Equipment evaluation, education, & procurement,Therapeutic activities  Safety Devices  Type of Devices: All fall risk precautions in place,Call light within reach,Gait belt,Nurse notified,Chair alarm in place,Left in chair,Sitter present  Restraints  Restraints Initially in Place: Yes  Restraints: 4 bed rails     Restrictions  Restrictions/Precautions  Restrictions/Precautions: Fall Risk,General Precautions  Required Braces or Orthoses?: No  Position Activity Restriction  Other position/activity restrictions: SBP goal less than 160. CVA, s/p tPA, s/p thrombectomy. Subjective   General  Chart Reviewed: Yes  Family / Caregiver Present: No  Follows Commands: Impaired  Subjective  Subjective: Pt supine in bed and agreeable to therapy, RN agreeable to therapy. Pt able to follow few commands this date. Cognition   Orientation  Overall Orientation Status: Impaired  Orientation Level: Oriented to person;Disoriented to place; Disoriented to time;Disoriented to situation  Cognition  Overall Cognitive Status: Exceptions  Arousal/Alertness: Inconsistent responses to stimuli  Following Commands: Inconsistently follows commands; Follows one step commands with increased time; Follows one step commands with repetition  Attention Span: Difficulty dividing attention; Difficulty attending to directions     Objective   Bed mobility  Supine to Sit: Unable to assess  Sit to Supine: Unable to assess  Scooting: Unable to assess  Transfers  Sit to Stand: Unable to assess  Stand to sit: Unable to assess  Ambulation  More Ambulation?: No  Stairs/Curb  Stairs?: No        A/AROM Exercises: Ankle pumps- x10 BLE, LAQs- x10 BLE, SLRs- x10 BLE, Heel Slides- x10 BLE, Hip Abduction- x10 BLE, Bicep Curls- x10 BLE, Shoulder Flexion- x10 BLE.                AM-PAC Score  AM-PAC Inpatient Mobility Raw Score : 12 (05/09/22 1059)  AM-PAC Inpatient T-Scale Score : 35.33 (05/09/22 1059)  Mobility Inpatient CMS 0-100% Score: 68.66 (05/09/22 1059)  Mobility Inpatient CMS G-Code Modifier : CL (05/09/22 1059)          Goals  Short Term Goals  Time Frame for Short term goals: 14 visits  Short term goal 1: Supine to/from sit with SBA. Short term goal 2: Sit to/from stand with SBA. Short term goal 3: Ambulate 150' with walker with SBA. Short term goal 4: Improve standing balance to stand with fair + balance.   Additional Goals?: No         Therapy Time   Individual Concurrent Group Co-treatment   Time In 1250         Time Out 1313         Minutes 23         Timed Code Treatment Minutes: Rowan 17, PTA

## 2022-05-11 NOTE — PROGRESS NOTES
Occupational Therapy  Facility/Department: [address] STEP DOWN  Occupational Therapy Daily Treatment Note    Name: Nehal Murcia  : 1934  MRN: [id number]  Date of Service: 2022    Discharge Recommendations:  Patient would benefit from continued therapy after discharge to increase balance, endurance, and independence. The patient should be able to tolerate at least 3 hours of therapy per day over 5 days or 15 hours over 7 days. This patient may benefit from a Physical Medicine and Rehab consult. Assessment   Performance deficits / Impairments: Decreased functional mobility ; Decreased endurance;Decreased ADL status; Decreased balance;Decreased high-level IADLs;Decreased safe awareness;Decreased cognition;Decreased posture  Prognosis: Good  REQUIRES OT FOLLOW-UP: Yes  Activity Tolerance  Activity Tolerance: Patient limited by fatigue;Treatment limited secondary to decreased cognition        Plan   Plan  Times per Week: 4-5x     Restrictions  Restrictions/Precautions  Restrictions/Precautions: Fall Risk,General Precautions  Required Braces or Orthoses?: No  Position Activity Restriction  Other position/activity restrictions: SBP goal less than 160. CVA, s/p tPA, s/p thrombectomy. Subjective   General  Chart Reviewed: Yes  Patient assessed for rehabilitation services?: Yes  Family / Caregiver Present: No  Diagnosis: TPA, Subacute anterior L temporal lobe infarct  General Comment  Comments: RN and Pt agreeable for therapy this day. Pt supine in bed start of session, retired to chair end of session with chair alarm on, call light in reach, BLE raised, sitter present and all needs met. Pain: Pt denies any pain. Objective              Safety Devices  Type of Devices: Left in chair;Telesitter in use;Sitter present; Chair alarm in place;Call light within reach;Gait belt  Restraints  Restraints Initially in Place: Yes  Restraints: 4 bed rails  Balance  Sitting: With support (static/ dynamic Status: Exceptions  Arousal/Alertness: Delayed responses to stimuli  Following Commands: Inconsistently follows commands; Follows one step commands with increased time; Follows one step commands with repetition  Attention Span: Difficulty dividing attention; Difficulty attending to directions  Safety Judgement: Decreased awareness of need for assistance;Decreased awareness of need for safety  Problem Solving: Decreased awareness of errors;Assistance required to correct errors made;Assistance required to identify errors made  Insights: Not aware of deficits  Initiation: Requires cues for all  Sequencing: Requires cues for all  Cognition Comment: Pt stated \" did you see that, a mouse just ran under my feet\". Pt pretended to fall out of chair and then started laughing. Education Given To: Patient  Education Provided: Role of Therapy; ADL Adaptive Strategies;Transfer Training  Education Provided Comments: Pt educated on OT role, proper hand placement, balance maintenance, RW management, figure 4 technique                                                                      AM-PAC Score        AM-[facility] Inpatient Daily Activity Raw Score: 16 (05/11/22 1704)  -PAC Inpatient ADL T-Scale Score : 35.96 (05/11/22 1704)  ADL Inpatient CMS 0-100% Score: 53.32 (05/11/22 1704)  ADL Inpatient CMS G-Code Modifier : CK (05/11/22 1704)    Goals  Short Term Goals  Time Frame for Short term goals: Pt will by discharge  Short Term Goal 1: demo good safety awareness during func mob around room at SUP and LRD PRN  Short Term Goal 2: demo ADL UB bathing/dressing activity at mod I with 2 cues  Short Term Goal 3: demo ADL LB bathing/dressing activity at SBA, AD PRN, with 2 cues  Short Term Goal 4: demo SUP for all bed mobility using bed rails PRN  Short Term Goal 5: demo activity tolerance for 35 min+       Therapy Time   Individual Concurrent Group Co-treatment   Time In 1604         Time Out 1642         Minutes 38 Timed Code Treatment Minutes: [address], [address]/[place]  Supervised by Myra GRIFFIN/KHALIF

## 2022-05-11 NOTE — PROGRESS NOTES
[facility]  Internal Medicine Teaching Residency Program  Inpatient Daily Progress Note  ______________________________________________________________________________    Patient: Sindi Green  YOB: 1934   TLU:[id number]    Acct: [de-identified]     Room: [address] [place], [place] [address]  Admit date: 5/6/2022  Today's date: 05/11/22  Number of days in the hospital: 4    SUBJECTIVE   Admitting Diagnosis: Cerebrovascular accident (CVA) ([address]  CC: Right sided weakness with speech difficulty    - Pt examined at bedside. Chart & results reviewed. - No acute event overnight   - Pt resting comfortably in bed this morning   - Oriented to person but not time or place   - Afebrile   - VSS    Plan for today:  - Cardiology recs for AICD and discharge  - Awaiting neurology recs regarding discharge   - Discharge planning   - Repeat CT head   - Continue with current medical management  - Continue to work with PT/OT     ROS:  Constitutional:  negative for chills, fevers, sweats  Respiratory:  negative for cough, dyspnea on exertion, hemoptysis, shortness of breath, wheezing  Cardiovascular:  negative for chest pain, chest pressure/discomfort, lower extremity edema, palpitations  Gastrointestinal:  negative for abdominal pain, constipation, diarrhea, nausea, vomiting  Neurological:  negative for dizziness, headache    BRIEF HISTORY     Joyce Ortiz a 80 y. o. male with no significant PMH and not presently on AC who presented via EMS after being noted to have acute onset right sided weakness and speech difficulty. Wife reports he fell asleep on the recliner at 9:30pm and woke up around 10om and couldn't get up from the chair. Family also noted right sided weakness. EMS was called and activated stroke alert. Patient was evaluated by mobile stroke unit.  He was noted to have aphasia, dysarthria, and right arm and leg weakness.     LKW: 9:30PM  NIH: 14     Non contrast CT Head obtained.  Negative for any acute bleed  Was given tPa bolus dose at 2311 and infusion started 2313.  [facility] Course:   CTA head and neck: Showed left M1 occlusion.  Patient was taken for mechanical thrombectomy for M1 occlusion. Patient was given 600 mg aspirin x1.  Transfer to neuro ICU for further management. Pt found to have hemorrhagic conversion.  Plan for repeat CT head in 24 hours.  Cardiology planning for cardiac cath.  No heparin at this time.  We will continue with Lovenox. OBJECTIVE     Vital Signs:  /77   Pulse 83   Temp 98.3 °F (36.8 °C) (Oral)   Resp 20   Ht 5' 9\" (1.753 m)   Wt 151 lb 0.2 oz (68.5 kg)   SpO2 95%   BMI 22.30 kg/m²     Temp (24hrs), Av.1 °F (36.7 °C), Min:97.4 °F (36.3 °C), Max:98.8 °F (37.1 °C)    In: 700   Out: 300 [Urine:300]    Physical Exam:  Physical Exam  Vitals and nursing note reviewed. Constitutional:       Appearance: Normal appearance. HENT:      Head: Normocephalic and atraumatic. Nose: Nose normal.      Mouth/Throat:      Mouth: Mucous membranes are moist.      Pharynx: Oropharynx is clear. Eyes:      Extraocular Movements: Extraocular movements intact. Conjunctiva/sclera: Conjunctivae normal.      Pupils: Pupils are equal, round, and reactive to light. Cardiovascular:      Rate and Rhythm: Normal rate and regular rhythm. Pulses: Normal pulses. Heart sounds: Normal heart sounds. No murmur heard. No friction rub. No gallop. Pulmonary:      Effort: Pulmonary effort is normal. No respiratory distress. Breath sounds: Normal breath sounds. No stridor. No wheezing, rhonchi or rales. Chest:      Chest wall: No tenderness. Abdominal:      General: Abdomen is flat. Bowel sounds are normal. There is no distension. Palpations: Abdomen is soft. There is no mass. Tenderness: There is no abdominal tenderness. There is no guarding or rebound. Hernia: No hernia is present.    Musculoskeletal:         General: No swelling, tenderness, deformity or signs of injury. Normal range of motion. Cervical back: Normal range of motion. Right lower leg: No edema. Left lower leg: No edema. Comments: Pt complains of back pain which is not reproducible on exam    Skin:     General: Skin is warm. Neurological:      General: No focal deficit present. Mental Status: He is alert. Mental status is at baseline. He is disoriented. Psychiatric:         Mood and Affect: Mood normal.         Behavior: Behavior normal.         Thought Content: Thought content normal.         Judgment: Judgment normal.           Medications:  Scheduled Medications:    sodium chloride flush  5-40 mL IntraVENous 2 times per day    clopidogrel  75 mg Oral Daily    metoprolol tartrate  25 mg Oral BID    aspirin  81 mg Oral Daily    lisinopril  5 mg Oral Daily    tamsulosin  0.4 mg Oral Daily    atorvastatin  80 mg Oral Nightly     Continuous Infusions:    sodium chloride       PRN Medicationssodium chloride flush, 5-40 mL, PRN  sodium chloride, , PRN  acetaminophen, 650 mg, Q4H PRN  heparin (porcine), 4,000 Units, PRN  heparin (porcine), 2,000 Units, PRN  ondansetron, 4 mg, Q8H PRN   Or  ondansetron, 4 mg, Q6H PRN  polyethylene glycol, 17 g, Daily PRN  labetalol, 10 mg, Q4H PRN  acetaminophen, 650 mg, Q4H PRN  hydrALAZINE, 10 mg, Q6H PRN        Diagnostic Labs:  CBC:   Recent Labs     05/09/22  1653 05/09/22  1804 05/10/22  0613   WBC 7.6 7.6 8.9   RBC 3.47* 3.45* 3.58*   HGB 11.2* 11.1* 11.2*   HCT 33.1* 32.8* 33.5*   MCV 95.4 95.1 93.6   RDW 12.6 12.7 12.5   PLT See Reflexed IPF Result See Reflexed IPF Result See Reflexed IPF Result     BMP:   Recent Labs     05/09/22  0348 05/10/22  0613    136   K 4.0 3.9    104   CO2 21 17*   BUN 12 17   CREATININE 0.84 0.79     BNP: No results for input(s): BNP in the last 72 hours. PT/INR: No results for input(s): PROTIME, INR in the last 72 hours.   APTT:   Recent Labs     05/09/22  2228 05/10/22  0613 05/10/22  1152   APTT 25.8 40.2* 27.1     CARDIAC ENZYMES: No results for input(s): CKMB, CKMBINDEX, TROPONINI in the last 72 hours. Invalid input(s): CKTOTAL;3  FASTING LIPID PANEL:  Lab Results   Component Value Date    CHOL 133 05/07/2022    HDL 52 05/07/2022    TRIG 50 05/07/2022     LIVER PROFILE: No results for input(s): AST, ALT, ALB, BILIDIR, BILITOT, ALKPHOS in the last 72 hours. MICROBIOLOGY: No results found for: CULTURE    Imaging:    CT HEAD WO CONTRAST    Result Date: 5/9/2022  Evolving left temporal lobe infarct anteriorly with stable superimposed focus of hemorrhage. CT HEAD WO CONTRAST    Result Date: 5/8/2022  Evolving subacute ischemic infarct of the anterior left temporal lobe. Small focus of hemorrhagic conversion has not appreciably changed. Findings were discussed with BRANDIN Oleary at 5:34 am on 5/8/2022 by Dr. Lowanda Schilder. CT HEAD WO CONTRAST    Result Date: 5/7/2022  1. No infarct evident within the left MCA territory. 2. No acute intracranial hemorrhage. CT HEAD WO CONTRAST    Result Date: 5/7/2022  1. No acute intracranial abnormality. 2. Left MCA territory perfusion mismatch with ratio 11.1 and mismatch volume 142 mL. 3. 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 4. 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 5. No significant stenosis or occlusion of the cervical carotid arteries. 6. Thyroid nodules measuring up to 1.3 cm. No further imaging follow-up is required. Critical results were called by Dr. Taras Roberts MD to Dr. Ailyn Magdaleno On 5/7/2022 at 00:25. CT HEAD WO CONTRAST    Result Date: 5/6/2022  Motion degraded exam. No acute hemorrhage given limitation. No midline shift. Slight relative decreased attenuation of the right temporal lobe with overall suboptimal evaluation for gray-white differentiation due to motion artifact. Slight relative decreased attenuation of the left temporal lobe anteriorly. Correlate with presenting symptoms. CT BRAIN PERFUSION    Result Date: 5/7/2022  1. No acute intracranial abnormality. 2. Left MCA territory perfusion mismatch with ratio 11.1 and mismatch volume 142 mL. 3. 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 4. 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 5. No significant stenosis or occlusion of the cervical carotid arteries. 6. Thyroid nodules measuring up to 1.3 cm. No further imaging follow-up is required. Critical results were called by Dr. Tammy Alan MD to Dr. Shasta Hickman On 5/7/2022 at 00:25. CTA HEAD NECK W CONTRAST    Result Date: 5/7/2022  1. No acute intracranial abnormality. 2. Left MCA territory perfusion mismatch with ratio 11.1 and mismatch volume 142 mL. 3. 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 4. 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 5. No significant stenosis or occlusion of the cervical carotid arteries. 6. Thyroid nodules measuring up to 1.3 cm. No further imaging follow-up is required. Critical results were called by Dr. Tammy Alan MD to Dr. Shasta Hickman On 5/7/2022 at 00:25. MRI BRAIN WO CONTRAST    Result Date: 5/7/2022  1. Acute infarction in the left anterior temporal lobe and left basal ganglia, consistent with MCA occlusion. 2. Punctate foci of acute infarction in the right frontal cortex. 3. Mild microangiopathic change. ASSESSMENT & PLAN     Assessment and Plan:    Principal Problem:    Cerebrovascular accident (CVA) ([address]  Active Problems:    Acute ischemic stroke ([address]    Altered mental status    CHF (congestive heart failure) ([address]    NSTEMI (non-ST elevated myocardial infarction) ([address]    Intracranial bleed ([address]    Right sided weakness  Resolved Problems:    * No resolved hospital problems.  *      Left MCA infarct with left M1 occlusion status post mechanical thrombectomy  - CT of the head shows evolving left anterior, left temporal lobe infarction with small hemorrhagic conversion. Repeat CT scan reviewed, stable. - Continue with aspirin  - Continue with Lipitor 80 mg  - Heparin discontinued  - Plavix 75mg   - Repeat CT head      NSTEMI  - Troponins 770  - Cardiac cath showing MV disease      Systolic congestive heart failure  - Echocardiogram done yesterday showing dilated left ventricle with severely reduced systolic function.  EF of 13%.  Moderate mitral regurgitation.  Negative bubble study.  - Cardiology following > plans for AICD?     Moderate mitral regurgitation     Diet: Regular diet. DVT ppx : Lovenox  GI ppx: Not indicated     PT/OT/SW: Consulted  Discharge Planning: PM&R consulted, plan for discharge to acute rehab, in process. Gary Vanegas MD  Internal Medicine Resident, PGY-1  [address] [place] [place], [place]   7:07 AM 5/11/2022     Please note that part of this chart was generated using voice recognition dictation software. Although every effort was made to ensure the accuracy of this automated transcription, some errors in transcription may have occurred.

## 2022-05-11 NOTE — CARE COORDINATION
[facility] Quality Flow/Interdisciplinary Rounds Progress Note    Quality Flow Rounds held on May 11, 2022 at [address] Attending:  Bedside Nurse, ,  and Nursing Unit Leadership    Barriers to Discharge: placement, sitter     Anticipated Discharge Date:       Anticipated Discharge Disposition:  [facility]       Readmission Risk              Risk of Unplanned Readmission:  10           Discussed patient goal for the day, patient clinical progression, and barriers to discharge. The following Goal(s) of the Day/Commitment(s) have been identified:  Cardio recs     Spoke with Lalit Anders at [facility]; patient needs to be sitter free. Will present patient's case to medical director and call back with decision. 1200 received call from Lalit Anders at [facility]; unable to accept patient d/t patient's EF. Patient's wife to come to bedside this afternoon to discuss SNF options. [facility]. with patient's wife regarding SNF choices; Referrals sent to [facility] (1st choice), [facility] and [address].      Shital Francois RN  May 11, 2022

## 2022-05-11 NOTE — PLAN OF CARE
Spoke with pt's wife, [place], via telephone. She was able to correctly identify pt identifiers. Update wife on pt's condition and plan moving forward. Discussed acute rehab after discharge from the hospital. All questions answered to satisfaction per [place]. Can Gooden MD  [address];  [facility]

## 2022-05-11 NOTE — PROGRESS NOTES
Speech Language Pathology  Speech Language Pathology  [address]    Speech Language/Cognitive Treatment Note    Date: 2022  Patients Name: Josi Hernandez  MRN: [id number]  Diagnosis:   Patient Active Problem List   Diagnosis Code    Cerebrovascular accident (CVA) ([facility] I63.9    Acute ischemic stroke ([facility] I63.9    Altered mental status R41.82    CHF (congestive heart failure) ([facility] I50.9    NSTEMI (non-ST elevated myocardial infarction) ([facility] I21.4    Intracranial bleed ([facility] I62.9    Right sided weakness R53.1     Pain: 0/10    Cognitive Treatment    Treatment time: 063-885    Subjective: [x] Alert [x] Cooperative     [] Confused     [] Agitated    [] Lethargic    Objective/Assessment:    Orientation: Pt oriented to name, age, birthday, and month independently. Oriented to place and year w/ max verbal cues. Recall:    Recall w/ Distractions, 1 functional unit: 0/1 increased to 1/1 x1 w/ mod verbal cues; 1/1 x1 independently     Auditory Comprehension:   Moderate Y/N questions: 7/8 increased to 8/8 with mod verbal cues      Verbal Expression:   Confrontation Namin/8 increased to 8/8 with max verbal cues     \"What\" Questions:  increased to 7/7 with mod verbal cues, increased processing time noted     Problem Solving/Reasoning:   Task Insight: 1/3 increased to 3/3 with mod-max verbal cues     Diet Tolerance Monitoring: Pt seen for diet tolerance monitoring. Pt seen for BSSE on 2022, ST recommended Dysphagia III: Soft and Bite Sized diet. Pt agitated and refusing trials of liquids, ST unable to make liquid recommendation at that time. This date, pt observed w/ trials of regular and soft solids, and thin liquids w/ no overt s/s of aspiration. Pt w/ min-mod lingual residual w/ regualr solid, cleared w/ liquid wash. ST recommends pt diet upgrade to [employer] w/ thin liquids at this time. If overt s/s of aspiration occur, recommend d/c PO intake and complete MBSS. Pt and ST reviewed safe swallowing strategies w/ good return. Plan:  [x] Continue ST services    [] Discharge from ST:      Discharge recommendations: []  Further therapy recommended at discharge. The patient should be able to tolerate at least 3 hours of therapy per day over 5 days or 15 hours over 7 days. [x] Further therapy recommended at discharge. [] No therapy recommended at discharge.   Treatment completed by: Lori Blanco, SLP

## 2022-05-11 NOTE — PROGRESS NOTES
Neurology Resident Progress Note      SUBJECTIVE:  This is a 80 y.o.  male admitted 5/6/2022 for Acute ischemic stroke [facility]) [I63.9]  Right sided weakness [R53.1]  Altered mental status, unspecified altered mental status type [R41.82]  Cerebrovascular accident (CVA), unspecified mechanism ([facility] [address] [I63.9]  This is a follow-up neurology progress note. The patient was seen and examined and the chart was reviewed. There were no acute events overnight. ROS  Constitutional: no fever, chills, fatigue  HENT: No change in vision or hearing   Respiratory: No cough, SOB, wheezing. Cardiovascular:  No chest pain, palpitations, leg swelling. Gastrointestinal: No nausea, vomiting, diarrhea. Genitourinary: No increased frequency, urgency. Musculoskeletal: No myalgia or arthralgia. Skin: No rashes or scarring or bruises. Neurological: No headache, paresthesia, or focal weakness. Endo/Heme/Allergies: Negative for itchy eyes or runny nose. Psychiatric/Behavioral: No anxiety or depressed mood. HPI  See H&P     sodium chloride flush  5-40 mL IntraVENous 2 times per day    clopidogrel  75 mg Oral Daily    metoprolol tartrate  25 mg Oral BID    aspirin  81 mg Oral Daily    lisinopril  5 mg Oral Daily    tamsulosin  0.4 mg Oral Daily    atorvastatin  80 mg Oral Nightly       Past Medical History:   Diagnosis Date    Arthritis     Psoriasis        Past Surgical History:   Procedure Laterality Date    PROSTATE SURGERY         PHYSICAL EXAM:     Blood pressure (!) 146/78, pulse 74, temperature 98.1 °F (36.7 °C), temperature source Oral, resp. rate 14, height 5' 9\" (1.753 m), weight 151 lb 0.2 oz (68.5 kg), SpO2 100 %. CONSTITUTIONAL:   Patient drowsy, open eyes to voice, does follow command intermittently, oriented x2, cooperative.     HEAD:  normocephalic, atraumatic    EYES:  PERRLA, EOMI.   ENT:  moist mucous membranes   NECK:  supple, symmetric   LUNGS:  Equal air entry bilaterally   CARDIOVASCULAR: normal s1 / s2, RRR, distal pulses intact   ABDOMEN:  Soft, no rigidity   NEUROLOGIC:  Mental Status:   does follow command intermittently, oriented x2, cooperative. Patient drowsy, open eyes to voice.     Patient moving all 4 extremities antigravity with significant strength     Gait: Unable to assess           Investigations:      Laboratory Testing:  Recent Results (from the past 24 hour(s))   APTT    Collection Time: 05/10/22 11:52 AM   Result Value Ref Range    PTT 27.1 20.5 - 30.5 sec   CBC with Auto Differential    Collection Time: 05/11/22  7:05 AM   Result Value Ref Range    WBC 7.3 3.5 - 11.3 k/uL    RBC 3.70 (L) 4.21 - 5.77 m/uL    Hemoglobin 11.8 (L) 13.0 - 17.0 g/dL    Hematocrit 34.3 (L) 40.7 - 50.3 %    MCV 92.7 82.6 - 102.9 fL    MCH 31.9 25.2 - 33.5 pg    MCHC 34.4 28.4 - 34.8 g/dL    RDW 12.4 11.8 - 14.4 %    Platelets See Reflexed IPF Result 138 - 453 k/uL    NRBC Automated 0.0 0.0 per 100 WBC    Immature Granulocytes 1 (H) 0 %    Seg Neutrophils 53 36 - 66 %    Lymphocytes 22 (L) 24 - 44 %    Monocytes 22 (H) 1 - 7 %    Eosinophils % 2 1 - 4 %    Basophils 0 0 - 2 %    Absolute Immature Granulocyte 0.07 0.00 - 0.30 k/uL    Segs Absolute 3.86 1.8 - 7.7 k/uL    Absolute Lymph # 1.61 1.0 - 4.8 k/uL    Absolute Mono # 1.61 (H) 0.1 - 0.8 k/uL    Absolute Eos # 0.15 0.0 - 0.4 k/uL    Basophils Absolute 0.00 0.0 - 0.2 k/uL    Morphology Normal    Basic Metabolic Panel    Collection Time: 05/11/22  7:05 AM   Result Value Ref Range    Glucose 116 (H) 70 - 99 mg/dL    BUN 13 8 - 23 mg/dL    CREATININE 0.71 0.70 - 1.20 mg/dL    Calcium 8.7 8.6 - 10.4 mg/dL    Sodium 140 135 - 144 mmol/L    Potassium 3.5 (L) 3.7 - 5.3 mmol/L    Chloride 106 98 - 107 mmol/L    CO2 20 20 - 31 mmol/L    Anion Gap 14 9 - 17 mmol/L    GFR Non-African American >60 >60 mL/min    GFR African American >60 >60 mL/min    GFR Comment         Immature Platelet Fraction    Collection Time: 05/11/22  7:05 AM   Result Value Ref Range Platelet, Immature Fraction 5.1 1.1 - 10.3 %    Platelet, Fluorescence 129 (L) 138 - 453 k/uL       Imaging/Diagnostics:  ECHO Complete 2D W Doppler W Color    Result Date: 5/8/2022  Transthoracic Echocardiography Report (TTE)  Patient Name Santino Whiting      Date of Study               05/07/2022               SHIRA CACERES   Date of      1934  Gender                      Male  Birth   Age          80 year(s)  Race                           Room Number  0526        Height:                     69 inch, 175.26 cm   Corporate ID [id number]   Weight:                     175 pounds, 79.4 kg  #   Patient Acct [de-identified]   BSA:          1.95 m^2      BMI:      25.84  #                                                              kg/m^2   MR #         [id number]     Sonographer                 [facility]   Accession #  [id number]  Interpreting Physician      Kashmir Carrington   Fellow                   Referring Nurse                           Practitioner   Interpreting             Referring Physician         Angelique Avelar MD  Fellow  Type of Study   TTE procedure:2D Echocardiogram, M-Mode, Doppler, Color Doppler, Bubble  Study. Procedure Date Date: 05/07/2022 Start: 01:21 PM Study Location: [facility] Indications:CVA. History / Tech. Comments: CVA, Altered Mental Status Patient Status: Inpatient Height: 69 inches Weight: 175 pounds BSA: 1.95 m^2 BMI: 25.84 kg/m^2 HR: 74 bpm CONCLUSIONS Summary Dilated left ventricle with severely reduced systolic function. Calculated ejection fraction is 13 % Moderate mitral regurgitation. Bubble study was negative for shunt.  Signature ----------------------------------------------------------------------------  Electronically signed by Alexey Barrera(Sonographer) on 05/07/2022 03:59  PM ---------------------------------------------------------------------------- ----------------------------------------------------------------------------  Electronically signed by Kashmir Carrington(Interpreting physician) on  05/08/2022 10:56 AM ---------------------------------------------------------------------------- FINDINGS Left Atrium Left atrium is mildly enlarged. Left Ventricle Dilated left ventricle with severely reduced systolic function. Calculated ejection fraction is 13 % Right Atrium Right atrium is normal in size. Right Ventricle Normal right ventricular size and function. TAPSE measures 2.1 cm Mitral Valve Thickening of mitral valve leaflets without stenosis. Moderate mitral regurgitation. Vena Contract width 0.5 cm Aortic Valve Aortic valve is trileaflet. Aortic sclerosis without stenosis. Mild aortic insufficiency. Tricuspid Valve Normal tricuspid valve structure and function. No tricuspid regurgitation was seen. Pulmonic Valve The pulmonic valve is normal in structure. Pericardial Effusion No significant pericardial effusion is seen. Miscellaneous Normal aortic root dimension. E/E' average = 29. Bubble study was negative.  M-mode / 2D Measurements & Calculations:   LVIDd:6.91 cm(3.7 - 5.6 cm)       Diastolic QEDJUB:834 ml  IVSd:1 cm(0.6 - 1.1 cm)           Systolic ABDQOC:347 ml  YXLPP:2.7 cm(0.6 - 1.1 cm)        Aortic Root:3.9 cm(2.0 - 3.7 cm)                                    LA Dimension: 4.2 cm(1.9 - 4.0 cm)  Calculated LVEF (%): 13.11 %      LA volume/Index: 94.73 ml /49m^2                                    LVOT:1.9 cm                                    RVDd:1.9 cm   Mitral:                                 Aortic   Valve Area (P1/2-Time): 3.93 cm^2       Peak Velocity: 1.78 m/s  Peak E-Wave: 1.10 m/s                   Mean Velocity: 1.11 m/s  Peak A-Wave: 1.43 m/s                   Peak Gradient: 12.67 mmHg  E/A Ratio: 0.77                         Mean Gradient: 6 mmHg  Peak Gradient: 4.84 mmHg  Mean Gradient: 4 mmHg  Deceleration Time: 200 msec             Area (continuity): 1.09 cm^2  P1/2t: 56 msec                          AV VTI: 41.3 cm   Area (continuity): 1.11 cm^2  Mean Velocity: 0.93 m/s                                           Pulmonic:                                           Peak Velocity: 0.77 m/s                                          Peak Gradient: 2.4 mmHg  Diastology / Tissue Doppler Septal Wall E' velocity:0.03 m/s Septal Wall E/E':38 Lateral Wall E' velocity:0.05 m/s Lateral Wall E/E':20    CT HEAD WO CONTRAST    Result Date: 5/8/2022  EXAMINATION: CT OF THE HEAD WITHOUT CONTRAST  5/8/2022 5:03 am TECHNIQUE: CT of the head was performed without the administration of intravenous contrast. Dose modulation, iterative reconstruction, and/or weight based adjustment of the mA/kV was utilized to reduce the radiation dose to as low as reasonably achievable. COMPARISON: CT and MRI May 7, 2022 HISTORY: ORDERING SYSTEM PROVIDED HISTORY: 24 hour stability CT head TECHNOLOGIST PROVIDED HISTORY: 24 hour stability CT head FINDINGS: BRAIN/VENTRICLES: Left anterior temporal lobe hypoattenuation with loss of cortical differentiation compatible with evolving encephalomalacia. 1.0 cm by 0.6 cm hyperdense focus in the left anterior temporal lobe compatible with hemorrhagic conversion. This has not appreciably changed since comparison MRI. No significant mass effect or midline shift. Ventricular system is unchanged in size. Basilar cisterns remain patent. ORBITS: The visualized portion of the orbits demonstrate no acute abnormality. SINUSES: The visualized paranasal sinuses and mastoid air cells demonstrate no acute abnormality. SOFT TISSUES/SKULL:  No acute abnormality of the visualized skull or soft tissues. Evolving subacute ischemic infarct of the anterior left temporal lobe. Small focus of hemorrhagic conversion has not appreciably changed. Findings were discussed with BRANDIN Duran at 5:34 am on 5/8/2022 by Dr. Jose Taylor.      CT HEAD WO CONTRAST    Result Date: 5/7/2022  EXAMINATION: CT OF THE HEAD WITHOUT CONTRAST  5/7/2022 6:38 am TECHNIQUE: CT of the head was performed without the administration of intravenous contrast. Dose modulation, iterative reconstruction, and/or weight based adjustment of the mA/kV was utilized to reduce the radiation dose to as low as reasonably achievable. COMPARISON: 05/06/2022 HISTORY: ORDERING SYSTEM PROVIDED HISTORY: Post-thrombectomy TECHNOLOGIST PROVIDED HISTORY: Post-thrombectomy Reason for Exam: Post-thrombectomy FINDINGS: BRAIN/VENTRICLES: There is no acute infarct or acute intracranial hemorrhage present. There is no mass effect or midline shift present. There is no ventriculomegaly or abnormal extra-axial fluid collection present. There is mild diffuse cerebral volume loss. Mild periventricular hypoattenuation is present. ORBITS: Limited evaluation of the orbits is unremarkable. SINUSES: The paranasal sinuses and mastoid air cells are clear. SOFT TISSUES/SKULL:  No lytic or blastic osseous lesions are identified. 1. No infarct evident within the left MCA territory. 2. No acute intracranial hemorrhage. CT HEAD WO CONTRAST    Result Date: 5/7/2022  EXAMINATION: CT OF THE HEAD WITHOUT CONTRAST; CTA OF THE HEAD AND NECK WITH CONTRAST; CTA OF THE HEAD WITH CONTRAST WITH PERFUSION 5/6/2022 11:40 pm; 5/6/2022 11:47 pm: TECHNIQUE: CT of the head was performed without the administration of intravenous contrast. Dose modulation, iterative reconstruction, and/or weight based adjustment of the mA/kV was utilized to reduce the radiation dose to as low as reasonably achievable.; CTA of the head and neck was performed with the administration of intravenous contrast. Multiplanar reformatted images are provided for review. MIP images are provided for review. Stenosis of the internal carotid arteries measured using NASCET criteria.  Dose modulation, iterative reconstruction, and/or weight based adjustment of the mA/kV was utilized to reduce the radiation dose to as low as reasonably achievable.; CTA of the head/brain was performed with the administration of intravenous contrast. Multiplanar reformatted images are provided for review. MIP images are provided for review. Dose modulation, iterative reconstruction, and/or weight based adjustment of the mA/kV was utilized to reduce the radiation dose to as low as reasonably achievable. Noncontrast CT of the head with reconstructed 2-D images are also provided for review. COMPARISON: None. HISTORY: ORDERING SYSTEM PROVIDED HISTORY: stroke TECHNOLOGIST PROVIDED HISTORY: stroke Decision Support Exception - unselect if not a suspected or confirmed emergency medical condition->Emergency Medical Condition (MA); ORDERING SYSTEM PROVIDED HISTORY: TPA TECHNOLOGIST PROVIDED HISTORY: TPA Decision Support Exception - unselect if not a suspected or confirmed emergency medical condition->Emergency Medical Condition (MA); ORDERING SYSTEM PROVIDED HISTORY: stroke TECHNOLOGIST PROVIDED HISTORY: stroke Decision Support Exception - unselect if not a suspected or confirmed emergency medical condition->Emergency Medical Condition (MA) FINDINGS: CT HEAD: BRAIN/VENTRICLES:  No acute intracranial hemorrhage or extraaxial fluid collection. Grey-white differentiation is maintained. No evidence of mass, mass effect or midline shift. No evidence of hydrocephalus. Periventricular and subcortical white matter hypoattenuation, consistent microangiopathic change. There is mild parenchymal volume loss. ORBITS: The visualized portion of the orbits demonstrate no acute abnormality. SINUSES:  The visualized paranasal sinuses and mastoid air cells demonstrate no acute abnormality. SOFT TISSUES/SKULL: No acute abnormality of the visualized skull or soft tissues. CTA NECK: AORTIC ARCH/ARCH VESSELS: No dissection or arterial injury. No significant stenosis of the brachiocephalic or subclavian arteries. CAROTID ARTERIES: No dissection, arterial injury, or hemodynamically significant stenosis by NASCET criteria.  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 Right vertebral artery is hypoplastic, but patent. SOFT TISSUES: The lung apices are clear. Mildly enlarged superior mediastinal lymph nodes. .  The larynx and pharynx are unremarkable. No acute abnormality of the salivary glands. Thyroid nodules measure up to 1.3 cm. BONES: Multilevel degenerative changes of the cervical spine. CTA HEAD: ANTERIOR CIRCULATION: The intracranial ICAs are patent and normal in caliber. The ACAs are patent and normal in caliber. There is occlusion of the left M1 MCA with some reconstituted flow distally. The right MCA is patent. There is atherosclerosis with mild narrowing in the right supraclinoid ICA. POSTERIOR CIRCULATION: No significant stenosis of the vertebral, basilar, or posterior cerebral arteries. No aneurysm. Fetal configuration of the left PCA. OTHER: No dural venous sinus thrombosis on this non-dedicated study. CT PERFUSION: EXAM QUALITY: The examination is diagnostic with appropriate arterial inflow and venous outflow curves, and diagnostic perfusion maps. CORE INFARCT: The total area of ischemic core is 14 mL (CBF<30% volume). TOTAL HYPOPERFUSION: The total area of hypoperfusion is 156 mL (Tmax>6s volume). PENUMBRA: The penumbra (mismatch) volume is 142 mL and is located in the left MCA territory. The mismatch ratio is 11.1.     1. No acute intracranial abnormality. 2. Left MCA territory perfusion mismatch with ratio 11.1 and mismatch volume 142 mL. 3. 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 4. 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 5. No significant stenosis or occlusion of the cervical carotid arteries. 6. Thyroid nodules measuring up to 1.3 cm. No further imaging follow-up is required. Critical results were called by Dr. Romi Deluna MD to Dr. Janeth Pittman On 5/7/2022 at 00:25.      CT HEAD WO CONTRAST    Result Date: 5/6/2022  EXAMINATION: CT OF THE HEAD WITHOUT CONTRAST  5/6/2022 10:52 pm TECHNIQUE: CT of the head was performed without the administration of intravenous contrast. Dose modulation, iterative reconstruction, and/or weight based adjustment of the mA/kV was utilized to reduce the radiation dose to as low as reasonably achievable. COMPARISON: None. HISTORY: ORDERING SYSTEM PROVIDED HISTORY: Stroke TECHNOLOGIST PROVIDED HISTORY: Stroke Decision Support Exception - unselect if not a suspected or confirmed emergency medical condition->Emergency Medical Condition (MA) FINDINGS: BRAIN/VENTRICLES:Motion degraded exam. Coronal and sagittal reformats were not available at the time of this dictation. No acute hemorrhage given limitation. Periventricular and subcortical hypoattenuation is nonspecific and may be related to microvascular disease. Artifact partially obscures the vianey. Artifact partially obscures the cerebellum. Slight relative decreased attenuation of the right temporal lobe with overall suboptimal evaluation for gray-white differentiation due to motion artifact. Slight relative decreased attenuation of the left temporal lobe anteriorly. Ventricles are within normal limits in size. There is no midline shift. Basal cisterns appear patent. ORBITS: Visualized orbits appear unremarkable on this unenhanced exam. SINUSES: Mild mucosal thickening of the ethmoid and left maxillary sinuses. Visualized mastoid air cells appear clear. SOFT TISSUES/SKULL: No depressed calvarial fracture. Motion degraded exam. No acute hemorrhage given limitation. No midline shift. Slight relative decreased attenuation of the right temporal lobe with overall suboptimal evaluation for gray-white differentiation due to motion artifact. Slight relative decreased attenuation of the left temporal lobe anteriorly. Correlate with presenting symptoms.      IR ANGIOGRAM CAROTID C EREBRAL BILATERAL    Result Date: 5/8/2022  Date of Service: 05/07/2022 Diagnosis: Left MCA Acute Ischemic Stroke with acute left MCA proximal M-1 occlusion Patient arrived to the angio suite at: 12:41 am Puncture obtained at: 1:03 am [place]: Germania Evans MD Procedures: 1. Mechanical thrombectomy of the Left M1 middle cerebral artery (MCA) with Stent retriever and Combined Suctioning 2. Left Internal Carotid Artery (ICA) Cerebral Angiography 3. Right Common Femoral Artery (CFA) Angiogram Neurointerventionalist: Tomi Jennings MD, MS Access: Right Common Femoral Artery Anesthesia: MAC anesthesia. Fluoro time: 14.2 minutes Contrast amount: 59 cc of Visipaque-270 Indication and Clinical History: 79 y/o M with pmh significant for LEXII presented with acute onset speech difficulty, right sided weakness, NIHSS, left MCA M1 acute occlusion s/p tPA. CTA head showed Left MCA M1 proximal occlusion. Therefore mechanical thrombectomy was indicated. Consent: after explaining the risks and benefit to the family, including but not limited to vessel injury or perforation, stroke, intracranial hemorrhage, radiation, dye allergic reaction, groin hematoma and death, an informed consent was obtained, signed and witnessed. Technique and Interpretations: The patient was brought to the interventional suite and placed in a supine position by the stroke and anesthesia team. The patient's right groin was prepped and draped in standard sterile fashion. Fluoroscopy was used to localize the right common femoral  artery. The right common femoral artery was accessed with a micropuncture technique under local anesthesia with conscious sedation, and a 8 Dutch short sheath was placed within the right common femoral artery, establishing arterial access using Seldinger technique. A 5F Gasca select catheter and a 8F Flowgate balloon guide catheter (BGC) were introduced coaxially with a guidewire. Patient was noted to have Type III aortic arch.  5 fr Gasca catheter was then replaced with VTK diagnostic catheter. Left CCA was then selectively catheterized. Flowgate BGC was then slowly and carefully advanced over the diagnostic catheter into the distal left cervical ICA. VTK diagnostic catheter was then removed. Left ICA Technique: The Left ICA was selectively catheterized under fluoroscopic guidance. Intracranial images were obtained in standard biplane projections. Left ICA Interpretation: The Left ICA images demonstrated complete occlusion of the proximal  left MCA M-1 segment. Grade 0 collateral blood supply to the Left MCA territory per the American Society of Interventional and Therapeutic Neuroradiology Radiology/Society of interventional Radiology(ASITN/SIR) collateral grade scale. There is normal antegrade filling of the distal internal carotid artery, ophthalmic artery, anterior cerebral artery. FIRST PASS MECHANICAL THROMBECTOMY OF LEFT M1 CLOT : A Synchro2 microwire and Prowler select microcatheter were advanced through the clot to the superior left MCA M2, the microwire was removed, and a 5 mm x 37 mm Embotrap stent retriever was advanced under fluoroscopic guidance, unsheathed spanning the Left M1 MCA clot, and allowed to integrate with the clot for at least 5 minutes. The microcatheter was \"stripped off. \" The BGC was inflated. The stent retriever was then removed under fluoroscopic guidance from the body while applying continuous aspiration from the balloon guide catheter side port (using manual aspiration through a 60 cc syringe). Thrombus was visualized on the stent retriever. FINAL ANGIOGRAPHIC RUN: In this injection, complete reperfusion of the left MCA territory was visualized. Final TICI score 3. Left MCA M-1 segment was noted to have stenosis measuring about 60% by NASCET criteria. The BGC was then removed, and digital subtraction angiography of the right CFA was performed in a frontal oblique projection from the short sheath.  There was no evidence of arterial stenosis, dissection, or pseudoaneurysm. The arterial puncture site was well placed above the femoral bifurcation. The sheath was then sutured in the place. No immediate complications were experienced. Patient was re-examined after the procedure with no change noted in their neurologic examination, with distal pulses present. The patient was subsequently discharged from the neurointerventional suite to the neurointensive care unit. Impression: 1. Acute Left MCA proximal M-1 occlusion TICI score 0 2. The above lesion was treated with 8 fr short sheath, flow gate bgc, prowler 21 microcatheter, embo trap 5 mm x 37 mm stent retriever first pass. Final reperfusion score TICI 03 3. 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 Eduardo Jasmine dictated this invasive procedure. Dr. Yojana Marcus was present for all procedural and imaging components of this case. Examination was reviewed and reported findings confirmed and evaluated by Dr. Yojana Marcus. Final report electronically signed by Fadumo Allen M.D. on 5/8/2022 5:45 PM    CT BRAIN PERFUSION    Result Date: 5/7/2022  EXAMINATION: CT OF THE HEAD WITHOUT CONTRAST; CTA OF THE HEAD AND NECK WITH CONTRAST; CTA OF THE HEAD WITH CONTRAST WITH PERFUSION 5/6/2022 11:40 pm; 5/6/2022 11:47 pm: TECHNIQUE: CT of the head was performed without the administration of intravenous contrast. Dose modulation, iterative reconstruction, and/or weight based adjustment of the mA/kV was utilized to reduce the radiation dose to as low as reasonably achievable.; CTA of the head and neck was performed with the administration of intravenous contrast. Multiplanar reformatted images are provided for review. MIP images are provided for review. Stenosis of the internal carotid arteries measured using NASCET criteria.  Dose modulation, iterative reconstruction, and/or weight based adjustment of the mA/kV was utilized to reduce the radiation dose to as low as reasonably achievable.; CTA of the head/brain was performed with the administration of intravenous contrast. Multiplanar reformatted images are provided for review. MIP images are provided for review. Dose modulation, iterative reconstruction, and/or weight based adjustment of the mA/kV was utilized to reduce the radiation dose to as low as reasonably achievable. Noncontrast CT of the head with reconstructed 2-D images are also provided for review. COMPARISON: None. HISTORY: ORDERING SYSTEM PROVIDED HISTORY: stroke TECHNOLOGIST PROVIDED HISTORY: stroke Decision Support Exception - unselect if not a suspected or confirmed emergency medical condition->Emergency Medical Condition (MA); ORDERING SYSTEM PROVIDED HISTORY: TPA TECHNOLOGIST PROVIDED HISTORY: TPA Decision Support Exception - unselect if not a suspected or confirmed emergency medical condition->Emergency Medical Condition (MA); ORDERING SYSTEM PROVIDED HISTORY: stroke TECHNOLOGIST PROVIDED HISTORY: stroke Decision Support Exception - unselect if not a suspected or confirmed emergency medical condition->Emergency Medical Condition (MA) FINDINGS: CT HEAD: BRAIN/VENTRICLES:  No acute intracranial hemorrhage or extraaxial fluid collection. Grey-white differentiation is maintained. No evidence of mass, mass effect or midline shift. No evidence of hydrocephalus. Periventricular and subcortical white matter hypoattenuation, consistent microangiopathic change. There is mild parenchymal volume loss. ORBITS: The visualized portion of the orbits demonstrate no acute abnormality. SINUSES:  The visualized paranasal sinuses and mastoid air cells demonstrate no acute abnormality. SOFT TISSUES/SKULL: No acute abnormality of the visualized skull or soft tissues. CTA NECK: AORTIC ARCH/ARCH VESSELS: No dissection or arterial injury. No significant stenosis of the brachiocephalic or subclavian arteries.  CAROTID ARTERIES: No dissection, arterial injury, or hemodynamically significant stenosis by NASCET criteria. 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 Right vertebral artery is hypoplastic, but patent. SOFT TISSUES: The lung apices are clear. Mildly enlarged superior mediastinal lymph nodes. .  The larynx and pharynx are unremarkable. No acute abnormality of the salivary glands. Thyroid nodules measure up to 1.3 cm. BONES: Multilevel degenerative changes of the cervical spine. CTA HEAD: ANTERIOR CIRCULATION: The intracranial ICAs are patent and normal in caliber. The ACAs are patent and normal in caliber. There is occlusion of the left M1 MCA with some reconstituted flow distally. The right MCA is patent. There is atherosclerosis with mild narrowing in the right supraclinoid ICA. POSTERIOR CIRCULATION: No significant stenosis of the vertebral, basilar, or posterior cerebral arteries. No aneurysm. Fetal configuration of the left PCA. OTHER: No dural venous sinus thrombosis on this non-dedicated study. CT PERFUSION: EXAM QUALITY: The examination is diagnostic with appropriate arterial inflow and venous outflow curves, and diagnostic perfusion maps. CORE INFARCT: The total area of ischemic core is 14 mL (CBF<30% volume). TOTAL HYPOPERFUSION: The total area of hypoperfusion is 156 mL (Tmax>6s volume). PENUMBRA: The penumbra (mismatch) volume is 142 mL and is located in the left MCA territory. The mismatch ratio is 11.1.     1. No acute intracranial abnormality. 2. Left MCA territory perfusion mismatch with ratio 11.1 and mismatch volume 142 mL. 3. 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 4. 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 5. No significant stenosis or occlusion of the cervical carotid arteries. 6. Thyroid nodules measuring up to 1.3 cm. No further imaging follow-up is required.  Critical results were called by Dr. May Heller MD to Dr. Joon Szymanski On 5/7/2022 at 00:25. CTA HEAD NECK W CONTRAST    Result Date: 5/7/2022  EXAMINATION: CT OF THE HEAD WITHOUT CONTRAST; CTA OF THE HEAD AND NECK WITH CONTRAST; CTA OF THE HEAD WITH CONTRAST WITH PERFUSION 5/6/2022 11:40 pm; 5/6/2022 11:47 pm: TECHNIQUE: CT of the head was performed without the administration of intravenous contrast. Dose modulation, iterative reconstruction, and/or weight based adjustment of the mA/kV was utilized to reduce the radiation dose to as low as reasonably achievable.; CTA of the head and neck was performed with the administration of intravenous contrast. Multiplanar reformatted images are provided for review. MIP images are provided for review. Stenosis of the internal carotid arteries measured using NASCET criteria. Dose modulation, iterative reconstruction, and/or weight based adjustment of the mA/kV was utilized to reduce the radiation dose to as low as reasonably achievable.; CTA of the head/brain was performed with the administration of intravenous contrast. Multiplanar reformatted images are provided for review. MIP images are provided for review. Dose modulation, iterative reconstruction, and/or weight based adjustment of the mA/kV was utilized to reduce the radiation dose to as low as reasonably achievable. Noncontrast CT of the head with reconstructed 2-D images are also provided for review. COMPARISON: None.  HISTORY: ORDERING SYSTEM PROVIDED HISTORY: stroke TECHNOLOGIST PROVIDED HISTORY: stroke Decision Support Exception - unselect if not a suspected or confirmed emergency medical condition->Emergency Medical Condition (MA); ORDERING SYSTEM PROVIDED HISTORY: TPA TECHNOLOGIST PROVIDED HISTORY: TPA Decision Support Exception - unselect if not a suspected or confirmed emergency medical condition->Emergency Medical Condition (MA); ORDERING SYSTEM PROVIDED HISTORY: stroke TECHNOLOGIST PROVIDED HISTORY: stroke Decision Support Exception - unselect if not a this non-dedicated study. CT PERFUSION: EXAM QUALITY: The examination is diagnostic with appropriate arterial inflow and venous outflow curves, and diagnostic perfusion maps. CORE INFARCT: The total area of ischemic core is 14 mL (CBF<30% volume). TOTAL HYPOPERFUSION: The total area of hypoperfusion is 156 mL (Tmax>6s volume). PENUMBRA: The penumbra (mismatch) volume is 142 mL and is located in the left MCA territory. The mismatch ratio is 11.1.     1. No acute intracranial abnormality. 2. Left MCA territory perfusion mismatch with ratio 11.1 and mismatch volume 142 mL. 3. 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 4. 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 5. No significant stenosis or occlusion of the cervical carotid arteries. 6. Thyroid nodules measuring up to 1.3 cm. No further imaging follow-up is required. Critical results were called by Dr. Maricarmen Pimentel MD to Dr. Khanh Zaragoza On 5/7/2022 at 00:25. MRI BRAIN WO CONTRAST    Result Date: 5/7/2022  EXAMINATION: MRI OF THE BRAIN WITHOUT CONTRAST  5/7/2022 4:54 pm TECHNIQUE: Multiplanar multisequence MRI of the brain was performed without the administration of intravenous contrast. COMPARISON: Same-day CT HISTORY: ORDERING SYSTEM PROVIDED HISTORY: L M1 occlusion TECHNOLOGIST PROVIDED HISTORY: L M1 occlusion Reason for Exam: L M1 occlusion FINDINGS: Examination is degraded by motion. INTRACRANIAL STRUCTURES/VENTRICLES: There is diffusion restriction in the left basal ganglia and anterior temporal lobe. There are small foci of diffusion restriction in the right frontal cortex. No mass effect or midline shift. No evidence of an acute intracranial hemorrhage. The ventricles and sulci are normal in size and configuration. The sellar/suprasellar regions appear unremarkable. The normal signal voids within the major intracranial vessels appear maintained.  There are scattered periventricular and deep subcortical white matter T2/FLAIR hyperintensities, consistent with microangiopathic change. There is mild parenchymal volume loss. ORBITS: The visualized portion of the orbits demonstrate no acute abnormality. SINUSES: Scattered mucosal thickening in the paranasal sinuses. Right mastoid tip effusion. BONES/SOFT TISSUES: The bone marrow signal intensity appears normal. The soft tissues demonstrate no acute abnormality. 1. Acute infarction in the left anterior temporal lobe and left basal ganglia, consistent with MCA occlusion. 2. Punctate foci of acute infarction in the right frontal cortex. 3. Mild microangiopathic change.        Assessment & Differential Dx:      Primary Problem  Cerebrovascular accident (CVA) [facility])    Active Hospital Problems    Diagnosis Date Noted    Intracranial bleed ([facility] [address] [I62.9]      Priority: Medium    Right sided weakness [R53.1]      Priority: Medium    CHF (congestive heart failure) ([address] [I50.9] 05/08/2022     Priority: Medium    NSTEMI (non-ST elevated myocardial infarction) ([facility] [address] [I21.4] 05/08/2022     Priority: Medium    Altered mental status [R41.82]      Priority: Medium    Cerebrovascular accident (CVA) ([address] [I63.9] 05/07/2022     Priority: Medium    Acute ischemic stroke ([address] [I63.9] 05/07/2022     Priority: Medium       Case of 27-year-old male with no significant PMH not on any AC, presented via EMS after acute onset right sided weakness and speech difficulty, evaluated by mobile stroke unit, noted to be aphasic dysarthric, right arm and leg weakness, NIH: 14, CT head: Negative for acute bleed, patient given tPA  -CTA head and neck: Left M2 1 occlusion, taken for mechanical thrombectomy for M1 occlusion, TICI 3 overnight patient became confused agitated  -Repeat CT head: On 5/9 evolving left temporal lobe infarct anteriorly with stable superimposed focus of hemorrhage    Impression:  -  Left M1 complete occlusion status post mechanical thrombectomy, TICI 3  -  CT head evolving left anterior left temporal lobe infarction with a small hemorrhage  -  NSTEMI: Troponin 770, continue trending troponin for 6 hours    Plan:     -  Aspirin 81 nad plavix 75mg   -  Lipitor 80  -  Patient is okay for heparin due to NSTEMI  -  Repeat CT head neck is stable patient.   On low-dose heparin drip without bolus  -  Cardiology is okay for cardiac cath  -  Goal SBP between 130 and 170  -  Ejection fraction of 13%  -  PM&R: Patient will benefit from acute rehab once medically stable per primary service  - any changes to pt mention after procedure please order stat CT and notify neurology team for any neurologic changes  - we are following       Raghu Noonan MD, MD, 5/11/2022 9:15 AM

## 2022-05-11 NOTE — PROGRESS NOTES
Physical Medicine & Rehabilitation  Progress Note    5/11/2022 9:28 AM     CC: Ambulatory and ADL dysfunction due to left MCA CVA status post tPA and mechanical thrombectomy with hemorrhagic conversion, NSTEMI, systolic heart failure ejection fraction 15    80year-old male with history of psoriasis admitted with speech changes and weakness have left MCA territory perfusion mismatch and complete occlusion left M1 MCA underwent mechanical thrombectomy Mickey CT showed focal hemorrhagic conversion hospital course complicated by agitation NSTEMI echo showed ejection fraction 13%, cardiology cath-recommendations medical therapy, internal medicine questioning AICD    Subjective:   Currently in restraints, notes lack of sleep, disoriented. Notes nursing notes was agitated and kicking at nurses. ROS:  Denies fevers, chills, sweats. No chest pain, palpitations, lightheadedness. Denies coughing, wheezing or shortness of breath. Denies abdominal pain, nausea, diarrhea or constipation. No new areas of joint pain. Denies new areas of numbness or weakness. Denies new anxiety or depression issues. No new skin problems. Rehabilitation:   PT:   5/9   Restrictions/Precautions: Fall Risk,General Precautions  Other position/activity restrictions: SBP goal less than 160. CVA, s/p tPA, s/p thrombectomy. Transfers  Sit to Stand: Minimal Assistance  Stand to sit: Minimal Assistance  Bed to Chair: Unable to assess  Comment: Transfers performed 2x this date. Pt impulsive with transfers. Pt reaching for objects on the ground frequently, attempting to pull at cords. Ambulation  Surface: level tile  Device: Rolling Walker  Assistance: Minimal assistance  Quality of Gait: unsteady, increased trunk sway. Gait Deviations: Staggers,Decreased step length  Distance: 5 feet  Comments: Ambulation distance limited secondary to monitor frequently reading Vtach, high PVC count.   More Ambulation?: No          OT:  ADL  Feeding: Stand by assistance,Setup,Increased time to complete,Verbal cueing  Grooming: Contact guard assistance,Setup,Verbal cueing,Increased time to complete  UE Bathing: Minimal assistance,Setup,Verbal cueing,Increased time to complete  LE Bathing: Moderate assistance,Setup,Increased time to complete,Verbal cueing  UE Dressing: Minimal assistance,Increased time to complete,Verbal cueing,Setup  LE Dressing: Verbal cueing,Setup,Increased time to complete,Maximum assistance  Toileting: Moderate assistance,Increased time to complete,Setup  Additional Comments: Pt with some confusion this date, pt dribbling urine during func mob around unit with OT/PT, pt had significant difficulty doffing/donning L sock this date d/t flexibility concerns despite normally (I) at baseline, pt feeding self apple crisp/pudding with RUE and spoon without spilling yet close to making a mess                      Bed mobility  Supine to Sit: Moderate assistance  Sit to Supine:  (pt retired up to a chair at the conclusion of today's session.)  Scooting: Moderate assistance  Bed Mobility Comments: HOB elevated ~30 degrees, significantly increased time/effort to perform. Pt with difficulty following simple one-step commands this date. ST:      Auditory Comprehension:   1-step commands: 6/6 independently, increased processing time noted. 2-step commands: 0/2 increased to 2/2 w/ mod verbal cues and visual models      Basic Y/N questions: 3/6 increased to 6/6 w/ mod verbal cues, increased processing time noted.       Verbal Expression:   Automatic Language Tasks, Rhymes: 100% independently      Confrontation Namin/8 increased to 8/8 w/ mod-max semantic and phonemic cues      Orientation:  Pt oriented to name, age, and date and month of birth (oriented to birth year w/ mod verbal cues), and current month. Pt disoriented to year and place despite max verbal cues.      Recall: Pt able to recall 3/5 of children's names, unable to recall # of grandchildren or events of AM.      Recall w/ Distractions, 1 functional unit (year): 0/1 x1 increased to  w/ max verbal and visual cues. Encouraged pt to utilize whiteboard in room to ID year (however, pt did not have glasses and per wife likely unable to see whiteboard from bed). 1/1 x1 independently      Other: Unable to complete diet tolerance monitoring as pt NPO for possible procedure.  will       Objective:  BP (!) 146/78   Pulse 74   Temp 98.1 °F (36.7 °C) (Oral)   Resp 14   Ht 5' 9\" (1.753 m)   Wt 151 lb 0.2 oz (68.5 kg)   SpO2 100%   BMI 22.30 kg/m²  I Body mass index is 22.3 kg/m². I   Wt Readings from Last 1 Encounters:   22 151 lb 0.2 oz (68.5 kg)      Temp (24hrs), Av °F (36.7 °C), Min:97.4 °F (36.3 °C), Max:98.3 °F (36.8 °C)         GEN:, no acute distress  HEENT: Normocephalic atraumatic, EOMI, mucous membranes pink and moist  CV: RRR, no murmurs, rubs or gallops  PULM: Respirations WNL and unlabored  ABD: soft, NT, ND, +BS and equal  NEURO: Oriented only to self today knew he was at the hospital, did not know year or president, did follow two-step commands and names objects  MSK: Moves all extremities at least antigravity  EXTREMITIES: No calf tenderness to palpation bilaterally.  No edema BLEs  SKIN: warm dry and intact with good turgor  PSYCH: Improved interaction, pleasant        Medications   Scheduled Meds:   sodium chloride flush  5-40 mL IntraVENous 2 times per day    clopidogrel  75 mg Oral Daily    metoprolol tartrate  25 mg Oral BID    aspirin  81 mg Oral Daily    lisinopril  5 mg Oral Daily    tamsulosin  0.4 mg Oral Daily    atorvastatin  80 mg Oral Nightly     Continuous Infusions:   sodium chloride       PRN Meds:.sodium chloride flush, sodium chloride, acetaminophen, ondansetron **OR** ondansetron, polyethylene glycol, labetalol, acetaminophen, hydrALAZINE     Diagnostics:     CBC:   Recent Labs     22  1804 05/10/22  0613 22  0705   WBC 7.6 converison  2. Therapy: Has PT/OT/SLP needs -has made improvements today  3. Medical Necessity: As above  4. Support: Lives with spouse  5. Rehab Recommendation:   sitter and one-on-one, -  - ejection fraction 13% (questionable baseline prior), internal medicine questioning AICD-noted referral to [facility]    -CT head pending today    6. DVT Prophylaxis:   Off heparin drip, questionable DVT prophylaxis-subcu heparin if no contraindications     Discussed with  and nursing     It was my pleasure to evaluate Cammy Shah today. Please call with questions.         Sergei Lux MD       This note is created with the assistance of a speech recognition program.  While intending to generate a document that actually reflects the content of the visit, the document can still have some errors including those of syntax and sound a like substitutions which may escape proof reading.   In such instances, actual meaning can be extrapolated by contextual diversion

## 2022-05-11 NOTE — PLAN OF CARE
Problem: Discharge Planning  Goal: Discharge to home or other facility with appropriate resources  Outcome: Progressing     Problem: Chronic Conditions and Co-morbidities  Goal: Patient's chronic conditions and co-morbidity symptoms are monitored and maintained or improved  Outcome: Progressing     Problem: Safety - Adult  Goal: Free from fall injury  Outcome: Progressing     Problem: ABCDS Injury Assessment  Goal: Absence of physical injury  Outcome: Progressing     Problem: Skin/Tissue Integrity  Goal: Absence of new skin breakdown  Description: 1. Monitor for areas of redness and/or skin breakdown  2. Assess vascular access sites hourly  3. Every 4-6 hours minimum:  Change oxygen saturation probe site  4. Every 4-6 hours:  If on nasal continuous positive airway pressure, respiratory therapy assess nares and determine need for appliance change or resting period.   Outcome: Progressing

## 2022-05-11 NOTE — PROGRESS NOTES
Endovascular Neurosurgery Progress Note    SUBJECTIVE:   No reported events overnight. This am pt awake. He has difficulty making his needs known. Review of Systems:  CONSTITUTIONAL:  negative for fevers, chills, fatigue and malaise    EYES:  negative for double vision, blurred vision and photophobia     HEENT:  negative for tinnitus, epistaxis and sore throat    RESPIRATORY:  negative for cough, shortness of breath, wheezing    CARDIOVASCULAR:  negative for chest pain, palpitations, syncope, edema    GASTROINTESTINAL:  negative for nausea, vomiting    GENITOURINARY:  negative for incontinence    MUSCULOSKELETAL:  negative for neck or back pain    NEUROLOGICAL:  Negative for weakness and tingling  negative for headaches and dizziness    PSYCHIATRIC:  negative for anxiety      Review of systems otherwise negative. OBJECTIVE:     Vitals:    22 1600   BP: 111/64   Pulse: 76   Resp: 17   Temp: 98.2 °F (36.8 °C)   SpO2: 98%        General:  Gen: normal habitus, NAD  HEENT: NCAT, mucosa moist  Cvs: RRR, S1 S2 normal  Resp: symmetric unlabored breathing  Abd: s/nd/nt  Ext: no edema  Skin: no lesions seen, warm and dry    Neuro:  Gen: awake, oriented to self and month, not to place. Lang/speech: Mild aphasia, naming impaired, follows commands, severe dysarthria. Intermittent commands  CN: PERRL, EOMI, VFF, V1-3 intact, face symmetric, hearing intact  Motor: at least 3/5 all 4 ext. Sense: LT intact all 4 ext. Coord: impaired on right UE and LE  DTR: deferred  Gait: deferred    NIH Stroke Scale:   1a  Level of consciousness: 0   1b. LOC questions:  0   1c. LOC commands: 1 - performs one task correctly   2. Best Gaze: 0 - normal   3. Visual: 0 - no visual loss   4. Facial Palsy: 0 - normal symmetric movement   5a. Motor left arm: 0   5b. Motor right arm: 0   6a. Motor left le   6b  Motor right le   7. Limb Ataxia: 0 - absent   8. Sensory: 0   9. Best Language:  1   10. Dysarthria: 2   11. Extinction and Inattention: 0         Total:   4     MRS: 5    LABS:   Reviewed. Lab Results   Component Value Date    HGB 11.8 (L) 05/11/2022    WBC 7.3 05/11/2022    PLT See Reflexed IPF Result 05/11/2022     05/11/2022    BUN 13 05/11/2022    CREATININE 0.71 05/11/2022    MG 2.0 05/09/2022    APTT 27.1 05/10/2022    INR 1.1 05/07/2022      Lab Results   Component Value Date    COVID19 Not Detected 05/07/2022       RADIOLOGY:   Images were personally reviewed including:   CT head wo con 5/11/2022  Left temporal lobe infarct anteriorly with similar superimposed areas of   hemorrhage. MRI Brain 05/08/2022:   1. Acute infarction in the left anterior temporal lobe and left basal   ganglia, consistent with MCA occlusion. 2. Punctate foci of acute infarction in the right frontal cortex. 3. Mild microangiopathic change. IR 5/7/2022:   1. Acute Left MCA proximal M-1 occlusion TICI score 0   2. The above lesion was treated with 8 fr short sheath, flow gate bgc, prowler 21 microcatheter, embo trap 5 mm x 37 mm stent retriever first pass. Final reperfusion score TICI 03    TTE 5/6/2022: EF 10%    ASSESSMENT:   81 y/o M with pmh significant for LEXII presented 5/7/2022 with left M1 acute occlusion s/p tPA and MT TICI 03, first pass. 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 Workup shows HF EF 10%    Pt has residual mild R candace weakness, mild exp aphasia and mild dysarthria. Waxing and waning mentation. MRI 5/9/2022 shows L MCA stroke and punctate hemo conversion. Cardiac cath 5/10/2022 showed complete occlusion all 3 cardiac arteries with collateralization. CT head 5/11/2022 stable. PLAN:   --c/w ASA 81 and plavix 75  --continue lipitor 80  ---140  --stable from endovascular perspective for discharge. --F/up with Dr. Michael Henderson in 2 weeks after discharge and f/up with Dr. José Miguel Bautista in 3 months     Case discussed with Dr. José Miguel Bautista attending.     Yanique Miranda MD  Stroke, Neurocritical Care & [facility]  [address]  Electronically signed 5/11/2022 at 7:21 PM

## 2022-05-11 NOTE — PROGRESS NOTES
[facility]  Progress Note                   Date:   5/11/2022  Patient name: Hien Cronin  Date of admission:  5/6/2022 11:29 PM  MRN:   [id number]  YOB: 1934  PCP: Hardik Christy MD    Reason for Admission: Acute ischemic stroke [facility]) [I63.9]  Right sided weakness [R53.1]  Altered mental status, unspecified altered mental status type [R41.82]  Cerebrovascular accident (CVA), unspecified mechanism ([address] [I63.9]    Subjective:       Clinical Changes /Abnormalities: Seen & examined in room, sleeping. Arousable but falls right back to sleep. Sitter at side. No family present. No acute CV issues/cocnern overnight. Labs, vitals, & tele reviewed. Review of Systems    Medications:   Scheduled Meds:   sodium chloride flush  5-40 mL IntraVENous 2 times per day    clopidogrel  75 mg Oral Daily    metoprolol tartrate  25 mg Oral BID    aspirin  81 mg Oral Daily    lisinopril  5 mg Oral Daily    tamsulosin  0.4 mg Oral Daily    atorvastatin  80 mg Oral Nightly     Continuous Infusions:   sodium chloride       CBC:   Recent Labs     05/09/22  1804 05/10/22  0613 05/11/22  0705   WBC 7.6 8.9 7.3   HGB 11.1* 11.2* 11.8*   PLT See Reflexed IPF Result See Reflexed IPF Result See Reflexed IPF Result     BMP:    Recent Labs     05/09/22  0348 05/10/22  0613 05/11/22  0705    136 140   K 4.0 3.9 3.5*    104 106   CO2 21 17* 20   BUN 12 17 13   CREATININE 0.84 0.79 0.71   GLUCOSE 134* 127* 116*     Hepatic:No results for input(s): AST, ALT, ALB, BILITOT, ALKPHOS in the last 72 hours. Troponin:   Recent Labs     05/09/22  1804 05/09/22  2336 05/10/22  0613   TROPHS 736* 789* 679*     BNP: No results for input(s): BNP in the last 72 hours. Lipids:   No results for input(s): CHOL, HDL in the last 72 hours. Invalid input(s): LDLCALCU  INR:   No results for input(s): INR in the last 72 hours.   ECHO 5/9/2022      Summary  Dilated left ventricle with severely reduced systolic function. Calculated ejection fraction is 13 %  Moderate mitral regurgitation. Bubble study was negative for shunt.     Signature  ----------------------------------------------------------------------------   Electronically signed by Alexey Chi(Sonographer) on 05/07/2022 03:59   PM  ----------------------------------------------------------------------------     ----------------------------------------------------------------------------   Electronically signed by Kashmir Carrington(Interpreting physician) on   05/08/2022 10:56 AM  ----------------------------------------------------------------------------    Cardiac Cath 5/10/2022  Findings:     LMCA: has moderate calcification with distal 30% stenosis. LAD: has heavy calcification with ostial 60% stenosis, proximal 80% stenosis   mid 90% stenosis and distal 100% occlusion. The D1 has ostial 90% stenosis   and proximal 90% stenosis. The D2 has ostial 80% stenosis. LCx: has moderate calcification with proximal 100% occlusion. The Om1 has   high origin with ostial 90% stenosis. The OM2 and Om3 are seen via left to   left collaterals. RCA: has mid 100% occlusion with left to right collaterals. The RV marginal   branch has ostial 70% stenosis.      Coronary Tree        Dominance: Right      The LV gram was performed in the ROSADO 30 position. LVEF: 10-15%     Estimated Blood Loss:            [x]? ? Minimal 10-25 cc   []? ? Minimal < 25 cc      []? ? Moderate 25-50 cc         []?? >50 cc      Conclusions        Procedure Summary        Severe multivessel coronary artery disease.    Severe cardiomyopathy.        Recommendations        Medical therapy as needed.    Risk factor modification.    Routine Post Diagnostic Cath orders.    Discussed with wife and son-in law in detail. Recommend medical therapy.    Add plavix 75mg po qday if ok with neurology.    Palliative consult. Address code status.      Objective:   Vitals: BP (!) 146/78   Pulse 74   Temp 98.1 °F (36.7 °C) (Oral)   Resp 14   Ht 5' 9\" (1.753 m)   Wt 151 lb 0.2 oz (68.5 kg)   SpO2 100%   BMI 22.30 kg/m²   General appearance: alert and cooperative with exam   HEENT: Head: Normocephalic, no lesions, without obvious abnormality. Neck:no JVD, trachea midline, no adenopathy  Lungs: Clear to auscultation, no distress on RA  Heart: Regular rate and rhythm, s1/s2 auscultated, no murmurs, SR 76  Abdomen: soft, non-tender, bowel sounds active  Extremities: no edema  Neurologic: not done        Assessment / Acute Cardiac Problems:   1. Acute CVA s/p tPA and mechanical thrombectomy of left MCA thrombus  2 elevated troponin likely NSTEMI type I.  3.  Severe MVD - medical management  4. Cardiomyopathy with  ejection fraction 13%/ICMP      Patient Active Problem List:     Cerebrovascular accident (CVA) ([facility]     Acute ischemic stroke ([facility]     Altered mental status     CHF (congestive heart failure) ([facility]     NSTEMI (non-ST elevated myocardial infarction) ([facility]      Plan of Treatment:   1. Stable without distress. Cath as above. Continue medical management with PO ASA, Plavix, statin, BB, & ACE. No plans for intervention or AICD. 2. Keep K>4, Mg >2- KCL given today  3. Clinically no volume overload. Continue BB & ACE. Conservative treatments. 4. No objection to discharge from CV standpoint once arrangements made. F/U in clinic in 2 weeks after discharge.      Electronically signed by SARA Abel CNP on 5/11/2022 at 10:23 AM  [address]  [phone]

## 2022-05-11 NOTE — FLOWSHEET NOTE
SPIRITUAL CARE DEPARTMENT - [facility]  PROGRESS NOTE    Shift date: 5/11/2022  Shift day: Wednesday   Shift # 1    Room # 3444/8014-41   Name: Stephen Connors            Age: 80 y.o. Gender: male          Worship: [employer]   Place of Anglican: [address] [place] [address]    Referral: Routine Visit    Admit Date & Time: 5/6/2022 11:29 PM    PATIENT/EVENT DESCRIPTION:  Stephen Connors is a 80 y.o. male   (Description of condition/event). SPIRITUAL ASSESSMENT/INTERVENTION:  Assessment: Patient, wife and daughter present. Pt appeared to be confused at times but engaged in conversation. Daughter stated that he is the  at his [employer]. Wife spoke about the difficulty of coming here everyday from [place]. Intervention:  provided a supportive presence and words of affirmation. Outcome: Pt and family thanked  for visit. SPIRITUAL CARE FOLLOW-UP PLAN:  Chaplains will remain available to offer spiritual and emotional support as needed. Electronically signed by Joyce Yip, on 5/11/2022 at 3:15 PM.  [facility]  [phone]       05/11/22 1513   Encounter Summary   Service Provided For: Patient and family together   Referral/Consult From:   (Palliative list)   Support System Spouse; Children   Last Encounter  05/11/22   Complexity of Encounter Low   Begin Time 1435   End Time  1445   Total Time Calculated 10 min   Spiritual/Emotional needs   Type Spiritual Support   Assessment/Intervention/Outcome   Assessment Calm;Coping   Intervention Active listening;Explored/Affirmed feelings, thoughts, concerns;Prayer (assurance of)/[place]   Outcome Engaged in conversation;Expressed feelings, needs, and concerns;Expressed Gratitude;Receptive

## 2022-05-12 PROBLEM — I48.91 NEW ONSET ATRIAL FIBRILLATION (HCC): Status: ACTIVE | Noted: 2022-05-12

## 2022-05-12 PROBLEM — D72.819 LEUKOPENIA: Status: ACTIVE | Noted: 2022-05-12

## 2022-05-12 PROBLEM — I21.3 STEMI (ST ELEVATION MYOCARDIAL INFARCTION) (HCC): Status: ACTIVE | Noted: 2022-05-08

## 2022-05-12 PROBLEM — D70.9 NEUTROPENIA (HCC): Status: ACTIVE | Noted: 2022-05-12

## 2022-05-12 LAB
ABSOLUTE EOS #: 0 K/UL (ref 0–0.4)
ABSOLUTE EOS #: 0.08 K/UL (ref 0–0.4)
ABSOLUTE IMMATURE GRANULOCYTE: 0 K/UL (ref 0–0.3)
ABSOLUTE IMMATURE GRANULOCYTE: 0 K/UL (ref 0–0.3)
ABSOLUTE LYMPH #: 1.23 K/UL (ref 1–4.8)
ABSOLUTE LYMPH #: 1.42 K/UL (ref 1–4.8)
ABSOLUTE MONO #: 0.09 K/UL (ref 0.1–0.8)
ABSOLUTE MONO #: 1.15 K/UL (ref 0.1–0.8)
ABSOLUTE RETIC #: 0.12 M/UL (ref 0.03–0.08)
ADENOVIRUS PCR: NOT DETECTED
AMMONIA: 26 UMOL/L (ref 16–60)
ANION GAP SERPL CALCULATED.3IONS-SCNC: 15 MMOL/L (ref 9–17)
BASOPHILS # BLD: 0 % (ref 0–2)
BASOPHILS # BLD: 0 % (ref 0–2)
BASOPHILS ABSOLUTE: 0 K/UL (ref 0–0.2)
BASOPHILS ABSOLUTE: 0 K/UL (ref 0–0.2)
BORDETELLA PARAPERTUSSIS: NOT DETECTED
BORDETELLA PERTUSSIS PCR: NOT DETECTED
BUN BLDV-MCNC: 20 MG/DL (ref 8–23)
CALCIUM SERPL-MCNC: 8.5 MG/DL (ref 8.6–10.4)
CHLAMYDIA PNEUMONIAE BY PCR: NOT DETECTED
CHLORIDE BLD-SCNC: 107 MMOL/L (ref 98–107)
CO2: 18 MMOL/L (ref 20–31)
CORONAVIRUS 229E PCR: NOT DETECTED
CORONAVIRUS HKU1 PCR: NOT DETECTED
CORONAVIRUS NL63 PCR: NOT DETECTED
CORONAVIRUS OC43 PCR: NOT DETECTED
CREAT SERPL-MCNC: 0.85 MG/DL (ref 0.7–1.2)
EKG ATRIAL RATE: 110 BPM
EKG ATRIAL RATE: 150 BPM
EKG ATRIAL RATE: 174 BPM
EKG ATRIAL RATE: 234 BPM
EKG Q-T INTERVAL: 306 MS
EKG Q-T INTERVAL: 352 MS
EKG Q-T INTERVAL: 360 MS
EKG Q-T INTERVAL: 386 MS
EKG QRS DURATION: 112 MS
EKG QRS DURATION: 116 MS
EKG QRS DURATION: 116 MS
EKG QRS DURATION: 120 MS
EKG QTC CALCULATION (BAZETT): 505 MS
EKG QTC CALCULATION (BAZETT): 522 MS
EKG QTC CALCULATION (BAZETT): 531 MS
EKG QTC CALCULATION (BAZETT): 531 MS
EKG R AXIS: -3 DEGREES
EKG R AXIS: 11 DEGREES
EKG R AXIS: 17 DEGREES
EKG R AXIS: 75 DEGREES
EKG T AXIS: 106 DEGREES
EKG T AXIS: 118 DEGREES
EKG T AXIS: 125 DEGREES
EKG T AXIS: 129 DEGREES
EKG VENTRICULAR RATE: 110 BPM
EKG VENTRICULAR RATE: 131 BPM
EKG VENTRICULAR RATE: 137 BPM
EKG VENTRICULAR RATE: 164 BPM
EOSINOPHILS RELATIVE PERCENT: 0 % (ref 1–4)
EOSINOPHILS RELATIVE PERCENT: 1 % (ref 1–4)
GFR AFRICAN AMERICAN: >60 ML/MIN
GFR NON-AFRICAN AMERICAN: >60 ML/MIN
GFR SERPL CREATININE-BSD FRML MDRD: ABNORMAL ML/MIN/{1.73_M2}
GLUCOSE BLD-MCNC: 125 MG/DL (ref 75–110)
GLUCOSE BLD-MCNC: 135 MG/DL (ref 70–99)
HCT VFR BLD CALC: 31.6 % (ref 40.7–50.3)
HCT VFR BLD CALC: 32.9 % (ref 40.7–50.3)
HEMOGLOBIN: 11 G/DL (ref 13–17)
HEMOGLOBIN: 11.4 G/DL (ref 13–17)
HUMAN METAPNEUMOVIRUS PCR: NOT DETECTED
IMMATURE GRANULOCYTES: 0 %
IMMATURE GRANULOCYTES: 0 %
IMMATURE RETIC FRACT: 18 % (ref 2.7–18.3)
INFLUENZA A BY PCR: NOT DETECTED
INFLUENZA B BY PCR: NOT DETECTED
LYMPHOCYTES # BLD: 15 % (ref 24–44)
LYMPHOCYTES # BLD: 79 % (ref 24–44)
MAGNESIUM: 3.3 MG/DL (ref 1.6–2.6)
MCH RBC QN AUTO: 32.2 PG (ref 25.2–33.5)
MCH RBC QN AUTO: 32.3 PG (ref 25.2–33.5)
MCHC RBC AUTO-ENTMCNC: 34.7 G/DL (ref 28.4–34.8)
MCHC RBC AUTO-ENTMCNC: 34.8 G/DL (ref 28.4–34.8)
MCV RBC AUTO: 92.7 FL (ref 82.6–102.9)
MCV RBC AUTO: 92.9 FL (ref 82.6–102.9)
MONOCYTES # BLD: 14 % (ref 1–7)
MONOCYTES # BLD: 5 % (ref 1–7)
MORPHOLOGY: NORMAL
MORPHOLOGY: NORMAL
MYCOPLASMA PNEUMONIAE PCR: NOT DETECTED
NRBC AUTOMATED: 0 PER 100 WBC
NRBC AUTOMATED: 0 PER 100 WBC
PARAINFLUENZA 1 PCR: NOT DETECTED
PARAINFLUENZA 2 PCR: NOT DETECTED
PARAINFLUENZA 3 PCR: NOT DETECTED
PARAINFLUENZA 4 PCR: NOT DETECTED
PARTIAL THROMBOPLASTIN TIME: 24.4 SEC (ref 20.5–30.5)
PARTIAL THROMBOPLASTIN TIME: 30.7 SEC (ref 20.5–30.5)
PARTIAL THROMBOPLASTIN TIME: 36.7 SEC (ref 20.5–30.5)
PDW BLD-RTO: 12.7 % (ref 11.8–14.4)
PDW BLD-RTO: 12.7 % (ref 11.8–14.4)
PLATELET # BLD: 136 K/UL (ref 138–453)
PLATELET # BLD: ABNORMAL K/UL (ref 138–453)
PLATELET, FLUORESCENCE: 107 K/UL (ref 138–453)
PLATELET, IMMATURE FRACTION: 3.8 % (ref 1.1–10.3)
PMV BLD AUTO: 11.9 FL (ref 8.1–13.5)
POTASSIUM SERPL-SCNC: 3.9 MMOL/L (ref 3.7–5.3)
RBC # BLD: 3.41 M/UL (ref 4.21–5.77)
RBC # BLD: 3.54 M/UL (ref 4.21–5.77)
RESP SYNCYTIAL VIRUS PCR: NOT DETECTED
RETIC %: 3.3 % (ref 0.5–1.9)
RETIC HEMOGLOBIN: 37.1 PG (ref 28.2–35.7)
RHINO/ENTEROVIRUS PCR: NOT DETECTED
SARS-COV-2, PCR: NOT DETECTED
SEG NEUTROPHILS: 16 % (ref 36–66)
SEG NEUTROPHILS: 70 % (ref 36–66)
SEGMENTED NEUTROPHILS ABSOLUTE COUNT: 0.29 K/UL (ref 1.8–7.7)
SEGMENTED NEUTROPHILS ABSOLUTE COUNT: 5.74 K/UL (ref 1.8–7.7)
SODIUM BLD-SCNC: 140 MMOL/L (ref 135–144)
SPECIMEN DESCRIPTION: NORMAL
TROPONIN, HIGH SENSITIVITY: 482 NG/L (ref 0–22)
TROPONIN, HIGH SENSITIVITY: 613 NG/L (ref 0–22)
WBC # BLD: 1.8 K/UL (ref 3.5–11.3)
WBC # BLD: 8.2 K/UL (ref 3.5–11.3)

## 2022-05-12 PROCEDURE — 85045 AUTOMATED RETICULOCYTE COUNT: CPT

## 2022-05-12 PROCEDURE — 2580000003 HC RX 258: Performed by: STUDENT IN AN ORGANIZED HEALTH CARE EDUCATION/TRAINING PROGRAM

## 2022-05-12 PROCEDURE — 99498 ADVNCD CARE PLAN ADDL 30 MIN: CPT | Performed by: FAMILY MEDICINE

## 2022-05-12 PROCEDURE — 99497 ADVNCD CARE PLAN 30 MIN: CPT | Performed by: FAMILY MEDICINE

## 2022-05-12 PROCEDURE — 6360000002 HC RX W HCPCS: Performed by: STUDENT IN AN ORGANIZED HEALTH CARE EDUCATION/TRAINING PROGRAM

## 2022-05-12 PROCEDURE — 93010 ELECTROCARDIOGRAM REPORT: CPT | Performed by: INTERNAL MEDICINE

## 2022-05-12 PROCEDURE — 93005 ELECTROCARDIOGRAM TRACING: CPT | Performed by: STUDENT IN AN ORGANIZED HEALTH CARE EDUCATION/TRAINING PROGRAM

## 2022-05-12 PROCEDURE — 2500000003 HC RX 250 WO HCPCS

## 2022-05-12 PROCEDURE — 2500000003 HC RX 250 WO HCPCS: Performed by: STUDENT IN AN ORGANIZED HEALTH CARE EDUCATION/TRAINING PROGRAM

## 2022-05-12 PROCEDURE — 6370000000 HC RX 637 (ALT 250 FOR IP): Performed by: NURSE PRACTITIONER

## 2022-05-12 PROCEDURE — 99232 SBSQ HOSP IP/OBS MODERATE 35: CPT | Performed by: PSYCHIATRY & NEUROLOGY

## 2022-05-12 PROCEDURE — 85055 RETICULATED PLATELET ASSAY: CPT

## 2022-05-12 PROCEDURE — 6370000000 HC RX 637 (ALT 250 FOR IP)

## 2022-05-12 PROCEDURE — 36415 COLL VENOUS BLD VENIPUNCTURE: CPT

## 2022-05-12 PROCEDURE — 84484 ASSAY OF TROPONIN QUANT: CPT

## 2022-05-12 PROCEDURE — 99233 SBSQ HOSP IP/OBS HIGH 50: CPT | Performed by: INTERNAL MEDICINE

## 2022-05-12 PROCEDURE — 83735 ASSAY OF MAGNESIUM: CPT

## 2022-05-12 PROCEDURE — 0202U NFCT DS 22 TRGT SARS-COV-2: CPT

## 2022-05-12 PROCEDURE — 80048 BASIC METABOLIC PNL TOTAL CA: CPT

## 2022-05-12 PROCEDURE — 82140 ASSAY OF AMMONIA: CPT

## 2022-05-12 PROCEDURE — 99233 SBSQ HOSP IP/OBS HIGH 50: CPT | Performed by: PSYCHIATRY & NEUROLOGY

## 2022-05-12 PROCEDURE — 6370000000 HC RX 637 (ALT 250 FOR IP): Performed by: SURGERY

## 2022-05-12 PROCEDURE — 85025 COMPLETE CBC W/AUTO DIFF WBC: CPT

## 2022-05-12 PROCEDURE — 6370000000 HC RX 637 (ALT 250 FOR IP): Performed by: PSYCHIATRY & NEUROLOGY

## 2022-05-12 PROCEDURE — 6370000000 HC RX 637 (ALT 250 FOR IP): Performed by: STUDENT IN AN ORGANIZED HEALTH CARE EDUCATION/TRAINING PROGRAM

## 2022-05-12 PROCEDURE — 2000000000 HC ICU R&B

## 2022-05-12 PROCEDURE — 93005 ELECTROCARDIOGRAM TRACING: CPT

## 2022-05-12 PROCEDURE — 85730 THROMBOPLASTIN TIME PARTIAL: CPT

## 2022-05-12 PROCEDURE — 82947 ASSAY GLUCOSE BLOOD QUANT: CPT

## 2022-05-12 PROCEDURE — 99232 SBSQ HOSP IP/OBS MODERATE 35: CPT | Performed by: PHYSICAL MEDICINE & REHABILITATION

## 2022-05-12 RX ORDER — METOPROLOL TARTRATE 5 MG/5ML
2.5 INJECTION INTRAVENOUS ONCE
Status: COMPLETED | OUTPATIENT
Start: 2022-05-12 | End: 2022-05-12

## 2022-05-12 RX ORDER — DIGOXIN 0.25 MG/ML
250 INJECTION INTRAMUSCULAR; INTRAVENOUS ONCE
Status: COMPLETED | OUTPATIENT
Start: 2022-05-12 | End: 2022-05-12

## 2022-05-12 RX ORDER — METOPROLOL TARTRATE 5 MG/5ML
INJECTION INTRAVENOUS
Status: COMPLETED
Start: 2022-05-12 | End: 2022-05-12

## 2022-05-12 RX ORDER — HEPARIN SODIUM 1000 [USP'U]/ML
4000 INJECTION, SOLUTION INTRAVENOUS; SUBCUTANEOUS ONCE
Status: DISCONTINUED | OUTPATIENT
Start: 2022-05-12 | End: 2022-05-12

## 2022-05-12 RX ORDER — HEPARIN SODIUM 1000 [USP'U]/ML
4000 INJECTION, SOLUTION INTRAVENOUS; SUBCUTANEOUS PRN
Status: DISCONTINUED | OUTPATIENT
Start: 2022-05-12 | End: 2022-05-12

## 2022-05-12 RX ORDER — METOPROLOL TARTRATE 5 MG/5ML
2.5 INJECTION INTRAVENOUS ONCE
Status: DISCONTINUED | OUTPATIENT
Start: 2022-05-12 | End: 2022-05-18 | Stop reason: HOSPADM

## 2022-05-12 RX ORDER — SODIUM CHLORIDE, SODIUM LACTATE, POTASSIUM CHLORIDE, AND CALCIUM CHLORIDE .6; .31; .03; .02 G/100ML; G/100ML; G/100ML; G/100ML
250 INJECTION, SOLUTION INTRAVENOUS ONCE
Status: DISCONTINUED | OUTPATIENT
Start: 2022-05-12 | End: 2022-05-18 | Stop reason: HOSPADM

## 2022-05-12 RX ORDER — METOPROLOL TARTRATE 5 MG/5ML
5 INJECTION INTRAVENOUS ONCE
Status: COMPLETED | OUTPATIENT
Start: 2022-05-12 | End: 2022-05-12

## 2022-05-12 RX ORDER — 0.9 % SODIUM CHLORIDE 0.9 %
250 INTRAVENOUS SOLUTION INTRAVENOUS ONCE
Status: COMPLETED | OUTPATIENT
Start: 2022-05-12 | End: 2022-05-12

## 2022-05-12 RX ORDER — DIGOXIN 0.25 MG/ML
250 INJECTION INTRAMUSCULAR; INTRAVENOUS ONCE
Status: DISCONTINUED | OUTPATIENT
Start: 2022-05-12 | End: 2022-05-14

## 2022-05-12 RX ORDER — HEPARIN SODIUM 1000 [USP'U]/ML
2000 INJECTION, SOLUTION INTRAVENOUS; SUBCUTANEOUS PRN
Status: DISCONTINUED | OUTPATIENT
Start: 2022-05-12 | End: 2022-05-12

## 2022-05-12 RX ADMIN — MIDODRINE HYDROCHLORIDE 10 MG: 5 TABLET ORAL at 08:29

## 2022-05-12 RX ADMIN — MIDODRINE HYDROCHLORIDE 10 MG: 5 TABLET ORAL at 17:24

## 2022-05-12 RX ADMIN — AMIODARONE HYDROCHLORIDE 150 MG: 50 INJECTION, SOLUTION INTRAVENOUS at 18:27

## 2022-05-12 RX ADMIN — SODIUM CHLORIDE, PRESERVATIVE FREE 10 ML: 5 INJECTION INTRAVENOUS at 20:40

## 2022-05-12 RX ADMIN — AMIODARONE HYDROCHLORIDE 0.5 MG/MIN: 50 INJECTION, SOLUTION INTRAVENOUS at 13:11

## 2022-05-12 RX ADMIN — Medication 12 UNITS/KG/HR: at 07:50

## 2022-05-12 RX ADMIN — ATORVASTATIN CALCIUM 80 MG: 80 TABLET, FILM COATED ORAL at 20:38

## 2022-05-12 RX ADMIN — METOPROLOL TARTRATE 2.5 MG: 1 INJECTION, SOLUTION INTRAVENOUS at 10:27

## 2022-05-12 RX ADMIN — METOPROLOL TARTRATE 25 MG: 25 TABLET ORAL at 21:29

## 2022-05-12 RX ADMIN — DIGOXIN 250 MCG: 0.25 INJECTION INTRAMUSCULAR; INTRAVENOUS at 17:23

## 2022-05-12 RX ADMIN — CLOPIDOGREL 75 MG: 75 TABLET, FILM COATED ORAL at 08:21

## 2022-05-12 RX ADMIN — MIDODRINE HYDROCHLORIDE 10 MG: 5 TABLET ORAL at 12:06

## 2022-05-12 RX ADMIN — SODIUM CHLORIDE 250 ML: 0.9 INJECTION, SOLUTION INTRAVENOUS at 08:20

## 2022-05-12 RX ADMIN — DIGOXIN 250 MCG: 0.25 INJECTION INTRAMUSCULAR; INTRAVENOUS at 08:06

## 2022-05-12 RX ADMIN — METOPROLOL TARTRATE 5 MG: 5 INJECTION INTRAVENOUS at 07:19

## 2022-05-12 RX ADMIN — METOPROLOL TARTRATE 25 MG: 25 TABLET ORAL at 08:29

## 2022-05-12 RX ADMIN — PHENYLEPHRINE HYDROCHLORIDE 30 MCG/MIN: 10 INJECTION INTRAVENOUS at 10:34

## 2022-05-12 RX ADMIN — AMIODARONE HYDROCHLORIDE 1 MG/MIN: 150 INJECTION, SOLUTION INTRAVENOUS at 06:41

## 2022-05-12 RX ADMIN — SODIUM CHLORIDE, PRESERVATIVE FREE 10 ML: 5 INJECTION INTRAVENOUS at 08:14

## 2022-05-12 RX ADMIN — ASPIRIN 81 MG: 81 TABLET, CHEWABLE ORAL at 08:21

## 2022-05-12 RX ADMIN — METOPROLOL TARTRATE 2.5 MG: 1 INJECTION, SOLUTION INTRAVENOUS at 18:20

## 2022-05-12 RX ADMIN — AMIODARONE HYDROCHLORIDE 150 MG: 150 INJECTION, SOLUTION INTRAVENOUS at 06:19

## 2022-05-12 RX ADMIN — METOPROLOL TARTRATE 2.5 MG: 5 INJECTION INTRAVENOUS at 08:58

## 2022-05-12 RX ADMIN — PHENYLEPHRINE HYDROCHLORIDE 20 MCG/MIN: 10 INJECTION INTRAVENOUS at 18:33

## 2022-05-12 RX ADMIN — TAMSULOSIN HYDROCHLORIDE 0.4 MG: 0.4 CAPSULE ORAL at 08:21

## 2022-05-12 ASSESSMENT — ENCOUNTER SYMPTOMS: TACHYPNEA: 1

## 2022-05-12 ASSESSMENT — PAIN SCALES - GENERAL: PAINLEVEL_OUTOF10: 0

## 2022-05-12 NOTE — FLOWSHEET NOTE
SPIRITUAL CARE DEPARTMENT - [facility]  PROGRESS NOTE    Shift date: 5/12/2022  Shift day: Wednesday   Shift # 3    Room # 5712/4977-01   Name: Josh Chamorro            Age: 80 y.o. Gender: male          Yazidism:    Place of Spiritism:     Referral: Rapid Response    Admit Date & Time: 5/6/2022 11:29 PM    PATIENT/EVENT DESCRIPTION:  Josh Chamorro is a 80 y.o. male in room 144.  responded to RRT. (Description of condition/event). SPIRITUAL ASSESSMENT/INTERVENTION:  Alex Caba was ministry of presence.  prepared to contact family members. Nurse stated patient's family would be arriving in a couple of hours.  provided spiritual and emotional support. SPIRITUAL CARE FOLLOW-UP PLAN:  Chaplains are available 24/7 via [address] [place] [address]. Electronically signed by Henrietta Rogel on 5/12/2022 at [facility]  [phone]     05/12/22 4990   Encounter Summary   Encounter Overview/Reason  Crisis   Service Provided For: Patient   Referral/Consult From: Multi-disciplinary team   Support System Spouse; Children   Last Encounter  05/12/22   Complexity of Encounter Moderate   Begin Time 0550   End Time  0605   Total Time Calculated 15 min   Crisis   Type Rapid Response   Assessment/Intervention/Outcome   Assessment Anxious   Intervention Active listening;Sustaining Presence/Ministry of presence   Outcome Did not respond

## 2022-05-12 NOTE — PROGRESS NOTES
[facility]  Internal Medicine Teaching Residency Program  Inpatient Daily Progress Note  ______________________________________________________________________________    Patient: Zoe Weber  YOB: 1934   XJE:[id number]    Acct: [de-identified]     Room: [facility]-  Admit date: 5/6/2022  Today's date: 05/12/22  Number of days in the hospital: 5    SUBJECTIVE   Admitting Diagnosis: Cerebrovascular accident (CVA) ([facility]  CC: Right sided weakness and speech difficulty    - Pt examined at bedside. Chart & results reviewed. - Overnight patient had episode of SVT versus A. fib. EKG showing new ST segment elevations in leads 2 3 and aVF. Patient received 5 of Lopressor, digoxin to 50 mcg and started on amnio drip patient was restarted on heparin. Spoke with neurology okay to continue with heparin for now. Will hold Plavix from tomorrow. - This morning was paged to bedside as patient's heart rate was in the 140s. EKG obtained showing A. fib with RVR with new ST segment elevations in leads II, III and aVF. Patient denies any chest pain. Alert and oriented x4. Patient received 2.5 mg of Lopressor. Patient was hypotensive and received a total of 750 mL of LR.   Cardiology paged and plan to transfer patient to ICU.  - WBC dropped from 71.8  - Critical lab came back absolute neutrophils of 0.29  - Patient remains afebrile  - Blood pressure more stable after intervention    Plan for today:  - Plan to transfer to CVICU  -Will repeat CBC if low for peripheral smear and viral respiratory panel  - Cardiology following    ROS:  Constitutional:  negative for chills, fevers, sweats  Respiratory:  negative for cough, dyspnea on exertion, hemoptysis, shortness of breath, wheezing  Cardiovascular:  negative for chest pain, chest pressure/discomfort, lower extremity edema, palpitations  Gastrointestinal:  negative for abdominal pain, constipation, diarrhea, nausea, vomiting  Neurological:  negative for dizziness, headache    BRIEF HISTORY     Carli Tony a 80 y. o. male with no significant PMH and not presently on AC who presented via EMS after being noted to have acute onset right sided weakness and speech difficulty. Wife reports he fell asleep on the recliner at 9:30pm and woke up around 10om and couldn't get up from the chair. Family also noted right sided weakness. EMS was called and activated stroke alert. Patient was evaluated by mobile stroke unit. He was noted to have aphasia, dysarthria, and right arm and leg weakness.     LKW: 9:30PM  NIH: 14     Non contrast CT Head obtained.  Negative for any acute bleed  Was given tPa bolus dose at 2311 and infusion started [address] Course:   CTA head and neck: Showed left M1 occlusion.  Patient was taken for mechanical thrombectomy for M1 occlusion. Patient was given 600 mg aspirin x1.  Transfer to neuro ICU for further management. Pt found to have hemorrhagic conversion.  Plan for repeat CT head in 24 hours.  Cardiology planning for cardiac cath.  No heparin at this time.  We will continue with Lovenox. OBJECTIVE     Vital Signs:  BP 96/66   Pulse 127   Temp 98.3 °F (36.8 °C) (Oral)   Resp 19   Ht 5' 9\" (1.753 m)   Wt 151 lb 0.2 oz (68.5 kg)   SpO2 96%   BMI 22.30 kg/m²     Temp (24hrs), Av.4 °F (36.9 °C), Min:97.5 °F (36.4 °C), Max:99.3 °F (37.4 °C)    In: -   Out: 150 [Urine:150]    Physical Exam:  Physical Exam  Vitals and nursing note reviewed. Constitutional:       Appearance: Normal appearance. HENT:      Head: Normocephalic and atraumatic. Nose: Nose normal.      Mouth/Throat:      Mouth: Mucous membranes are moist.      Pharynx: Oropharynx is clear. Eyes:      Extraocular Movements: Extraocular movements intact. Conjunctiva/sclera: Conjunctivae normal.      Pupils: Pupils are equal, round, and reactive to light.    Cardiovascular:      Rate and Rhythm: Normal rate and regular Or  ondansetron, 4 mg, Q6H PRN  polyethylene glycol, 17 g, Daily PRN  labetalol, 10 mg, Q4H PRN  acetaminophen, 650 mg, Q4H PRN  hydrALAZINE, 10 mg, Q6H PRN        Diagnostic Labs:  CBC:   Recent Labs     05/10/22  0613 05/11/22  0705 05/12/22  0624   WBC 8.9 7.3 1.8*   RBC 3.58* 3.70* 3.41*   HGB 11.2* 11.8* 11.0*   HCT 33.5* 34.3* 31.6*   MCV 93.6 92.7 92.7   RDW 12.5 12.4 12.7   PLT See Reflexed IPF Result See Reflexed IPF Result See Reflexed IPF Result     BMP:   Recent Labs     05/10/22  0613 05/11/22  0705 05/12/22  0624    140 140   K 3.9 3.5* 3.9    106 107   CO2 17* 20 18*   BUN 17 13 20   CREATININE 0.79 0.71 0.85     BNP: No results for input(s): BNP in the last 72 hours. PT/INR: No results for input(s): PROTIME, INR in the last 72 hours. APTT:   Recent Labs     05/10/22  0613 05/10/22  1152 05/12/22 0624   APTT 40.2* 27.1 24.4     CARDIAC ENZYMES: No results for input(s): CKMB, CKMBINDEX, TROPONINI in the last 72 hours. Invalid input(s): CKTOTAL;3  FASTING LIPID PANEL:  Lab Results   Component Value Date    CHOL 133 05/07/2022    HDL 52 05/07/2022    TRIG 50 05/07/2022     LIVER PROFILE: No results for input(s): AST, ALT, ALB, BILIDIR, BILITOT, ALKPHOS in the last 72 hours. MICROBIOLOGY: No results found for: CULTURE    Imaging:    CT HEAD WO CONTRAST    Result Date: 5/11/2022  Left temporal lobe infarct anteriorly with similar superimposed areas of hemorrhage. CT HEAD WO CONTRAST    Result Date: 5/9/2022  Evolving left temporal lobe infarct anteriorly with stable superimposed focus of hemorrhage. CT HEAD WO CONTRAST    Result Date: 5/8/2022  Evolving subacute ischemic infarct of the anterior left temporal lobe. Small focus of hemorrhagic conversion has not appreciably changed. Findings were discussed with BRANDIN Oleary at 5:34 am on 5/8/2022 by Dr. Matthew Perez. CT HEAD WO CONTRAST    Result Date: 5/7/2022  1. No infarct evident within the left MCA territory.  2. No acute intracranial hemorrhage. CT HEAD WO CONTRAST    Result Date: 5/7/2022  1. No acute intracranial abnormality. 2. Left MCA territory perfusion mismatch with ratio 11.1 and mismatch volume 142 mL. 3. 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 4. 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 5. No significant stenosis or occlusion of the cervical carotid arteries. 6. Thyroid nodules measuring up to 1.3 cm. No further imaging follow-up is required. Critical results were called by Dr. Yessy Mata MD to Dr. Niranjan Wakefield On 5/7/2022 at 00:25. CT HEAD WO CONTRAST    Result Date: 5/6/2022  Motion degraded exam. No acute hemorrhage given limitation. No midline shift. Slight relative decreased attenuation of the right temporal lobe with overall suboptimal evaluation for gray-white differentiation due to motion artifact. Slight relative decreased attenuation of the left temporal lobe anteriorly. Correlate with presenting symptoms. CT BRAIN PERFUSION    Result Date: 5/7/2022  1. No acute intracranial abnormality. 2. Left MCA territory perfusion mismatch with ratio 11.1 and mismatch volume 142 mL. 3. 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 4. 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 5. No significant stenosis or occlusion of the cervical carotid arteries. 6. Thyroid nodules measuring up to 1.3 cm. No further imaging follow-up is required. Critical results were called by Dr. Yessy Mata MD to Dr. Niranjan Wakefield On 5/7/2022 at 00:25. CTA HEAD NECK W CONTRAST    Result Date: 5/7/2022  1. No acute intracranial abnormality. 2. Left MCA territory perfusion mismatch with ratio 11.1 and mismatch volume 142 mL. 3. 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 4. 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 5. No significant stenosis or occlusion of the cervical carotid arteries.  6. Thyroid nodules measuring up to 1.3 cm. No further imaging follow-up is required. Critical results were called by Dr. Rosina Peoples MD to Dr. Kartik Villalpando On 5/7/2022 at 00:25. MRI BRAIN WO CONTRAST    Result Date: 5/7/2022  1. Acute infarction in the left anterior temporal lobe and left basal ganglia, consistent with MCA occlusion. 2. Punctate foci of acute infarction in the right frontal cortex. 3. Mild microangiopathic change. ASSESSMENT & PLAN     Assessment and Plan:    Principal Problem:    Cerebrovascular accident (CVA) ([address]  Active Problems:    Acute ischemic stroke ([address]    Altered mental status    CHF (congestive heart failure) ([address]    NSTEMI (non-ST elevated myocardial infarction) ([address]    Intracranial bleed ([address]    Right sided weakness  Resolved Problems:    * No resolved hospital problems. *    Left MCA infarct with left M1 occlusion status post mechanical thrombectomy  - CT of the head shows evolving left anterior, left temporal lobe infarction with small hemorrhagic conversion.  Repeat CT scan reviewed, stable. - Continue with aspirin  - Continue with Lipitor 80 mg  - Heparin restarted > neurology aware planning to hold Plavix and aspirin from tomorrow  - Plavix 75mg   - Repeat CT head today     Leukopenia / neutropenia   - Repeat WBC  - Peripheral smear  - Respiratory viral panel    Atrial fibrillation with RVR versus SVT   - Attempted vagal maneuvers were mildly successful  - Patient received Lopressor, total of 750 mL of bolus and digoxin.   On amnio drip  - Heart rate uncontrolled  - Heparin drip restarted  - Plans for transfer to CVICU  - Cardiology following      Demand ischemia secondary to tachycardia leading to ST elevations in inferior leads  - Troponins 770 > trending down 613 this morning  - Follow-up on repeat troponin  - Patient denies any chest pain  - Cardiac cath showing MV disease   - Transferred to CVICU for further managementSystolic congestive heart failure  - Echocardiogram showing dilated left ventricle with severely reduced systolic function.  EF of 13%.  Moderate mitral regurgitation.  Negative bubble study.  - Cardiology following > plans for AICD?  - Transfer to CVICU     Moderate mitral regurgitation     Diet: Regular diet. DVT ppx : Plavix and heparin  GI ppx: Not indicated     PT/OT/SW: Consulted  Discharge Planning: PM&R consulted, plan for discharge to acute rehab, in process.              Carri Maldonado MD  Internal Medicine Resident, PGY-1  [facility]; [place], [place]   7:17 AM 5/12/2022     Please note that part of this chart was generated using voice recognition dictation software. Although every effort was made to ensure the accuracy of this automated transcription, some errors in transcription may have occurred.

## 2022-05-12 NOTE — CARE COORDINATION
TRANSITIONAL CARE PLANNING/ 2 Rehab [facility] Day: 5    Reason for Admission: Acute ischemic stroke ([facility] [address] [I63.9]  Right sided weakness [R53.1]  Altered mental status, unspecified altered mental status type [R41.82]  Cerebrovascular accident (CVA), unspecified mechanism ([facility] [address] [I63.9]     Treatment Plan of Care:   Palliative Care conference today MVD not a candidate for stent or CABG spoke with family about Code status changed to [facility] with no intub spoke about Hospice care Pt's wife [place] wants to give pt more time for pt to recover  PM&R recommendation SNF  Cardiology: afib RVR acute stemi STV    Tests/Procedures still needed:     Barriers to Discharge:     Readmission Risk              Risk of Unplanned Readmission:  12            Patient goals/Treatment Preferences/Transitional Plan:     Referrals Made: Samir Pepper and [address] [place] [address]    Follow Up needed:

## 2022-05-12 NOTE — PROGRESS NOTES
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 Doing well. More alert, oriented. However had rapid response today-elevated heart rate/V. tach, patient plan to go to cardiac ICU and possible cath by CT    ROS:  Denies fevers, chills, sweats. No chest pain, palpitations, lightheadedness. Denies coughing, wheezing or shortness of breath. Denies abdominal pain, nausea, diarrhea or constipation. No new areas of joint pain. Denies new areas of numbness or weakness. Denies new anxiety or depression issues. No new skin problems. Rehabilitation:   PT:   5/9   Restrictions/Precautions: Fall Risk,General Precautions  Other position/activity restrictions: SBP goal less than 160. CVA, s/p tPA, s/p thrombectomy. Transfers  Sit to Stand: Unable to assess  Stand to sit: Unable to assess  Bed to Chair: Unable to assess  Comment: Transfers performed 2x this date. Pt impulsive with transfers. Pt reaching for objects on the ground frequently, attempting to pull at cords. Ambulation  Surface: level tile  Device: Rolling Walker  Assistance: Minimal assistance  Quality of Gait: unsteady, increased trunk sway. Gait Deviations: Staggers,Decreased step length  Distance: 5 feet  Comments: Ambulation distance limited secondary to monitor frequently reading Vtach, high PVC count.   More Ambulation?: No          OT:  ADL  Feeding: Stand by assistance,Setup,Increased time to complete,Verbal cueing  Grooming: Contact guard assistance,Increased time to complete,Setup (washing face)  UE Bathing: Minimal assistance,Setup,Verbal cueing,Increased time to complete  LE Bathing: Moderate assistance,Setup,Increased time to complete,Verbal cueing  UE Dressing: Verbal cueing,Increased time to complete,Minimal assistance,Setup (don/doff gown)  LE Dressing: Contact guard assistance,Increased time to complete,Setup (don/doff socks)  Toileting: Moderate assistance,Increased time to complete,Setup  Additional Comments: face washing facilitated sitting EOB with CGA for balance. Pt don/doff gown seated EOB Min A for threading arms, v/c for initation and sequencing. Don/doff socks seated in chair at [facility], Education and demonstration provided on figure 4 technique,Pt utilized a modified figure 4 to doff socks, donned R sock with forward flexion and declined to use figure 4 technique, donned L sock utilizing figure 4 technique. Pt req increased time throughout session d/t decrease cognition. Pt limited throughout session d/t increased fatigue, decrease cognition, and increased impulsivity. Bed mobility  Supine to Sit: Minimal assistance  Sit to Supine: Unable to assess  Scooting: Contact guard assistance  Bed Mobility Comments: HOB elevated, req increased time and effort to complete. Min A for trunk progression during supine > sit. MOHINDER sit > supine d/t Pt retired in chair end of session. Transfers  Sit to stand: Minimal assistance  Stand to sit: Contact guard assistance  Transfer Comments: utilizing RW, verbal and tactile cues for proper hand placement                   ST:    5/9  Auditory Comprehension:   1-step commands: 6/6 independently, increased processing time noted.      2-step commands: 0/2 increased to 2/2 w/ mod verbal cues and visual models      Basic Y/N questions: 3/6 increased to 6/6 w/ mod verbal cues, increased processing time noted.       Verbal Expression:   Automatic Language Tasks, Rhymes: 100% independently      Confrontation Namin/8 increased to  w/ mod-max semantic and phonemic cues      Orientation:  Pt oriented to name, age, and date and month of birth (oriented to birth year w/ mod verbal cues), and current month. Pt disoriented to year and place despite max verbal cues. Recall: Pt able to recall 3/5 of children's names, unable to recall # of grandchildren or events of AM.      Recall w/ Distractions, 1 functional unit (year): 0/1 x1 increased to  w/ max verbal and visual cues. Encouraged pt to utilize whiteboard in room to ID year (however, pt did not have glasses and per wife likely unable to see whiteboard from bed). 1/1 x1 independently      Other: Unable to complete diet tolerance monitoring as pt NPO for possible procedure.        Objective:  /86   Pulse 141   Temp 98.2 °F (36.8 °C) (Oral)   Resp 27   Ht 5' 9\" (1.753 m)   Wt 151 lb 0.2 oz (68.5 kg)   SpO2 96%   BMI 22.30 kg/m²  I Body mass index is 22.3 kg/m². I   Wt Readings from Last 1 Encounters:   22 151 lb 0.2 oz (68.5 kg)      Temp (24hrs), Av.4 °F (36.9 °C), Min:97.5 °F (36.4 °C), Max:99.3 °F (37.4 °C)         GEN:, no acute distress  HEENT: Normocephalic atraumatic, EOMI, mucous membranes pink and moist  CV: RRR, no murmurs, rubs or gallops  PULM: Respirations WNL and unlabored  ABD: soft, NT, ND, +BS and equal  NEURO: Today he knew he was in hospital and ,, did not know  president, did follow two-step commands and names objects  MSK: Moves all extremities at least antigravity  EXTREMITIES: No calf tenderness to palpation bilaterally.  No edema BLEs  SKIN: warm dry and intact with good turgor  PSYCH: Improved interaction, pleasant        Medications   Scheduled Meds:   digoxin  250 mcg IntraVENous Once    metoprolol        metoprolol  2.5 mg IntraVENous Once    sodium chloride  250 mL IntraVENous Once    metoprolol  2.5 mg IntraVENous Once    lactated ringers bolus  250 mL IntraVENous Once    midodrine  10 mg Oral TID WC    sodium chloride flush  5-40 mL IntraVENous 2 times per day    clopidogrel  75 mg Oral Daily    metoprolol tartrate  25 mg Oral BID    aspirin  81 mg Oral Daily    lisinopril  5 mg Oral Daily    tamsulosin  0.4 mg Oral Daily    atorvastatin  80 mg Oral Nightly     Continuous Infusions:   amiodarone 1 mg/min (05/12/22 0641)    Followed by   Britney Spurling amiodarone      heparin (PORCINE) Infusion 12 Units/kg/hr (05/12/22 0750)    sodium chloride       PRN Meds:.sodium chloride flush, sodium chloride, acetaminophen, ondansetron **OR** ondansetron, polyethylene glycol, labetalol, acetaminophen, hydrALAZINE     Diagnostics:     CBC:   Recent Labs     05/10/22  0613 05/11/22  0705 05/12/22 0624   WBC 8.9 7.3 1.8*   RBC 3.58* 3.70* 3.41*   HGB 11.2* 11.8* 11.0*   HCT 33.5* 34.3* 31.6*   MCV 93.6 92.7 92.7   RDW 12.5 12.4 12.7   PLT See Reflexed IPF Result See Reflexed IPF Result See Reflexed IPF Result     BMP:   Recent Labs     05/10/22  0613 05/11/22  0705 05/12/22 0624    140 140   K 3.9 3.5* 3.9    106 107   CO2 17* 20 18*   BUN 17 13 20   CREATININE 0.79 0.71 0.85     BNP: No results for input(s): BNP in the last 72 hours. PT/INR: No results for input(s): PROTIME, INR in the last 72 hours. APTT:   Recent Labs     05/10/22  0613 05/10/22  1152 05/12/22 0624   APTT 40.2* 27.1 24.4     CARDIAC ENZYMES: No results for input(s): CKMB, CKMBINDEX, TROPONINT in the last 72 hours. Invalid input(s): CKTOTAL;3  FASTING LIPID PANEL:  Lab Results   Component Value Date    CHOL 133 05/07/2022    HDL 52 05/07/2022    TRIG 50 05/07/2022     LIVER PROFILE: No results for input(s): AST, ALT, ALB, BILIDIR, BILITOT, ALKPHOS in the last 72 hours. I/O (24Hr):   No intake or output data in the 24 hours ending 05/12/22 0939    Glu last 24 hour  Recent Labs 05/12/22  0549   POCGLU 125*       No results for input(s): CLARITYU, COLORU, PHUR, SPECGRAV, PROTEINU, RBCUA, BLOODU, BACTERIA, NITRU, WBCUA, LEUKOCYTESUR, YEAST, GLUCOSEU, BILIRUBINUR in the last 72 hours. CT head without contrast  Impression:        Left temporal lobe infarct anteriorly with similar superimposed areas of   hemorrhage. Impression:     1. 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 NSTEMI,Systolic heart failure, EF 13 %-aspirin, Lipitor, lisinopril, metoprolol-plan for cath today elevated troponin increased from 250- 770, cardiac cath-medical management noted no AICD  3. Rapid response today-plan-transfer to cardiac ICU, possible cath, CT, etc.  4. Agitation-appears improved today, still with one-on-one, but no restraints  5. Anemia  6. Thrombocytopenia 129  7. Psoriasis  8. BPH-Flomax  9. Leukopenia-white blood cell count down to 1.8 from 7. 3?     Recommendations:     1. Diagnosis:  Left MCA CVA with hemorrhagic converison  2. Therapy: Has PT/OT/SLP needs   3. Medical Necessity: As above  4. Support: Lives with spouse  5. Rehab Recommendation:   sitter and one-on-one, -  - ejection fraction 13%,-noted referral to [facility] due to low ejection fraction. Agree would recommend SNF  6. DVT Prophylaxis:   Off heparin drip, questionable DVT prophylaxis-subcu heparin if no contraindications     Discussed with  and nursing     It was my pleasure to evaluate Josi Market today. Please call with questions.         Sergei Law MD       This note is created with the assistance of a speech recognition program.  While intending to generate a document that actually reflects the content of the visit, the document can still have some errors including those of syntax and sound a like substitutions which may escape proof reading.   In such instances, actual meaning can be extrapolated by contextual diversion

## 2022-05-12 NOTE — PROGRESS NOTES
[facility] Neurology   [facility]    Progress note             Date:   5/12/2022  Patient name:  Cb Sanchez  Date of admission:  5/6/2022 11:29 PM  MRN:   [id number]  Account:  [de-identified]  YOB: 1934  PCP:    Kleli Gibbons MD  Room:   [address] [place] [address]  Code Status:    Full Code    Chief Complaint:   Acute onset right sided weakness, dysarthria and aphasia 21:30 5/6/22  Seen by MSU given IV TPA emergent MT for left m1 occlusion   Interval hx: The Patient was seen and examined at bedside  Hemodynamically unstable overnight episode of SVT adenosine . 3 X2 doses subsequently A-fib RVR hypotensive transferred to Cardiac ICU concern for worsening STEMI also started on heparin drip around 6AM.   Patient was awake when examined by me following all commands oriented to person, place, month, year. Non-focal neurologic exam some minimal expressive aphasia at times otherwise stable neurologic exam.     Plan to follow PTT low intensity heparin drip no bolus. Holding plavix concern for triple therapy given large left temporal stroke with confirmed hemorrhagic conversion stable most recently CT head WO 5/11/22. Once PTT is >50 will get stat CT head WO if patient is stable enough from cardiac standpoint. Also recommend stat CT head WO if patient has any change in neurologic exam.   Brief History of Present Illness: The patient is a 80 y.o.  male who presents 5/7/2022 around midnight via mobile stroke unit after witnessed acute onset right-sided hemiparesis, dysarthria and aphasia symptoms onset 21:30 5/6/22. Past medical history significant for psoriasis and osteoarthritis. Patient was not on antiplatelet/anticoagulation or statin therapy prior to admission. Initial mobile stroke unit finding patient GCS of 13 E4V 3M 6, NIH of 12 due to expressive aphasia and right-sided hemiparesis.   Well within tPA window no contraindications as patient received IV tPA meloxicam (MOBIC) 15 MG tablet Take 15 mg by mouth daily   Yes Historical Provider, MD        Allergies:     Patient has no known allergies. Social History:     Tobacco:    reports that he has been smoking pipe. He does not have any smokeless tobacco history on file. Alcohol:      has no history on file for alcohol use. Drug Use:  has no history on file for drug use. Family History:     No family history on file. Review of Systems:     ROS:  Constitutional  Negative for fever and chills    HEENT  Negative for ear discharge, ear pain, nosebleed    Eyes  Negative for photophobia, pain and discharge    Respiratory  Negative for hemoptysis and sputum    Cardiovascular  Unstable cardiac arrhytmias with symptomatic hypotension     Gastrointestinal  Negative for abdominal pain, diarrhea, blood in stool    Musculoskeletal  Negative for joint pain, negative for myalgia    Neurology Negative for seizures, loss of consciousness   Skin  Negative for rash or itching    Endo/heme/allergies  Negative for polydipsia, environmental allergy    Psychiatric/behavioral  Negative for suicidal ideation.  Patient is not anxious        Physical Exam:   /88   Pulse 170   Temp 98.2 °F (36.8 °C) (Oral)   Resp 19   Ht 5' 9\" (1.753 m)   Wt 151 lb 0.2 oz (68.5 kg)   SpO2 (!) 87%   BMI 22.30 kg/m²   Temp (24hrs), Av.4 °F (36.9 °C), Min:97.5 °F (36.4 °C), Max:99.3 °F (37.4 °C)    Recent Labs     22  0549   POCGLU 125*     No intake or output data in the 24 hours ending 22 0816      NEUROLOGIC EXAMINATION  GENERAL  Appears comfortable and in no distress   HEENT  NC/ AT   NECK  Supple and no bruits heard   MENTAL STATUS:  Alert, oriented to person, place, month, year and president, waxing and wanning memory, minimal dysarthric speech, normal language, no hallucination or delusion very mild expressive aphasia    CRANIAL NERVES: II     -      Visual fields intact to confrontation  III,IV,VI -  EOMs full, no afferent defect, no EDILIA, no ptosis  V     -     Normal facial sensation  VII    -     Normal facial symmetry  VIII   -     Intact hearing  IX,X -     Symmetrical palate  XI    -     Symmetrical shoulder shrug  XII   -     Midline tongue, no atrophy    MOTOR FUNCTION:  significant for good strength of grade 5/5 in bilateral proximal and distal muscle groups of both upper and lower extremities with normal bulk, normal tone and no involuntary movements, no tremor   SENSORY FUNCTION:  Normal touch, normal pin, normal vibration, normal proprioception   CEREBELLAR FUNCTION:  Intact fine motor control over upper limbs   REFLEX FUNCTION:  Symmetric, no perverted reflex, no Babinski sign   STATION and GAIT  Not tested given on cardiac telemetry with EKG going unstable arrythmias cardiology at bedside        Investigations:      Laboratory Testing:  Recent Results (from the past 24 hour(s))   EKG 12 Lead    Collection Time: 05/12/22  4:48 AM   Result Value Ref Range    Ventricular Rate 164 BPM    Atrial Rate 174 BPM    QRS Duration 112 ms    Q-T Interval 306 ms    QTc Calculation (Bazett) 505 ms    R Axis 11 degrees    T Axis 118 degrees   EKG 12 Lead    Collection Time: 05/12/22  5:21 AM   Result Value Ref Range    Ventricular Rate 110 BPM    Atrial Rate 234 BPM    QRS Duration 120 ms    Q-T Interval 386 ms    QTc Calculation (Bazett) 522 ms    R Axis 17 degrees    T Axis 129 degrees   POC Glucose Fingerstick    Collection Time: 05/12/22  5:49 AM   Result Value Ref Range    POC Glucose 125 (H) 75 - 110 mg/dL   CBC with Auto Differential    Collection Time: 05/12/22  6:24 AM   Result Value Ref Range    WBC 1.8 (L) 3.5 - 11.3 k/uL    RBC 3.41 (L) 4.21 - 5.77 m/uL    Hemoglobin 11.0 (L) 13.0 - 17.0 g/dL    Hematocrit 31.6 (L) 40.7 - 50.3 %    MCV 92.7 82.6 - 102.9 fL    MCH 32.3 25.2 - 33.5 pg    MCHC 34.8 28.4 - 34.8 g/dL    RDW 12.7 11.8 - 14.4 %    Platelets See Reflexed IPF Result 138 - 453 k/uL    NRBC Automated 0.0 0.0 per 100 WBC    Immature Granulocytes 0 0 %    Seg Neutrophils 16 (L) 36 - 66 %    Lymphocytes 79 (H) 24 - 44 %    Monocytes 5 1 - 7 %    Eosinophils % 0 (L) 1 - 4 %    Basophils 0 0 - 2 %    Absolute Immature Granulocyte 0.00 0.00 - 0.30 k/uL    Segs Absolute 0.29 (LL) 1.8 - 7.7 k/uL    Absolute Lymph # 1.42 1.0 - 4.8 k/uL    Absolute Mono # 0.09 (L) 0.1 - 0.8 k/uL    Absolute Eos # 0.00 0.0 - 0.4 k/uL    Basophils Absolute 0.00 0.0 - 0.2 k/uL    Morphology Normal    Basic Metabolic Panel    Collection Time: 05/12/22  6:24 AM   Result Value Ref Range    Glucose 135 (H) 70 - 99 mg/dL    BUN 20 8 - 23 mg/dL    CREATININE 0.85 0.70 - 1.20 mg/dL    Calcium 8.5 (L) 8.6 - 10.4 mg/dL    Sodium 140 135 - 144 mmol/L    Potassium 3.9 3.7 - 5.3 mmol/L    Chloride 107 98 - 107 mmol/L    CO2 18 (L) 20 - 31 mmol/L    Anion Gap 15 9 - 17 mmol/L    GFR Non-African American >60 >60 mL/min    GFR African American >60 >60 mL/min    GFR Comment         Ammonia    Collection Time: 05/12/22  6:24 AM   Result Value Ref Range    Ammonia 26 16 - 60 umol/L   APTT    Collection Time: 05/12/22  6:24 AM   Result Value Ref Range    PTT 24.4 20.5 - 30.5 sec   Magnesium    Collection Time: 05/12/22  6:24 AM   Result Value Ref Range    Magnesium 3.3 (H) 1.6 - 2.6 mg/dL   Troponin    Collection Time: 05/12/22  6:24 AM   Result Value Ref Range    Troponin, High Sensitivity 613 (HH) 0 - 22 ng/L   Immature Platelet Fraction    Collection Time: 05/12/22  6:24 AM   Result Value Ref Range    Platelet, Immature Fraction 3.8 1.1 - 10.3 %    Platelet, Fluorescence 107 (L) 138 - 453 k/uL         Assessment :      Primary Problem  Cerebrovascular accident (CVA) [facility])    Active Hospital Problems    Diagnosis Date Noted    Intracranial bleed (HCC) [I62.9]      Priority: Medium    Right sided weakness [R53.1]      Priority: Medium    CHF (congestive heart failure) ([facility]) [I50.9] 05/08/2022     Priority: Medium    NSTEMI (non-ST elevated myocardial infarction) [facility]) [I21.4] 05/08/2022     Priority: Medium    Altered mental status [R41.82]      Priority: Medium    Cerebrovascular accident (CVA) ([facility] [address] [I63.9] 05/07/2022     Priority: Medium    Acute ischemic stroke ([facility] [address] [I63.9] 05/07/2022     Priority: Medium       Plan:   Acute left M1 embolic stroke s/p IV TPA and Mechanical thrombectomy 1st pass TICI 3  -Continue aspirin 81 mg daily  -Recommend holding Plavix 75 mg due to concern for triple therapy and high risk of worsening hemorrhagic conversion left temporal stroke  -Started on heparin drip low intensity around 7:50 AM  -Follow PTT closely Q6 hours next ptt planned for noon today   PTT 24.4 (5/12/22 @06:24)   -Repeat CT head WO STAT stability once PTT >50     A-FIB RVR Acute STEMI   -ST elevations in the inferior leads per cardiology 10:30AM 5/12/22  -transfer from neuro step down to Cardiac ICU  -low intensity heparin drip no bolus PTT goal 50-70   -Started amiodarone   -Lopressor and 1 dose of dig 250mcg  -Dr Namita Hummel reviewed patient multi vessel disease 100% RCA  Not amenable to revascularization or PCI    Episode of SVT   -given two doses of adenosine . 3 by rapid response team     Guarded long term prognosis given acute left temporal stroke with Hemorrhagic conversion and worsening STEMI with unstable arrhythmias. Very high risk for catastrophic brain bleeding if given any AC bolus and do not recommend Triple therapy also due to above concern. Recommend Palliative care team consult and discussion with patient and family   Continue PT/OT/ST as appropriate if stable             Follow-up further recommendations after discussing the case with attending  The plan was discussed with the patient, patient's family and the medical staff.    Consultations:   IP CONSULT TO NEUROCRITICAL CARE  IP CONSULT TO STROKE TEAM  IP CONSULT TO PHYSICAL MEDICINE REHAB  IP CONSULT TO PHARMACY  PHARMACY TO CHANGE BASE FLUIDS  IP CONSULT TO IV TEAM  IP CONSULT TO CARDIOLOGY  IP

## 2022-05-12 NOTE — CARE COORDINATION
[facility] Flow/Interdisciplinary Rounds Progress Note    Quality Flow Rounds held on May 12, 2022 at [address] [facility] Attending:  Bedside Nurse, ,  and Nursing Unit Leadership    Barriers to Discharge: placement, Afib RVR. Anticipated Discharge Date:       Anticipated Discharge Disposition: SNF    Readmission Risk              Risk of Unplanned Readmission:  11           Discussed patient goal for the day, patient clinical progression, and barriers to discharge. The following Goal(s) of the Day/Commitment(s) have been identified:  SNF Discharge - Check H&P Update, Medication Reconciliation, and Transfer Form      Referrals hand faxed to [facility] and [facility].        Geoff Elder RN  May 12, 2022

## 2022-05-12 NOTE — PROGRESS NOTES
[employer]  Occupational Therapy Not Seen Note    DATE: 2022    NAME: Mairanne Chase  MRN: [id number]   : 1934      Patient not seen this date for Occupational Therapy due to: RN cx'yazan-pt in A-fib    Next Scheduled Treatment: 22    Electronically signed by DERRICK Escalona on 2022 at 9:00 AM

## 2022-05-12 NOTE — PLAN OF CARE
Problem: Discharge Planning  Goal: Discharge to home or other facility with appropriate resources  5/12/2022 0523 by Fady New RN  Outcome: Progressing  5/12/2022 0523 by Fady New RN  Outcome: Progressing     Problem: Chronic Conditions and Co-morbidities  Goal: Patient's chronic conditions and co-morbidity symptoms are monitored and maintained or improved  5/12/2022 0523 by Fady New RN  Outcome: Progressing  5/12/2022 0523 by Fady New RN  Outcome: Progressing     Problem: Safety - Adult  Goal: Free from fall injury  5/12/2022 0523 by Fady New RN  Outcome: Progressing  5/12/2022 0523 by Fady New RN  Outcome: Progressing     Problem: ABCDS Injury Assessment  Goal: Absence of physical injury  5/12/2022 0523 by Fady New RN  Outcome: Progressing  5/12/2022 0523 by Fady New RN  Outcome: Progressing     Problem: Skin/Tissue Integrity  Goal: Absence of new skin breakdown  Description: 1. Monitor for areas of redness and/or skin breakdown  2. Assess vascular access sites hourly  3. Every 4-6 hours minimum:  Change oxygen saturation probe site  4. Every 4-6 hours:  If on nasal continuous positive airway pressure, respiratory therapy assess nares and determine need for appliance change or resting period.   5/12/2022 0523 by Fady New RN  Outcome: Progressing  5/12/2022 0523 by Fady New RN  Outcome: Progressing

## 2022-05-12 NOTE — PROGRESS NOTES
Dr. Calos mckinnon served at 8:19. Pt sustaining in A fib with RVR up to the 170's. Pt is asymptomatic. BP 93/58 MAP 69 RA 96% HR currently 154. Digoxin administered, metoprolol, and lisinopril given. Physician came to bedside. Pt assessed and LR bolus ordered. Metoprolol given. Cardiology resident Dr. Raheem Jaquez came to bedside. Pt reassessed and transferred to CVICU bed 1025. See MAR medications given and flowsheets for vitals.

## 2022-05-12 NOTE — SIGNIFICANT EVENT
Laurie was called overhead, immediately went to the bedside to see the patient who is laying down being on room air without any complaints. All personnel including ICU staff, residents, nursing and RT were all available working in conjunction. The nurse was well versed with the patient and gave us full update at the bedside reporting that the patient was stable all night, received his metoprolol yesterday evening, urinated appropriately without difficulty. Monitor showed MAP 65, heart rate 140s with a telemetry monitor appearing to be in SVT vs a.fib. Focused bedside exam: No distress, lungs clear, limbs normothermic, nonedematous. Baseline slurred speech, AO1, no obvious focal deficits, moving limbs spontaneously, following directions. Carotid massage, leg raise was performed while IV metoprolol 5mg was given. Initial EKG showing ~regular rrythm with narrow QRS and what appeared to be P waves obscured by rapid conduction complexes. IV adenosine 3mg was given instead of 6mg initially given he just received Beta blockage and a quick chart review revealed recent cath with multivessel disease with RCA occlusion of 100%. 2g magnesium push given. Slight improvement with 3mg adenosin. Another 3mg adenosine attempted with another marginal improvement. BP recycled, MAPs down trending, thus  ml bolus started, and Amiodarone 150 mg bolus given. Repeat BP showing improved MAPS in 70s with HR fluctuating between 80--130. Repeat EKG concerning for A fib. Patient started heparin infusion. Amio infusion started. Labs were obtained and pending. Cardio paged by bedside resident. Patient remained asymptomatic. No ICU needs at this time given his improvement but condition remains guarded and will need to have cardiology see patient first thing in morning for revluation and continuing investigation. Monitor volume status given the IVF running. Bladder scan didn't show any retention.  Bedside nurses were updated on plan, thanked for their alvarenga services and team work.

## 2022-05-12 NOTE — PLAN OF CARE
Called to bedside due to patient having new onset A. fib with RVR with ST elevations in the inferior leads. Upon my examination, patient was lying comfortably in bed, alert and oriented with no chest pain. EKG showing atrial fibrillation with RVR which is new. Rapid response was called in the a.m. when patient suddenly went tachycardic and was given adenosine for possible SVT, then started on amiodarone and heparin for A. fib and given Lopressor and 1 dose of dig 250 mcg. We also ordered fluid bolus and will transfer to CVICU. Discussed with primary team at bedside. Troponin have been trending down. Reviewed cath films with Dr. Lawton Salts, patient does have multi vessel disease, 100% RCA occluded with left to left and left to right collaterals which are not amenable to revascularization or PCI. Discussed with the patient at bedside and would recommend CODE STATUS discussion, palliative care. Tried to call the wife 2 times but she did not  her phone. Will attempt to call her again. Will continue to follow. If patient does get hypotensive, will start on phenylephrine and possible cardioversion. Discussed with patient and he is in agreement.         Tete Damon MD       Cardiovascular Fellow PGY-4  5/12/2022, 10:33 AM

## 2022-05-12 NOTE — PROGRESS NOTES
Pt's wife [place] called to update her of pt being moved to CVICU. Room number 1025 given to her.

## 2022-05-12 NOTE — PROGRESS NOTES
[facility]  Progress Note                   Date:   5/12/2022  Patient name: Cammy Shah  Date of admission:  5/6/2022 11:29 PM  MRN:   [id number]  YOB: 1934  PCP: Angy Hannon MD    Reason for Admission: Acute ischemic stroke [facility]) [I63.9]  Right sided weakness [R53.1]  Altered mental status, unspecified altered mental status type [R41.82]  Cerebrovascular accident (CVA), unspecified mechanism ([address] [I63.9]    Subjective:     Rapid response was called in the a.m. when patient suddenly went tachycardic and was given adenosine for possible SVT, then started on amiodarone and heparin for A. fib and given Lopressor and 1 dose of dig 250 mcg. We also ordered fluid bolus and will transfer to CVICU. Discussed with primary team at bedside. Troponin have been trending down. Reviewed cath films with Dr. Christy Khalil, patient does have multi vessel disease, 100% RCA occluded with left to left and left to right collaterals which are not amenable to revascularization or PCI. Discussed with the patient and wife at bedside and would recommend CODE STATUS discussion, palliative care.        Medications:   Scheduled Meds:   digoxin  250 mcg IntraVENous Once    metoprolol  2.5 mg IntraVENous Once    lactated ringers bolus  250 mL IntraVENous Once    midodrine  10 mg Oral TID WC    sodium chloride flush  5-40 mL IntraVENous 2 times per day    [Held by provider] clopidogrel  75 mg Oral Daily    metoprolol tartrate  25 mg Oral BID    aspirin  81 mg Oral Daily    lisinopril  5 mg Oral Daily    tamsulosin  0.4 mg Oral Daily    atorvastatin  80 mg Oral Nightly     Continuous Infusions:   amiodarone 0.5 mg/min (05/12/22 1311)    heparin (PORCINE) Infusion 12 Units/kg/hr (05/12/22 0750)    phenylephrine (ANDREW-SYNEPHRINE) 50mg/250mL infusion 30 mcg/min (05/12/22 1034)    sodium chloride       CBC:   Recent Labs     05/10/22  0613 05/11/22  0705 05/12/22  0624   WBC 8.9 7.3 1.8*   HGB 11.2* 11.8* 11.0*   PLT See Reflexed IPF Result See Reflexed IPF Result See Reflexed IPF Result     BMP:    Recent Labs     05/10/22  0613 05/11/22  0705 05/12/22  0624    140 140   K 3.9 3.5* 3.9    106 107   CO2 17* 20 18*   BUN 17 13 20   CREATININE 0.79 0.71 0.85   GLUCOSE 127* 116* 135*     Hepatic:No results for input(s): AST, ALT, ALB, BILITOT, ALKPHOS in the last 72 hours. Troponin:   Recent Labs     05/10/22  0613 05/12/22  0624 05/12/22  0909   TROPHS 679* 613* 482*     BNP: No results for input(s): BNP in the last 72 hours. Lipids:   No results for input(s): CHOL, HDL in the last 72 hours. Invalid input(s): LDLCALCU  INR:   No results for input(s): INR in the last 72 hours. ECHO 5/9/2022      Summary  Dilated left ventricle with severely reduced systolic function. Calculated ejection fraction is 13 %  Moderate mitral regurgitation. Bubble study was negative for shunt.     Signature  ----------------------------------------------------------------------------   Electronically signed by Alexey Escobar(Sonographer) on 05/07/2022 03:59   PM  ----------------------------------------------------------------------------     ----------------------------------------------------------------------------   Electronically signed by Kashmir Carrington(Interpreting physician) on   05/08/2022 10:56 AM  ----------------------------------------------------------------------------    Cardiac Cath 5/10/2022  Findings:     LMCA: has moderate calcification with distal 30% stenosis. LAD: has heavy calcification with ostial 60% stenosis, proximal 80% stenosis   mid 90% stenosis and distal 100% occlusion. The D1 has ostial 90% stenosis   and proximal 90% stenosis. The D2 has ostial 80% stenosis. LCx: has moderate calcification with proximal 100% occlusion. The Om1 has   high origin with ostial 90% stenosis. The OM2 and Om3 are seen via left to   left collaterals.      RCA: has mid 100% occlusion with left to right collaterals. The RV marginal   branch has ostial 70% stenosis.      Coronary Tree        Dominance: Right      The LV gram was performed in the ROSADO 30 position. LVEF: 10-15%     Estimated Blood Loss:            [x]? ? Minimal 10-25 cc   []? ? Minimal < 25 cc      []? ? Moderate 25-50 cc         []?? >50 cc      Conclusions        Procedure Summary        Severe multivessel coronary artery disease.    Severe cardiomyopathy.        Recommendations        Medical therapy as needed.    Risk factor modification.    Routine Post Diagnostic Cath orders.    Discussed with wife and son-in law in detail. Recommend medical therapy.    Add plavix 75mg po qday if ok with neurology.    Palliative consult. Address code status. Objective:   Vitals: BP 99/75   Pulse 124   Temp 98.1 °F (36.7 °C) (Oral)   Resp 20   Ht 5' 9\" (1.753 m)   Wt 151 lb 0.2 oz (68.5 kg)   SpO2 (!) 88%   BMI 22.30 kg/m²   General appearance: alert and cooperative with exam   HEENT: Head: Normocephalic, no lesions, without obvious abnormality. Neck:no JVD, trachea midline, no adenopathy  Lungs: Clear to auscultation, no distress on RA  Heart: Regular rate and rhythm, s1/s2 auscultated, no murmurs, SR 76  Abdomen: soft, non-tender, bowel sounds active  Extremities: no edema  Neurologic: not done        Assessment / Acute Cardiac Problems:   1. Acute CVA s/p tPA and mechanical thrombectomy of left MCA thrombus  2 elevated troponin likely NSTEMI type I.  3.  Severe MVD - medical management  4. 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 New onset Afib with RVR, ChadsVasc of 6, on heparin and amiodarone. Plan of Treatment:   1. Continue current medical therapy, amio and heparin drip. 2. Code status discussion as per palliative care. 3.  Reviewed cath films with Dr. Jacob Montesinos, patient does have multi vessel disease, 100% RCA occluded with left to left and left to right collaterals which are not amenable to revascularization or PCI. Will continue to follow. Saul Duran MD       Cardiovascular Fellow PGY-4  5/12/2022, 1:37 PM     Attending Physician Statement  I have discussed the case of Jara Colder including pertinent history and exam findings with the resident. I have seen and examined the patient and the key elements of the encounter have been performed by me. I agree with the assessment, plan and orders as documented by the resident With changes made to the note. Reviewed cath film and EKGs, severe multivessel CAD not amenable to surgical or percutaneous revascularization, palliative care discussion. Continue medical treatment with Amiodarone, Heparin, ASA, BB, ACEi and Statins.     Electronically signed by Jose Luis Epps MD on 5/12/2022 at 3:19 PM.    [facility]      [phone]

## 2022-05-12 NOTE — PATIENT CARE CONFERENCE
Palliative Care Family Conference    Patient: Linda Oshea  Room: 2247/8540-05    Attended By: Dr. Damion Hooks care attending, Dr. Roc Curiel patient's cardiologist, cardiology residents, student on palliative care rotation, patient himself, patient's RN Conor De La O, patient's wife [place] and patient's daughter    Reason for meeting: Follow-up to prior meeting  Disease progression  Functional decline  Discuss goals of care  Treatment options/plans  Provide clinical updates and answer questions  Share information and provider education for the family  Listen to patient/family concerns  Assess family understanding, concerns, and coping  Communicate update after a long length of stay  Poor prognosis is anticipated  Provide emotional support to the family  Elicit patient's goals and values, and use these to establish or modify goals of care  Other major care decisions  CODE STATUS discussion     Conference Summary:   The face-to-face family meeting was held in patient's room on the first floor in the cardiology unit  The family meeting was conducted by Dr. Roc Curiel and myself and attended by cardiology residents, student, patient himself, patient's KODY De La O, patient's wife [place] and patient's daughter  Dr. Roc Curiel explained to the patient and family that patient has multi vessel coronary artery disease and 2 of his arteries are showing 100% blockade and 1 artery is showing 70- 100% blockade   Dr. Roc Curiel told that patient cannot have stent placement and also is not a candidate for bypass cardiac surgery  Dr. Roc Curiel said that patient can continue to get medical treatments for his heart condition  We explained to the family that patient had rapid response called and patient's heart rate had been very high and thus he had to be moved to cardiac ICU overnight  Patient has been put on cardiac medications to control his heart rate  Dr. Roc Curiel and his team left  I explained to the patient and family that patient is the candidate only for medical management and no invasive procedures can be done since he is not a candidate for them  I explained the different types of codes to the patient and [place] and they both agreed for changing the CODE STATUS to DNR CCA with no intubation and patient's and this was witnessed by myself, student as well as patient's RN Ashley Dumont  I changed patient's CODE STATUS to to DNR CCA with no intubation as per patient and family's wishes  I also explained about comfort care and hospice care to the family  Patient's wife [place] told that she wanted to give more time to the patient for recovery and I respected her wishes  I offered comfort and emotional support to the patient and family      Conclusion/Plan:  Face-to-face family meeting was held in patient's room  Patient's current medical conditions were discussed and explained to the patient and family by cardiology team as well as myself  Different types of codes were explained to the patient and family  CODE STATUS changed to DNR CCA with no intubation    We will continue to follow-up with the patient and family for further goals of care and CODE STATUS discussion as needed  We will continue to provide comfort and support to the patient and family    The total time I spent face-to-face family meeting discussing goals of care, CODE STATUS discussion, advanced directives, greater than 50% time in counseling, comfort care and other major issues was more than 60 minutes  The note has been dictated by dragon, typing errors may be a possibility.          Electronically signed by   Marilynn Art MD  Palliative Care Team  on 5/12/2022 at 12:42 PM

## 2022-05-13 ENCOUNTER — APPOINTMENT (OUTPATIENT)
Dept: CT IMAGING | Age: 87
DRG: 023 | End: 2022-05-13
Payer: MEDICARE

## 2022-05-13 LAB
ABSOLUTE EOS #: 0.35 K/UL (ref 0–0.4)
ABSOLUTE IMMATURE GRANULOCYTE: 0 K/UL (ref 0–0.3)
ABSOLUTE LYMPH #: 1.75 K/UL (ref 1–4.8)
ABSOLUTE MONO #: 1.33 K/UL (ref 0.1–0.8)
ANION GAP SERPL CALCULATED.3IONS-SCNC: 10 MMOL/L (ref 9–17)
BASOPHILS # BLD: 0 % (ref 0–2)
BASOPHILS ABSOLUTE: 0 K/UL (ref 0–0.2)
BUN BLDV-MCNC: 20 MG/DL (ref 8–23)
CALCIUM SERPL-MCNC: 8.3 MG/DL (ref 8.6–10.4)
CHLORIDE BLD-SCNC: 101 MMOL/L (ref 98–107)
CO2: 23 MMOL/L (ref 20–31)
CREAT SERPL-MCNC: 0.86 MG/DL (ref 0.7–1.2)
EOSINOPHILS RELATIVE PERCENT: 5 % (ref 1–4)
GFR AFRICAN AMERICAN: >60 ML/MIN
GFR NON-AFRICAN AMERICAN: >60 ML/MIN
GFR SERPL CREATININE-BSD FRML MDRD: ABNORMAL ML/MIN/{1.73_M2}
GLUCOSE BLD-MCNC: 122 MG/DL (ref 70–99)
HCT VFR BLD CALC: 32.5 % (ref 40.7–50.3)
HEMOGLOBIN: 11.1 G/DL (ref 13–17)
IMMATURE GRANULOCYTES: 0 %
LYMPHOCYTES # BLD: 25 % (ref 24–44)
MCH RBC QN AUTO: 32.2 PG (ref 25.2–33.5)
MCHC RBC AUTO-ENTMCNC: 34.2 G/DL (ref 28.4–34.8)
MCV RBC AUTO: 94.2 FL (ref 82.6–102.9)
MONOCYTES # BLD: 19 % (ref 1–7)
MORPHOLOGY: NORMAL
NRBC AUTOMATED: 0 PER 100 WBC
PARTIAL THROMBOPLASTIN TIME: 38.1 SEC (ref 20.5–30.5)
PARTIAL THROMBOPLASTIN TIME: 47 SEC (ref 20.5–30.5)
PARTIAL THROMBOPLASTIN TIME: 53.6 SEC (ref 20.5–30.5)
PATHOLOGIST REVIEW: NORMAL
PDW BLD-RTO: 12.9 % (ref 11.8–14.4)
PLATELET # BLD: ABNORMAL K/UL (ref 138–453)
PLATELET, FLUORESCENCE: 124 K/UL (ref 138–453)
PLATELET, IMMATURE FRACTION: 5.1 % (ref 1.1–10.3)
POTASSIUM SERPL-SCNC: 4 MMOL/L (ref 3.7–5.3)
RBC # BLD: 3.45 M/UL (ref 4.21–5.77)
SEG NEUTROPHILS: 51 % (ref 36–66)
SEGMENTED NEUTROPHILS ABSOLUTE COUNT: 3.57 K/UL (ref 1.8–7.7)
SODIUM BLD-SCNC: 134 MMOL/L (ref 135–144)
SURGICAL PATHOLOGY REPORT: NORMAL
WBC # BLD: 7 K/UL (ref 3.5–11.3)

## 2022-05-13 PROCEDURE — 85025 COMPLETE CBC W/AUTO DIFF WBC: CPT

## 2022-05-13 PROCEDURE — 36415 COLL VENOUS BLD VENIPUNCTURE: CPT

## 2022-05-13 PROCEDURE — 99497 ADVNCD CARE PLAN 30 MIN: CPT | Performed by: FAMILY MEDICINE

## 2022-05-13 PROCEDURE — 6360000002 HC RX W HCPCS: Performed by: STUDENT IN AN ORGANIZED HEALTH CARE EDUCATION/TRAINING PROGRAM

## 2022-05-13 PROCEDURE — 97129 THER IVNTJ 1ST 15 MIN: CPT

## 2022-05-13 PROCEDURE — 2580000003 HC RX 258: Performed by: STUDENT IN AN ORGANIZED HEALTH CARE EDUCATION/TRAINING PROGRAM

## 2022-05-13 PROCEDURE — 97130 THER IVNTJ EA ADDL 15 MIN: CPT

## 2022-05-13 PROCEDURE — 99232 SBSQ HOSP IP/OBS MODERATE 35: CPT | Performed by: PSYCHIATRY & NEUROLOGY

## 2022-05-13 PROCEDURE — 97535 SELF CARE MNGMENT TRAINING: CPT

## 2022-05-13 PROCEDURE — 80048 BASIC METABOLIC PNL TOTAL CA: CPT

## 2022-05-13 PROCEDURE — 99233 SBSQ HOSP IP/OBS HIGH 50: CPT | Performed by: PSYCHIATRY & NEUROLOGY

## 2022-05-13 PROCEDURE — 85730 THROMBOPLASTIN TIME PARTIAL: CPT

## 2022-05-13 PROCEDURE — 85055 RETICULATED PLATELET ASSAY: CPT

## 2022-05-13 PROCEDURE — 6370000000 HC RX 637 (ALT 250 FOR IP): Performed by: SURGERY

## 2022-05-13 PROCEDURE — 6370000000 HC RX 637 (ALT 250 FOR IP): Performed by: PSYCHIATRY & NEUROLOGY

## 2022-05-13 PROCEDURE — 70450 CT HEAD/BRAIN W/O DYE: CPT

## 2022-05-13 PROCEDURE — 99232 SBSQ HOSP IP/OBS MODERATE 35: CPT | Performed by: INTERNAL MEDICINE

## 2022-05-13 PROCEDURE — 6370000000 HC RX 637 (ALT 250 FOR IP)

## 2022-05-13 PROCEDURE — 6370000000 HC RX 637 (ALT 250 FOR IP): Performed by: NURSE PRACTITIONER

## 2022-05-13 PROCEDURE — 2000000000 HC ICU R&B

## 2022-05-13 RX ADMIN — SODIUM CHLORIDE, PRESERVATIVE FREE 10 ML: 5 INJECTION INTRAVENOUS at 09:23

## 2022-05-13 RX ADMIN — ATORVASTATIN CALCIUM 80 MG: 80 TABLET, FILM COATED ORAL at 20:13

## 2022-05-13 RX ADMIN — MIDODRINE HYDROCHLORIDE 10 MG: 5 TABLET ORAL at 17:25

## 2022-05-13 RX ADMIN — LISINOPRIL 5 MG: 5 TABLET ORAL at 09:24

## 2022-05-13 RX ADMIN — METOPROLOL TARTRATE 25 MG: 25 TABLET ORAL at 20:13

## 2022-05-13 RX ADMIN — MIDODRINE HYDROCHLORIDE 10 MG: 5 TABLET ORAL at 12:01

## 2022-05-13 RX ADMIN — MIDODRINE HYDROCHLORIDE 10 MG: 5 TABLET ORAL at 07:50

## 2022-05-13 RX ADMIN — SODIUM CHLORIDE, PRESERVATIVE FREE 10 ML: 5 INJECTION INTRAVENOUS at 20:13

## 2022-05-13 RX ADMIN — METOPROLOL TARTRATE 25 MG: 25 TABLET ORAL at 09:24

## 2022-05-13 RX ADMIN — AMIODARONE HYDROCHLORIDE 0.5 MG/MIN: 50 INJECTION, SOLUTION INTRAVENOUS at 18:05

## 2022-05-13 RX ADMIN — AMIODARONE HYDROCHLORIDE 0.5 MG/MIN: 50 INJECTION, SOLUTION INTRAVENOUS at 02:14

## 2022-05-13 RX ADMIN — TAMSULOSIN HYDROCHLORIDE 0.4 MG: 0.4 CAPSULE ORAL at 09:24

## 2022-05-13 RX ADMIN — Medication 18 UNITS/KG/HR: at 05:57

## 2022-05-13 ASSESSMENT — PAIN SCALES - GENERAL
PAINLEVEL_OUTOF10: 0

## 2022-05-13 NOTE — CONSULTS
Please see my consult note of 5/10, palliative care is following the patient and will be seeing the patient today.         Dr. Helen Price care attending

## 2022-05-13 NOTE — PLAN OF CARE
Pt.on Ra,lungs sound diminished,encourage to use IS.VSS,afebrile,abd.soft,regular diet-tolerating well. Bowel movement x2-soft brown stool. AxO to himself,move all four extremities,pulses present,warm to touch. Continues on heparin and amiodarone gtt. Patient denies pain.   Problem: Discharge Planning  Goal: Discharge to home or other facility with appropriate resources  9/86/2924 [id number] by Ronda Hicks RN  Outcome: Progressing  5/13/2022 0619 by Veronica Reyes  Outcome: Progressing

## 2022-05-13 NOTE — PROGRESS NOTES
Occupational Therapy  Facility/Department: [facility]  Occupational Therapy Daily Progress Note    Name: Samir Berry  : 1934  MRN: [id number]  Date of Service: 2022    Discharge Recommendations: Pt. Would benefit from further skilled OT services to enhance functional outcomes. Patient would benefit from continued therapy after discharge          Patient Diagnosis(es): The primary encounter diagnosis was Cerebrovascular accident (CVA), unspecified mechanism ([facility] [address] Diagnoses of Altered mental status, unspecified altered mental status type and Right sided weakness were also pertinent to this visit. Past Medical History:  has a past medical history of Arthritis and Psoriasis. Past Surgical History:  has a past surgical history that includes Prostate surgery. Assessment   Performance deficits / Impairments: Decreased functional mobility ; Decreased endurance;Decreased ADL status; Decreased balance;Decreased high-level IADLs;Decreased safe awareness;Decreased cognition;Decreased posture  Prognosis: Good  Decision Making: Medium Complexity  REQUIRES OT FOLLOW-UP: Yes  Activity Tolerance  Activity Tolerance: Patient Tolerated treatment well        Pain Assessment  Pain Assessment: 0-10  Pain Level: 0/10, no c/o pain      Plan   Plan  Times per Week: 4-5x  Current Treatment Recommendations: Strengthening,Balance training,Functional mobility training,Endurance training,Safety education & training,Neuromuscular re-education,Patient/Caregiver education & training,Self-Care / ADL     Restrictions  Restrictions/Precautions  Restrictions/Precautions: Fall Risk,General Precautions  Required Braces or Orthoses?: No  Position Activity Restriction  Other position/activity restrictions: SBP goal less than 160. CVA, s/p tPA, s/p thrombectomy.     Subjective   General  Chart Reviewed: Yes  Patient assessed for rehabilitation services?: Yes  Family / Caregiver Present: Spouse, son, dtr, 3 grandchildren, present at the beginning and end of session  Diagnosis: TPA, Subacute anterior L temporal lobe infarct  General Comment  Comments: RN and Pt agreeable for therapy this day. Pt supine in bed start of session, retired to chair end of session with chair alarm on, call light in reach, BLE raised, sitter present and all needs met. Objective      Safety Devices  Type of Devices: Left in chair;Chair alarm in place;Call light within reach;Gait belt  Restraints  Restraints Initially in Place: No  Bed Mobility Training  Bed Mobility Training: Yes  Supine to Sit: Minimum assistance;Assist X1 (min A for trunk progression + bed rail + verbal cues for initiation)  Scooting: Contact-guard assistance  Balance  Sitting: With support (CGA-SBA sitting EOB/unsupported in recliner chair, ~20 minutes while engaged in activity. Static/dynamic)  Standing: With support (min-mod A x 1 + RW, Stood ~6 minutes, static/dynamic, pt. hold weight heavy on heals, mod cues to weight shift forward.)  Standing - Static: Constant support  Standing - Dynamic: Constant support  Transfer Training  Transfer Training: Yes  Overall Level of Assistance: Minimum assistance;Assist X1;Additional time; Adaptive equipment (RW)  Sit to Stand: Minimum assistance  Stand to Sit: Minimum assistance  Stand Pivot Transfers: Minimum assistance;Assist X1        ADL  Grooming: Increased time to complete;Setup;Stand by assistance  Grooming Skilled Clinical Factors: Sitting in recliner chair with B feet on floor, set up on tray table. Pt. tolerated brushing teeth/washing face at SBA level + cues to initiate task. Increased time, pt. is particular. LE Dressing: Contact guard assistance; Increased time to complete;Setup  LE Dressing Skilled Clinical Factors: Sitting EOB, unable to tolerate 4 figure tech, pt. completed donning B socks by reaching down for B feet. CGA to ensure stability. Needing extended time with min rest breaks. Toileting: Moderate assistance; Increased time to complete;Setup;Maximum assistance  Toileting Skilled Clinical Factors: Pt. soiled brief, max A for bottom and telma area hyg of one person + mod-min A for balance d/t posterior lean, pt. very heavy on heals, needing cues for weight shifting + RW  Additional Comments: Chaya VILCHIS tolerance to ADL activity this session. Cognition  Overall Cognitive Status: Exceptions  Arousal/Alertness: Delayed responses to stimuli  Following Commands: Inconsistently follows commands; Follows one step commands with increased time; Follows one step commands with repetition  Attention Span: Difficulty attending to directions; Attends with cues to redirect  Memory: Decreased recall of recent events;Decreased recall of precautions  Safety Judgement: Decreased awareness of need for assistance;Decreased awareness of need for safety  Problem Solving: Decreased awareness of errors;Assistance required to correct errors made;Assistance required to identify errors made  Insights: Not aware of deficits  Initiation: Requires cues for some  Sequencing: Requires cues for some  Cognition Comment: Pt. using humor to cover up decreased cognition at times. Education Given To: Patient  Education Provided: Role of Therapy; ADL Adaptive Strategies;Transfer Training  Education Provided Comments: Pt educated on OT role, proper hand placement, balance maintenance, RW management, figure 4 technique, pt. lila VILCHIS carryover  Education Method: Demonstration;Verbal  Barriers to Learning: Cognition  Education Outcome: Continued education needed;Verbalized understanding;Demonstrated understanding                                    AM-PAC Score        AM-[facility] Inpatient Daily Activity Raw Score: 16 (05/13/22 1414)  AM-PAC Inpatient ADL T-Scale Score : 35.96 (05/13/22 1414)  ADL Inpatient CMS 0-100% Score: 53.32 (05/13/22 1414)  ADL Inpatient CMS G-Code Modifier : CK (05/13/22 1414)    Goals  Short Term Goals  Time Frame for Short term goals: Pt will by discharge  Short Term Goal 1: demo good safety awareness during func mob around room at SUP and LRD PRN  Short Term Goal 2: demo ADL UB bathing/dressing activity at mod I with 2 cues  Short Term Goal 3: demo ADL LB bathing/dressing activity at SBA, AD PRN, with 2 cues  Short Term Goal 4: demo SUP for all bed mobility using bed rails PRN  Short Term Goal 5: demo activity tolerance for 35 min+       Therapy Time   Individual Concurrent Group Co-treatment   Time In 1238         Time Out 1322         Minutes 44         Timed Code Treatment Minutes: [address], [place]FF[place]/L

## 2022-05-13 NOTE — PROGRESS NOTES
[facility]  Progress Note                   Date:   5/13/2022  Patient name: Abdulkadir Busch  Date of admission:  5/6/2022 11:29 PM  MRN:   [id number]  YOB: 1934  PCP: Kelli Guerrero MD    Reason for Admission: Acute ischemic stroke [facility]) [I63.9]  Right sided weakness [R53.1]  Altered mental status, unspecified altered mental status type [R41.82]  Cerebrovascular accident (CVA), unspecified mechanism ([address] [I63.9]    Subjective:     No acute issues overnight. Patient converted to NSR overnight and has been vitally stable, requiring no pressors overnight. Will continue amiodarone and transition to p.o. likely tomorrow. Medications:   Scheduled Meds:   digoxin  250 mcg IntraVENous Once    metoprolol  2.5 mg IntraVENous Once    lactated ringers bolus  250 mL IntraVENous Once    midodrine  10 mg Oral TID WC    sodium chloride flush  5-40 mL IntraVENous 2 times per day    [Held by provider] clopidogrel  75 mg Oral Daily    metoprolol tartrate  25 mg Oral BID    aspirin  81 mg Oral Daily    lisinopril  5 mg Oral Daily    tamsulosin  0.4 mg Oral Daily    atorvastatin  80 mg Oral Nightly     Continuous Infusions:   amiodarone 0.5 mg/min (05/13/22 0400)    heparin (PORCINE) Infusion 18 Units/kg/hr (05/13/22 0557)    phenylephrine (ANDREW-SYNEPHRINE) 50mg/250mL infusion Stopped (05/12/22 2128)    sodium chloride       CBC:   Recent Labs     05/12/22  0624 05/12/22  1319 05/13/22  0211   WBC 1.8* 8.2 7.0   HGB 11.0* 11.4* 11.1*   PLT See Reflexed IPF Result 136* See Reflexed IPF Result     BMP:    Recent Labs     05/11/22  0705 05/12/22  0624 05/13/22  0211    140 134*   K 3.5* 3.9 4.0    107 101   CO2 20 18* 23   BUN 13 20 20   CREATININE 0.71 0.85 0.86   GLUCOSE 116* 135* 122*     Hepatic:No results for input(s): AST, ALT, ALB, BILITOT, ALKPHOS in the last 72 hours.   Troponin:   Recent Labs     05/12/22  0624 05/12/22  0909   TROPHS 613* 482*     BNP: No results for input(s): BNP in the last 72 hours. Lipids:   No results for input(s): CHOL, HDL in the last 72 hours. Invalid input(s): LDLCALCU  INR:   No results for input(s): INR in the last 72 hours. ECHO 5/9/2022      Summary  Dilated left ventricle with severely reduced systolic function. Calculated ejection fraction is 13 %  Moderate mitral regurgitation. Bubble study was negative for shunt. Signature  ----------------------------------------------------------------------------   Electronically signed by Alexey Quevedo(Sonographer) on 05/07/2022 03:59   PM  ----------------------------------------------------------------------------     ----------------------------------------------------------------------------   Electronically signed by Kashmir Carrington(Interpreting physician) on   05/08/2022 10:56 AM  ----------------------------------------------------------------------------    Cardiac Cath 5/10/2022  Findings:     LMCA: has moderate calcification with distal 30% stenosis. LAD: has heavy calcification with ostial 60% stenosis, proximal 80% stenosis   mid 90% stenosis and distal 100% occlusion. The D1 has ostial 90% stenosis   and proximal 90% stenosis. The D2 has ostial 80% stenosis. LCx: has moderate calcification with proximal 100% occlusion. The Om1 has   high origin with ostial 90% stenosis. The OM2 and Om3 are seen via left to   left collaterals. RCA: has mid 100% occlusion with left to right collaterals. The RV marginal   branch has ostial 70% stenosis. Coronary Tree        Dominance: Right      The LV gram was performed in the ROSADO 30 position. 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 Recommendations        Medical therapy as needed. Risk factor modification. Routine Post Diagnostic Cath orders. Discussed with wife and son-in law in detail. Recommend medical therapy. Add plavix 75mg po qday if ok with neurology. Palliative consult. Address code status. Objective:   Vitals: /89   Pulse 64   Temp 98.3 °F (36.8 °C) (Oral)   Resp 22   Ht 5' 9\" (1.753 m)   Wt 151 lb 7.3 oz (68.7 kg)   SpO2 100%   BMI 22.37 kg/m²   General appearance: alert and cooperative with exam   HEENT: Head: Normocephalic, no lesions, without obvious abnormality. Neck:no JVD, trachea midline, no adenopathy  Lungs: Clear to auscultation, no distress on RA  Heart: Regular rate and rhythm, s1/s2 auscultated, no murmurs, SR 76  Abdomen: soft, non-tender, bowel sounds active  Extremities: no edema  Neurologic: not done        Assessment / Acute Cardiac Problems:   1. Acute CVA s/p tPA and mechanical thrombectomy of left MCA thrombus  2 Elevated troponin likely NSTEMI type I.  3.  Severe MVD - medical management  4. 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 New onset Afib with RVR, ChadsVasc of 6, on heparin and amiodarone. Converted to NSR. 6. DNR-CCA. Plan of Treatment:   1. Continue current medical therapy, amio and heparin drip. 2. Will transition to PO amiodarone tomorrow. 3. Discuss with neurology regarding restarting plavix and anticoagulation with Eliquis   4. Code status discussion as per palliative care. Will continue to follow. Verónica Levi MD       Cardiovascular Fellow PGY-4  5/13/2022, 8:09 AM       Attending Physician Statement  I have discussed the case of Darleen Jaramillo including pertinent history and exam findings with the resident. I have seen and examined the patient and the key elements of the encounter have been performed by me. I agree with the assessment, plan and orders as documented by the resident With changes made to the note.      Electronically signed by Dolly Andrade MD on 5/13/2022 at 1:53 PM.    [facility]      [phone]

## 2022-05-13 NOTE — PROGRESS NOTES
Physical Therapy        Physical Therapy Cancel Note      DATE: 2022    NAME: Sindi Green  MRN: [id number]   : 1934      Patient not seen this date for Physical Therapy due to:    Testing: Pt off the floor for CT.  Will check back       Electronically signed by Martin Steele PTA on 2022 at 2:30 PM

## 2022-05-13 NOTE — PROGRESS NOTES
[facility]  Internal Medicine Teaching Residency Program  Inpatient Daily Progress Note  ______________________________________________________________________________    Patient: Varsha Sparks  YOB: 1934   CDX:[id number]    Acct: [de-identified]     Room: [address] [place] [address]  Admit date: 5/6/2022  Today's date: 05/13/22  Number of days in the hospital: 6    SUBJECTIVE   Admitting Diagnosis: Cerebrovascular accident (CVA) ([facility]  CC: Right-sided weakness and speech difficulty    - Pt examined at bedside. Chart & results reviewed. - No acute events overnight  - Patient resting comfortably in bed this morning  - Alert and oriented x3  - Heart rate controlled > NSR  - WBC 7  - Hgb stable  - Patient has been weaned off the phenylephrine and no longer requiring pressor support  - Afebrile  - Vital signs stable    ROS:  Constitutional:  negative for chills, fevers, sweats  Respiratory:  negative for cough, dyspnea on exertion, hemoptysis, shortness of breath, wheezing  Cardiovascular:  negative for chest pain, chest pressure/discomfort, lower extremity edema, palpitations  Gastrointestinal:  negative for abdominal pain, constipation, diarrhea, nausea, vomiting  Neurological:  negative for dizziness, headache    BRIEF HISTORY     Franklin Furnaceraulito Ames a 80 y. o. male with no significant PMH and not presently on AC who presented via EMS after being noted to have acute onset right sided weakness and speech difficulty. Wife reports he fell asleep on the recliner at 9:30pm and woke up around 10om and couldn't get up from the chair. Family also noted right sided weakness. EMS was called and activated stroke alert. Patient was evaluated by mobile stroke unit.  He was noted to have aphasia, dysarthria, and right arm and leg weakness.     LKW: 9:30PM  NIH: 14     Non contrast CT Head obtained.  Negative for any acute bleed  Was given tPa bolus dose at 2311 and infusion started [address] Course:   CTA head and neck: Showed left M1 occlusion.  Patient was taken for mechanical thrombectomy for M1 occlusion. Patient was given 600 mg aspirin x1.  Transfer to neuro ICU for further management. Pt found to have hemorrhagic conversion.  Plan for repeat CT head in 24 hours.  Cardiology planning for cardiac cath.  No heparin at this time.  We will continue with Lovenox. OBJECTIVE     Vital Signs:  /66   Pulse 68   Temp 98.1 °F (36.7 °C) (Oral)   Resp 20   Ht 5' 9\" (1.753 m)   Wt 151 lb 0.2 oz (68.5 kg)   SpO2 97%   BMI 22.30 kg/m²     Temp (24hrs), Av.1 °F (36.7 °C), Min:97.7 °F (36.5 °C), Max:98.3 °F (36.8 °C)    In: 535.6   Out: -     Physical Exam:  Physical Exam  Vitals and nursing note reviewed. Constitutional:       Appearance: Normal appearance. HENT:      Head: Normocephalic and atraumatic. Nose: Nose normal.      Mouth/Throat:      Mouth: Mucous membranes are moist.      Pharynx: Oropharynx is clear. Eyes:      Extraocular Movements: Extraocular movements intact. Conjunctiva/sclera: Conjunctivae normal.      Pupils: Pupils are equal, round, and reactive to light. Cardiovascular:      Rate and Rhythm: Normal rate and regular rhythm. Pulses: Normal pulses. Heart sounds: Normal heart sounds. No murmur heard. No friction rub. No gallop. Pulmonary:      Effort: Pulmonary effort is normal. No respiratory distress. Breath sounds: Normal breath sounds. No stridor. No wheezing, rhonchi or rales. Chest:      Chest wall: No tenderness. Abdominal:      General: Abdomen is flat. Bowel sounds are normal. There is no distension. Palpations: Abdomen is soft. There is no mass. Tenderness: There is no abdominal tenderness. There is no guarding or rebound. Hernia: No hernia is present. Musculoskeletal:         General: No swelling, tenderness, deformity or signs of injury. Normal range of motion.       Cervical back: Normal range of motion. Right lower leg: No edema. Left lower leg: No edema. Skin:     General: Skin is warm. Neurological:      General: No focal deficit present. Mental Status: He is alert and oriented to person, place, and time. Mental status is at baseline. Psychiatric:         Mood and Affect: Mood normal.         Behavior: Behavior normal.         Thought Content:  Thought content normal.         Judgment: Judgment normal.           Medications:  Scheduled Medications:    digoxin  250 mcg IntraVENous Once    metoprolol  2.5 mg IntraVENous Once    lactated ringers bolus  250 mL IntraVENous Once    midodrine  10 mg Oral TID WC    sodium chloride flush  5-40 mL IntraVENous 2 times per day    [Held by provider] clopidogrel  75 mg Oral Daily    metoprolol tartrate  25 mg Oral BID    aspirin  81 mg Oral Daily    lisinopril  5 mg Oral Daily    tamsulosin  0.4 mg Oral Daily    atorvastatin  80 mg Oral Nightly     Continuous Infusions:    amiodarone 0.5 mg/min (05/12/22 2300)    heparin (PORCINE) Infusion 16 Units/kg/hr (05/12/22 2300)    phenylephrine (ANDREW-SYNEPHRINE) 50mg/250mL infusion Stopped (05/12/22 2128)    sodium chloride       PRN Medicationssodium chloride flush, 5-40 mL, PRN  sodium chloride, , PRN  acetaminophen, 650 mg, Q4H PRN  ondansetron, 4 mg, Q8H PRN   Or  ondansetron, 4 mg, Q6H PRN  polyethylene glycol, 17 g, Daily PRN  labetalol, 10 mg, Q4H PRN  acetaminophen, 650 mg, Q4H PRN  hydrALAZINE, 10 mg, Q6H PRN        Diagnostic Labs:  CBC:   Recent Labs     05/11/22  0705 05/12/22  0624 05/12/22  1319   WBC 7.3 1.8* 8.2   RBC 3.70* 3.41* 3.54*   HGB 11.8* 11.0* 11.4*   HCT 34.3* 31.6* 32.9*   MCV 92.7 92.7 92.9   RDW 12.4 12.7 12.7   PLT See Reflexed IPF Result See Reflexed IPF Result 136*     BMP:   Recent Labs     05/10/22  0613 05/11/22  0705 05/12/22  0624    140 140   K 3.9 3.5* 3.9    106 107   CO2 17* 20 18*   BUN 17 13 20   CREATININE 0.79 0.71 0.85 5/6/2022  Motion degraded exam. No acute hemorrhage given limitation. No midline shift. Slight relative decreased attenuation of the right temporal lobe with overall suboptimal evaluation for gray-white differentiation due to motion artifact. Slight relative decreased attenuation of the left temporal lobe anteriorly. Correlate with presenting symptoms. CT BRAIN PERFUSION    Result Date: 5/7/2022  1. No acute intracranial abnormality. 2. Left MCA territory perfusion mismatch with ratio 11.1 and mismatch volume 142 mL. 3. 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 4. 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 5. No significant stenosis or occlusion of the cervical carotid arteries. 6. Thyroid nodules measuring up to 1.3 cm. No further imaging follow-up is required. Critical results were called by Dr. Cindy Aviles MD to Dr. Zuhair isbell On 5/7/2022 at 00:25. CTA HEAD NECK W CONTRAST    Result Date: 5/7/2022  1. No acute intracranial abnormality. 2. Left MCA territory perfusion mismatch with ratio 11.1 and mismatch volume 142 mL. 3. 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 4. 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 5. No significant stenosis or occlusion of the cervical carotid arteries. 6. Thyroid nodules measuring up to 1.3 cm. No further imaging follow-up is required. Critical results were called by Dr. Cindy Aviles MD to Dr. Zuhair isbell On 5/7/2022 at 00:25. MRI BRAIN WO CONTRAST    Result Date: 5/7/2022  1. Acute infarction in the left anterior temporal lobe and left basal ganglia, consistent with MCA occlusion. 2. Punctate foci of acute infarction in the right frontal cortex. 3. Mild microangiopathic change.        ASSESSMENT & PLAN     Assessment and Plan:    Principal Problem:    Cerebrovascular accident (CVA) ([address]  Active Problems:    Acute ischemic stroke ([address]    Altered mental status    CHF (congestive heart failure) ([facility] [address]    NSTEMI (non-ST elevated myocardial infarction) ([facility] [address] [facility]    Intracranial bleed (HCC)    Right sided weakness    New onset atrial fibrillation (HCC)    Leukopenia    Neutropenia ([facility] [address]    Encounter for palliative care  Resolved Problems:    * No resolved hospital problems. *      Left MCA infarct with left M1 occlusion status post mechanical thrombectomy  - CT of the head shows evolving left anterior, left temporal lobe infarction with small hemorrhagic conversion.  Repeat CT scan reviewed, stable. - Continue with aspirin  - Continue with Lipitor 80 mg  - Heparin restarted > neurology aware planning to hold Plavix and aspirin from tomorrow  - Plavix 75mg > hold per neurology  - Repeat CT head showing left temporal lobe infarct anteriorly with similar superimposed areas of hemorrhage     Leukopenia / neutropenia - resolved > possible lab error?  - Repeat WBC  - Peripheral smear > pending  - Respiratory viral panel > no viral infection detected     Atrial fibrillation with RVR versus SVT   - Attempted vagal maneuvers were mildly successful  - Patient received Lopressor, total of 750 mL of bolus and digoxin. On amnio drip  - Heart rate controlled  - Heparin drip  - Plans for transfer to CVICU  - Cardiology following      Demand ischemia secondary to tachycardia leading to ST elevations in inferior leads  - Troponins 770 > trending down 613 > 482  - Patient denies any chest pain  - Cardiac cath showing MV disease   - Transferred to CVICU for further management Systolic congestive heart failure  - Echocardiogram showing dilated left ventricle with severely reduced systolic function.  EF of 13%.  Moderate mitral regurgitation.  Negative bubble study.  - Cardiology following   - Possible transfer out of CVICU     Moderate mitral regurgitation     Diet: Regular diet.   DVT ppx : Plavix and heparin  GI ppx: Not indicated     PT/OT/SW: Consulted  Discharge Planning: PM&R consulted, plan for discharge to acute rehab, in process.           Delmi Randhawa MD  Internal Medicine Resident, PGY-1  [address], [place]   12:08 AM 5/13/2022     Please note that part of this chart was generated using voice recognition dictation software. Although every effort was made to ensure the accuracy of this automated transcription, some errors in transcription may have occurred.

## 2022-05-13 NOTE — PLAN OF CARE
Pt denies any pain or chest pain overnight. Rigoberto gtt stopped at 2100. Pt maintained adequate bp. Pt converted to nsr at 2145 until 0345 & began to flip back and forth between afib and nsr. Cardiology aware. Pt only oriented to self, no new changes in neuro status. Heparin gtt continued and followed with protocol.

## 2022-05-13 NOTE — PROGRESS NOTES
Endovascular Neurosurgery Progress Note    SUBJECTIVE:   No reported events overnight. This am pt awake. Pt has no acute complaints. Review of Systems:  CONSTITUTIONAL:  negative for fevers, chills, fatigue and malaise    EYES:  negative for double vision, blurred vision and photophobia     HEENT:  negative for tinnitus, epistaxis and sore throat    RESPIRATORY:  negative for cough, shortness of breath, wheezing    CARDIOVASCULAR:  negative for chest pain, palpitations, syncope, edema    GASTROINTESTINAL:  negative for nausea, vomiting    GENITOURINARY:  negative for incontinence    MUSCULOSKELETAL:  negative for neck or back pain    NEUROLOGICAL:  Negative for weakness and tingling  negative for headaches and dizziness    PSYCHIATRIC:  negative for anxiety      Review of systems otherwise negative. OBJECTIVE:     Vitals:    22 1700   BP: (!) 124/108   Pulse: 82   Resp: 19   Temp:    SpO2:         General:  Gen: normal habitus, NAD  HEENT: NCAT, mucosa moist  Cvs: RRR, S1 S2 normal  Resp: symmetric unlabored breathing  Abd: s/nd/nt  Ext: no edema  Skin: no lesions seen, warm and dry    Neuro:   Gen: awake, oriented to self and month, not to place. Lang/speech: Mild aphasia, naming impaired, follows commands, no dysarthria. Follows commands  CN: PERRL, EOMI, VFF, V1-3 intact, face symmetric, hearing intact  Motor: at least 3/5 all 4 ext. Sense: LT intact all 4 ext. Coord: impaired on right UE and LE  DTR: deferred  Gait: deferred    NIH Stroke Scale:   1a  Level of consciousness: 0   1b. LOC questions:  0   1c. LOC commands: 0   2. Best Gaze: 0 - normal   3. Visual: 0 - no visual loss   4. Facial Palsy: 0 - normal symmetric movement   5a. Motor left arm: 0   5b. Motor right arm: 0   6a. Motor left le   6b  Motor right le   7. Limb Ataxia: 0 - absent   8. Sensory: 0   9. Best Language:  1   10. Dysarthria: 0   11.  Extinction and Inattention: 0         Total:   1     MRS: 3    LABS: Reviewed. Lab Results   Component Value Date    HGB 11.1 (L) 05/13/2022    WBC 7.0 05/13/2022    PLT See Reflexed IPF Result 05/13/2022     (L) 05/13/2022    BUN 20 05/13/2022    CREATININE 0.86 05/13/2022    MG 3.3 (H) 05/12/2022    APTT 47.0 (H) 05/13/2022    INR 1.1 05/07/2022      Lab Results   Component Value Date    COVID19 Not Detected 05/12/2022       RADIOLOGY:   Images were personally reviewed including:   CT head wo con 5/11/2022  Left temporal lobe infarct anteriorly with similar superimposed areas of   hemorrhage. MRI Brain 05/08/2022:   1. Acute infarction in the left anterior temporal lobe and left basal   ganglia, consistent with MCA occlusion. 2. Punctate foci of acute infarction in the right frontal cortex. 3. Mild microangiopathic change. IR 5/7/2022:   1. Acute Left MCA proximal M-1 occlusion TICI score 0   2. The above lesion was treated with 8 fr short sheath, flow gate bgc, prowler 21 microcatheter, embo trap 5 mm x 37 mm stent retriever first pass. Final reperfusion score TICI 03    TTE 5/6/2022: EF 10%    ASSESSMENT:   81 y/o M with pmh significant for LEXII presented 5/7/2022 with left M1 acute occlusion s/p tPA and MT TICI 03, first pass. 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 Workup shows HF EF 10%    Pt is DNR CCA. Pt has residual mild exp aphasia. MRI 5/9/2022 shows L MCA stroke and punctate hemo conversion. Cardiac cath 5/10/2022 showed complete occlusion all 3 cardiac arteries with collateralization. CT head 5/11/2022 stable. New afib rvr and nstemi 5/12/2022. PLAN:   --c/w ASA 81. Ok to d/c plavix when ac is started. --hep gtt as per cardio. Plan switch to oral ac, eliquis ok. --continue lipitor 80  ---140  --stable from endovascular perspective for discharge. Pending cardiac issues. --F/up with Dr. Juani Duke in 2 weeks after discharge and f/up with Dr. Toby Vázquez in 3 months     Case discussed with Dr. Toby Vázquez attending.     Grecia Schmidt, MD  Stroke, [facility]  [address] [place]  Electronically signed 5/13/2022 at 5:54 PM

## 2022-05-13 NOTE — PROGRESS NOTES
Endovascular Neurosurgery Progress Note    SUBJECTIVE:   No reported events overnight. This am pt awake. Pt has no acute complaints. Review of Systems:  CONSTITUTIONAL:  negative for fevers, chills, fatigue and malaise    EYES:  negative for double vision, blurred vision and photophobia     HEENT:  negative for tinnitus, epistaxis and sore throat    RESPIRATORY:  negative for cough, shortness of breath, wheezing    CARDIOVASCULAR:  negative for chest pain, palpitations, syncope, edema    GASTROINTESTINAL:  negative for nausea, vomiting    GENITOURINARY:  negative for incontinence    MUSCULOSKELETAL:  negative for neck or back pain    NEUROLOGICAL:  Negative for weakness and tingling  negative for headaches and dizziness    PSYCHIATRIC:  negative for anxiety      Review of systems otherwise negative. OBJECTIVE:     Vitals:    22   BP: (!) 114/90   Pulse: 108   Resp: 20   Temp:    SpO2: 96%        General:  Gen: normal habitus, NAD  HEENT: NCAT, mucosa moist  Cvs: RRR, S1 S2 normal  Resp: symmetric unlabored breathing  Abd: s/nd/nt  Ext: no edema  Skin: no lesions seen, warm and dry    Neuro: on phenylephrine 10, amio 0.5, hep gtt  Gen: awake, oriented to self and month, not to place. Lang/speech: Mild aphasia, naming impaired, follows commands, mild dysarthria. Follows commands  CN: PERRL, EOMI, VFF, V1-3 intact, face symmetric, hearing intact  Motor: at least 3/5 all 4 ext. Sense: LT intact all 4 ext. Coord: impaired on right UE and LE  DTR: deferred  Gait: deferred    NIH Stroke Scale:   1a  Level of consciousness: 0   1b. LOC questions:  0   1c. LOC commands: 0   2. Best Gaze: 0 - normal   3. Visual: 0 - no visual loss   4. Facial Palsy: 0 - normal symmetric movement   5a. Motor left arm: 0   5b. Motor right arm: 0   6a. Motor left le   6b  Motor right le   7. Limb Ataxia: 0 - absent   8. Sensory: 0   9. Best Language:  1   10. Dysarthria: 1   11.  Extinction and Inattention: 0 Total:   2     MRS: 3    LABS:   Reviewed. Lab Results   Component Value Date    HGB 11.4 (L) 05/12/2022    WBC 8.2 05/12/2022     (L) 05/12/2022     05/12/2022    BUN 20 05/12/2022    CREATININE 0.85 05/12/2022    MG 3.3 (H) 05/12/2022    APTT 36.7 (H) 05/12/2022    INR 1.1 05/07/2022      Lab Results   Component Value Date    COVID19 Not Detected 05/12/2022       RADIOLOGY:   Images were personally reviewed including:   CT head wo con 5/11/2022  Left temporal lobe infarct anteriorly with similar superimposed areas of   hemorrhage. MRI Brain 05/08/2022:   1. Acute infarction in the left anterior temporal lobe and left basal   ganglia, consistent with MCA occlusion. 2. Punctate foci of acute infarction in the right frontal cortex. 3. Mild microangiopathic change. IR 5/7/2022:   1. Acute Left MCA proximal M-1 occlusion TICI score 0   2. The above lesion was treated with 8 fr short sheath, flow gate bgc, prowler 21 microcatheter, embo trap 5 mm x 37 mm stent retriever first pass. Final reperfusion score TICI 03    TTE 5/6/2022: EF 10%    ASSESSMENT:   81 y/o M with pmh significant for LEXII presented 5/7/2022 with left M1 acute occlusion s/p tPA and MT TICI 03, first pass. 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 Workup shows HF EF 10%    Pt is DNR CCA. Pt has residual mild R candace weakness, mild exp aphasia and mild dysarthria. Waxing and waning mentation. MRI 5/9/2022 shows L MCA stroke and punctate hemo conversion. Cardiac cath 5/10/2022 showed complete occlusion all 3 cardiac arteries with collateralization. CT head 5/11/2022 stable. New afib rvr and nstemi 5/12/2022. Plans for cardioversion. PLAN:   --c/w ASA 81. plavix 75 held as per cardio  --hep gtt as per cardio  --continue lipitor 80  ---140  --stable from endovascular perspective for discharge. Pending cardiac issues.   --F/up with Dr. Shasta Hickman in 2 weeks after discharge and f/up with Dr. Mandy Hou in 3 months     Case discussed with Dr. Keily Wood attending.     Jude Nunez MD  Stroke, [facility]  [id number]  Electronically signed 5/12/2022 at 9:21 PM

## 2022-05-13 NOTE — PROGRESS NOTES
[facility] Neurology   [address] [place] [address]    Progress note             Date:   5/13/2022  Patient name:  Zoe Weber  Date of admission:  5/6/2022 11:29 PM  MRN:   [id number]  Account:  [de-identified]  YOB: 1934  PCP:    Vivianne Felty, MD  Room:   [address] [place] [address]  Code Status:    DNR-CCA    Chief Complaint:   Acute onset right sided weakness, dysarthria and aphasia 21:30 5/6/22  Seen by MSU given IV TPA emergent MT for left m1 occlusion   Interval hx: The Patient was seen and examined at bedside  Hemodynamically stable overnight SBP  taken off pressor support with shannan at 18:22 yesterday, HR  in last 8 hours had converted in and out of NSR with afib RVR currently sinus rhythm. Patient significantly improved overnight doing very well fully oriented to person, place, year and month. Very quick to respond and in good spirits. Patients son and daughter at bedside updated plan for CT head WO today given heparin for 24 hours and PTT 47.0 this AM around 08:00 goal 50-70. Can continue asa 81mg daily holding plavix due to concern for worsening of left temporal Hemorraghic conversion large stroke. Otherwise patient is doing exceptionally well and likely transfer out of MICU in the near future. If patient remains stable on heparin and asa 81 can likely transition  In the next 48hrs to Xarelto 20mg daily or eliquis 5mg BID pending insurance approval for whichever is cheaper for patient. Brief History of Present Illness: The patient is a 80 y.o.  male who presents 5/7/2022 around midnight via mobile stroke unit after witnessed acute onset right-sided hemiparesis, dysarthria and aphasia symptoms onset 21:30 5/6/22. Past medical history significant for psoriasis and osteoarthritis. Patient was not on antiplatelet/anticoagulation or statin therapy prior to admission.   Initial mobile stroke unit finding patient GCS of 13 E4V 3M 6, NIH of 12 due to expressive aphasia and right-sided hemiparesis. Well within tPA window no contraindications as patient received IV tPA after CT head without was unremarkable (very slight relative decreased attenuation of the right temporal lobe). CTA head and neck demonstrating acute left M1 occlusion and CT perfusion also completed demonstrating mismatch volume of 142 mL. Patient went for emergent mechanical thrombectomy TICI 0 baseline and first-pass TICI3 resolution.      -Hemoglobin A1c 6.8  -total cholesterol 133, HDL 52, LDL 71, triglycerides 50 goal     CT head without, CTA head and neck and CT brain perfusion 5/6/2022  Impression   1. No acute intracranial abnormality. 2. Left MCA territory perfusion mismatch with ratio 11.1 and mismatch volume   142 mL. 3. 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 4. 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 5. No significant stenosis or occlusion of the cervical carotid arteries. 6. Thyroid nodules measuring up to 1.3 cm.  No further imaging follow-up is   required.      MRI brain without 5/7/2022  Impression   1. Acute infarction in the left anterior temporal lobe and left basal   ganglia, consistent with MCA occlusion. 2. Punctate foci of acute infarction in the right frontal cortex. 3. Mild microangiopathic change. CT head WO 5/11/22  Impression   Left temporal lobe infarct anteriorly with similar superimposed areas of   hemorrhage. Past Medical History:     Past Medical History:   Diagnosis Date    Arthritis     Psoriasis         Past Surgical History:     Past Surgical History:   Procedure Laterality Date    PROSTATE SURGERY          Medications Prior to Admission:     Prior to Admission medications    Medication Sig Start Date End Date Taking?  Authorizing Provider   calcipotriene (DOVONEX) 0.005 % solution Apply topically 2 times daily psoriasis   Yes Historical Provider, MD   clobetasol (TEMOVATE) 0.05 % cream Apply topically 2 times daily Indications: Psoriasis Apply topically 2 times daily. Yes Historical Provider, MD   meloxicam (MOBIC) 15 MG tablet Take 15 mg by mouth daily   Yes Historical Provider, MD        Allergies:     Patient has no known allergies. Social History:     Tobacco:    reports that he has been smoking pipe. He does not have any smokeless tobacco history on file. Alcohol:      has no history on file for alcohol use. Drug Use:  has no history on file for drug use. Family History:     No family history on file. Review of Systems:     ROS:  Constitutional  Negative for fever and chills    HEENT  Negative for ear discharge, ear pain, nosebleed    Eyes  Negative for photophobia, pain and discharge    Respiratory  Negative for hemoptysis and sputum    Cardiovascular  Negative for orthopnea, claudication and PND    Gastrointestinal  Negative for abdominal pain, diarrhea, blood in stool    Musculoskeletal  Negative for joint pain, negative for myalgia    Neurology Negative for seizures, loss of consciousness   Skin  Negative for rash or itching    Endo/heme/allergies  Negative for polydipsia, environmental allergy    Psychiatric/behavioral  Negative for suicidal ideation.  Patient is not anxious        Physical Exam:   BP (!) 98/37   Pulse 65   Temp 98.4 °F (36.9 °C) (Oral)   Resp 20   Ht 5' 9\" (1.753 m)   Wt 151 lb 7.3 oz (68.7 kg)   SpO2 100%   BMI 22.37 kg/m²   Temp (24hrs), Av.2 °F (36.8 °C), Min:97.7 °F (36.5 °C), Max:98.4 °F (36.9 °C)    Recent Labs     22  0549   POCGLU 125*       Intake/Output Summary (Last 24 hours) at 2022 1200  Last data filed at 2022 1600  Gross per 24 hour   Intake 521.31 ml   Output --   Net 521.31 ml         NEUROLOGIC EXAMINATION  GENERAL  Appears comfortable and in no distress   HEENT  NC/ AT   NECK  Supple and no bruits heard   MENTAL STATUS:  Alert, oriented X4, minimal MCI likely at baseline , no confusion, mild expressive aphasia mainly difficulty in repetition, speech, normal language, no hallucination or delusion   CRANIAL NERVES: II     -      Visual fields intact to confrontation  III,IV,VI -  EOMs full, no afferent defect, no EDILIA, no ptosis  V     -     Normal facial sensation  VII    -     Normal facial symmetry  VIII   -     Intact hearing  IX,X -     Symmetrical palate  XI    -     Symmetrical shoulder shrug  XII   -     Midline tongue, no atrophy    MOTOR FUNCTION:  significant for good strength of grade 5/5 in bilateral proximal and distal muscle groups of both upper and lower extremities with normal bulk, normal tone and no involuntary movements, no tremor   SENSORY FUNCTION:  Normal touch, normal pin, normal vibration, normal proprioception   CEREBELLAR FUNCTION:  Intact fine motor control over upper limbs   REFLEX FUNCTION:  Symmetric, no perverted reflex, no Babinski sign   STATION and GAIT  Not tested       Investigations:      Laboratory Testing:  Recent Results (from the past 24 hour(s))   APTT    Collection Time: 05/12/22  1:19 PM   Result Value Ref Range    PTT 30.7 (H) 20.5 - 30.5 sec   Path Review, Smear    Collection Time: 05/12/22  1:19 PM   Result Value Ref Range    Pathologist Review SEE REPORT    CBC with Auto Differential    Collection Time: 05/12/22  1:19 PM   Result Value Ref Range    WBC 8.2 3.5 - 11.3 k/uL    RBC 3.54 (L) 4.21 - 5.77 m/uL    Hemoglobin 11.4 (L) 13.0 - 17.0 g/dL    Hematocrit 32.9 (L) 40.7 - 50.3 %    MCV 92.9 82.6 - 102.9 fL    MCH 32.2 25.2 - 33.5 pg    MCHC 34.7 28.4 - 34.8 g/dL    RDW 12.7 11.8 - 14.4 %    Platelets 354 (L) 211 - 453 k/uL    MPV 11.9 8.1 - 13.5 fL    NRBC Automated 0.0 0.0 per 100 WBC    Immature Granulocytes 0 0 %    Seg Neutrophils 70 (H) 36 - 66 %    Lymphocytes 15 (L) 24 - 44 %    Monocytes 14 (H) 1 - 7 %    Eosinophils % 1 1 - 4 %    Basophils 0 0 - 2 %    Absolute Immature Granulocyte 0.00 0.00 - 0.30 k/uL    Segs Absolute 5.74 1.8 - 7.7 k/uL    Absolute Lymph # 1.23 1.0 - 4.8 k/uL    Absolute Mono # 1.15 (H) 0.1 - 0.8 k/uL    Absolute Eos # 0.08 0.0 - 0.4 k/uL    Basophils Absolute 0.00 0.0 - 0.2 k/uL    Morphology Normal    Reticulocytes    Collection Time: 05/12/22  1:19 PM   Result Value Ref Range    Retic % 3.3 (H) 0.5 - 1.9 %    Absolute Retic # 0.120 (H) 0.030 - 0.080 M/uL    Immature Retic Fract 18.000 2.7 - 18.3 %    Retic Hemoglobin 37.1 (H) 28.2 - 35.7 pg   Respiratory Panel, Molecular, with COVID-19 (Restricted: peds pts or suitable admitted adults)    Collection Time: 05/12/22  2:44 PM    Specimen: Nasopharyngeal Swab   Result Value Ref Range    Specimen Description . NASOPHARYNGEAL SWAB     Adenovirus PCR Not Detected Not Detected    Coronavirus 229E PCR Not Detected Not Detected    Coronavirus HKU1 PCR Not Detected Not Detected    Coronavirus NL63 PCR Not Detected Not Detected    Coronavirus OC43 PCR Not Detected Not Detected    SARS-CoV-2, PCR Not Detected Not Detected    Human Metapneumovirus PCR Not Detected Not Detected    Rhino/Enterovirus PCR Not Detected Not Detected    Influenza A by PCR Not Detected Not Detected    Influenza B by PCR Not Detected Not Detected    Parainfluenza 1 PCR Not Detected Not Detected    Parainfluenza 2 PCR Not Detected Not Detected    Parainfluenza 3 PCR Not Detected Not Detected    Parainfluenza 4 PCR Not Detected Not Detected    Resp Syncytial Virus PCR Not Detected Not Detected    Bordetella Parapertussis Not Detected Not Detected    B Pertussis by PCR Not Detected Not Detected    Chlamydia pneumoniae By PCR Not Detected Not Detected    Mycoplasma pneumo by PCR Not Detected Not Detected   APTT    Collection Time: 05/12/22  8:04 PM   Result Value Ref Range    PTT 36.7 (H) 20.5 - 30.5 sec   CBC with Auto Differential    Collection Time: 05/13/22  2:11 AM   Result Value Ref Range    WBC 7.0 3.5 - 11.3 k/uL    RBC 3.45 (L) 4.21 - 5.77 m/uL    Hemoglobin 11.1 (L) 13.0 - 17.0 g/dL    Hematocrit 32.5 (L) 40.7 - 50.3 %    MCV 94.2 82.6 - 102.9 fL    MCH 32.2 25.2 - 33.5 pg    MCHC 34.2 28.4 - 34.8 g/dL    RDW 12.9 11.8 - 14.4 %    Platelets See Reflexed IPF Result 138 - 453 k/uL    NRBC Automated 0.0 0.0 per 100 WBC    Immature Granulocytes 0 0 %    Seg Neutrophils 51 36 - 66 %    Lymphocytes 25 24 - 44 %    Monocytes 19 (H) 1 - 7 %    Eosinophils % 5 (H) 1 - 4 %    Basophils 0 0 - 2 %    Absolute Immature Granulocyte 0.00 0.00 - 0.30 k/uL    Segs Absolute 3.57 1.8 - 7.7 k/uL    Absolute Lymph # 1.75 1.0 - 4.8 k/uL    Absolute Mono # 1.33 (H) 0.1 - 0.8 k/uL    Absolute Eos # 0.35 0.0 - 0.4 k/uL    Basophils Absolute 0.00 0.0 - 0.2 k/uL    Morphology Normal    Basic Metabolic Panel    Collection Time: 05/13/22  2:11 AM   Result Value Ref Range    Glucose 122 (H) 70 - 99 mg/dL    BUN 20 8 - 23 mg/dL    CREATININE 0.86 0.70 - 1.20 mg/dL    Calcium 8.3 (L) 8.6 - 10.4 mg/dL    Sodium 134 (L) 135 - 144 mmol/L    Potassium 4.0 3.7 - 5.3 mmol/L    Chloride 101 98 - 107 mmol/L    CO2 23 20 - 31 mmol/L    Anion Gap 10 9 - 17 mmol/L    GFR Non-African American >60 >60 mL/min    GFR African American >60 >60 mL/min    GFR Comment         APTT    Collection Time: 05/13/22  2:11 AM   Result Value Ref Range    PTT 38.1 (H) 20.5 - 30.5 sec   Immature Platelet Fraction    Collection Time: 05/13/22  2:11 AM   Result Value Ref Range    Platelet, Immature Fraction 5.1 1.1 - 10.3 %    Platelet, Fluorescence 124 (L) 138 - 453 k/uL   APTT    Collection Time: 05/13/22  8:02 AM   Result Value Ref Range    PTT 47.0 (H) 20.5 - 30.5 sec         Assessment :      Primary Problem  Cerebrovascular accident (CVA) [facility])    Active Hospital Problems    Diagnosis Date Noted    New onset atrial fibrillation ([address] [I48.91] 05/12/2022     Priority: Medium    Leukopenia [D72.819] 05/12/2022     Priority: Medium    Neutropenia ([facility] [D70.9] 05/12/2022     Priority: Medium    Encounter for palliative care [Z51.5]      Priority: Medium    Intracranial bleed ([facility] [I62.9] Priority: Medium    Right sided weakness [R53.1]      Priority: Medium    CHF (congestive heart failure) ([facility] [I50.9] 05/08/2022     Priority: Medium    NSTEMI (non-ST elevated myocardial infarction) ([facility] [I21.4] 05/08/2022     Priority: Medium    Altered mental status [R41.82]      Priority: Medium    Cerebrovascular accident (CVA) ([facility] [I63.9] 05/07/2022     Priority: Medium    Acute ischemic stroke ([facility] [I63.9] 05/07/2022     Priority: Medium       Plan:   Acute left M1 embolic stroke s/p IV TPA and Mechanical thrombectomy 1st pass TICI 3  -Continue aspirin 81 mg daily  -Recommend holding Plavix 75 mg due to concern for triple therapy and high risk of worsening hemorrhagic conversion left temporal stroke  -Started on heparin drip low intensity around 7:50 AM  -Follow PTT closely Q6 hours next ptt planned for noon today   -PTT 47.0 at 08:02 AM 5/13/2 repeat CT head Now      A-FIB RVR Acute STEMI   -ST elevations in the inferior leads per cardiology 10:30AM 5/12/22  -transfer from neuro step down to Cardiac ICU  -low intensity heparin drip no bolus PTT goal 50-70   -Started amiodarone   -Lopressor and 1 dose of dig 250mcg  -Dr Monika Meraz reviewed patient multi vessel disease 100% RCA  Not amenable to revascularization or PCI     Episode of SVT   -given two doses of adenosine . 3 by rapid response team      Guarded long term prognosis given acute left temporal stroke with Hemorrhagic conversion and worsening STEMI with unstable arrhythmias. Very high risk for catastrophic brain bleeding if given any AC bolus and do not recommend Triple therapy also due to above concern.      Recommend Palliative care team consult and discussion with patient and family   Continue PT/OT/ST as appropriate if stable           Follow-up further recommendations after discussing the case with attending  The plan was discussed with the patient, patient's family and the medical staff.    Consultations:   IP CONSULT TO NEUROCRITICAL CARE  IP CONSULT TO STROKE TEAM  IP CONSULT TO PHYSICAL MEDICINE REHAB  IP CONSULT TO PHARMACY  PHARMACY TO CHANGE BASE FLUIDS  IP CONSULT TO IV TEAM  IP CONSULT TO CARDIOLOGY  IP CONSULT TO PHYSICAL MEDICINE REHAB  IP CONSULT TO PALLIATIVE CARE  IP CONSULT TO PALLIATIVE CARE    Patient is admitted as inpatient status because of co-morbidities listed above, severity of signs and symptoms as outlined, requirement for current medical therapies and most importantly because of direct risk to patient if care not provided in a hospital setting.     Faina Delgadillo MD   PGY-3 Neurology Resident   5/13/2022  12:00 PM    Copy sent to Dr. Huber Noriega MD

## 2022-05-13 NOTE — PATIENT CARE CONFERENCE
Palliative Care Family Conference    Patient: Jasmeet Ly  Room: 9977/8426-47    Attended By: Dr. Kevin Gray care attending, student on palliative care rotation, patient's wife [place] and other family members    Reason for meeting: Follow-up to prior meeting  Disease progression  Discuss goals of care  Treatment options/plans  Provide clinical updates and answer questions  Share information and provider education for the family  Listen to patient/family concerns  Assess family understanding, concerns, and coping  Provide emotional support to the family  Elicit patient's goals and values, and use these to establish or modify goals of care  Other major care decisions     Conference Summary:   The face-to-face family meeting was held in patient's room on the first floor in the cardiology unit  The family meeting was conducted by me and attended by student on palliative care rotation, patient's wife [place] and other family members  I discussed patient's current medical conditions with [place] she stated that patient reactivated today, he has been unresponsive and more alert and able to recognize family members  I discussed with [place] that patient has a very weak heart and is EF is 30%  I explained to [place] that patient is on continuous heparin drip and amiodarone for the new onset A. fib to prevent further embolic stroke but patient can have chances of bleeding due to the anticoagulation he is getting  [place] stated that she does understand the risk for both but she wants the patient to be given chance for recovery and wants to continue all the treatments  I explained the different types of codes to [place] and she stated that she would want the patient's CODE STATUS to remain as DNR CCA with no intubation but does want treatments  [place] told that she wants the patient to go to nursing home facility for rehab and to regain strength  I explained to [place] that she needs that patient's case manager will be looking into the facilities where she would want the patient to be discharged  I offered comfort and emotional support to the patient and family      Conclusion/Plan:      The total time I spent face-to-face family meeting discussing goals of care, CODE STATUS discussion, advanced directives, greater than 50% time in counseling, comfort care and other major issues was more than 30 minutes  The note has been dictated by dragon, typing errors may be a possibility.          Electronically signed by   Debbie Lucero MD  Palliative Care Team  on 5/13/2022 at 1:36 PM

## 2022-05-14 LAB
ABSOLUTE EOS #: 0.12 K/UL (ref 0–0.44)
ABSOLUTE IMMATURE GRANULOCYTE: 0.3 K/UL (ref 0–0.3)
ABSOLUTE LYMPH #: 1.7 K/UL (ref 1.1–3.7)
ABSOLUTE MONO #: 1.1 K/UL (ref 0.1–1.2)
ANION GAP SERPL CALCULATED.3IONS-SCNC: 8 MMOL/L (ref 9–17)
BASOPHILS # BLD: 0 % (ref 0–2)
BASOPHILS ABSOLUTE: <0.03 K/UL (ref 0–0.2)
BUN BLDV-MCNC: 14 MG/DL (ref 8–23)
CALCIUM SERPL-MCNC: 8.1 MG/DL (ref 8.6–10.4)
CHLORIDE BLD-SCNC: 105 MMOL/L (ref 98–107)
CO2: 21 MMOL/L (ref 20–31)
CREAT SERPL-MCNC: 0.74 MG/DL (ref 0.7–1.2)
EOSINOPHILS RELATIVE PERCENT: 2 % (ref 1–4)
GFR AFRICAN AMERICAN: >60 ML/MIN
GFR NON-AFRICAN AMERICAN: >60 ML/MIN
GFR SERPL CREATININE-BSD FRML MDRD: ABNORMAL ML/MIN/{1.73_M2}
GLUCOSE BLD-MCNC: 118 MG/DL (ref 70–99)
HCT VFR BLD CALC: 31.3 % (ref 40.7–50.3)
HEMOGLOBIN: 10.6 G/DL (ref 13–17)
IMMATURE GRANULOCYTES: 5 %
LYMPHOCYTES # BLD: 28 % (ref 24–43)
MCH RBC QN AUTO: 31.9 PG (ref 25.2–33.5)
MCHC RBC AUTO-ENTMCNC: 33.9 G/DL (ref 28.4–34.8)
MCV RBC AUTO: 94.3 FL (ref 82.6–102.9)
MONOCYTES # BLD: 18 % (ref 3–12)
NRBC AUTOMATED: 0 PER 100 WBC
PARTIAL THROMBOPLASTIN TIME: 63 SEC (ref 20.5–30.5)
PDW BLD-RTO: 12.6 % (ref 11.8–14.4)
PLATELET # BLD: ABNORMAL K/UL (ref 138–453)
PLATELET, FLUORESCENCE: 116 K/UL (ref 138–453)
PLATELET, IMMATURE FRACTION: 5.2 % (ref 1.1–10.3)
POTASSIUM SERPL-SCNC: 3.5 MMOL/L (ref 3.7–5.3)
RBC # BLD: 3.32 M/UL (ref 4.21–5.77)
SEG NEUTROPHILS: 48 % (ref 36–65)
SEGMENTED NEUTROPHILS ABSOLUTE COUNT: 2.94 K/UL (ref 1.5–8.1)
SODIUM BLD-SCNC: 134 MMOL/L (ref 135–144)
WBC # BLD: 6.2 K/UL (ref 3.5–11.3)

## 2022-05-14 PROCEDURE — 6370000000 HC RX 637 (ALT 250 FOR IP): Performed by: NURSE PRACTITIONER

## 2022-05-14 PROCEDURE — 6370000000 HC RX 637 (ALT 250 FOR IP): Performed by: PHYSICAL MEDICINE & REHABILITATION

## 2022-05-14 PROCEDURE — 6370000000 HC RX 637 (ALT 250 FOR IP)

## 2022-05-14 PROCEDURE — 85055 RETICULATED PLATELET ASSAY: CPT

## 2022-05-14 PROCEDURE — 85730 THROMBOPLASTIN TIME PARTIAL: CPT

## 2022-05-14 PROCEDURE — 6370000000 HC RX 637 (ALT 250 FOR IP): Performed by: STUDENT IN AN ORGANIZED HEALTH CARE EDUCATION/TRAINING PROGRAM

## 2022-05-14 PROCEDURE — 2580000003 HC RX 258: Performed by: STUDENT IN AN ORGANIZED HEALTH CARE EDUCATION/TRAINING PROGRAM

## 2022-05-14 PROCEDURE — 99232 SBSQ HOSP IP/OBS MODERATE 35: CPT | Performed by: INTERNAL MEDICINE

## 2022-05-14 PROCEDURE — 99232 SBSQ HOSP IP/OBS MODERATE 35: CPT | Performed by: PSYCHIATRY & NEUROLOGY

## 2022-05-14 PROCEDURE — 85025 COMPLETE CBC W/AUTO DIFF WBC: CPT

## 2022-05-14 PROCEDURE — 97110 THERAPEUTIC EXERCISES: CPT

## 2022-05-14 PROCEDURE — 2000000000 HC ICU R&B

## 2022-05-14 PROCEDURE — 99233 SBSQ HOSP IP/OBS HIGH 50: CPT | Performed by: PSYCHIATRY & NEUROLOGY

## 2022-05-14 PROCEDURE — 36415 COLL VENOUS BLD VENIPUNCTURE: CPT

## 2022-05-14 PROCEDURE — 6370000000 HC RX 637 (ALT 250 FOR IP): Performed by: SURGERY

## 2022-05-14 PROCEDURE — 80048 BASIC METABOLIC PNL TOTAL CA: CPT

## 2022-05-14 PROCEDURE — 97116 GAIT TRAINING THERAPY: CPT

## 2022-05-14 PROCEDURE — 6360000002 HC RX W HCPCS

## 2022-05-14 PROCEDURE — 6370000000 HC RX 637 (ALT 250 FOR IP): Performed by: PSYCHIATRY & NEUROLOGY

## 2022-05-14 RX ORDER — HEPARIN SODIUM 10000 [USP'U]/100ML
INJECTION, SOLUTION INTRAVENOUS
Status: COMPLETED
Start: 2022-05-14 | End: 2022-05-14

## 2022-05-14 RX ORDER — AMIODARONE HYDROCHLORIDE 200 MG/1
200 TABLET ORAL 2 TIMES DAILY
Status: DISCONTINUED | OUTPATIENT
Start: 2022-05-14 | End: 2022-05-18 | Stop reason: HOSPADM

## 2022-05-14 RX ORDER — HEPARIN SODIUM AND DEXTROSE 10000; 5 [USP'U]/100ML; G/100ML
5-30 INJECTION INTRAVENOUS CONTINUOUS
Status: DISCONTINUED | OUTPATIENT
Start: 2022-05-14 | End: 2022-05-14

## 2022-05-14 RX ORDER — LACTOBACILLUS RHAMNOSUS GG 10B CELL
1 CAPSULE ORAL
Status: DISCONTINUED | OUTPATIENT
Start: 2022-05-15 | End: 2022-05-18 | Stop reason: HOSPADM

## 2022-05-14 RX ORDER — LISINOPRIL 5 MG/1
5 TABLET ORAL DAILY
Qty: 30 TABLET | Refills: 3 | Status: CANCELLED | OUTPATIENT
Start: 2022-05-14

## 2022-05-14 RX ADMIN — MIDODRINE HYDROCHLORIDE 10 MG: 5 TABLET ORAL at 08:37

## 2022-05-14 RX ADMIN — MIDODRINE HYDROCHLORIDE 10 MG: 5 TABLET ORAL at 12:03

## 2022-05-14 RX ADMIN — METOPROLOL TARTRATE 25 MG: 25 TABLET ORAL at 08:46

## 2022-05-14 RX ADMIN — HEPARIN SODIUM AND DEXTROSE 20 UNITS/KG/HR: 10000; 5 INJECTION INTRAVENOUS at 02:04

## 2022-05-14 RX ADMIN — TAMSULOSIN HYDROCHLORIDE 0.4 MG: 0.4 CAPSULE ORAL at 08:46

## 2022-05-14 RX ADMIN — APIXABAN 5 MG: 5 TABLET, FILM COATED ORAL at 13:59

## 2022-05-14 RX ADMIN — MIDODRINE HYDROCHLORIDE 10 MG: 5 TABLET ORAL at 17:48

## 2022-05-14 RX ADMIN — APIXABAN 5 MG: 5 TABLET, FILM COATED ORAL at 20:35

## 2022-05-14 RX ADMIN — METOPROLOL TARTRATE 25 MG: 25 TABLET ORAL at 20:34

## 2022-05-14 RX ADMIN — SODIUM CHLORIDE, PRESERVATIVE FREE 10 ML: 5 INJECTION INTRAVENOUS at 20:35

## 2022-05-14 RX ADMIN — LISINOPRIL 5 MG: 5 TABLET ORAL at 08:46

## 2022-05-14 RX ADMIN — AMIODARONE HYDROCHLORIDE 200 MG: 200 TABLET ORAL at 07:10

## 2022-05-14 RX ADMIN — ASPIRIN 81 MG: 81 TABLET, CHEWABLE ORAL at 08:45

## 2022-05-14 RX ADMIN — ATORVASTATIN CALCIUM 80 MG: 80 TABLET, FILM COATED ORAL at 20:34

## 2022-05-14 RX ADMIN — AMIODARONE HYDROCHLORIDE 200 MG: 200 TABLET ORAL at 20:35

## 2022-05-14 RX ADMIN — SODIUM CHLORIDE, PRESERVATIVE FREE 10 ML: 5 INJECTION INTRAVENOUS at 08:46

## 2022-05-14 RX ADMIN — POTASSIUM BICARBONATE 40 MEQ: 782 TABLET, EFFERVESCENT ORAL at 10:08

## 2022-05-14 ASSESSMENT — PAIN SCALES - GENERAL
PAINLEVEL_OUTOF10: 0

## 2022-05-14 NOTE — PROGRESS NOTES
[facility]  Internal Medicine Teaching Residency Program  Inpatient Daily Progress Note  ______________________________________________________________________________    Patient: Marek Flores  YOB: 1934   MNF:[id number]    Acct: [de-identified]     Room: [address] [place] [address]  Admit date: 5/6/2022  Today's date: 05/14/22  Number of days in the hospital: 7    SUBJECTIVE   Admitting Diagnosis: Cerebrovascular accident (CVA) ([address]  CC: Right-sided weakness and speech difficulty    - Pt examined at bedside. Chart & results reviewed. - No acute events overnight  - Patient resting comfortably in bed this  - Normal sinus rhythm  - Fluctuating mentation > A&O x1  -Transition to PO Amiodarone   - CT head improved with no new focal areas of hemorrhage  - Afebrile  - Vital signs stable    Plan for today:  - Hold Plavix   - Continue medical management   - Awaiting placement     ROS:  Constitutional:  negative for chills, fevers, sweats  Respiratory:  negative for cough, dyspnea on exertion, hemoptysis, shortness of breath, wheezing  Cardiovascular:  negative for chest pain, chest pressure/discomfort, lower extremity edema, palpitations  Gastrointestinal:  negative for abdominal pain, constipation, diarrhea, nausea, vomiting  Neurological:  negative for dizziness, headache    BRIEF HISTORY     Aliza Peoples a 80 y. o. male with no significant PMH and not presently on AC who presented via EMS after being noted to have acute onset right sided weakness and speech difficulty. Wife reports he fell asleep on the recliner at 9:30pm and woke up around 10om and couldn't get up from the chair. Family also noted right sided weakness. EMS was called and activated stroke alert. Patient was evaluated by mobile stroke unit.  He was noted to have aphasia, dysarthria, and right arm and leg weakness.     LKW: 9:30PM  NIH: 14     Non contrast CT Head obtained.  Negative for any acute bleed  Was given tPa bolus dose at 2311 and infusion started 2313.  [facility] Course:   CTA head and neck: Showed left M1 occlusion.  Patient was taken for mechanical thrombectomy for M1 occlusion. Patient was given 600 mg aspirin x1.  Transfer to neuro ICU for further management. Pt found to have hemorrhagic conversion.  Plan for repeat CT head in 24 hours.  Cardiology planning for cardiac cath.  No heparin at this time.  We will continue with Lovenox. OBJECTIVE     Vital Signs:  /65   Pulse 61   Temp 98.6 °F (37 °C) (Oral)   Resp 20   Ht 5' 9\" (1.753 m)   Wt 151 lb 7.3 oz (68.7 kg)   SpO2 100%   BMI 22.37 kg/m²     Temp (24hrs), Av.5 °F (36.9 °C), Min:98.3 °F (36.8 °C), Max:98.6 °F (37 °C)    In: 432.6   Out: 50 [Urine:50]    Physical Exam:  Physical Exam  Vitals and nursing note reviewed. Constitutional:       Appearance: Normal appearance. HENT:      Head: Normocephalic and atraumatic. Nose: Nose normal.      Mouth/Throat:      Mouth: Mucous membranes are moist.      Pharynx: Oropharynx is clear. Eyes:      Extraocular Movements: Extraocular movements intact. Conjunctiva/sclera: Conjunctivae normal.      Pupils: Pupils are equal, round, and reactive to light. Cardiovascular:      Rate and Rhythm: Normal rate and regular rhythm. Pulses: Normal pulses. Heart sounds: Normal heart sounds. No murmur heard. No friction rub. No gallop. Pulmonary:      Effort: Pulmonary effort is normal. No respiratory distress. Breath sounds: Normal breath sounds. No stridor. No wheezing, rhonchi or rales. Chest:      Chest wall: No tenderness. Abdominal:      General: Abdomen is flat. Bowel sounds are normal. There is no distension. Palpations: Abdomen is soft. There is no mass. Tenderness: There is no abdominal tenderness. There is no guarding or rebound. Hernia: No hernia is present.    Musculoskeletal:         General: No swelling, tenderness, deformity or signs of injury. Normal range of motion. Cervical back: Normal range of motion. Right lower leg: No edema. Left lower leg: No edema. Skin:     General: Skin is warm. Neurological:      General: No focal deficit present. Mental Status: He is alert. Mental status is at baseline. He is disoriented. Psychiatric:         Mood and Affect: Mood normal.         Behavior: Behavior normal.         Thought Content:  Thought content normal.         Judgment: Judgment normal.           Medications:  Scheduled Medications:    amiodarone  200 mg Oral BID    metoprolol  2.5 mg IntraVENous Once    lactated ringers bolus  250 mL IntraVENous Once    midodrine  10 mg Oral TID WC    sodium chloride flush  5-40 mL IntraVENous 2 times per day    [Held by provider] clopidogrel  75 mg Oral Daily    metoprolol tartrate  25 mg Oral BID    aspirin  81 mg Oral Daily    lisinopril  5 mg Oral Daily    tamsulosin  0.4 mg Oral Daily    atorvastatin  80 mg Oral Nightly     Continuous Infusions:    heparin (PORCINE) Infusion 20 Units/kg/hr (05/14/22 0204)    phenylephrine (ANDREW-SYNEPHRINE) 50mg/250mL infusion Stopped (05/12/22 2128)    sodium chloride       PRN Medicationssodium chloride flush, 5-40 mL, PRN  sodium chloride, , PRN  acetaminophen, 650 mg, Q4H PRN  ondansetron, 4 mg, Q8H PRN   Or  ondansetron, 4 mg, Q6H PRN  polyethylene glycol, 17 g, Daily PRN  labetalol, 10 mg, Q4H PRN  acetaminophen, 650 mg, Q4H PRN  hydrALAZINE, 10 mg, Q6H PRN        Diagnostic Labs:  CBC:   Recent Labs     05/12/22  1319 05/13/22 0211 05/14/22  0128   WBC 8.2 7.0 6.2   RBC 3.54* 3.45* 3.32*   HGB 11.4* 11.1* 10.6*   HCT 32.9* 32.5* 31.3*   MCV 92.9 94.2 94.3   RDW 12.7 12.9 12.6   * See Reflexed IPF Result See Reflexed IPF Result     BMP:   Recent Labs     05/12/22  0624 05/13/22 0211 05/14/22  0128    134* 134*   K 3.9 4.0 3.5*    101 105   CO2 18* 23 21   BUN 20 20 14   CREATININE 0.85 0.86 0.74     BNP: No results for input(s): BNP in the last 72 hours. PT/INR: No results for input(s): PROTIME, INR in the last 72 hours. APTT:   Recent Labs     05/13/22  0802 05/13/22  1834 05/14/22  0128   APTT 47.0* 53.6* 63.0*     CARDIAC ENZYMES: No results for input(s): CKMB, CKMBINDEX, TROPONINI in the last 72 hours. Invalid input(s): CKTOTAL;3  FASTING LIPID PANEL:  Lab Results   Component Value Date    CHOL 133 05/07/2022    HDL 52 05/07/2022    TRIG 50 05/07/2022     LIVER PROFILE: No results for input(s): AST, ALT, ALB, BILIDIR, BILITOT, ALKPHOS in the last 72 hours. MICROBIOLOGY: No results found for: CULTURE    Imaging:    CT HEAD WO CONTRAST    Result Date: 5/13/2022  1. Decreased density superimposed hemorrhagic area of left temporal lobe infarct. Findings compatible with interval improvement. 2.  Continued mass effect associated with the infarct with effacement of the brain parenchyma bordering the cisterns. 3.  No midline shift. No new areas of hemorrhage. RECOMMENDATIONS: Unavailable     CT HEAD WO CONTRAST    Result Date: 5/11/2022  Left temporal lobe infarct anteriorly with similar superimposed areas of hemorrhage. CT HEAD WO CONTRAST    Result Date: 5/9/2022  Evolving left temporal lobe infarct anteriorly with stable superimposed focus of hemorrhage. CT HEAD WO CONTRAST    Result Date: 5/8/2022  Evolving subacute ischemic infarct of the anterior left temporal lobe. Small focus of hemorrhagic conversion has not appreciably changed. Findings were discussed with BRANDIN Maldonado at 5:34 am on 5/8/2022 by Dr. Teodoro Noriega. MRI BRAIN WO CONTRAST    Result Date: 5/7/2022  1. Acute infarction in the left anterior temporal lobe and left basal ganglia, consistent with MCA occlusion. 2. Punctate foci of acute infarction in the right frontal cortex. 3. Mild microangiopathic change.        ASSESSMENT & PLAN     Assessment and Plan:    Principal Problem:    Cerebrovascular accident (CVA) ([facility]  Active Problems:    Acute ischemic stroke (HCC)    Altered mental status    CHF (congestive heart failure) (HCC)    NSTEMI (non-ST elevated myocardial infarction) ([facility]    Intracranial bleed (HCC)    Right sided weakness    New onset atrial fibrillation (HCC)    Leukopenia    Neutropenia ([facility] [address]    Encounter for palliative care  Resolved Problems:    * No resolved hospital problems. *      Left MCA infarct with left M1 occlusion status post mechanical thrombectomy  - CT of the head shows evolving left anterior, left temporal lobe infarction with small hemorrhagic conversion.  Repeat CT scan reviewed, stable.   - Continue with aspirin  - Continue with Lipitor 80 mg  - Heparin restarted > neurology aware planning to hold Plavix and aspirin from tomorrow  - Plavix 75mg > hold per neurology  - Repeat CT head improved > no new focal areas of hemorrhage      Leukopenia / neutropenia - resolved > possible lab error? > resolved   - Repeat WBC  - Peripheral smear > pending  - Respiratory viral panel > no viral infection detected     Atrial fibrillation with RVR versus SVT   - Attempted vagal maneuvers were mildly successful  - Patient received Lopressor, total of 750 mL of bolus and digoxin.  On amnio drip  - Heart rate controlled  - Heparin drip  - Cardiology following      Demand ischemia secondary to tachycardia leading to ST elevations in inferior leads  - Troponins 770 > trending down 613 > 482  - Patient denies any chest pain  - Cardiac cath showing MV disease   - Transferred to CVICU for further management Systolic congestive heart failure  - Echocardiogram showing dilated left ventricle with severely reduced systolic function.  EF of 13%.  Moderate mitral regurgitation.  Negative bubble study.  - Cardiology following      Moderate mitral regurgitation     Diet: Regular diet  DVT ppx : Heparin  GI ppx: Not indicated     PT/OT/SW: Consulted  Discharge Planning: PM&R consulted, plan for discharge to acute rehab vs SNF, in process.           Shaina Gregg MD  Internal Medicine Resident, PGY-1  [address], [place]   7:10 AM 5/14/2022     Please note that part of this chart was generated using voice recognition dictation software. Although every effort was made to ensure the accuracy of this automated transcription, some errors in transcription may have occurred.

## 2022-05-14 NOTE — PLAN OF CARE
No acute events overnight. Pt a&ox1. Amio gtt stopped at 0600.  Heparin therapeutic, ptt to be drawn in the am.

## 2022-05-14 NOTE — PROGRESS NOTES
Endovascular Neurosurgery Progress Note    SUBJECTIVE:   No reported events overnight. This am pt awake. Pt has no acute complaints. Review of Systems:  CONSTITUTIONAL:  negative for fevers, chills, fatigue and malaise    EYES:  negative for double vision, blurred vision and photophobia     HEENT:  negative for tinnitus, epistaxis and sore throat    RESPIRATORY:  negative for cough, shortness of breath, wheezing    CARDIOVASCULAR:  negative for chest pain, palpitations, syncope, edema    GASTROINTESTINAL:  negative for nausea, vomiting    GENITOURINARY:  negative for incontinence    MUSCULOSKELETAL:  negative for neck or back pain    NEUROLOGICAL:  Negative for weakness and tingling  negative for headaches and dizziness    PSYCHIATRIC:  negative for anxiety      Review of systems otherwise negative. OBJECTIVE:     Vitals:    22 1200   BP:    Pulse:    Resp:    Temp: 98.8 °F (37.1 °C)   SpO2:         General:  Gen: normal habitus, NAD  HEENT: NCAT, mucosa moist  Cvs: RRR, S1 S2 normal  Resp: symmetric unlabored breathing  Abd: s/nd/nt  Ext: no edema  Skin: no lesions seen, warm and dry    Neuro:   Gen: awake, oriented to self and month, not to place. Lang/speech: no aphasia, follows commands, no dysarthria. Follows commands  CN: PERRL, EOMI, VFF, V1-3 intact, face symmetric, hearing intact  Motor: 5/5 all 4 ext. Sense: LT intact all 4 ext. Coord: f/t intact b/l  DTR: deferred  Gait: deferred    NIH Stroke Scale:   1a  Level of consciousness: 0   1b. LOC questions:  0   1c. LOC commands: 0   2. Best Gaze: 0 - normal   3. Visual: 0 - no visual loss   4. Facial Palsy: 0 - normal symmetric movement   5a. Motor left arm: 0   5b. Motor right arm: 0   6a. Motor left le   6b  Motor right le   7. Limb Ataxia: 0 - absent   8. Sensory: 0   9. Best Language:  0   10. Dysarthria: 0   11. Extinction and Inattention: 0         Total:   0     MRS: 3    LABS:   Reviewed.   Lab Results   Component Value Date    HGB 10.6 (L) 05/14/2022    WBC 6.2 05/14/2022    PLT See Reflexed IPF Result 05/14/2022     (L) 05/14/2022    BUN 14 05/14/2022    CREATININE 0.74 05/14/2022    MG 3.3 (H) 05/12/2022    APTT 63.0 (H) 05/14/2022    INR 1.1 05/07/2022      Lab Results   Component Value Date    COVID19 Not Detected 05/12/2022       RADIOLOGY:   Images were personally reviewed including:   CT head wo con 5/11/2022  Left temporal lobe infarct anteriorly with similar superimposed areas of   hemorrhage. MRI Brain 05/08/2022:   1. Acute infarction in the left anterior temporal lobe and left basal   ganglia, consistent with MCA occlusion. 2. Punctate foci of acute infarction in the right frontal cortex. 3. Mild microangiopathic change. IR 5/7/2022:   1. Acute Left MCA proximal M-1 occlusion TICI score 0   2. The above lesion was treated with 8 fr short sheath, flow gate bgc, prowler 21 microcatheter, embo trap 5 mm x 37 mm stent retriever first pass. Final reperfusion score TICI 03    TTE 5/6/2022: EF 10%    ASSESSMENT:   81 y/o M with pmh significant for LEXII presented 5/7/2022 with left M1 acute occlusion s/p tPA and MT TICI 03, first pass. 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 Workup shows HF EF 10%    Pt is DNR CCA. Pt has residual disorientation. MRI 5/9/2022 shows L MCA stroke and punctate hemo conversion. Cardiac cath 5/10/2022 showed complete occlusion all 3 cardiac arteries with collateralization. CT head 5/11/2022 stable. New afib rvr and nstemi 5/12/2022. PLAN:   --c/w ASA 81  --continue eliquis 5 bid  --continue lipitor 80  ---140  --stable from endovascular perspective for discharge. Pending cardiac issues. --F/up with Dr. Joon Szymanski in 2 weeks after discharge and f/up with Dr. Gwyn Valerio in 3 months     Case discussed with Dr. Gwyn Valerio attending.     Oli Clemons MD  Stroke, [facility] Stroke [address] [facility] [address] [address]  Electronically signed 5/14/2022 at 2:21 PM

## 2022-05-14 NOTE — PROGRESS NOTES
NEUROLOGY INPATIENT PROGRESS NOTE    5/14/2022         Subjective: Cary Ron is a  80 y.o. male admitted on 5/6/2022 with Acute ischemic stroke [facility]) [I63.9]  Right sided weakness [R53.1]  Altered mental status, unspecified altered mental status type [R41.82]  Cerebrovascular accident (CVA), unspecified mechanism ([address] [I63.9]    Briefly, this is a  80 y.o. male with no significant PMH was admitted on 5/6/2022 with acute onset of right-sided weakness and speech difficulties found to have left M1 occlusion with resultant left temporal lobe, left basal ganglia infarction; underwent IV tPA & s/p mechanical thrombectomy with TICI 3      No current facility-administered medications on file prior to encounter. Current Outpatient Medications on File Prior to Encounter   Medication Sig Dispense Refill    calcipotriene (DOVONEX) 0.005 % solution Apply topically 2 times daily psoriasis      clobetasol (TEMOVATE) 0.05 % cream Apply topically 2 times daily Indications: Psoriasis Apply topically 2 times daily.  meloxicam (MOBIC) 15 MG tablet Take 15 mg by mouth daily         Allergies: Cary Ron has No Known Allergies.     Past Medical History:   Diagnosis Date    Arthritis     Psoriasis        Past Surgical History:   Procedure Laterality Date    PROSTATE SURGERY             Medications:     amiodarone  200 mg Oral BID    [START ON 5/15/2022] lactobacillus  1 capsule Oral Daily with breakfast    apixaban  5 mg Oral BID    metoprolol  2.5 mg IntraVENous Once    lactated ringers bolus  250 mL IntraVENous Once    midodrine  10 mg Oral TID WC    sodium chloride flush  5-40 mL IntraVENous 2 times per day    [Held by provider] clopidogrel  75 mg Oral Daily    metoprolol tartrate  25 mg Oral BID    aspirin  81 mg Oral Daily    lisinopril  5 mg Oral Daily    tamsulosin  0.4 mg Oral Daily    atorvastatin  80 mg Oral Nightly     PRN Meds include: sodium chloride flush, sodium chloride, acetaminophen, ondansetron **OR** ondansetron, polyethylene glycol, labetalol, acetaminophen, hydrALAZINE    Objective:   BP (!) 122/52   Pulse 61   Temp (P) 98.4 °F (36.9 °C) (Oral)   Resp 20   Ht 5' 9\" (1.753 m)   Wt 151 lb 7.3 oz (68.7 kg)   SpO2 100%   BMI 22.37 kg/m²     Blood pressure range: Systolic (23YOK), ROSEANN:348 , Min:104 , QUQ:910   ; Diastolic (47PSM), WJJ:27, Min:52, Max:109    neuro exam significant for dysarthric speech with expressive aphasia and right hemiparesis. Data:    Lab Results:   CBC:   Recent Labs     05/12/22  1319 05/13/22  0211 05/14/22  0128   WBC 8.2 7.0 6.2   HGB 11.4* 11.1* 10.6*   * See Reflexed IPF Result See Reflexed IPF Result     BMP:    Recent Labs     05/12/22  0624 05/13/22  0211 05/14/22  0128    134* 134*   K 3.9 4.0 3.5*    101 105   CO2 18* 23 21   BUN 20 20 14   CREATININE 0.85 0.86 0.74   GLUCOSE 135* 122* 118*         Lab Results   Component Value Date    CHOL 133 05/07/2022    LDLCHOLESTEROL 71 05/07/2022    HDL 52 05/07/2022    TRIG 50 05/07/2022    TSH 1.30 05/07/2022    INR 1.1 05/07/2022    LABA1C 6.8 (H) 05/07/2022       MRI brain (5/7/2022): Acute infarction in left anterior temporal lobe and left basal ganglia, consistent with MCA occlusion. Punctate foci of acute infarction right frontal cortex.     CTA head/neck (5/7/2022): Complete occlusion of left M1 MCA; severe stenosis at origin of left vertebral artery. Left MCA territory perfusion mismatch.     CT head (5/8/2022): Evolving subacute ischemic infarct of left temporal lobe. Small focus of hemorrhagic conversion has not appreciably changed on comparison with CT and MRI on 5/7/2022.     Follow up CT Head 5/9/22: compared with 5/8/22 study; evolving left temporal lobe infarct anteriorly with stable superimposed focus of hemorrhage.      Rpt CT head 5/11/22:  Left temporal lobe infarct anteriorly with similar superimposed areas of   hemorrhage.          Impression and Plan: Mr. Sintia Rhodes Patito Manning is a 80 y.o. male with   Acute left MCA territory ischemic infarct; s/p IV tPA & [facility] thrombectomy, TICI 3 (5/7/2022). ,  Question small intracranial h'age; rpt CT head is stable. Continue ASA and statin for stroke prevention and also to continue speech therapy.      Severe CAD; NSTEMI; low EF cardiomyopathy; abnl cath with 100% distal occlusion of LAD, 100% mid RCA occlusion; moderate calcification with distal 30% stenosis of LMCA  New onset A Fib: on Eliquis 5 bid and asa along with statin, BB and ACE and no plans for intervention as per cardiology. Will follow with you. This note was partially created using voice recognition software and is inherently subject to errors including those of syntax and \"sound alike\" substitutions which may escape proofreading. In such instances, original meaning may be extrapolated by contextual derivation.           Sergio Davis MD 5/14/2022 6:07 PM

## 2022-05-14 NOTE — PROGRESS NOTES
[facility]  Progress Note                   Date:   5/13/2022  Patient name: Imelda Mayes  Date of admission:  5/6/2022 11:29 PM  MRN:   [id number]  YOB: 1934  PCP: Negra Herrera MD    Reason for Admission: Acute ischemic stroke [facility]) [I63.9]  Right sided weakness [R53.1]  Altered mental status, unspecified altered mental status type [R41.82]  Cerebrovascular accident (CVA), unspecified mechanism ([address] [I63.9]    Subjective:     No acute issues overnight. Patient converted to NSR overnight and has been vitally stable, requiring no pressors overnight. Medications:   Scheduled Meds:   digoxin  250 mcg IntraVENous Once    metoprolol  2.5 mg IntraVENous Once    lactated ringers bolus  250 mL IntraVENous Once    midodrine  10 mg Oral TID WC    sodium chloride flush  5-40 mL IntraVENous 2 times per day    [Held by provider] clopidogrel  75 mg Oral Daily    metoprolol tartrate  25 mg Oral BID    aspirin  81 mg Oral Daily    lisinopril  5 mg Oral Daily    tamsulosin  0.4 mg Oral Daily    atorvastatin  80 mg Oral Nightly     Continuous Infusions:   amiodarone 0.5 mg/min (05/13/22 2000)    heparin (PORCINE) Infusion 20 Units/kg/hr (05/13/22 2000)    phenylephrine (ANDREW-SYNEPHRINE) 50mg/250mL infusion Stopped (05/12/22 2128)    sodium chloride       CBC:   Recent Labs     05/12/22  0624 05/12/22  1319 05/13/22  0211   WBC 1.8* 8.2 7.0   HGB 11.0* 11.4* 11.1*   PLT See Reflexed IPF Result 136* See Reflexed IPF Result     BMP:    Recent Labs     05/11/22  0705 05/12/22  0624 05/13/22  0211    140 134*   K 3.5* 3.9 4.0    107 101   CO2 20 18* 23   BUN 13 20 20   CREATININE 0.71 0.85 0.86   GLUCOSE 116* 135* 122*     Hepatic:No results for input(s): AST, ALT, ALB, BILITOT, ALKPHOS in the last 72 hours. Troponin:   Recent Labs     05/12/22  0624 05/12/22  0909   TROPHS 613* 482*     BNP: No results for input(s): BNP in the last 72 hours.   Lipids:   No results for input(s): CHOL, HDL in the last 72 hours. Invalid input(s): LDLCALCU  INR:   No results for input(s): INR in the last 72 hours. ECHO 5/9/2022      Summary  Dilated left ventricle with severely reduced systolic function. Calculated ejection fraction is 13 %  Moderate mitral regurgitation. Bubble study was negative for shunt. Signature  ----------------------------------------------------------------------------   Electronically signed by Alexey Varghese(Sonographer) on 05/07/2022 03:59   PM  ----------------------------------------------------------------------------     ----------------------------------------------------------------------------   Electronically signed by Kashmir Carrington(Interpreting physician) on   05/08/2022 10:56 AM  ----------------------------------------------------------------------------    Cardiac Cath 5/10/2022  Findings:     LMCA: has moderate calcification with distal 30% stenosis. LAD: has heavy calcification with ostial 60% stenosis, proximal 80% stenosis   mid 90% stenosis and distal 100% occlusion. The D1 has ostial 90% stenosis   and proximal 90% stenosis. The D2 has ostial 80% stenosis. LCx: has moderate calcification with proximal 100% occlusion. The Om1 has   high origin with ostial 90% stenosis. The OM2 and Om3 are seen via left to   left collaterals. RCA: has mid 100% occlusion with left to right collaterals. The RV marginal   branch has ostial 70% stenosis. Coronary Tree        Dominance: Right      The LV gram was performed in the ROSADO 30 position. 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 Recommendations        Medical therapy as needed. Risk factor modification. Routine Post Diagnostic Cath orders.     Discussed with wife and son-in law in detail. Recommend medical therapy. Add plavix 75mg po qday if ok with neurology. Palliative consult. Address code status. Objective:   Vitals: /67   Pulse 67   Temp 98.5 °F (36.9 °C) (Oral)   Resp 17   Ht 5' 9\" (1.753 m)   Wt 151 lb 7.3 oz (68.7 kg)   SpO2 100%   BMI 22.37 kg/m²   General appearance: alert and cooperative with exam   HEENT: Head: Normocephalic, no lesions, without obvious abnormality. Neck:no JVD, trachea midline, no adenopathy  Lungs: Clear to auscultation, no distress on RA  Heart: Regular rate and rhythm, s1/s2 auscultated, no murmurs, SR 76  Abdomen: soft, non-tender, bowel sounds active  Extremities: no edema  Neurologic: not done        Assessment / Acute Cardiac Problems:   1. Acute CVA s/p tPA and mechanical thrombectomy of left MCA thrombus  2 Elevated troponin likely NSTEMI type I.  3.  Severe MVD - medical management  4. 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 New onset Afib with RVR, ChadsVasc of 6, on heparin and amiodarone. Converted to NSR. 6. DNR-CCA. Plan of Treatment:   1. Continue ASA  2. Transition to po Amio  3. Continue po lopressor 25 mg BID  4. On low intensity heparin gtt  5. Plavix on hold per Neurology due to focus of hemorrhage on CT scan   6. Code status discussion as per palliative care. Mellisa Lu MD      I performed a history and physical examination of the patient and discussed management with the resident. I reviewed the residents note and agree with the documented findings and plan of care. Any areas of disagreement are noted on the chart. I was personally present for the key portions of any procedures. I have documented in the chart those procedures where I was not present during the key portions. I have personally evaluated this patient and have completed at least one if not all key elements of the E/M (history, physical exam, and MDM). Additional findings are as noted.       Sinus rhythm. Continue amiodarone.  Change to eliquis if ok with neuro  Jack Cunha MD

## 2022-05-14 NOTE — PROGRESS NOTES
Neurology Resident Progress Note      SUBJECTIVE:  This is a 80 y.o.  male admitted 5/6/2022 for Acute ischemic stroke [facility]) [I63.9]  Right sided weakness [R53.1]  Altered mental status, unspecified altered mental status type [R41.82]  Cerebrovascular accident (CVA), unspecified mechanism ([facility] [address] [I63.9]  This is a follow-up neurology progress note. The patient was seen and examined and the chart was reviewed. There were no acute events overnight. Patient resting comfortably in bed, normal sinus rhythm, transition to p.o. amiodarone from drip, afebrile, vital signs are stable      ROS  Constitutional: no fever, chills, fatigue  HENT: No change in vision or hearing   Respiratory: No cough, SOB, wheezing. Cardiovascular:  No chest pain, palpitations, leg swelling. Gastrointestinal: No nausea, vomiting, diarrhea. Genitourinary: No increased frequency, urgency. Musculoskeletal: No myalgia or arthralgia. Skin: No rashes or scarring or bruises. Neurological: No headache, paresthesia, or focal weakness. Endo/Heme/Allergies: Negative for itchy eyes or runny nose. Psychiatric/Behavioral: No anxiety or depressed mood. HPI  See H&P     amiodarone  200 mg Oral BID    metoprolol  2.5 mg IntraVENous Once    lactated ringers bolus  250 mL IntraVENous Once    midodrine  10 mg Oral TID WC    sodium chloride flush  5-40 mL IntraVENous 2 times per day    [Held by provider] clopidogrel  75 mg Oral Daily    metoprolol tartrate  25 mg Oral BID    aspirin  81 mg Oral Daily    lisinopril  5 mg Oral Daily    tamsulosin  0.4 mg Oral Daily    atorvastatin  80 mg Oral Nightly       Past Medical History:   Diagnosis Date    Arthritis     Psoriasis        Past Surgical History:   Procedure Laterality Date    PROSTATE SURGERY         PHYSICAL EXAM:      Blood pressure 104/78, pulse 68, temperature 99.3 °F (37.4 °C), temperature source Oral, resp.  rate 21, height 5' 9\" (1.753 m), weight 151 lb 7.3 oz (68.7 kg), SpO2 100 %. General Examination    General Resting comfortably in bed   Head Normocephalic, without obvious abnormality   Neck Supple, symmetrical. Good ROM. No midline or paraspinal tenderness. Lungs Respirations unlabored, no wheezing   Chest Wall No deformity   Heart RRR, no murmur   Abdomen Soft. Non-tender, non-distended   Extremities No cyanosis or edema or warmth. Pulses 2+ and symmetric   Skin: Skin  turgor normal, no rashes or lesions     Mental status  Speech Alert. Oriented to person, place, and time. -Mild expressive aphasia, mainly difficulty in repetition, speech,  -Patient is mildly disoriented today however he is alert and following commands  No hallucinations or delusions. No SI/HI. Cranial nerves   II - VFF, visual threat intact  III, IV, VI - extra-ocular muscles full. No nystagmus. Pupils symmetric and responsive.    V - sensation symmetric         VII -  No facial droop or asymmetric NLF  VIII - intact hearing to conversational tone          IX, X - symmetrical palate elevation   XI - 5/5 strength symmetric  XII - tongue midline   Motor function  Strength: grossly 5/5 in b/l              Deltoid, biceps, triceps, wrist flexion, wrist extension             Hip flexion/extension, knee flexion/extension, plantar flexion  Bulk: grossly normal no atrophy  Tone: symmetric b/l arms and legs  Abnormal movements: No abnormal movements or tremor   Sensory function Symmetric to touch in all extremities bilaterally   Cerebellar No dysmetria or dysdiadochocinesia    Reflex function DTR:        2+ b/l symmetric in biceps, brachioradialis, patellar, calcaneal  Babinski b/l plantar downgoing   Gait                  Not assessed       Investigations:      Laboratory Testing:  Recent Results (from the past 24 hour(s))   APTT    Collection Time: 05/13/22  6:34 PM   Result Value Ref Range    PTT 53.6 (H) 20.5 - 30.5 sec   CBC with Auto Differential    Collection Time: 05/14/22  1:28 AM   Result Value Ref Range    WBC 6.2 3.5 - 11.3 k/uL    RBC 3.32 (L) 4.21 - 5.77 m/uL    Hemoglobin 10.6 (L) 13.0 - 17.0 g/dL    Hematocrit 31.3 (L) 40.7 - 50.3 %    MCV 94.3 82.6 - 102.9 fL    MCH 31.9 25.2 - 33.5 pg    MCHC 33.9 28.4 - 34.8 g/dL    RDW 12.6 11.8 - 14.4 %    Platelets See Reflexed IPF Result 138 - 453 k/uL    NRBC Automated 0.0 0.0 per 100 WBC    Seg Neutrophils 48 36 - 65 %    Lymphocytes 28 24 - 43 %    Monocytes 18 (H) 3 - 12 %    Eosinophils % 2 1 - 4 %    Basophils 0 0 - 2 %    Immature Granulocytes 5 (H) 0 %    Segs Absolute 2.94 1.50 - 8.10 k/uL    Absolute Lymph # 1.70 1.10 - 3.70 k/uL    Absolute Mono # 1.10 0.10 - 1.20 k/uL    Absolute Eos # 0.12 0.00 - 0.44 k/uL    Basophils Absolute <0.03 0.00 - 0.20 k/uL    Absolute Immature Granulocyte 0.30 0.00 - 0.30 k/uL   Basic Metabolic Panel    Collection Time: 05/14/22  1:28 AM   Result Value Ref Range    Glucose 118 (H) 70 - 99 mg/dL    BUN 14 8 - 23 mg/dL    CREATININE 0.74 0.70 - 1.20 mg/dL    Calcium 8.1 (L) 8.6 - 10.4 mg/dL    Sodium 134 (L) 135 - 144 mmol/L    Potassium 3.5 (L) 3.7 - 5.3 mmol/L    Chloride 105 98 - 107 mmol/L    CO2 21 20 - 31 mmol/L    Anion Gap 8 (L) 9 - 17 mmol/L    GFR Non-African American >60 >60 mL/min    GFR African American >60 >60 mL/min    GFR Comment         APTT    Collection Time: 05/14/22  1:28 AM   Result Value Ref Range    PTT 63.0 (H) 20.5 - 30.5 sec   Immature Platelet Fraction    Collection Time: 05/14/22  1:28 AM   Result Value Ref Range    Platelet, Immature Fraction 5.2 1.1 - 10.3 %    Platelet, Fluorescence 116 (L) 138 - 453 k/uL       Imaging/Diagnostics:  ECHO Complete 2D W Doppler W Color    Result Date: 5/8/2022  Transthoracic Echocardiography Report (TTE)  Patient Name Santino Whiting      Date of Study               05/07/2022               SHRIA CACERES   Date of      1934  Gender                      Male  Birth   Age          80 year(s)  Race                           Room Number  0193        Height: 69 inch, 175.26 cm   Corporate ID [id number]   Weight:                     175 pounds, 79.4 kg  #   Patient Acct [de-identified]   BSA:          1.95 m^2      BMI:      25.84  #                                                              kg/m^2   MR #         [id number]     Sonographer                 Seth Garrett   Accession #  [id number]  Interpreting Physician      Kashmir Carrington   Fellow                   Referring Nurse                           Practitioner   Interpreting             Referring Physician         Sue Samuels MD  Fellow  Type of Study   TTE procedure:2D Echocardiogram, M-Mode, Doppler, Color Doppler, Bubble  Study. Procedure Date Date: 05/07/2022 Start: 01:21 PM Study Location: [facility] Indications:CVA. History / Tech. Comments: CVA, Altered Mental Status Patient Status: Inpatient Height: 69 inches Weight: 175 pounds BSA: 1.95 m^2 BMI: 25.84 kg/m^2 HR: 74 bpm CONCLUSIONS Summary Dilated left ventricle with severely reduced systolic function. Calculated ejection fraction is 13 % Moderate mitral regurgitation. Bubble study was negative for shunt. Signature ----------------------------------------------------------------------------  Electronically signed by Alexey Gómez(Sonographer) on 05/07/2022 03:59  PM ---------------------------------------------------------------------------- ----------------------------------------------------------------------------  Electronically signed by Kashmir Carrington(Interpreting physician) on  05/08/2022 10:56 AM ---------------------------------------------------------------------------- FINDINGS Left Atrium Left atrium is mildly enlarged. Left Ventricle Dilated left ventricle with severely reduced systolic function. Calculated ejection fraction is 13 % Right Atrium Right atrium is normal in size. Right Ventricle Normal right ventricular size and function.  TAPSE measures 2.1 cm Mitral Valve Thickening of mitral valve leaflets without stenosis. Moderate mitral regurgitation. Vena Contract width 0.5 cm Aortic Valve Aortic valve is trileaflet. Aortic sclerosis without stenosis. Mild aortic insufficiency. Tricuspid Valve Normal tricuspid valve structure and function. No tricuspid regurgitation was seen. Pulmonic Valve The pulmonic valve is normal in structure. Pericardial Effusion No significant pericardial effusion is seen. Miscellaneous Normal aortic root dimension. E/E' average = 29. Bubble study was negative.  M-mode / 2D Measurements & Calculations:   LVIDd:6.91 cm(3.7 - 5.6 cm)       Diastolic ZMCHAN:193 ml  IVSd:1 cm(0.6 - 1.1 cm)           Systolic WUDOCH:640 ml  GNCCT:6.6 cm(0.6 - 1.1 cm)        Aortic Root:3.9 cm(2.0 - 3.7 cm)                                    LA Dimension: 4.2 cm(1.9 - 4.0 cm)  Calculated LVEF (%): 13.11 %      LA volume/Index: 94.73 ml /49m^2                                    LVOT:1.9 cm                                    RVDd:1.9 cm   Mitral:                                 Aortic   Valve Area (P1/2-Time): 3.93 cm^2       Peak Velocity: 1.78 m/s  Peak E-Wave: 1.10 m/s                   Mean Velocity: 1.11 m/s  Peak A-Wave: 1.43 m/s                   Peak Gradient: 12.67 mmHg  E/A Ratio: 0.77                         Mean Gradient: 6 mmHg  Peak Gradient: 4.84 mmHg  Mean Gradient: 4 mmHg  Deceleration Time: 200 msec             Area (continuity): 1.09 cm^2  P1/2t: 56 msec                          AV VTI: 41.3 cm   Area (continuity): 1.11 cm^2  Mean Velocity: 0.93 m/s                                           Pulmonic:                                           Peak Velocity: 0.77 m/s                                          Peak Gradient: 2.4 mmHg  Diastology / Tissue Doppler Septal Wall E' velocity:0.03 m/s Septal Wall E/E':38 Lateral Wall E' velocity:0.05 m/s Lateral Wall E/E':20    CT HEAD WO CONTRAST    Result Date: 5/13/2022  EXAMINATION: CT OF THE HEAD WITHOUT CONTRAST  5/13/2022 2:16 pm TECHNIQUE: CT of the head was performed without the administration of intravenous contrast. Automated exposure control, iterative reconstruction, and/or weight based adjustment of the mA/kV was utilized to reduce the radiation dose to as low as reasonably achievable. COMPARISON: 11 May 2022 HISTORY: ORDERING SYSTEM PROVIDED HISTORY: started on heparin drip PTT therapeutic FU stability scan of left temporal stroke TECHNOLOGIST PROVIDED HISTORY: Started on heparin drip PTT therapeutic FU stability scan of left temporal stroke Reason for Exam: started on heparin drip PTT therapeutic FU stability scan of left temporal stroke FINDINGS: BRAIN/VENTRICLES: The patient has a hypodensity in the left temporal lobe consistent with infarct. Prior area of hemorrhage along the anterolateral aspect of the infarct has decreased in density but is unchanged in size. No midline shift is noted but mild attenuation of the left lateral ventricle is noted. Stable cortical atrophy and white matter changes are noted. No new areas of hemorrhage are noted. No suspicious extra-axial fluid collections. Effacement of the tissue bordering the basilar cisterns is noted similar to prior study secondary to mass effect from a subacute stroke. ORBITS: The visualized portion of the orbits demonstrate no acute abnormality. SINUSES: The visualized paranasal sinuses and mastoid air cells demonstrate no acute abnormality. SOFT TISSUES/SKULL:  No acute abnormality of the visualized skull or soft tissues. 1.  Decreased density superimposed hemorrhagic area of left temporal lobe infarct. Findings compatible with interval improvement. 2.  Continued mass effect associated with the infarct with effacement of the brain parenchyma bordering the cisterns. 3.  No midline shift. No new areas of hemorrhage.  RECOMMENDATIONS: Unavailable     CT HEAD WO CONTRAST    Result Date: 5/11/2022  EXAMINATION: CT OF THE HEAD WITHOUT CONTRAST  5/11/2022 12:38 pm TECHNIQUE: CT of the head was performed without the administration of intravenous contrast. Automated exposure control, iterative reconstruction, and/or weight based adjustment of the mA/kV was utilized to reduce the radiation dose to as low as reasonably achievable. COMPARISON: CT brain performed 05/09/2022. HISTORY: ORDERING SYSTEM PROVIDED HISTORY: f/u post heparin TECHNOLOGIST PROVIDED HISTORY: f/u post heparin FINDINGS: BRAIN/VENTRICLES: There is redemonstration of remote infarct in the left temporal region anteriorly with foci of superimposed hemorrhage. This is stable compared to prior examination. There is no mass effect or midline shift. The ventricular structures are unremarkable. The infratentorial structures are unremarkable. ORBITS: The visualized portion of the orbits demonstrate no acute abnormality. SINUSES: The visualized paranasal sinuses and mastoid air cells demonstrate no acute abnormality. SOFT TISSUES/SKULL:  No acute abnormality of the visualized skull or soft tissues. Left temporal lobe infarct anteriorly with similar superimposed areas of hemorrhage. CT HEAD WO CONTRAST    Result Date: 5/9/2022  EXAMINATION: CT OF THE HEAD WITHOUT CONTRAST  5/9/2022 3:09 pm TECHNIQUE: CT of the head was performed without the administration of intravenous contrast. Dose modulation, iterative reconstruction, and/or weight based adjustment of the mA/kV was utilized to reduce the radiation dose to as low as reasonably achievable. COMPARISON: CT brain performed 05/08/2022. HISTORY: ORDERING SYSTEM PROVIDED HISTORY: Hemorrhage assessment. TECHNOLOGIST PROVIDED HISTORY: Hemorrhage assessment. Reason for Exam: Hemorrhage assessment. FINDINGS: BRAIN/VENTRICLES: Evolving infarct in in the left temporal lobe anteriorly with a stable focus of superimposed hemorrhage. There is no significant mass effect or midline shift. The ventricular structures are unremarkable. The infratentorial structures are unremarkable.  ORBITS: The visualized portion of the orbits demonstrate no acute abnormality. SINUSES: The visualized paranasal sinuses and mastoid air cells demonstrate no acute abnormality. SOFT TISSUES/SKULL:  No acute abnormality of the visualized skull or soft tissues. Evolving left temporal lobe infarct anteriorly with stable superimposed focus of hemorrhage. CT HEAD WO CONTRAST    Result Date: 5/8/2022  EXAMINATION: CT OF THE HEAD WITHOUT CONTRAST  5/8/2022 5:03 am TECHNIQUE: CT of the head was performed without the administration of intravenous contrast. Dose modulation, iterative reconstruction, and/or weight based adjustment of the mA/kV was utilized to reduce the radiation dose to as low as reasonably achievable. COMPARISON: CT and MRI May 7, 2022 HISTORY: ORDERING SYSTEM PROVIDED HISTORY: 24 hour stability CT head TECHNOLOGIST PROVIDED HISTORY: 24 hour stability CT head FINDINGS: BRAIN/VENTRICLES: Left anterior temporal lobe hypoattenuation with loss of cortical differentiation compatible with evolving encephalomalacia. 1.0 cm by 0.6 cm hyperdense focus in the left anterior temporal lobe compatible with hemorrhagic conversion. This has not appreciably changed since comparison MRI. No significant mass effect or midline shift. Ventricular system is unchanged in size. Basilar cisterns remain patent. ORBITS: The visualized portion of the orbits demonstrate no acute abnormality. SINUSES: The visualized paranasal sinuses and mastoid air cells demonstrate no acute abnormality. SOFT TISSUES/SKULL:  No acute abnormality of the visualized skull or soft tissues. Evolving subacute ischemic infarct of the anterior left temporal lobe. Small focus of hemorrhagic conversion has not appreciably changed. Findings were discussed with BRANDIN Rodrigez at 5:34 am on 5/8/2022 by Dr. Katalina Rivera.      CT HEAD WO CONTRAST    Result Date: 5/7/2022  EXAMINATION: CT OF THE HEAD WITHOUT CONTRAST  5/7/2022 6:38 am TECHNIQUE: CT of the head was performed without the administration of intravenous contrast. Dose modulation, iterative reconstruction, and/or weight based adjustment of the mA/kV was utilized to reduce the radiation dose to as low as reasonably achievable. COMPARISON: 05/06/2022 HISTORY: ORDERING SYSTEM PROVIDED HISTORY: Post-thrombectomy TECHNOLOGIST PROVIDED HISTORY: Post-thrombectomy Reason for Exam: Post-thrombectomy FINDINGS: BRAIN/VENTRICLES: There is no acute infarct or acute intracranial hemorrhage present. There is no mass effect or midline shift present. There is no ventriculomegaly or abnormal extra-axial fluid collection present. There is mild diffuse cerebral volume loss. Mild periventricular hypoattenuation is present. ORBITS: Limited evaluation of the orbits is unremarkable. SINUSES: The paranasal sinuses and mastoid air cells are clear. SOFT TISSUES/SKULL:  No lytic or blastic osseous lesions are identified. 1. No infarct evident within the left MCA territory. 2. No acute intracranial hemorrhage. CT HEAD WO CONTRAST    Result Date: 5/7/2022  EXAMINATION: CT OF THE HEAD WITHOUT CONTRAST; CTA OF THE HEAD AND NECK WITH CONTRAST; CTA OF THE HEAD WITH CONTRAST WITH PERFUSION 5/6/2022 11:40 pm; 5/6/2022 11:47 pm: TECHNIQUE: CT of the head was performed without the administration of intravenous contrast. Dose modulation, iterative reconstruction, and/or weight based adjustment of the mA/kV was utilized to reduce the radiation dose to as low as reasonably achievable.; CTA of the head and neck was performed with the administration of intravenous contrast. Multiplanar reformatted images are provided for review. MIP images are provided for review. Stenosis of the internal carotid arteries measured using NASCET criteria.  Dose modulation, iterative reconstruction, and/or weight based adjustment of the mA/kV was utilized to reduce the radiation dose to as low as reasonably achievable.; CTA of the head/brain was performed with the severe stenosis at the origin of the left vertebral artery, which is otherwise patent and normal in caliber. Right vertebral artery is hypoplastic, but patent. SOFT TISSUES: The lung apices are clear. Mildly enlarged superior mediastinal lymph nodes. .  The larynx and pharynx are unremarkable. No acute abnormality of the salivary glands. Thyroid nodules measure up to 1.3 cm. BONES: Multilevel degenerative changes of the cervical spine. CTA HEAD: ANTERIOR CIRCULATION: The intracranial ICAs are patent and normal in caliber. The ACAs are patent and normal in caliber. There is occlusion of the left M1 MCA with some reconstituted flow distally. The right MCA is patent. There is atherosclerosis with mild narrowing in the right supraclinoid ICA. POSTERIOR CIRCULATION: No significant stenosis of the vertebral, basilar, or posterior cerebral arteries. No aneurysm. Fetal configuration of the left PCA. OTHER: No dural venous sinus thrombosis on this non-dedicated study. CT PERFUSION: EXAM QUALITY: The examination is diagnostic with appropriate arterial inflow and venous outflow curves, and diagnostic perfusion maps. CORE INFARCT: The total area of ischemic core is 14 mL (CBF<30% volume). TOTAL HYPOPERFUSION: The total area of hypoperfusion is 156 mL (Tmax>6s volume). PENUMBRA: The penumbra (mismatch) volume is 142 mL and is located in the left MCA territory. The mismatch ratio is 11.1.     1. No acute intracranial abnormality. 2. Left MCA territory perfusion mismatch with ratio 11.1 and mismatch volume 142 mL. 3. 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 4. 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 5. No significant stenosis or occlusion of the cervical carotid arteries. 6. Thyroid nodules measuring up to 1.3 cm. No further imaging follow-up is required. Critical results were called by Dr. Lali Bradshaw MD to Dr. Alejandrina Holland On 5/7/2022 at 00:25.      CT HEAD WO CONTRAST    Result Date: 5/6/2022  EXAMINATION: CT OF THE HEAD WITHOUT CONTRAST  5/6/2022 10:52 pm TECHNIQUE: CT of the head was performed without the administration of intravenous contrast. Dose modulation, iterative reconstruction, and/or weight based adjustment of the mA/kV was utilized to reduce the radiation dose to as low as reasonably achievable. COMPARISON: None. HISTORY: ORDERING SYSTEM PROVIDED HISTORY: Stroke TECHNOLOGIST PROVIDED HISTORY: Stroke Decision Support Exception - unselect if not a suspected or confirmed emergency medical condition->Emergency Medical Condition (MA) FINDINGS: BRAIN/VENTRICLES:Motion degraded exam. Coronal and sagittal reformats were not available at the time of this dictation. No acute hemorrhage given limitation. Periventricular and subcortical hypoattenuation is nonspecific and may be related to microvascular disease. Artifact partially obscures the vianey. Artifact partially obscures the cerebellum. Slight relative decreased attenuation of the right temporal lobe with overall suboptimal evaluation for gray-white differentiation due to motion artifact. Slight relative decreased attenuation of the left temporal lobe anteriorly. Ventricles are within normal limits in size. There is no midline shift. Basal cisterns appear patent. ORBITS: Visualized orbits appear unremarkable on this unenhanced exam. SINUSES: Mild mucosal thickening of the ethmoid and left maxillary sinuses. Visualized mastoid air cells appear clear. SOFT TISSUES/SKULL: No depressed calvarial fracture. Motion degraded exam. No acute hemorrhage given limitation. No midline shift. Slight relative decreased attenuation of the right temporal lobe with overall suboptimal evaluation for gray-white differentiation due to motion artifact. Slight relative decreased attenuation of the left temporal lobe anteriorly. Correlate with presenting symptoms.      IR ANGIOGRAM CAROTID C EREBRAL BILATERAL    Result Date: 5/8/2022  Date of Service: 05/07/2022 Diagnosis: Left MCA Acute Ischemic Stroke with acute left MCA proximal M-1 occlusion Patient arrived to the angio suite at: 12:41 am Puncture obtained at: 1:03 am [place]: Karon Strauss MD Procedures: 1. Mechanical thrombectomy of the Left M1 middle cerebral artery (MCA) with Stent retriever and Combined Suctioning 2. Left Internal Carotid Artery (ICA) Cerebral Angiography 3. Right Common Femoral Artery (CFA) Angiogram Neurointerventionalist: Eddie Beck MD, MS Access: Right Common Femoral Artery Anesthesia: MAC anesthesia. Fluoro time: 14.2 minutes Contrast amount: 59 cc of Visipaque-270 Indication and Clinical History: 81 y/o M with pmh significant for LEXII presented with acute onset speech difficulty, right sided weakness, NIHSS, left MCA M1 acute occlusion s/p tPA. CTA head showed Left MCA M1 proximal occlusion. Therefore mechanical thrombectomy was indicated. Consent: after explaining the risks and benefit to the family, including but not limited to vessel injury or perforation, stroke, intracranial hemorrhage, radiation, dye allergic reaction, groin hematoma and death, an informed consent was obtained, signed and witnessed. Technique and Interpretations: The patient was brought to the interventional suite and placed in a supine position by the stroke and anesthesia team. The patient's right groin was prepped and draped in standard sterile fashion. Fluoroscopy was used to localize the right common femoral  artery. The right common femoral artery was accessed with a micropuncture technique under local anesthesia with conscious sedation, and a 8 Maori short sheath was placed within the right common femoral artery, establishing arterial access using Seldinger technique. A 5F Gasca select catheter and a 8F Flowgate balloon guide catheter (BGC) were introduced coaxially with a guidewire. Patient was noted to have Type III aortic arch.  5 fr Gasca catheter was then replaced with VTK diagnostic catheter. Left CCA was then selectively catheterized. Flowgate BGC was then slowly and carefully advanced over the diagnostic catheter into the distal left cervical ICA. VTK diagnostic catheter was then removed. Left ICA Technique: The Left ICA was selectively catheterized under fluoroscopic guidance. Intracranial images were obtained in standard biplane projections. Left ICA Interpretation: The Left ICA images demonstrated complete occlusion of the proximal  left MCA M-1 segment. Grade 0 collateral blood supply to the Left MCA territory per the [employer] Society [employer] Neuroradiology Radiology/Society of interventional Radiology(ASITN/SIR) collateral grade scale. There is normal antegrade filling of the distal internal carotid artery, ophthalmic artery, anterior cerebral artery. FIRST PASS MECHANICAL THROMBECTOMY OF LEFT M1 CLOT : A Synchro2 microwire and Prowler select microcatheter were advanced through the clot to the superior left MCA M2, the microwire was removed, and a 5 mm x 37 mm Embotrap stent retriever was advanced under fluoroscopic guidance, unsheathed spanning the Left M1 MCA clot, and allowed to integrate with the clot for at least 5 minutes. The microcatheter was \"stripped off. \" The BGC was inflated. The stent retriever was then removed under fluoroscopic guidance from the body while applying continuous aspiration from the balloon guide catheter side port (using manual aspiration through a 60 cc syringe). Thrombus was visualized on the stent retriever. FINAL ANGIOGRAPHIC RUN: In this injection, complete reperfusion of the left MCA territory was visualized. Final TICI score 3. Left MCA M-1 segment was noted to have stenosis measuring about 60% by NASCET criteria. The BGC was then removed, and digital subtraction angiography of the right CFA was performed in a frontal oblique projection from the short sheath.  There was no evidence of arterial stenosis, dissection, or pseudoaneurysm. The arterial puncture site was well placed above the femoral bifurcation. The sheath was then sutured in the place. No immediate complications were experienced. Patient was re-examined after the procedure with no change noted in their neurologic examination, with distal pulses present. The patient was subsequently discharged from the neurointerventional suite to the neurointensive care unit. Impression: 1. Acute Left MCA proximal M-1 occlusion TICI score 0 2. The above lesion was treated with 8 fr short sheath, flow gate bgc, prowler 21 microcatheter, embo trap 5 mm x 37 mm stent retriever first pass. Final reperfusion score TICI 03 3. 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 Malachi Garcia dictated this invasive procedure. Dr. Maninder Yoon was present for all procedural and imaging components of this case. Examination was reviewed and reported findings confirmed and evaluated by Dr. Maninder Yoon. Final report electronically signed by Hilario Granado M.D. on 5/8/2022 5:45 PM    CT BRAIN PERFUSION    Result Date: 5/7/2022  EXAMINATION: CT OF THE HEAD WITHOUT CONTRAST; CTA OF THE HEAD AND NECK WITH CONTRAST; CTA OF THE HEAD WITH CONTRAST WITH PERFUSION 5/6/2022 11:40 pm; 5/6/2022 11:47 pm: TECHNIQUE: CT of the head was performed without the administration of intravenous contrast. Dose modulation, iterative reconstruction, and/or weight based adjustment of the mA/kV was utilized to reduce the radiation dose to as low as reasonably achievable.; CTA of the head and neck was performed with the administration of intravenous contrast. Multiplanar reformatted images are provided for review. MIP images are provided for review. Stenosis of the internal carotid arteries measured using NASCET criteria.  Dose modulation, iterative reconstruction, and/or weight based adjustment of the mA/kV was utilized to reduce the radiation dose to as low as reasonably achievable.; CTA of the head/brain was performed with the administration of intravenous contrast. Multiplanar reformatted images are provided for review. MIP images are provided for review. Dose modulation, iterative reconstruction, and/or weight based adjustment of the mA/kV was utilized to reduce the radiation dose to as low as reasonably achievable. Noncontrast CT of the head with reconstructed 2-D images are also provided for review. COMPARISON: None. HISTORY: ORDERING SYSTEM PROVIDED HISTORY: stroke TECHNOLOGIST PROVIDED HISTORY: stroke Decision Support Exception - unselect if not a suspected or confirmed emergency medical condition->Emergency Medical Condition (MA); ORDERING SYSTEM PROVIDED HISTORY: TPA TECHNOLOGIST PROVIDED HISTORY: TPA Decision Support Exception - unselect if not a suspected or confirmed emergency medical condition->Emergency Medical Condition (MA); ORDERING SYSTEM PROVIDED HISTORY: stroke TECHNOLOGIST PROVIDED HISTORY: stroke Decision Support Exception - unselect if not a suspected or confirmed emergency medical condition->Emergency Medical Condition (MA) FINDINGS: CT HEAD: BRAIN/VENTRICLES:  No acute intracranial hemorrhage or extraaxial fluid collection. Grey-white differentiation is maintained. No evidence of mass, mass effect or midline shift. No evidence of hydrocephalus. Periventricular and subcortical white matter hypoattenuation, consistent microangiopathic change. There is mild parenchymal volume loss. ORBITS: The visualized portion of the orbits demonstrate no acute abnormality. SINUSES:  The visualized paranasal sinuses and mastoid air cells demonstrate no acute abnormality. SOFT TISSUES/SKULL: No acute abnormality of the visualized skull or soft tissues. CTA NECK: AORTIC ARCH/ARCH VESSELS: No dissection or arterial injury. No significant stenosis of the brachiocephalic or subclavian arteries.  CAROTID ARTERIES: No dissection, arterial injury, or hemodynamically significant stenosis by NASCET criteria. 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 Right vertebral artery is hypoplastic, but patent. SOFT TISSUES: The lung apices are clear. Mildly enlarged superior mediastinal lymph nodes. .  The larynx and pharynx are unremarkable. No acute abnormality of the salivary glands. Thyroid nodules measure up to 1.3 cm. BONES: Multilevel degenerative changes of the cervical spine. CTA HEAD: ANTERIOR CIRCULATION: The intracranial ICAs are patent and normal in caliber. The ACAs are patent and normal in caliber. There is occlusion of the left M1 MCA with some reconstituted flow distally. The right MCA is patent. There is atherosclerosis with mild narrowing in the right supraclinoid ICA. POSTERIOR CIRCULATION: No significant stenosis of the vertebral, basilar, or posterior cerebral arteries. No aneurysm. Fetal configuration of the left PCA. OTHER: No dural venous sinus thrombosis on this non-dedicated study. CT PERFUSION: EXAM QUALITY: The examination is diagnostic with appropriate arterial inflow and venous outflow curves, and diagnostic perfusion maps. CORE INFARCT: The total area of ischemic core is 14 mL (CBF<30% volume). TOTAL HYPOPERFUSION: The total area of hypoperfusion is 156 mL (Tmax>6s volume). PENUMBRA: The penumbra (mismatch) volume is 142 mL and is located in the left MCA territory. The mismatch ratio is 11.1.     1. No acute intracranial abnormality. 2. Left MCA territory perfusion mismatch with ratio 11.1 and mismatch volume 142 mL. 3. 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 4. 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 5. No significant stenosis or occlusion of the cervical carotid arteries. 6. Thyroid nodules measuring up to 1.3 cm. No further imaging follow-up is required.  Critical results were called by Dr. Thelma Mathis MD to Dr. Joon Szymanski On 5/7/2022 at 00:25. CTA HEAD NECK W CONTRAST    Result Date: 5/7/2022  EXAMINATION: CT OF THE HEAD WITHOUT CONTRAST; CTA OF THE HEAD AND NECK WITH CONTRAST; CTA OF THE HEAD WITH CONTRAST WITH PERFUSION 5/6/2022 11:40 pm; 5/6/2022 11:47 pm: TECHNIQUE: CT of the head was performed without the administration of intravenous contrast. Dose modulation, iterative reconstruction, and/or weight based adjustment of the mA/kV was utilized to reduce the radiation dose to as low as reasonably achievable.; CTA of the head and neck was performed with the administration of intravenous contrast. Multiplanar reformatted images are provided for review. MIP images are provided for review. Stenosis of the internal carotid arteries measured using NASCET criteria. Dose modulation, iterative reconstruction, and/or weight based adjustment of the mA/kV was utilized to reduce the radiation dose to as low as reasonably achievable.; CTA of the head/brain was performed with the administration of intravenous contrast. Multiplanar reformatted images are provided for review. MIP images are provided for review. Dose modulation, iterative reconstruction, and/or weight based adjustment of the mA/kV was utilized to reduce the radiation dose to as low as reasonably achievable. Noncontrast CT of the head with reconstructed 2-D images are also provided for review. COMPARISON: None.  HISTORY: ORDERING SYSTEM PROVIDED HISTORY: stroke TECHNOLOGIST PROVIDED HISTORY: stroke Decision Support Exception - unselect if not a suspected or confirmed emergency medical condition->Emergency Medical Condition (MA); ORDERING SYSTEM PROVIDED HISTORY: TPA TECHNOLOGIST PROVIDED HISTORY: TPA Decision Support Exception - unselect if not a suspected or confirmed emergency medical condition->Emergency Medical Condition (MA); ORDERING SYSTEM PROVIDED HISTORY: stroke TECHNOLOGIST PROVIDED HISTORY: stroke Decision Support Exception - unselect if not a suspected or confirmed emergency medical condition->Emergency Medical Condition (MA) FINDINGS: CT HEAD: BRAIN/VENTRICLES:  No acute intracranial hemorrhage or extraaxial fluid collection. Grey-white differentiation is maintained. No evidence of mass, mass effect or midline shift. No evidence of hydrocephalus. Periventricular and subcortical white matter hypoattenuation, consistent microangiopathic change. There is mild parenchymal volume loss. ORBITS: The visualized portion of the orbits demonstrate no acute abnormality. SINUSES:  The visualized paranasal sinuses and mastoid air cells demonstrate no acute abnormality. SOFT TISSUES/SKULL: No acute abnormality of the visualized skull or soft tissues. CTA NECK: AORTIC ARCH/ARCH VESSELS: No dissection or arterial injury. No significant stenosis of the brachiocephalic or subclavian arteries. CAROTID ARTERIES: No dissection, arterial injury, or hemodynamically significant stenosis by NASCET criteria. 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 Right vertebral artery is hypoplastic, but patent. SOFT TISSUES: The lung apices are clear. Mildly enlarged superior mediastinal lymph nodes. .  The larynx and pharynx are unremarkable. No acute abnormality of the salivary glands. Thyroid nodules measure up to 1.3 cm. BONES: Multilevel degenerative changes of the cervical spine. CTA HEAD: ANTERIOR CIRCULATION: The intracranial ICAs are patent and normal in caliber. The ACAs are patent and normal in caliber. There is occlusion of the left M1 MCA with some reconstituted flow distally. The right MCA is patent. There is atherosclerosis with mild narrowing in the right supraclinoid ICA. POSTERIOR CIRCULATION: No significant stenosis of the vertebral, basilar, or posterior cerebral arteries. No aneurysm. Fetal configuration of the left PCA.  OTHER: No dural venous sinus thrombosis on this non-dedicated study. CT PERFUSION: EXAM QUALITY: The examination is diagnostic with appropriate arterial inflow and venous outflow curves, and diagnostic perfusion maps. CORE INFARCT: The total area of ischemic core is 14 mL (CBF<30% volume). TOTAL HYPOPERFUSION: The total area of hypoperfusion is 156 mL (Tmax>6s volume). PENUMBRA: The penumbra (mismatch) volume is 142 mL and is located in the left MCA territory. The mismatch ratio is 11.1.     1. No acute intracranial abnormality. 2. Left MCA territory perfusion mismatch with ratio 11.1 and mismatch volume 142 mL. 3. 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 4. 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 5. No significant stenosis or occlusion of the cervical carotid arteries. 6. Thyroid nodules measuring up to 1.3 cm. No further imaging follow-up is required. Critical results were called by Dr. Felicity Dennis MD to Dr. Felecia Ramirez On 5/7/2022 at 00:25. MRI BRAIN WO CONTRAST    Result Date: 5/7/2022  EXAMINATION: MRI OF THE BRAIN WITHOUT CONTRAST  5/7/2022 4:54 pm TECHNIQUE: Multiplanar multisequence MRI of the brain was performed without the administration of intravenous contrast. COMPARISON: Same-day CT HISTORY: ORDERING SYSTEM PROVIDED HISTORY: L M1 occlusion TECHNOLOGIST PROVIDED HISTORY: L M1 occlusion Reason for Exam: L M1 occlusion FINDINGS: Examination is degraded by motion. INTRACRANIAL STRUCTURES/VENTRICLES: There is diffusion restriction in the left basal ganglia and anterior temporal lobe. There are small foci of diffusion restriction in the right frontal cortex. No mass effect or midline shift. No evidence of an acute intracranial hemorrhage. The ventricles and sulci are normal in size and configuration. The sellar/suprasellar regions appear unremarkable. The normal signal voids within the major intracranial vessels appear maintained.  There are scattered periventricular and deep subcortical white matter T2/FLAIR hyperintensities, consistent with microangiopathic change. There is mild parenchymal volume loss. ORBITS: The visualized portion of the orbits demonstrate no acute abnormality. SINUSES: Scattered mucosal thickening in the paranasal sinuses. Right mastoid tip effusion. BONES/SOFT TISSUES: The bone marrow signal intensity appears normal. The soft tissues demonstrate no acute abnormality. 1. Acute infarction in the left anterior temporal lobe and left basal ganglia, consistent with MCA occlusion. 2. Punctate foci of acute infarction in the right frontal cortex. 3. Mild microangiopathic change.        Assessment & Differential Dx:      Primary Problem  Cerebrovascular accident (CVA) [facility])    Active Hospital Problems    Diagnosis Date Noted    New onset atrial fibrillation ([address] [I48.91] 05/12/2022     Priority: Medium    Leukopenia [D72.819] 05/12/2022     Priority: Medium    Neutropenia ([address] [D70.9] 05/12/2022     Priority: Medium    Encounter for palliative care [Z51.5]      Priority: Medium    Intracranial bleed ([address] [I62.9]      Priority: Medium    Right sided weakness [R53.1]      Priority: Medium    CHF (congestive heart failure) ([address] [I50.9] 05/08/2022     Priority: Medium    NSTEMI (non-ST elevated myocardial infarction) ([address] [I21.4] 05/08/2022     Priority: Medium    Altered mental status [R41.82]      Priority: Medium    Cerebrovascular accident (CVA) ([address] [I63.9] 05/07/2022     Priority: Medium    Acute ischemic stroke ([address] [I63.9] 05/07/2022     Priority: Medium       Case of 80-year-old male, with no significant PMH admitted on 5/6/2022 with acute onset right-sided weakness and speech difficulty found to have L M1 occlusion with resultant left temporal lobe, left basal ganglia infarction, underwent IV tPA and status post mechanical thrombectomy TICI 3    MRI brain 5/7/2022: Acute infarct in the left anterior temporal lobe and left basal ganglia, consistent with MCA occlusion, punctate foci of acute infarction right frontal cortex  -CTA head and neck 5/7/2022: Complete occlusion of left M1 MCA, severe stenosis of origin of left vertebral artery, left MCA territory  -CT head: Subacute infarct of left temporal lobe, small focus of hemorrhagic conversion has not changed on comparison with CT and MRI    Impression:  -Acute left MCA territory ischemic infarct status post tPA and mechanical thrombectomy TICI 3  -Severe CAD, NSTEMI, low EF cardiomyopathy  -Abnormal cardiac cath: 100% distal occlusion of the LAD, 100% mid RCA occlusion    Plan:     -Continue aspirin, statin,  -Speech therapy  -On IV heparin per cardiology along with statin, BB and ACE,  -PTT today: 63  -Endovascular on board: SBP between 100-140,  -Follow-up with Dr. Niranjan Wakefield in 2 weeks, follow-up with Dr. Andrew Blanca in 3 months        Asmita Lomeli MD, MD, 5/14/2022 10:30 AM

## 2022-05-14 NOTE — PROGRESS NOTES
Physical Therapy  Facility/Department: [facility]  Physical Therapy Daily Treatment Note    Name: Cammy Shah  : 1934  MRN: [id number]  Date of Service: 2022    Discharge Recommendations:  Patient would benefit from continued therapy after discharge   PT Equipment Recommendations  Equipment Needed: No  Mobility Devices: May Riedel: Rolling      Patient Diagnosis(es): The primary encounter diagnosis was Cerebrovascular accident (CVA), unspecified mechanism ([facility] [address] Diagnoses of Altered mental status, unspecified altered mental status type and Right sided weakness were also pertinent to this visit. Past Medical History:  has a past medical history of Arthritis and Psoriasis. Past Surgical History:  has a past surgical history that includes Prostate surgery. Assessment   Body Structures, Functions, Activity Limitations Requiring Skilled Therapeutic Intervention: Decreased functional mobility ; Decreased coordination;Decreased balance;Decreased strength;Decreased safe awareness;Decreased cognition;Decreased endurance;Decreased posture  Assessment: Pt very pleasant and cooperative with treatment. Pt demonstrates impaired safety awareness, pt mildly confued but able to follow commands this date. Sunny for bed mobility and ambulation with knee immobilizer on L LE. Pt would benefit from additional therapy upon discharge to address balance, BLE strength, and coodination deficits. Pt will require strict 24/7 supervision and assistance with mobility upon discharge secondary to high fall risk.   Therapy Prognosis: Good  Barriers to Learning: mild confusion  Requires PT Follow-Up: Yes  Activity Tolerance  Activity Tolerance: Treatment limited secondary to decreased cognition;Patient tolerated treatment well     Plan   Plan  Plan:  (5-6x/wk)  Current Treatment Recommendations: Strengthening,Balance training,Functional mobility training,Transfer training,Gait training,Neuromuscular re-education,Home exercise cues for some     Observation/Palpation  Posture: Good     Bed mobility  Supine to Sit: Minimal assistance  Sit to Supine:  (pt left seated in recliner)  Scooting: Contact guard assistance  Bed Mobility Comments: HOB elevated, req increased time and effort to complete. Min A for trunk progression during supine > sit. MOHINDER sit > supine d/t Pt retired in chair end of session. Transfers  Sit to Stand: Minimal Assistance  Stand to sit: Minimal Assistance  Bed to Chair: Moderate assistance  Ambulation  Surface: level tile  Device: Rolling Walker  Other Apparatus: Knee Immobilizer (pt reports he uses for safety)  Assistance: Minimal assistance  Quality of Gait: unsteady, increased trunk sway. Gait Deviations: Staggers;Decreased step length  Distance: 25ft  Comments: increased unsteadiness with turning  More Ambulation?: No  Stairs/Curb  Stairs?: No     Balance  Posture: Fair  Sitting - Static: Fair  Sitting - Dynamic: Fair  Standing - Static: Fair;-  Standing - Dynamic: Poor;+  Comments: Assessed with RW.  A/AROM Exercises:.  Seated LE exercise program: Long Arc Quads, hip abduction/adduction, heel/toe raises, and marches. Reps: 15x     AM-PAC Score  AM-PAC Inpatient Mobility Raw Score : 14    Goals  Short Term Goals  Time Frame for Short term goals: 14 visits  Short term goal 1: Supine to/from sit with SBA. Short term goal 2: Sit to/from stand with SBA. Short term goal 3: Ambulate 150' with walker with SBA. Short term goal 4: Improve standing balance to stand with fair + balance. Additional Goals?: No       Education  Patient Education  Education Given To: Patient  Education Provided: Role of Therapy;Plan of Care;Transfer Training;Precautions;Orientation; Fall Prevention Strategies  Education Method: Demonstration;Verbal  Barriers to Learning: None  Education Outcome: Continued education needed      Therapy Time   Individual Concurrent Group Co-treatment   Time In 1200         Time Out 1226         Minutes 26

## 2022-05-14 NOTE — PLAN OF CARE
Pt.on Ra,lungs sound diminished,encourage to use IS.VSS,afebrile,continues on heparin drip and amiodarone PO. Abd.soft,bowel sound active,soft abd.bowel movement x2-waterly stool -MD aware. AxOx3-4,move all extremities,pulses present,warm to touch. Patient denies pain.   Problem: Discharge Planning  Goal: Discharge to home or other facility with appropriate resources  1/20/2462 [id number] by Kristina Lopez RN  Outcome: Progressing  5/14/2022 0609 by Deonte Brewer  Outcome: Progressing

## 2022-05-15 LAB
ABSOLUTE EOS #: 0.16 K/UL (ref 0–0.44)
ABSOLUTE IMMATURE GRANULOCYTE: 0.14 K/UL (ref 0–0.3)
ABSOLUTE LYMPH #: 2.2 K/UL (ref 1.1–3.7)
ABSOLUTE MONO #: 1.5 K/UL (ref 0.1–1.2)
ANION GAP SERPL CALCULATED.3IONS-SCNC: 8 MMOL/L (ref 9–17)
BASOPHILS # BLD: 0 % (ref 0–2)
BASOPHILS ABSOLUTE: <0.03 K/UL (ref 0–0.2)
BUN BLDV-MCNC: 13 MG/DL (ref 8–23)
CALCIUM SERPL-MCNC: 8.4 MG/DL (ref 8.6–10.4)
CHLORIDE BLD-SCNC: 105 MMOL/L (ref 98–107)
CO2: 21 MMOL/L (ref 20–31)
CREAT SERPL-MCNC: 0.8 MG/DL (ref 0.7–1.2)
EOSINOPHILS RELATIVE PERCENT: 3 % (ref 1–4)
GFR AFRICAN AMERICAN: >60 ML/MIN
GFR NON-AFRICAN AMERICAN: >60 ML/MIN
GFR SERPL CREATININE-BSD FRML MDRD: ABNORMAL ML/MIN/{1.73_M2}
GLUCOSE BLD-MCNC: 113 MG/DL (ref 70–99)
HCT VFR BLD CALC: 31.1 % (ref 40.7–50.3)
HEMOGLOBIN: 10.5 G/DL (ref 13–17)
IMMATURE GRANULOCYTES: 2 %
LYMPHOCYTES # BLD: 35 % (ref 24–43)
MCH RBC QN AUTO: 31.8 PG (ref 25.2–33.5)
MCHC RBC AUTO-ENTMCNC: 33.8 G/DL (ref 28.4–34.8)
MCV RBC AUTO: 94.2 FL (ref 82.6–102.9)
MONOCYTES # BLD: 24 % (ref 3–12)
NRBC AUTOMATED: 0 PER 100 WBC
PDW BLD-RTO: 12.8 % (ref 11.8–14.4)
PLATELET # BLD: ABNORMAL K/UL (ref 138–453)
PLATELET, FLUORESCENCE: 122 K/UL (ref 138–453)
PLATELET, IMMATURE FRACTION: 5 % (ref 1.1–10.3)
POTASSIUM SERPL-SCNC: 4 MMOL/L (ref 3.7–5.3)
RBC # BLD: 3.3 M/UL (ref 4.21–5.77)
SEG NEUTROPHILS: 36 % (ref 36–65)
SEGMENTED NEUTROPHILS ABSOLUTE COUNT: 2.23 K/UL (ref 1.5–8.1)
SODIUM BLD-SCNC: 134 MMOL/L (ref 135–144)
WBC # BLD: 6.2 K/UL (ref 3.5–11.3)

## 2022-05-15 PROCEDURE — 6370000000 HC RX 637 (ALT 250 FOR IP): Performed by: STUDENT IN AN ORGANIZED HEALTH CARE EDUCATION/TRAINING PROGRAM

## 2022-05-15 PROCEDURE — 80048 BASIC METABOLIC PNL TOTAL CA: CPT

## 2022-05-15 PROCEDURE — 2000000000 HC ICU R&B

## 2022-05-15 PROCEDURE — 85025 COMPLETE CBC W/AUTO DIFF WBC: CPT

## 2022-05-15 PROCEDURE — 99233 SBSQ HOSP IP/OBS HIGH 50: CPT | Performed by: PSYCHIATRY & NEUROLOGY

## 2022-05-15 PROCEDURE — 6370000000 HC RX 637 (ALT 250 FOR IP): Performed by: PHYSICAL MEDICINE & REHABILITATION

## 2022-05-15 PROCEDURE — 6370000000 HC RX 637 (ALT 250 FOR IP): Performed by: NURSE PRACTITIONER

## 2022-05-15 PROCEDURE — 6370000000 HC RX 637 (ALT 250 FOR IP): Performed by: SURGERY

## 2022-05-15 PROCEDURE — 36415 COLL VENOUS BLD VENIPUNCTURE: CPT

## 2022-05-15 PROCEDURE — 2580000003 HC RX 258: Performed by: STUDENT IN AN ORGANIZED HEALTH CARE EDUCATION/TRAINING PROGRAM

## 2022-05-15 PROCEDURE — 99232 SBSQ HOSP IP/OBS MODERATE 35: CPT | Performed by: INTERNAL MEDICINE

## 2022-05-15 PROCEDURE — 99231 SBSQ HOSP IP/OBS SF/LOW 25: CPT | Performed by: PSYCHIATRY & NEUROLOGY

## 2022-05-15 PROCEDURE — 6370000000 HC RX 637 (ALT 250 FOR IP)

## 2022-05-15 PROCEDURE — 97535 SELF CARE MNGMENT TRAINING: CPT

## 2022-05-15 PROCEDURE — 6370000000 HC RX 637 (ALT 250 FOR IP): Performed by: PSYCHIATRY & NEUROLOGY

## 2022-05-15 PROCEDURE — 85055 RETICULATED PLATELET ASSAY: CPT

## 2022-05-15 RX ORDER — MIDODRINE HYDROCHLORIDE 10 MG/1
10 TABLET ORAL
Qty: 90 TABLET | Refills: 3 | Status: CANCELLED | OUTPATIENT
Start: 2022-05-15

## 2022-05-15 RX ADMIN — MIDODRINE HYDROCHLORIDE 10 MG: 5 TABLET ORAL at 17:20

## 2022-05-15 RX ADMIN — ATORVASTATIN CALCIUM 80 MG: 80 TABLET, FILM COATED ORAL at 22:25

## 2022-05-15 RX ADMIN — AMIODARONE HYDROCHLORIDE 200 MG: 200 TABLET ORAL at 22:25

## 2022-05-15 RX ADMIN — MIDODRINE HYDROCHLORIDE 10 MG: 5 TABLET ORAL at 08:25

## 2022-05-15 RX ADMIN — ASPIRIN 81 MG: 81 TABLET, CHEWABLE ORAL at 08:26

## 2022-05-15 RX ADMIN — APIXABAN 5 MG: 5 TABLET, FILM COATED ORAL at 22:25

## 2022-05-15 RX ADMIN — Medication 1 CAPSULE: at 08:25

## 2022-05-15 RX ADMIN — APIXABAN 5 MG: 5 TABLET, FILM COATED ORAL at 08:25

## 2022-05-15 RX ADMIN — SODIUM CHLORIDE, PRESERVATIVE FREE 10 ML: 5 INJECTION INTRAVENOUS at 08:26

## 2022-05-15 RX ADMIN — METOPROLOL TARTRATE 12.5 MG: 25 TABLET ORAL at 20:30

## 2022-05-15 RX ADMIN — TAMSULOSIN HYDROCHLORIDE 0.4 MG: 0.4 CAPSULE ORAL at 08:25

## 2022-05-15 RX ADMIN — METOPROLOL TARTRATE 25 MG: 25 TABLET ORAL at 08:25

## 2022-05-15 RX ADMIN — SODIUM CHLORIDE, PRESERVATIVE FREE 10 ML: 5 INJECTION INTRAVENOUS at 20:34

## 2022-05-15 RX ADMIN — AMIODARONE HYDROCHLORIDE 200 MG: 200 TABLET ORAL at 08:26

## 2022-05-15 RX ADMIN — MIDODRINE HYDROCHLORIDE 10 MG: 5 TABLET ORAL at 12:42

## 2022-05-15 ASSESSMENT — PAIN SCALES - GENERAL
PAINLEVEL_OUTOF10: 0

## 2022-05-15 NOTE — PROGRESS NOTES
Occupational Therapy  Facility/Department: [facility]  Occupational Therapy Daily Treatment Note    Name: Benjie Zaman  : 1934  MRN: [id number]  Date of Service: 5/15/2022    Discharge Recommendations:  Patient would benefit from continued therapy after discharge     Patient Diagnosis(es): The primary encounter diagnosis was Cerebrovascular accident (CVA), unspecified mechanism ([address] Diagnoses of Altered mental status, unspecified altered mental status type and Right sided weakness were also pertinent to this visit. Past Medical History:  has a past medical history of Arthritis and Psoriasis. Past Surgical History:  has a past surgical history that includes Prostate surgery. Assessment   Performance deficits / Impairments: Decreased functional mobility ; Decreased endurance;Decreased ADL status; Decreased balance;Decreased high-level IADLs;Decreased safe awareness;Decreased cognition;Decreased posture  Prognosis: Good  Activity Tolerance  Activity Tolerance: Patient Tolerated treatment well        Plan   Plan  Times per Week: 4-5x  Current Treatment Recommendations: Strengthening,Balance training,Functional mobility training,Endurance training,Safety education & training,Neuromuscular re-education,Patient/Caregiver education & training,Self-Care / ADL     Restrictions  Restrictions/Precautions  Restrictions/Precautions: Fall Risk,General Precautions,Up as Tolerated  Required Braces or Orthoses?: Yes (Knee Immobilizer for LLE (for safety d/t buckling))  Position Activity Restriction  Other position/activity restrictions: SBP goal less than 160. CVA, s/p tPA, s/p thrombectomy. Subjective   General  Chart Reviewed: Yes  Patient assessed for rehabilitation services?: Yes  Family / Caregiver Present: No  Diagnosis: TPA, Subacute anterior L temporal lobe infarct  General Comment  Comments: RN and Pt agreeable for therapy this day.  Pt supine in bed start of session, retired to chair end of session with chair alarm on, call light in reach, BLE raised. Objective   SpO2: 100 %  O2 Device: None (Room air)  Pain Level: Denies pain     Safety Devices  Type of Devices: Left in chair;Chair alarm in place;Call light within reach;Gait belt;Nurse notified; Patient at risk for falls  Restraints  Restraints Initially in Place: No      ADL  Feeding: Stand by assistance;Setup; Increased time to complete;Verbal cueing (able to open most containers and feed self)  Grooming: Increased time to complete;Setup;Minimal assistance;Verbal cueing (Min-wash/dry hair, SBA-wash face, brush teeth and comb hair)  UE Bathing: Minimal assistance;Setup;Verbal cueing; Increased time to complete (assist to wash back)  LE Bathing: Moderate assistance;Setup; Increased time to complete;Verbal cueing (assist to wash lower legs and feet)  UE Dressing: Verbal cueing; Increased time to complete;Minimal assistance;Setup (assist to snap, thread arms and tie gown)  LE Dressing: Increased time to complete;Setup;Maximum assistance;Verbal cueing (assist to doff/don footies)  Toileting: Increased time to complete;Setup;Maximum assistance (soiled brief, max A for telma care, min A for balance d/t posterior lean, pt very heavy on heals w/RW)      Pt in bed upon arrival. Transferred to EOB and then to recliner w/RW and min assist. Pt is unsteady on feet, w/posterior lean. Vc's for hand placement prior to standing and prior to sitting w/poor carryover. Pt standing at chair to change brief. Doffed soiled brief, pt became incontinent while standing. , telma care completed and retired to recliner and brief donned while seated. Pt setup for self care seated in recliner at tray table (see above for LOF). Pt on RA, needs increased time and assist d/t fatigue and overall weakness. Pt remained in recliner, BLE elevated. Chair alarm activated.         Bed mobility  Rolling to Right: Stand by assistance  Supine to Sit: Stand by assistance  Sit to Supine: Unable to assess (left up in chair)  Scooting: Stand by assistance  Bed Mobility Comments: HOB elevated  Transfers  Sit to stand: Minimal assistance  Stand to sit: Minimal assistance  Transfer Comments: utilizing RW, verbal and tactile cues for proper hand placement   Functional Mobility: Minimal assistance w/RW (unsteady on feet, no gross LOB)     Cognition  Overall Cognitive Status: Exceptions  Arousal/Alertness: Appropriate responses to stimuli  Following Commands: Follows one step commands consistently; Follows multistep commands with increased time; Follows multistep commands with repitition  Attention Span: Appears intact  Memory: Appears intact  Safety Judgement: Decreased awareness of need for assistance;Decreased awareness of need for safety  Problem Solving: Assistance required to generate solutions;Assistance required to implement solutions  Insights: Decreased awareness of deficits  Initiation: Requires cues for some  Sequencing: Requires cues for some  Cognition Comment: Pt. using humor to cover up decreased cognition at times. Education Given To: Patient  Education Provided: Role of Therapy; ADL Adaptive Strategies;Transfer Training  Education Provided Comments: Pt educated on OT role, proper hand placement, balance maintenance, RW management, figure 4 technique, pt. demo F carryover  Education Method: Demonstration;Verbal  Barriers to Learning: Cognition; Hearing  Education Outcome: Continued education needed;Verbalized understanding;Demonstrated understanding    AM-PAC Score  AM-[facility] Inpatient Daily Activity Raw Score: 15 (05/15/22 1248)  AM-PAC Inpatient ADL T-Scale Score : 34.69 (05/15/22 1248)  ADL Inpatient CMS 0-100% Score: 56.46 (05/15/22 1248)  ADL Inpatient CMS G-Code Modifier : CK (05/15/22 1248)    Goals  Short Term Goals  Time Frame for Short term goals: Pt will by discharge  Short Term Goal 1: demo good safety awareness during func mob around room at SUP and LRD PRN  Short Term Goal 2: demo ADL UB bathing/dressing activity at mod I with 2 cues  Short Term Goal 3: demo ADL LB bathing/dressing activity at SBA, AD PRN, with 2 cues  Short Term Goal 4: demo SUP for all bed mobility using bed rails PRN  Short Term Goal 5: demo activity tolerance for 35 min+     Therapy Time   Individual Concurrent Group Co-treatment   Time In 1002         Time Out 1100         Minutes 58         Timed Code Treatment Minutes: [address], [place]/L

## 2022-05-15 NOTE — PROGRESS NOTES
NEUROLOGY INPATIENT PROGRESS NOTE    5/15/2022         Subjective: Jake Green is a  80 y.o. male admitted on 5/6/2022 with Acute ischemic stroke [facility]) [I63.9]  Right sided weakness [R53.1]  Altered mental status, unspecified altered mental status type [R41.82]  Cerebrovascular accident (CVA), unspecified mechanism ([address] [I63.9]    Briefly, this is a  80 y.o. male with no significant PMH was admitted on 5/6/2022 with acute onset of right-sided weakness and speech difficulties found to have left M1 occlusion with resultant left temporal lobe, left basal ganglia infarction; underwent IV tPA & s/p mechanical thrombectomy with TICI 3.  5/15/2022: Patient's family members at bedside. No acute events over the night. Patient denied headaches, dizziness and vision changes. Patient is able to follow simple as well as complex commands well. No current facility-administered medications on file prior to encounter. Current Outpatient Medications on File Prior to Encounter   Medication Sig Dispense Refill    calcipotriene (DOVONEX) 0.005 % solution Apply topically 2 times daily psoriasis      clobetasol (TEMOVATE) 0.05 % cream Apply topically 2 times daily Indications: Psoriasis Apply topically 2 times daily.  meloxicam (MOBIC) 15 MG tablet Take 15 mg by mouth daily         Allergies: Jake Green has No Known Allergies.     Past Medical History:   Diagnosis Date    Arthritis     Psoriasis        Past Surgical History:   Procedure Laterality Date    PROSTATE SURGERY             Medications:     metoprolol tartrate  12.5 mg Oral BID    amiodarone  200 mg Oral BID    lactobacillus  1 capsule Oral Daily with breakfast    apixaban  5 mg Oral BID    metoprolol  2.5 mg IntraVENous Once    lactated ringers bolus  250 mL IntraVENous Once    midodrine  10 mg Oral TID WC    sodium chloride flush  5-40 mL IntraVENous 2 times per day    [Held by provider] clopidogrel  75 mg Oral Daily    aspirin  81 mg Oral Daily    tamsulosin  0.4 mg Oral Daily    atorvastatin  80 mg Oral Nightly     PRN Meds include: sodium chloride flush, sodium chloride, acetaminophen, ondansetron **OR** ondansetron, polyethylene glycol, labetalol, acetaminophen, hydrALAZINE    Objective:   /63   Pulse 56   Temp 98.4 °F (36.9 °C) (Oral)   Resp 22   Ht 5' 9\" (1.753 m)   Wt 151 lb 7.3 oz (68.7 kg)   SpO2 100%   BMI 22.37 kg/m²     Blood pressure range: Systolic (38IIH), OOW:053 , Min:91 , MED:177   ; Diastolic (53UQP), STS:96, Min:47, Max:75    neuro exam significant for dysarthric speech with expressive aphasia and right hemiparesis. Data:    Lab Results:   CBC:   Recent Labs     05/13/22  0211 05/14/22  0128 05/15/22  0458   WBC 7.0 6.2 6.2   HGB 11.1* 10.6* 10.5*   PLT See Reflexed IPF Result See Reflexed IPF Result See Reflexed IPF Result     BMP:    Recent Labs     05/13/22  0211 05/14/22  0128 05/15/22  0458   * 134* 134*   K 4.0 3.5* 4.0    105 105   CO2 23 21 21   BUN 20 14 13   CREATININE 0.86 0.74 0.80   GLUCOSE 122* 118* 113*         Lab Results   Component Value Date    CHOL 133 05/07/2022    LDLCHOLESTEROL 71 05/07/2022    HDL 52 05/07/2022    TRIG 50 05/07/2022    TSH 1.30 05/07/2022    INR 1.1 05/07/2022    LABA1C 6.8 (H) 05/07/2022       MRI brain (5/7/2022): Acute infarction in left anterior temporal lobe and left basal ganglia, consistent with MCA occlusion. Punctate foci of acute infarction right frontal cortex.     CTA head/neck (5/7/2022): Complete occlusion of left M1 MCA; severe stenosis at origin of left vertebral artery. Left MCA territory perfusion mismatch.     CT head (5/8/2022): Evolving subacute ischemic infarct of left temporal lobe.   Small focus of hemorrhagic conversion has not appreciably changed on comparison with CT and MRI on 5/7/2022.     Follow up CT Head 5/9/22: compared with 5/8/22 study; evolving left temporal lobe infarct anteriorly with stable superimposed focus of

## 2022-05-15 NOTE — PROGRESS NOTES
Endovascular Neurosurgery Progress Note    SUBJECTIVE:   No reported events overnight. This am pt awake. Pt has no acute complaints. Review of Systems:  CONSTITUTIONAL:  negative for fevers, chills, fatigue and malaise    EYES:  negative for double vision, blurred vision and photophobia     HEENT:  negative for tinnitus, epistaxis and sore throat    RESPIRATORY:  negative for cough, shortness of breath, wheezing    CARDIOVASCULAR:  negative for chest pain, palpitations, syncope, edema    GASTROINTESTINAL:  negative for nausea, vomiting    GENITOURINARY:  negative for incontinence    MUSCULOSKELETAL:  negative for neck or back pain    NEUROLOGICAL:  Negative for weakness and tingling  negative for headaches and dizziness    PSYCHIATRIC:  negative for anxiety      Review of systems otherwise negative. OBJECTIVE:     Vitals:    05/15/22 1200   BP: 109/60   Pulse: 63   Resp: 18   Temp: 98.4 °F (36.9 °C)   SpO2: 100%        General:  Gen: normal habitus, NAD  HEENT: NCAT, mucosa moist  Cvs: RRR, S1 S2 normal  Resp: symmetric unlabored breathing  Abd: s/nd/nt  Ext: no edema  Skin: no lesions seen, warm and dry    Neuro:   Gen: awake, oriented to self and month, not to place. Lang/speech: no aphasia, follows commands, no dysarthria. Follows commands  CN: PERRL, EOMI, VFF, V1-3 intact, face symmetric, hearing intact  Motor: 5/5 all 4 ext. Sense: LT intact all 4 ext. Coord: f/t intact b/l  DTR: deferred  Gait: deferred    NIH Stroke Scale:   1a  Level of consciousness: 0   1b. LOC questions:  0   1c. LOC commands: 0   2. Best Gaze: 0 - normal   3. Visual: 0 - no visual loss   4. Facial Palsy: 0 - normal symmetric movement   5a. Motor left arm: 0   5b. Motor right arm: 0   6a. Motor left le   6b  Motor right le   7. Limb Ataxia: 0 - absent   8. Sensory: 0   9. Best Language:  0   10. Dysarthria: 0   11. Extinction and Inattention: 0         Total:   0     MRS: 3    LABS:   Reviewed.   Lab Results Component Value Date    HGB 10.5 (L) 05/15/2022    WBC 6.2 05/15/2022    PLT See Reflexed IPF Result 05/15/2022     (L) 05/15/2022    BUN 13 05/15/2022    CREATININE 0.80 05/15/2022    MG 3.3 (H) 05/12/2022    APTT 63.0 (H) 05/14/2022    INR 1.1 05/07/2022      Lab Results   Component Value Date    COVID19 Not Detected 05/12/2022       RADIOLOGY:   Images were personally reviewed including:   CT head wo con 5/13/2022  1.  Decreased density superimposed hemorrhagic area of left temporal lobe   infarct.  Findings compatible with interval improvement.       2.  Continued mass effect associated with the infarct with effacement of the   brain parenchyma bordering the cisterns.       3.  No midline shift.  No new areas of hemorrhage. MRI Brain 05/08/2022:   1. Acute infarction in the left anterior temporal lobe and left basal   ganglia, consistent with MCA occlusion. 2. Punctate foci of acute infarction in the right frontal cortex. 3. Mild microangiopathic change. IR 5/7/2022:   1. Acute Left MCA proximal M-1 occlusion TICI score 0   2. The above lesion was treated with 8 fr short sheath, flow gate bgc, prowler 21 microcatheter, embo trap 5 mm x 37 mm stent retriever first pass. Final reperfusion score TICI 03    TTE 5/6/2022: EF 10%    ASSESSMENT:   79 y/o M with pmh significant for LEXII presented 5/7/2022 with left M1 acute occlusion s/p tPA and MT TICI 03, first pass. 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 Workup shows HF EF 10%    Pt is DNR CCA. Pt has residual disorientation. MRI 5/9/2022 shows L MCA stroke and punctate hemo conversion. Cardiac cath 5/10/2022 showed complete occlusion all 3 cardiac arteries with collateralization. New afib rvr and nstemi 5/12/2022. CT head 5/13/2022 imrpoving. PLAN:   --c/w ASA 81  --continue eliquis 5 bid  --continue lipitor 80  ---140  --stable from endovascular perspective for discharge. Pending cardiac issues.   --F/up with Dr. Ramona Abdalla in 2 weeks after discharge and f/up with Dr. Kendy Donovan in 3 months     Case discussed with Dr. Kendy Donovan attending.     Garrick Smith MD  Stroke, [facility]  [id number]  Electronically signed 5/15/2022 at 12:39 PM

## 2022-05-15 NOTE — PROGRESS NOTES
[facility]  Progress Note                   Date:   5/15/2022  Patient name: Kusum Lee  Date of admission:  5/6/2022 11:29 PM  MRN:   [id number]  YOB: 1934  PCP: Garrison Gallegos MD    Reason for Admission: Acute ischemic stroke [facility]) [I63.9]  Right sided weakness [R53.1]  Altered mental status, unspecified altered mental status type [R41.82]  Cerebrovascular accident (CVA), unspecified mechanism ([address] [I63.9]    Subjective:     No acute issues overnight. Remains in NSR  Was switched to po eliquis yesterday        Medications:   Scheduled Meds:   amiodarone  200 mg Oral BID    lactobacillus  1 capsule Oral Daily with breakfast    apixaban  5 mg Oral BID    metoprolol  2.5 mg IntraVENous Once    lactated ringers bolus  250 mL IntraVENous Once    midodrine  10 mg Oral TID WC    sodium chloride flush  5-40 mL IntraVENous 2 times per day    [Held by provider] clopidogrel  75 mg Oral Daily    metoprolol tartrate  25 mg Oral BID    aspirin  81 mg Oral Daily    lisinopril  5 mg Oral Daily    tamsulosin  0.4 mg Oral Daily    atorvastatin  80 mg Oral Nightly     Continuous Infusions:   sodium chloride       CBC:   Recent Labs     05/13/22  0211 05/14/22  0128 05/15/22  0458   WBC 7.0 6.2 6.2   HGB 11.1* 10.6* 10.5*   PLT See Reflexed IPF Result See Reflexed IPF Result See Reflexed IPF Result     BMP:    Recent Labs     05/13/22  0211 05/14/22  0128 05/15/22  0458   * 134* 134*   K 4.0 3.5* 4.0    105 105   CO2 23 21 21   BUN 20 14 13   CREATININE 0.86 0.74 0.80   GLUCOSE 122* 118* 113*     Hepatic:No results for input(s): AST, ALT, ALB, BILITOT, ALKPHOS in the last 72 hours. Troponin:   Recent Labs     05/12/22  0624 05/12/22  0909   TROPHS 613* 482*     BNP: No results for input(s): BNP in the last 72 hours. Lipids:   No results for input(s): CHOL, HDL in the last 72 hours.     Invalid input(s): LDLCALCU  INR:   No results for input(s): INR in the last 72 hours.  ECHO 5/9/2022      Summary  Dilated left ventricle with severely reduced systolic function. Calculated ejection fraction is 13 %  Moderate mitral regurgitation. Bubble study was negative for shunt. Signature  ----------------------------------------------------------------------------   Electronically signed by Alexey Givens(Sonographer) on 05/07/2022 03:59   PM  ----------------------------------------------------------------------------     ----------------------------------------------------------------------------   Electronically signed by Kashmir Carrington(Interpreting physician) on   05/08/2022 10:56 AM  ----------------------------------------------------------------------------    Cardiac Cath 5/10/2022  Findings:     LMCA: has moderate calcification with distal 30% stenosis. LAD: has heavy calcification with ostial 60% stenosis, proximal 80% stenosis   mid 90% stenosis and distal 100% occlusion. The D1 has ostial 90% stenosis   and proximal 90% stenosis. The D2 has ostial 80% stenosis. LCx: has moderate calcification with proximal 100% occlusion. The Om1 has   high origin with ostial 90% stenosis. The OM2 and Om3 are seen via left to   left collaterals. RCA: has mid 100% occlusion with left to right collaterals. The RV marginal   branch has ostial 70% stenosis. Coronary Tree        Dominance: Right      The LV gram was performed in the ROSADO 30 position. 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 Recommendations        Medical therapy as needed. Risk factor modification. Routine Post Diagnostic Cath orders. Discussed with wife and son-in law in detail. Recommend medical therapy. Add plavix 75mg po qday if ok with neurology. Palliative consult. Address code status. Objective:   Vitals: BP (!) 117/54   Pulse 60   Temp 97.7 °F (36.5 °C) (Oral)   Resp 18   Ht 5' 9\" (1.753 m)   Wt 151 lb 7.3 oz (68.7 kg)   SpO2 100%   BMI 22.37 kg/m²   General appearance: alert and cooperative with exam   HEENT: Head: Normocephalic, no lesions, without obvious abnormality. Neck:no JVD, trachea midline, no adenopathy  Lungs: Clear to auscultation, no distress on RA  Heart: Regular rate and rhythm, s1/s2 auscultated, no murmurs, SR 76  Abdomen: soft, non-tender, bowel sounds active  Extremities: no edema  Neurologic: not done        Assessment / Acute Cardiac Problems:   1. Acute CVA s/p tPA and mechanical thrombectomy of left MCA thrombus  2 Elevated troponin likely NSTEMI type I.  3.  Severe MVD - medical management  4. 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 New onset Afib with RVR, ChadsVasc of 6, on heparin and amiodarone. Converted to NSR. 6. DNR-CCA. Plan of Treatment:   1. Plavix on hold per Neurology recs. Pt was switched to eliquis yesterday. Continue ASA and eliquis  2. Continue po amiodarone 200 mg BID  3. Continue po lopressor 25 mg BID  4. Patient is on midodrine 10 mg TID. Hence, discontinue lisinopril  5. Code status discussion as per palliative care. Viviana Maxwell MD    I performed a history and physical examination of the patient and discussed management with the resident. I reviewed the residents note and agree with the documented findings and plan of care. Any areas of disagreement are noted on the chart. I was personally present for the key portions of any procedures. I have documented in the chart those procedures where I was not present during the key portions. I have personally evaluated this patient and have completed at least one if not all key elements of the E/M (history, physical exam, and MDM). Additional findings are as noted.     Mirna Heard MD

## 2022-05-15 NOTE — PLAN OF CARE
Pt.on Ra,lungs sound diminished,encourage to use IS. VSS with episodes of bradycardia -MD aware,afebrile. AxOx3-4,denies pain,move all extremities,pulses present,warm to touch.

## 2022-05-15 NOTE — PROGRESS NOTES
[facility]  Internal Medicine Teaching Residency Program  Inpatient Daily Progress Note  ______________________________________________________________________________    Patient: Tricia Samano  YOB: 1934   IKW:[id number]    Acct: [de-identified]     Room: [address] [place] [address]  Admit date: 5/6/2022  Today's date: 05/15/22  Number of days in the hospital: 8    SUBJECTIVE   Admitting Diagnosis: Cerebrovascular accident (CVA) ([facility] [address]  CC: Right-sided weakness and speech difficulty    - Pt examined at bedside. Chart & results reviewed. - No acute events overnight   - Pt resting comfortably in bed   - Pt A&OX3 > mentation improved from yesterday   - No diarrhea since yesterday evening   - Hgb stable   - Afebrile   - Bradycardic > asymptomatic      Plan for today:  - Continue PO medications and current medical management > follow up on cardiology recs regarding bradycardia   - Discharge planning in process     ROS:  Constitutional:  negative for chills, fevers, sweats  Respiratory:  negative for cough, dyspnea on exertion, hemoptysis, shortness of breath, wheezing  Cardiovascular:  negative for chest pain, chest pressure/discomfort, lower extremity edema, palpitations  Gastrointestinal:  negative for abdominal pain, constipation, diarrhea, nausea, vomiting  Neurological:  negative for dizziness, headache    BRIEF HISTORY     Kristan Lesser a 80 y. o. male with no significant PMH and not presently on AC who presented via EMS after being noted to have acute onset right sided weakness and speech difficulty. Wife reports he fell asleep on the recliner at 9:30pm and woke up around 10om and couldn't get up from the chair. Family also noted right sided weakness. EMS was called and activated stroke alert. Patient was evaluated by mobile stroke unit.  He was noted to have aphasia, dysarthria, and right arm and leg weakness.     LKW: 9:30PM  NIH: 14     Non contrast CT Head obtained.  Negative for any acute bleed  Was given tPa bolus dose at 2311 and infusion started 2313.  [facility] Course:   CTA head and neck: Showed left M1 occlusion.  Patient was taken for mechanical thrombectomy for M1 occlusion. Patient was given 600 mg aspirin x1.  Transfer to neuro ICU for further management. Pt found to have hemorrhagic conversion.  Plan for repeat CT head in 24 hours.  Cardiology planning for cardiac cath.  No heparin at this time.  We will continue with Lovenox. OBJECTIVE     Vital Signs:  BP (!) 109/55   Pulse 56   Temp 97.7 °F (36.5 °C) (Oral)   Resp 19   Ht 5' 9\" (1.753 m)   Wt 151 lb 7.3 oz (68.7 kg)   SpO2 99%   BMI 22.37 kg/m²     Temp (24hrs), Av.3 °F (36.8 °C), Min:97.7 °F (36.5 °C), Max:99.3 °F (37.4 °C)    No intake/output data recorded. Physical Exam:  Physical Exam  Vitals and nursing note reviewed. Constitutional:       Appearance: Normal appearance. HENT:      Head: Normocephalic and atraumatic. Nose: Nose normal.      Mouth/Throat:      Mouth: Mucous membranes are moist.      Pharynx: Oropharynx is clear. Eyes:      Extraocular Movements: Extraocular movements intact. Conjunctiva/sclera: Conjunctivae normal.      Pupils: Pupils are equal, round, and reactive to light. Cardiovascular:      Rate and Rhythm: Regular rhythm. Bradycardia present. Pulses: Normal pulses. Heart sounds: Normal heart sounds. No murmur heard. No friction rub. No gallop. Pulmonary:      Effort: Pulmonary effort is normal. No respiratory distress. Breath sounds: Normal breath sounds. No stridor. No wheezing, rhonchi or rales. Chest:      Chest wall: No tenderness. Abdominal:      General: Abdomen is flat. Bowel sounds are normal. There is no distension. Palpations: Abdomen is soft. There is no mass. Tenderness: There is no abdominal tenderness. There is no guarding or rebound. Hernia: No hernia is present.    Musculoskeletal: General: No swelling, tenderness, deformity or signs of injury. Normal range of motion. Cervical back: Normal range of motion. Right lower leg: No edema. Left lower leg: No edema. Skin:     General: Skin is warm. Findings: Bruising present. Neurological:      General: No focal deficit present. Mental Status: He is alert and oriented to person, place, and time. Mental status is at baseline. Psychiatric:         Mood and Affect: Mood normal.         Behavior: Behavior normal.         Thought Content:  Thought content normal.         Judgment: Judgment normal.           Medications:  Scheduled Medications:    amiodarone  200 mg Oral BID    lactobacillus  1 capsule Oral Daily with breakfast    apixaban  5 mg Oral BID    metoprolol  2.5 mg IntraVENous Once    lactated ringers bolus  250 mL IntraVENous Once    midodrine  10 mg Oral TID WC    sodium chloride flush  5-40 mL IntraVENous 2 times per day    [Held by provider] clopidogrel  75 mg Oral Daily    metoprolol tartrate  25 mg Oral BID    aspirin  81 mg Oral Daily    tamsulosin  0.4 mg Oral Daily    atorvastatin  80 mg Oral Nightly     Continuous Infusions:    sodium chloride       PRN Medicationssodium chloride flush, 5-40 mL, PRN  sodium chloride, , PRN  acetaminophen, 650 mg, Q4H PRN  ondansetron, 4 mg, Q8H PRN   Or  ondansetron, 4 mg, Q6H PRN  polyethylene glycol, 17 g, Daily PRN  labetalol, 10 mg, Q4H PRN  acetaminophen, 650 mg, Q4H PRN  hydrALAZINE, 10 mg, Q6H PRN        Diagnostic Labs:  CBC:   Recent Labs     05/13/22  0211 05/14/22  0128 05/15/22  0458   WBC 7.0 6.2 6.2   RBC 3.45* 3.32* 3.30*   HGB 11.1* 10.6* 10.5*   HCT 32.5* 31.3* 31.1*   MCV 94.2 94.3 94.2   RDW 12.9 12.6 12.8   PLT See Reflexed IPF Result See Reflexed IPF Result See Reflexed IPF Result     BMP:   Recent Labs     05/13/22  0211 05/14/22  0128 05/15/22  0458   * 134* 134*   K 4.0 3.5* 4.0    105 105   CO2 23 21 21   BUN 20 14 13 CREATININE 0.86 0.74 0.80     BNP: No results for input(s): BNP in the last 72 hours. PT/INR: No results for input(s): PROTIME, INR in the last 72 hours. APTT:   Recent Labs     05/13/22  0802 05/13/22  1834 05/14/22  0128   APTT 47.0* 53.6* 63.0*     CARDIAC ENZYMES: No results for input(s): CKMB, CKMBINDEX, TROPONINI in the last 72 hours. Invalid input(s): CKTOTAL;3  FASTING LIPID PANEL:  Lab Results   Component Value Date    CHOL 133 05/07/2022    HDL 52 05/07/2022    TRIG 50 05/07/2022     LIVER PROFILE: No results for input(s): AST, ALT, ALB, BILIDIR, BILITOT, ALKPHOS in the last 72 hours. MICROBIOLOGY: No results found for: CULTURE    Imaging:    CT HEAD WO CONTRAST    Result Date: 5/13/2022  1. Decreased density superimposed hemorrhagic area of left temporal lobe infarct. Findings compatible with interval improvement. 2.  Continued mass effect associated with the infarct with effacement of the brain parenchyma bordering the cisterns. 3.  No midline shift. No new areas of hemorrhage. RECOMMENDATIONS: Unavailable     CT HEAD WO CONTRAST    Result Date: 5/11/2022  Left temporal lobe infarct anteriorly with similar superimposed areas of hemorrhage. CT HEAD WO CONTRAST    Result Date: 5/9/2022  Evolving left temporal lobe infarct anteriorly with stable superimposed focus of hemorrhage. ASSESSMENT & PLAN     Assessment and Plan:    Principal Problem:    Cerebrovascular accident (CVA) ([address]  Active Problems:    Acute ischemic stroke ([address]    Altered mental status    CHF (congestive heart failure) ([address]    NSTEMI (non-ST elevated myocardial infarction) ([address]    Intracranial bleed ([address]    Right sided weakness    New onset atrial fibrillation ([address]    Leukopenia    Neutropenia ([address]    Encounter for palliative care  Resolved Problems:    * No resolved hospital problems.  *      Left MCA infarct with left M1 occlusion status post mechanical thrombectomy  - CT of the head shows evolving left anterior, left temporal lobe infarction with small hemorrhagic conversion.  Repeat CT scan reviewed, stable. - Continue with aspirin and Eliquis   - Continue with Lipitor 80 mg  - Plavix 75mg > hold per neurology  - Repeat CT head improved > no new focal areas of hemorrhage      Bradycardia   - Asymptomatic   - Awaiting cardiology recommendations     Leukopenia / neutropenia - resolved > possible lab error? > resolved   - Repeat WBC  - Peripheral smear reviewed   - Respiratory viral panel > no viral infection detected     Atrial fibrillation with RVR versus SVT   - Attempted vagal maneuvers were mildly successful  - Patient received Lopressor, total of 750 mL of bolus and digoxin.  On amnio drip  - Heart rate controlled  - ASA and Eliquis   - Cardiology following     Diarrhea   - Probiotics   - None since yesterday evening     Demand ischemia secondary to tachycardia leading to ST elevations in inferior leads  - Troponins 770 > trending down 925 > 540  - Patient denies any chest pain  - Cardiac cath showing MV disease   - Transferred to CVICU for further management Systolic congestive heart failure  - Echocardiogram showing dilated left ventricle with severely reduced systolic function.  EF of 13%.  Moderate mitral regurgitation.  Negative bubble study.  - Cardiology following      Moderate mitral regurgitation     Diet: Regular diet  DVT ppx : ASA and Eliquis   GI ppx: Not indicated     PT/OT/SW: Consulted  Discharge Planning: PM&R consulted, plan for discharge to acute rehab vs SNF, in process. Regan Church MD  Internal Medicine Resident, PGY-1  [facility]; [place], [place]   7:25 AM 5/15/2022     Please note that part of this chart was generated using voice recognition dictation software. Although every effort was made to ensure the accuracy of this automated transcription, some errors in transcription may have occurred.

## 2022-05-16 LAB
ABSOLUTE EOS #: 0.16 K/UL (ref 0–0.44)
ABSOLUTE IMMATURE GRANULOCYTE: 0.21 K/UL (ref 0–0.3)
ABSOLUTE LYMPH #: 2.11 K/UL (ref 1.1–3.7)
ABSOLUTE MONO #: 1.26 K/UL (ref 0.1–1.2)
ANION GAP SERPL CALCULATED.3IONS-SCNC: 12 MMOL/L (ref 9–17)
BASOPHILS # BLD: 0 % (ref 0–2)
BASOPHILS ABSOLUTE: <0.03 K/UL (ref 0–0.2)
BUN BLDV-MCNC: 13 MG/DL (ref 8–23)
CALCIUM SERPL-MCNC: 8.9 MG/DL (ref 8.6–10.4)
CHLORIDE BLD-SCNC: 103 MMOL/L (ref 98–107)
CO2: 20 MMOL/L (ref 20–31)
CREAT SERPL-MCNC: 0.67 MG/DL (ref 0.7–1.2)
EOSINOPHILS RELATIVE PERCENT: 3 % (ref 1–4)
GFR AFRICAN AMERICAN: >60 ML/MIN
GFR NON-AFRICAN AMERICAN: >60 ML/MIN
GFR SERPL CREATININE-BSD FRML MDRD: ABNORMAL ML/MIN/{1.73_M2}
GLUCOSE BLD-MCNC: 107 MG/DL (ref 70–99)
HCT VFR BLD CALC: 32.9 % (ref 40.7–50.3)
HEMOGLOBIN: 11.4 G/DL (ref 13–17)
IMMATURE GRANULOCYTES: 4 %
LYMPHOCYTES # BLD: 36 % (ref 24–43)
MAGNESIUM: 2.1 MG/DL (ref 1.6–2.6)
MCH RBC QN AUTO: 32.2 PG (ref 25.2–33.5)
MCHC RBC AUTO-ENTMCNC: 34.7 G/DL (ref 28.4–34.8)
MCV RBC AUTO: 92.9 FL (ref 82.6–102.9)
MONOCYTES # BLD: 21 % (ref 3–12)
NRBC AUTOMATED: 0 PER 100 WBC
PDW BLD-RTO: 12.5 % (ref 11.8–14.4)
PHOSPHORUS: 3.4 MG/DL (ref 2.5–4.5)
PLATELET # BLD: 151 K/UL (ref 138–453)
PMV BLD AUTO: 11.8 FL (ref 8.1–13.5)
POTASSIUM SERPL-SCNC: 4.1 MMOL/L (ref 3.7–5.3)
RBC # BLD: 3.54 M/UL (ref 4.21–5.77)
SEG NEUTROPHILS: 36 % (ref 36–65)
SEGMENTED NEUTROPHILS ABSOLUTE COUNT: 2.2 K/UL (ref 1.5–8.1)
SODIUM BLD-SCNC: 135 MMOL/L (ref 135–144)
WBC # BLD: 6 K/UL (ref 3.5–11.3)

## 2022-05-16 PROCEDURE — 84100 ASSAY OF PHOSPHORUS: CPT

## 2022-05-16 PROCEDURE — 99232 SBSQ HOSP IP/OBS MODERATE 35: CPT | Performed by: INTERNAL MEDICINE

## 2022-05-16 PROCEDURE — 6370000000 HC RX 637 (ALT 250 FOR IP): Performed by: NURSE PRACTITIONER

## 2022-05-16 PROCEDURE — 2580000003 HC RX 258: Performed by: STUDENT IN AN ORGANIZED HEALTH CARE EDUCATION/TRAINING PROGRAM

## 2022-05-16 PROCEDURE — 6370000000 HC RX 637 (ALT 250 FOR IP): Performed by: PSYCHIATRY & NEUROLOGY

## 2022-05-16 PROCEDURE — 6370000000 HC RX 637 (ALT 250 FOR IP): Performed by: PHYSICAL MEDICINE & REHABILITATION

## 2022-05-16 PROCEDURE — 80048 BASIC METABOLIC PNL TOTAL CA: CPT

## 2022-05-16 PROCEDURE — 6370000000 HC RX 637 (ALT 250 FOR IP): Performed by: STUDENT IN AN ORGANIZED HEALTH CARE EDUCATION/TRAINING PROGRAM

## 2022-05-16 PROCEDURE — 85025 COMPLETE CBC W/AUTO DIFF WBC: CPT

## 2022-05-16 PROCEDURE — 97116 GAIT TRAINING THERAPY: CPT

## 2022-05-16 PROCEDURE — 99233 SBSQ HOSP IP/OBS HIGH 50: CPT | Performed by: PSYCHIATRY & NEUROLOGY

## 2022-05-16 PROCEDURE — 97535 SELF CARE MNGMENT TRAINING: CPT

## 2022-05-16 PROCEDURE — 2060000000 HC ICU INTERMEDIATE R&B

## 2022-05-16 PROCEDURE — 83735 ASSAY OF MAGNESIUM: CPT

## 2022-05-16 PROCEDURE — 36415 COLL VENOUS BLD VENIPUNCTURE: CPT

## 2022-05-16 PROCEDURE — 6370000000 HC RX 637 (ALT 250 FOR IP)

## 2022-05-16 RX ORDER — MIDODRINE HYDROCHLORIDE 5 MG/1
10 TABLET ORAL 3 TIMES DAILY
Status: DISCONTINUED | OUTPATIENT
Start: 2022-05-16 | End: 2022-05-18 | Stop reason: HOSPADM

## 2022-05-16 RX ORDER — DIPHENHYDRAMINE HCL 25 MG
25 TABLET ORAL ONCE
Status: COMPLETED | OUTPATIENT
Start: 2022-05-16 | End: 2022-05-16

## 2022-05-16 RX ORDER — SPIRONOLACTONE 25 MG/1
25 TABLET ORAL DAILY
Status: DISCONTINUED | OUTPATIENT
Start: 2022-05-16 | End: 2022-05-18 | Stop reason: HOSPADM

## 2022-05-16 RX ORDER — MIDODRINE HYDROCHLORIDE 5 MG/1
10 TABLET ORAL 3 TIMES DAILY PRN
Status: DISCONTINUED | OUTPATIENT
Start: 2022-05-16 | End: 2022-05-16

## 2022-05-16 RX ADMIN — APIXABAN 5 MG: 5 TABLET, FILM COATED ORAL at 09:19

## 2022-05-16 RX ADMIN — MIDODRINE HYDROCHLORIDE 10 MG: 5 TABLET ORAL at 21:42

## 2022-05-16 RX ADMIN — TAMSULOSIN HYDROCHLORIDE 0.4 MG: 0.4 CAPSULE ORAL at 09:19

## 2022-05-16 RX ADMIN — APIXABAN 5 MG: 5 TABLET, FILM COATED ORAL at 21:42

## 2022-05-16 RX ADMIN — AMIODARONE HYDROCHLORIDE 200 MG: 200 TABLET ORAL at 21:42

## 2022-05-16 RX ADMIN — METOPROLOL TARTRATE 12.5 MG: 25 TABLET ORAL at 09:19

## 2022-05-16 RX ADMIN — Medication 1 CAPSULE: at 09:19

## 2022-05-16 RX ADMIN — MIDODRINE HYDROCHLORIDE 10 MG: 5 TABLET ORAL at 09:19

## 2022-05-16 RX ADMIN — ATORVASTATIN CALCIUM 80 MG: 80 TABLET, FILM COATED ORAL at 21:42

## 2022-05-16 RX ADMIN — METOPROLOL TARTRATE 12.5 MG: 25 TABLET ORAL at 22:26

## 2022-05-16 RX ADMIN — DIPHENHYDRAMINE HCL 25 MG: 25 TABLET ORAL at 11:36

## 2022-05-16 RX ADMIN — MIDODRINE HYDROCHLORIDE 10 MG: 5 TABLET ORAL at 13:00

## 2022-05-16 RX ADMIN — SODIUM CHLORIDE, PRESERVATIVE FREE 10 ML: 5 INJECTION INTRAVENOUS at 21:43

## 2022-05-16 RX ADMIN — SODIUM CHLORIDE, PRESERVATIVE FREE 10 ML: 5 INJECTION INTRAVENOUS at 09:20

## 2022-05-16 RX ADMIN — AMIODARONE HYDROCHLORIDE 200 MG: 200 TABLET ORAL at 09:19

## 2022-05-16 RX ADMIN — ASPIRIN 81 MG: 81 TABLET, CHEWABLE ORAL at 09:19

## 2022-05-16 RX ADMIN — SPIRONOLACTONE 25 MG: 25 TABLET ORAL at 16:04

## 2022-05-16 ASSESSMENT — PAIN SCALES - GENERAL
PAINLEVEL_OUTOF10: 0

## 2022-05-16 NOTE — PROGRESS NOTES
[facility]  Progress Note                   Date:   5/16/2022  Patient name: Teresa Kc  Date of admission:  5/6/2022 11:29 PM  MRN:   [id number]  YOB: 1934  PCP: Min Shah MD    Reason for Admission: Acute ischemic stroke [facility]) [I63.9]  Right sided weakness [R53.1]  Altered mental status, unspecified altered mental status type [R41.82]  Cerebrovascular accident (CVA), unspecified mechanism ([address] [I63.9]    Subjective:       Clinical Changes /Abnormalities:  Patient seen and examined sitting up in chair in room with family present. Denies chest pain and SOB. SR on tele HR 80    Review of Systems    Medications:   Scheduled Meds:   midodrine  10 mg Oral TID    metoprolol tartrate  12.5 mg Oral BID    amiodarone  200 mg Oral BID    lactobacillus  1 capsule Oral Daily with breakfast    apixaban  5 mg Oral BID    metoprolol  2.5 mg IntraVENous Once    lactated ringers bolus  250 mL IntraVENous Once    sodium chloride flush  5-40 mL IntraVENous 2 times per day    [Held by provider] clopidogrel  75 mg Oral Daily    aspirin  81 mg Oral Daily    tamsulosin  0.4 mg Oral Daily    atorvastatin  80 mg Oral Nightly     Continuous Infusions:   sodium chloride       CBC:   Recent Labs     05/14/22 0128 05/15/22  0458 05/16/22 0442   WBC 6.2 6.2 6.0   HGB 10.6* 10.5* 11.4*   PLT See Reflexed IPF Result See Reflexed IPF Result 151     BMP:    Recent Labs     05/14/22  0128 05/15/22  0458 05/16/22 0442   * 134* 135   K 3.5* 4.0 4.1    105 103   CO2 21 21 20   BUN 14 13 13   CREATININE 0.74 0.80 0.67*   GLUCOSE 118* 113* 107*     Hepatic:No results for input(s): AST, ALT, ALB, BILITOT, ALKPHOS in the last 72 hours. Troponin: No results for input(s): TROPHS in the last 72 hours. BNP: No results for input(s): BNP in the last 72 hours. Lipids: No results for input(s): CHOL, HDL in the last 72 hours.     Invalid input(s): LDLCALCU  INR: No results for input(s): INR in the last 72 hours. DIAGNOSTIC DATA  ECHO 5/9/2022      Summary  Dilated left ventricle with severely reduced systolic function. Calculated ejection fraction is 13 %  Moderate mitral regurgitation. Bubble study was negative for shunt.     Cardiac Cath 5/10/2022  Findings:     LMCA: has moderate calcification with distal 30% stenosis. LAD: has heavy calcification with ostial 60% stenosis, proximal 80% stenosis   mid 90% stenosis and distal 100% occlusion. The D1 has ostial 90% stenosis   and proximal 90% stenosis. The D2 has ostial 80% stenosis. LCx: has moderate calcification with proximal 100% occlusion. The Om1 has   high origin with ostial 90% stenosis. The OM2 and Om3 are seen via left to   left collaterals. RCA: has mid 100% occlusion with left to right collaterals. The RV marginal   branch has ostial 70% stenosis.      Coronary Tree        Dominance: Right      The LV gram was performed in the ROSADO 30 position. LVEF: 10-15%     Estimated Blood Loss:            [x]? ?? Minimal 10-25 cc   []? ?? Minimal < 25 cc      []? ?? Moderate 25-50 cc         []??? >50 cc      Conclusions        Procedure Summary        Severe multivessel coronary artery disease.    Severe cardiomyopathy.        Recommendations        Medical therapy as needed.    Risk factor modification.    Routine Post Diagnostic Cath orders.    Discussed with wife and son-in law in detail. Recommend medical therapy.    Add plavix 75mg po qday if ok with neurology.    Palliative consult. Address code status.       Objective:   Vitals: /66   Pulse 82   Temp 98.1 °F (36.7 °C) (Oral)   Resp 19   Ht 5' 9\" (1.753 m)   Wt 151 lb 7.3 oz (68.7 kg)   SpO2 100%   BMI 22.37 kg/m²   General appearance: alert and cooperative with exam  HEENT: Head: Normocephalic, no lesions, without obvious abnormality.   Neck:no JVD, trachea midline, no adenopathy  Lungs: Clear to auscultation  Heart: Regular rate and rhythm, s1/s2 auscultated, no murmurs  Abdomen: soft, non-tender, bowel sounds active  Extremities: no edema  Neurologic: not done        Assessment / Acute Cardiac Problems:   1.  Acute CVA s/p tPA and mechanical thrombectomy of left MCA thrombus  2   Elevated troponin likely NSTEMI type I.  3.  Severe MVD - medical management  4. 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 New onset Afib with RVR, ChadsVasc of 6, on heparin and amiodarone. Converted to NSR. 6.  DNR-CCA. Patient Active Problem List:     Cerebrovascular accident (CVA) ([facility] [address]     Acute ischemic stroke ([facility] [address]     Altered mental status     CHF (congestive heart failure) ([facility] [address]     NSTEMI (non-ST elevated myocardial infarction) ([facility] [address]     Intracranial bleed ([facility] [address]     Right sided weakness     New onset atrial fibrillation ([facility] [address]     Leukopenia     Neutropenia ([facility] [address]     Encounter for palliative care      Plan of Treatment:   1. Continue ASA and Eliquis  Plavix on hold per neurology. 2. Continue PO amiodarone, lopressor, and Midodrine. 3. Continue statin  4. Continue medical management of MVD. 5. ICMP. LVEF 13% on ECHO 5/9/2022. Lengthy discussion with patient, son, and wife regarding lifevest option. Risk/benefit discussed and questions answered. Will order life vest and plan for follow up in [address] [facility] [facility] [place] office. Repeat ECHO in 90 days to evaluated LVEF and potential need for AICD. Patient agrees to proceed with LifeVest.  Order placed and [employer] rep notified. Continue BB. Will order Aldactone. Not on diuretic or ACE/ARB/ARNi d/t hypotension. 6. Keep K > 4.0 and Mag > 2.0  K 4.1 and Mag 2.1 today. 7. Okay for discharge after lifevest placed or can be placed as OP if delayed. Follow up Appt with [facility] in [place] scheduled.      Electronically signed by SARA Farrell NP on 5/16/2022 at 11:53 AM  [address]  [phone]

## 2022-05-16 NOTE — PROGRESS NOTES
Physical Therapy  Facility/Department: [facility]  Physical Therapy Daily Treatment Note    Name: Tricia Samano  : 1934  MRN: [id number]  Date of Service: 2022    Discharge Recommendations:  Patient would benefit from continued therapy after discharge   PT Equipment Recommendations  Equipment Needed: Yes  Mobility Devices: Thamas Yvon: Rolling      Patient Diagnosis(es): The primary encounter diagnosis was Cerebrovascular accident (CVA), unspecified mechanism ([facility] [address] Diagnoses of Altered mental status, unspecified altered mental status type and Right sided weakness were also pertinent to this visit. Past Medical History:  has a past medical history of Arthritis and Psoriasis. Past Surgical History:  has a past surgical history that includes Prostate surgery. Assessment   Body Structures, Functions, Activity Limitations Requiring Skilled Therapeutic Intervention: Decreased functional mobility ; Decreased coordination;Decreased balance;Decreased strength;Decreased safe awareness;Decreased cognition;Decreased endurance;Decreased posture  Assessment: Pt very pleasant and cooperative with treatment. Pt demonstrates impaired safety awareness, pt mildly confued but able to follow commands this date. Pt able to ambulate 35 feet with a RW and CGA, negotiate 4 stairs with a right handrail and min-A. Pt would benefit from additional therapy upon discharge to address balance, BLE strength, and coodination deficits. Pt will require strict 24/7 supervision and assistance with mobility upon discharge secondary to high fall risk.   Therapy Prognosis: Good  Decision Making: Medium Complexity  Requires PT Follow-Up: Yes  Activity Tolerance  Activity Tolerance: Treatment limited secondary to decreased cognition;Patient tolerated treatment well     Plan   Plan  Plan:  (5-6x/week)  Current Treatment Recommendations: Strengthening,Balance training,Functional mobility training,Transfer training,Gait training,Neuromuscular re-education,Home exercise program,Cognitive reorientation,Safety education & training,Patient/Caregiver education & training,Equipment evaluation, education, & procurement,Therapeutic activities  Safety Devices  Type of Devices: Call light within reach,All fall risk precautions in place,Nurse notified,Patient at risk for falls,Left in chair,Chair alarm in place  Restraints  Restraints Initially in Place: No  Restraints: . Restrictions  Restrictions/Precautions  Restrictions/Precautions: Fall Risk,General Precautions,Up as Tolerated  Required Braces or Orthoses?: No  Position Activity Restriction  Other position/activity restrictions: SBP goal less than 160. CVA, s/p tPA, s/p thrombectomy. Subjective   General  Patient assessed for rehabilitation services?: Yes  Response To Previous Treatment: Patient with no complaints from previous session. Family / Caregiver Present: Yes (wife, son)  Follows Commands: Within Functional Limits  Subjective  Subjective: Pt up in a chair and agreeable to therapy, RN agreeable to therapy. Pt pleasant and cooperative throughout. Cognition   Cognition  Overall Cognitive Status: Exceptions  Arousal/Alertness: Appropriate responses to stimuli  Following Commands: Follows multistep commands with increased time; Follows multistep commands with repitition  Attention Span: Attends with cues to redirect  Safety Judgement: Decreased awareness of need for assistance;Decreased awareness of need for safety  Insights: Decreased awareness of deficits  Initiation: Requires cues for some  Sequencing: Requires cues for some     Objective                           Balance  Sitting: With support (CGA EOB/in recliner)  Standing: With support (SBA>CGA for safety and balance)  Standing - Static: Occasional  Standing - Dynamic: Constant support  Gait  Overall Level of Assistance: Contact-guard assistance  Assistive Device: Walker, rolling  Bed mobility  Bed Mobility Comments: Pt began today's session up in a chair, retired up to a chair at the conclusion of today's session. Transfers  Sit to Stand: Contact guard assistance  Stand to sit: Contact guard assistance  Comment: Transfers performed 2x this date. Ambulation  Surface: level tile  Device: Rolling Walker  Assistance: Contact guard assistance  Quality of Gait: mild instability, decreased gait speed, increased LEELA, no true LOB. Gait Deviations: Staggers;Decreased step length; Slow Floresita; Increased LEELA  Distance: 25 feet, seated rest break, 35 feet, standing rest break, 35 feet, standing rest break, 30 feet. Comments: increased unsteadiness with turning  More Ambulation?: No  Stairs/Curb  Stairs?: Yes  Stairs  # Steps : 4  Stairs Height: 6\"  Rails: Right ascending  Assistance: Minimal assistance     Balance  Posture: Fair  Sitting - Static: Fair;+  Sitting - Dynamic: Fair  Standing - Static: Fair  Standing - Dynamic: Fair;-  Comments: Assessed with RW.                                                        AM-PAC Score  AM-PAC Inpatient Mobility Raw Score : 17 (05/16/22 1630)  AM-PAC Inpatient T-Scale Score : 42.13 (05/16/22 1630)  Mobility Inpatient CMS 0-100% Score: 50.57 (05/16/22 1630)  Mobility Inpatient CMS G-Code Modifier : CK (05/16/22 1630)          Goals  Short Term Goals  Time Frame for Short term goals: 14 visits  Short term goal 1: Supine to/from sit with SBA. Short term goal 2: Sit to/from stand with SBA. Short term goal 3: Ambulate 150' with walker with SBA. Short term goal 4: Improve standing balance to stand with fair + balance. Additional Goals?: No       Education  Patient Education  Education Given To: Patient  Education Provided: Role of Therapy;Plan of Care;Transfer Training; Fall Prevention Strategies  Education Method: Verbal  Barriers to Learning: Cognition  Education Outcome: Continued education needed      Therapy Time   Individual Concurrent Group Co-treatment   Time In 2467 Time Out 1544         Minutes 30         Timed Code Treatment Minutes: 221 Trevor Munoz, PT

## 2022-05-16 NOTE — PLAN OF CARE
Pt.on Ra,lungs sound diminished,encourage to use IS.VSS,afebrile,abd.soft,bowel sound active,regular diet -tolerating well. AxOx4,move all extremities,pulses present,warm to touch. Patient denies pain.   Problem: Discharge Planning  Goal: Discharge to home or other facility with appropriate resources  [id number] 1733 by Shiva French RN  Outcome: Progressing  5/16/2022 0454 by Li Concepcion RN  Outcome: Progressing

## 2022-05-16 NOTE — PROGRESS NOTES
Endovascular Neurosurgery Progress Note    SUBJECTIVE:   No reported events overnight. This am pt awake. Pt has no acute complaints. Review of Systems:  CONSTITUTIONAL:  negative for fevers, chills, fatigue and malaise    EYES:  negative for double vision, blurred vision and photophobia     HEENT:  negative for tinnitus, epistaxis and sore throat    RESPIRATORY:  negative for cough, shortness of breath, wheezing    CARDIOVASCULAR:  negative for chest pain, palpitations, syncope, edema    GASTROINTESTINAL:  negative for nausea, vomiting    GENITOURINARY:  negative for incontinence    MUSCULOSKELETAL:  negative for neck or back pain    NEUROLOGICAL:  Negative for weakness and tingling  negative for headaches and dizziness    PSYCHIATRIC:  negative for anxiety      Review of systems otherwise negative. OBJECTIVE:     Vitals:    22 0823   BP:    Pulse:    Resp:    Temp: 98.1 °F (36.7 °C)   SpO2:         General:  Gen: normal habitus, NAD  HEENT: NCAT, mucosa moist  Cvs: RRR, S1 S2 normal  Resp: symmetric unlabored breathing  Abd: s/nd/nt  Ext: no edema  Skin: no lesions seen, warm and dry    Neuro:   Gen: awake, oriented to self and month, not to place. Lang/speech: no aphasia, follows commands, no dysarthria. Follows commands  CN: PERRL, EOMI, VFF, V1-3 intact, face symmetric, hearing intact  Motor: 5/5 all 4 ext. Sense: LT intact all 4 ext. Coord: f/t intact b/l  DTR: deferred  Gait: deferred    NIH Stroke Scale:   1a  Level of consciousness: 0   1b. LOC questions:  0   1c. LOC commands: 0   2. Best Gaze: 0 - normal   3. Visual: 0 - no visual loss   4. Facial Palsy: 0 - normal symmetric movement   5a. Motor left arm: 0   5b. Motor right arm: 0   6a. Motor left le   6b  Motor right le   7. Limb Ataxia: 0 - absent   8. Sensory: 0   9. Best Language:  0   10. Dysarthria: 0   11. Extinction and Inattention: 0         Total:   0     MRS: 3    LABS:   Reviewed.   Lab Results   Component Value Date    HGB 11.4 (L) 05/16/2022    WBC 6.0 05/16/2022     05/16/2022     05/16/2022    BUN 13 05/16/2022    CREATININE 0.67 (L) 05/16/2022    MG 2.1 05/16/2022    APTT 63.0 (H) 05/14/2022    INR 1.1 05/07/2022      Lab Results   Component Value Date    COVID19 Not Detected 05/12/2022       RADIOLOGY:   Images were personally reviewed including:   CT head wo con 5/13/2022  1.  Decreased density superimposed hemorrhagic area of left temporal lobe   infarct.  Findings compatible with interval improvement.       2.  Continued mass effect associated with the infarct with effacement of the   brain parenchyma bordering the cisterns.       3.  No midline shift.  No new areas of hemorrhage. MRI Brain 05/08/2022:   1. Acute infarction in the left anterior temporal lobe and left basal   ganglia, consistent with MCA occlusion. 2. Punctate foci of acute infarction in the right frontal cortex. 3. Mild microangiopathic change. IR 5/7/2022:   1. Acute Left MCA proximal M-1 occlusion TICI score 0   2. The above lesion was treated with 8 fr short sheath, flow gate bgc, prowler 21 microcatheter, embo trap 5 mm x 37 mm stent retriever first pass. Final reperfusion score TICI 03    TTE 5/6/2022: EF 10%    ASSESSMENT:   81 y/o M with pmh significant for LEXII presented 5/7/2022 with left M1 acute occlusion s/p tPA and MT TICI 03, first pass. 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 Workup shows HF EF 10%, new afib    Pt is DNR CCA. Pt has residual disorientation. MRI 5/9/2022 shows L MCA stroke and punctate hemo conversion. Cardiac cath 5/10/2022 showed complete occlusion all 3 cardiac arteries with collateralization. New afib rvr and nstemi 5/12/2022. CT head 5/13/2022 imrpoving. PLAN:   --c/w ASA 81  --continue eliquis 5 bid  --continue lipitor 80  ---140  --stable from endovascular perspective for discharge. Pending cardiac issues.   --F/up with Dr. Aarm Diaz in 2 weeks after discharge and f/up with Dr. Fany Bryant in 3 months     Case discussed with Dr. Fany Bryant attending.     Yonathan Snow MD  Stroke, [facility]  [address]  Electronically signed 5/16/2022 at 9:56 AM

## 2022-05-16 NOTE — PROGRESS NOTES
Occupational Therapy  Facility/Department: [facility]  Occupational Therapy Initial Assessment    Name: Kayleen Daniel  : 1934  MRN: [id number]  Date of Service: 2022    Discharge Recommendations:  Patient would benefit from continued therapy after discharge      Patient Diagnosis(es): The primary encounter diagnosis was Cerebrovascular accident (CVA), unspecified mechanism ([address] Diagnoses of Altered mental status, unspecified altered mental status type and Right sided weakness were also pertinent to this visit. Assessment   Performance deficits / Impairments: Decreased functional mobility ; Decreased endurance;Decreased ADL status; Decreased balance;Decreased high-level IADLs;Decreased safe awareness;Decreased cognition;Decreased posture  Assessment: Patient is making steady gainsd towards goals this session. Pt will continue to benefit from OT services following discharge from acute care hospital.  Prognosis: Good  Activity Tolerance  Activity Tolerance: Patient Tolerated treatment well  Activity Tolerance Comments: Patient participated in full ADL routine with x2 short rest breaks required        Plan   Plan  Times per Week: 4-5x  Current Treatment Recommendations: Strengthening,Balance training,Functional mobility training,Endurance training,Safety education & training,Neuromuscular re-education,Patient/Caregiver education & training,Self-Care / ADL     Restrictions  Restrictions/Precautions  Restrictions/Precautions: Fall Risk,General Precautions,Up as Tolerated  Required Braces or Orthoses?: Yes (Knee Immobilizer for LLE (for safety d/t buckling))  Position Activity Restriction  Other position/activity restrictions: SBP goal less than 160. CVA, s/p tPA, s/p thrombectomy.     Subjective   General  Chart Reviewed: Yes  Patient assessed for rehabilitation services?: Yes  Response to previous treatment: Patient with no complaints from previous session  Family / Caregiver Present: No  Diagnosis: TPA, Subacute anterior L temporal lobe infarct  Subjective  Subjective: RN ok'd patient for OT treatment this morning. Pt supine in bed with breakfast arriving. Pt agreeable to session. Patient denied pain this session    Objective    Safety Devices  Type of Devices: Left in chair;Chair alarm in place;Call light within reach;Nurse notified; Patient at risk for falls  Restraints  Restraints Initially in Place: No  Balance  Sitting: With support (CGA EOB/in recliner)  Standing: With support (SBA>CGA for safety and balance)  Standing - Static: Occasional  Standing - Dynamic: Constant support  Gait  Overall Level of Assistance: Contact-guard assistance  Assistive Device: Walker, rolling  Toilet Transfers  Toilet - Technique: Ambulating  Equipment Used: Standard toilet  Toilet Transfer: Contact guard assistance  ADL  Feeding: Setup; Independent (Seated in recliner)  Grooming: Stand by assistance;Setup;Verbal cueing; Increased time to complete (Standing at sink side with increased time)  UE Bathing: Stand by assistance;Contact guard assistance; Increased time to complete;Verbal cueing;Setup  LE Bathing: Verbal cueing;Setup; Increased time to complete;Contact guard assistance  UE Dressing: Setup;Verbal cueing; Increased time to complete;Stand by assistance  LE Dressing: Setup;Stand by assistance (dons slipper socks while in long sitting without difficulty)  Toileting: Setup; Increased time to complete;Minimal assistance (Manage brief)  Additional Comments: Pt completes bathing, grooming and UB dressing standing at sink. Donned slipper socks long sitting. Pt exhibits occasional increased time to process task and locate items required for tasks  Bed mobility  Supine to Sit: Stand by assistance  Sit to Supine: Unable to assess  Scooting: Supervision  Bed Mobility Comments: HOB elevated and with minimal effort to complete.  Pt conlcuded session in recliner with call light and chair alarm in place  Transfers  Sit to stand: Contact guard assistance  Stand to sit: Contact guard assistance  Transfer Comments: RW use from bed, chair, toilet  Cognition  Overall Cognitive Status: Exceptions  Following Commands: Follows one step commands consistently; Follows multistep commands with increased time; Follows multistep commands with repitition  Attention Span: Appears intact  Safety Judgement: Decreased awareness of need for assistance;Decreased awareness of need for safety  Problem Solving: Assistance required to generate solutions;Assistance required to implement solutions  Insights: Decreased awareness of deficits  Initiation: Requires cues for some   Education Given To: Patient  Education Provided: ADL Adaptive Strategies;Transfer Training  Education Provided Comments: safety and body mechanics  Education Method: Demonstration;Verbal  Barriers to Learning: Cognition; Hearing  Education Outcome: Continued education needed;Verbalized understanding;Demonstrated understanding     AM-PAC Score   AM-[facility] Inpatient Daily Activity Raw Score: 19 (05/16/22 1258)  AM-PAC Inpatient ADL T-Scale Score : 40.22 (05/16/22 1258)  ADL Inpatient CMS 0-100% Score: 42.8 (05/16/22 1258)  ADL Inpatient CMS G-Code Modifier : CK (05/16/22 1258)    Goals  Short Term Goals  Time Frame for Short term goals: Pt will by discharge  Short Term Goal 1: demo good safety awareness during func mob around room at SUP and LRD PRN  Short Term Goal 2: demo ADL UB bathing/dressing activity at mod I with 2 cues  Short Term Goal 3: demo ADL LB bathing/dressing activity at SBA, AD PRN, with 2 cues  Short Term Goal 4: demo SUP for all bed mobility using bed rails PRN  Short Term Goal 5: demo activity tolerance for 35 min+       Therapy Time   Individual Concurrent Group Co-treatment   Time In 0835         Time Out 0928         Minutes 53         Timed Code Treatment Minutes: Eliel 19, GRIFFIN/L

## 2022-05-16 NOTE — CARE COORDINATION
message. Fax is [phone]. Will send demographic and order to that fax. Juana Galvez, Tone Krishnan NP cardiology note from today and order for wearable cardiac defibrillator to [facility] at [phone].

## 2022-05-16 NOTE — PROGRESS NOTES
right arm and leg weakness.     LKW: 9:30PM  NIH: 14     Non contrast CT Head obtained.  Negative for any acute bleed  Was given tPa bolus dose at 2311 and infusion started [address] Course:   CTA head and neck: Showed left M1 occlusion.  Patient was taken for mechanical thrombectomy for M1 occlusion. Patient was given 600 mg aspirin x1.  Transfer to neuro ICU for further management. Pt found to have hemorrhagic conversion.  Plan for repeat CT head in 24 hours.  Cardiology planning for cardiac cath.  No heparin at this time.  We will continue with Lovenox. OBJECTIVE     Vital Signs:  /64   Pulse 64   Temp 98.2 °F (36.8 °C) (Oral)   Resp 14   Ht 5' 9\" (1.753 m)   Wt 151 lb 7.3 oz (68.7 kg)   SpO2 100%   BMI 22.37 kg/m²     Temp (24hrs), Av.3 °F (36.8 °C), Min:98.2 °F (36.8 °C), Max:98.4 °F (36.9 °C)    In: 160   Out: -     Physical Exam:  Physical Exam  Vitals and nursing note reviewed. Constitutional:       Appearance: Normal appearance. HENT:      Head: Normocephalic and atraumatic. Nose: Nose normal.      Mouth/Throat:      Mouth: Mucous membranes are moist.      Pharynx: Oropharynx is clear. Eyes:      Extraocular Movements: Extraocular movements intact. Conjunctiva/sclera: Conjunctivae normal.      Pupils: Pupils are equal, round, and reactive to light. Cardiovascular:      Rate and Rhythm: Normal rate and regular rhythm. Pulses: Normal pulses. Heart sounds: Normal heart sounds. No murmur heard. No friction rub. No gallop. Pulmonary:      Effort: Pulmonary effort is normal. No respiratory distress. Breath sounds: Normal breath sounds. No stridor. No wheezing, rhonchi or rales. Chest:      Chest wall: No tenderness. Abdominal:      General: Abdomen is flat. Bowel sounds are normal. There is no distension. Palpations: Abdomen is soft. There is no mass. Tenderness: There is no abdominal tenderness. There is no guarding or rebound. Hernia: No hernia is present. Musculoskeletal:         General: No swelling, tenderness, deformity or signs of injury. Normal range of motion. Cervical back: Normal range of motion. Right lower leg: No edema. Left lower leg: No edema. Skin:     General: Skin is warm. Findings: Bruising present. Neurological:      General: No focal deficit present. Mental Status: He is alert and oriented to person, place, and time. Mental status is at baseline. Psychiatric:         Mood and Affect: Mood normal.         Behavior: Behavior normal.         Thought Content:  Thought content normal.         Judgment: Judgment normal.           Medications:  Scheduled Medications:    metoprolol tartrate  12.5 mg Oral BID    amiodarone  200 mg Oral BID    lactobacillus  1 capsule Oral Daily with breakfast    apixaban  5 mg Oral BID    metoprolol  2.5 mg IntraVENous Once    lactated ringers bolus  250 mL IntraVENous Once    midodrine  10 mg Oral TID WC    sodium chloride flush  5-40 mL IntraVENous 2 times per day    [Held by provider] clopidogrel  75 mg Oral Daily    aspirin  81 mg Oral Daily    tamsulosin  0.4 mg Oral Daily    atorvastatin  80 mg Oral Nightly     Continuous Infusions:    sodium chloride       PRN Medicationssodium chloride flush, 5-40 mL, PRN  sodium chloride, , PRN  acetaminophen, 650 mg, Q4H PRN  ondansetron, 4 mg, Q8H PRN   Or  ondansetron, 4 mg, Q6H PRN  polyethylene glycol, 17 g, Daily PRN  labetalol, 10 mg, Q4H PRN  acetaminophen, 650 mg, Q4H PRN  hydrALAZINE, 10 mg, Q6H PRN        Diagnostic Labs:  CBC:   Recent Labs     05/14/22  0128 05/15/22  0458 05/16/22 0442   WBC 6.2 6.2 6.0   RBC 3.32* 3.30* 3.54*   HGB 10.6* 10.5* 11.4*   HCT 31.3* 31.1* 32.9*   MCV 94.3 94.2 92.9   RDW 12.6 12.8 12.5   PLT See Reflexed IPF Result See Reflexed IPF Result 151     BMP:   Recent Labs     05/14/22 0128 05/15/22  0458 05/16/22 0442   * 134* 135   K 3.5* 4.0 4.1    105 103   CO2 21 21 20   BUN 14 13 13   CREATININE 0.74 0.80 0.67*     BNP: No results for input(s): BNP in the last 72 hours. PT/INR: No results for input(s): PROTIME, INR in the last 72 hours. APTT:   Recent Labs     05/13/22  0802 05/13/22  1834 05/14/22  0128   APTT 47.0* 53.6* 63.0*     CARDIAC ENZYMES: No results for input(s): CKMB, CKMBINDEX, TROPONINI in the last 72 hours. Invalid input(s): CKTOTAL;3  FASTING LIPID PANEL:  Lab Results   Component Value Date    CHOL 133 05/07/2022    HDL 52 05/07/2022    TRIG 50 05/07/2022     LIVER PROFILE: No results for input(s): AST, ALT, ALB, BILIDIR, BILITOT, ALKPHOS in the last 72 hours. MICROBIOLOGY: No results found for: CULTURE    Imaging:    CT HEAD WO CONTRAST    Result Date: 5/13/2022  1. Decreased density superimposed hemorrhagic area of left temporal lobe infarct. Findings compatible with interval improvement. 2.  Continued mass effect associated with the infarct with effacement of the brain parenchyma bordering the cisterns. 3.  No midline shift. No new areas of hemorrhage. RECOMMENDATIONS: Unavailable     CT HEAD WO CONTRAST    Result Date: 5/11/2022  Left temporal lobe infarct anteriorly with similar superimposed areas of hemorrhage. CT HEAD WO CONTRAST    Result Date: 5/9/2022  Evolving left temporal lobe infarct anteriorly with stable superimposed focus of hemorrhage. ASSESSMENT & PLAN     Assessment and Plan:    Principal Problem:    Cerebrovascular accident (CVA) ([address]  Active Problems:    Acute ischemic stroke ([address]    Altered mental status    CHF (congestive heart failure) ([address]    NSTEMI (non-ST elevated myocardial infarction) ([address]    Intracranial bleed ([address]    Right sided weakness    New onset atrial fibrillation ([address]    Leukopenia    Neutropenia ([address]    Encounter for palliative care  Resolved Problems:    * No resolved hospital problems.  *      Left MCA infarct with left M1 occlusion status post mechanical thrombectomy  - CT of the head shows evolving left anterior, left temporal lobe infarction with small hemorrhagic conversion.  Repeat CT scan reviewed, stable. - Continue with aspirin and Eliquis   - Continue with Lipitor 80 mg  - Plavix 75mg > hold per neurology  - Repeat CT head improved > no new focal areas of hemorrhage      Bradycardia   - Asymptomatic   - Awaiting cardiology recommendations      Leukopenia / neutropenia - resolved > possible lab error? > resolved   - Repeat WBC  - Peripheral smear reviewed   - Respiratory viral panel > no viral infection detected     Atrial fibrillation with RVR versus SVT   - Attempted vagal maneuvers were mildly successful  - Patient received Lopressor, total of 750 mL of bolus and digoxin.  On amnio drip  - Heart rate controlled  - ASA and Eliquis   - Cardiology following     Diarrhea - resolved   - Probiotics      Demand ischemia secondary to tachycardia leading to ST elevations in inferior leads  - Troponins 770 > trending down 807 > 238  - Patient denies any chest pain  - Cardiac cath showing MV disease   - Transferred to CVICU for further management Systolic congestive heart failure  - Echocardiogram showing dilated left ventricle with severely reduced systolic function.  EF of 13%.  Moderate mitral regurgitation.  Negative bubble study.  - Cardiology following      Moderate mitral regurgitation     Diet: Regular diet  DVT ppx : ASA and Eliquis   GI ppx: Not indicated     PT/OT/SW: Consulted  Discharge Planning: PM&R consulted, plan for discharge to acute rehab vs SNF, in process. Will discuss the possibility of home with home care and home PT/OT. Cheyenne Fleming MD  Internal Medicine Resident, PGY-1  [address], [place]   7:14 AM 5/16/2022     Please note that part of this chart was generated using voice recognition dictation software.  Although every effort was made to ensure the accuracy of this automated transcription, some errors in transcription may have occurred.

## 2022-05-16 NOTE — PLAN OF CARE
Pt had a 5 beat run of non-sustained Vtach overnight. Pt was sleeping at the time with VSS. Cardiology Fellow on call notified via secure message no new orders received continue to monitor and assess. Pt denies any c/o pain, discomfort or SOB. Frequent urination with incontinence noted. Q 2 hour brief checks ongoing. No other events overnight.

## 2022-05-17 LAB
ABSOLUTE EOS #: 0.14 K/UL (ref 0–0.44)
ABSOLUTE IMMATURE GRANULOCYTE: 0.09 K/UL (ref 0–0.3)
ABSOLUTE LYMPH #: 2.08 K/UL (ref 1.1–3.7)
ABSOLUTE MONO #: 1.04 K/UL (ref 0.1–1.2)
ANION GAP SERPL CALCULATED.3IONS-SCNC: 11 MMOL/L (ref 9–17)
BASOPHILS # BLD: 0 % (ref 0–2)
BASOPHILS ABSOLUTE: <0.03 K/UL (ref 0–0.2)
BUN BLDV-MCNC: 15 MG/DL (ref 8–23)
CALCIUM SERPL-MCNC: 9.2 MG/DL (ref 8.6–10.4)
CHLORIDE BLD-SCNC: 105 MMOL/L (ref 98–107)
CO2: 23 MMOL/L (ref 20–31)
CREAT SERPL-MCNC: 0.71 MG/DL (ref 0.7–1.2)
EOSINOPHILS RELATIVE PERCENT: 3 % (ref 1–4)
GFR AFRICAN AMERICAN: >60 ML/MIN
GFR NON-AFRICAN AMERICAN: >60 ML/MIN
GFR SERPL CREATININE-BSD FRML MDRD: ABNORMAL ML/MIN/{1.73_M2}
GLUCOSE BLD-MCNC: 113 MG/DL (ref 70–99)
HCT VFR BLD CALC: 33 % (ref 40.7–50.3)
HEMOGLOBIN: 11 G/DL (ref 13–17)
IMMATURE GRANULOCYTES: 2 %
LYMPHOCYTES # BLD: 38 % (ref 24–43)
MCH RBC QN AUTO: 31.2 PG (ref 25.2–33.5)
MCHC RBC AUTO-ENTMCNC: 33.3 G/DL (ref 28.4–34.8)
MCV RBC AUTO: 93.5 FL (ref 82.6–102.9)
MONOCYTES # BLD: 20 % (ref 3–12)
NRBC AUTOMATED: 0 PER 100 WBC
PDW BLD-RTO: 12.7 % (ref 11.8–14.4)
PLATELET # BLD: 153 K/UL (ref 138–453)
PMV BLD AUTO: 11.5 FL (ref 8.1–13.5)
POTASSIUM SERPL-SCNC: 4.1 MMOL/L (ref 3.7–5.3)
RBC # BLD: 3.53 M/UL (ref 4.21–5.77)
SEG NEUTROPHILS: 37 % (ref 36–65)
SEGMENTED NEUTROPHILS ABSOLUTE COUNT: 1.98 K/UL (ref 1.5–8.1)
SODIUM BLD-SCNC: 139 MMOL/L (ref 135–144)
WBC # BLD: 5.3 K/UL (ref 3.5–11.3)

## 2022-05-17 PROCEDURE — 6370000000 HC RX 637 (ALT 250 FOR IP): Performed by: NURSE PRACTITIONER

## 2022-05-17 PROCEDURE — 6370000000 HC RX 637 (ALT 250 FOR IP): Performed by: PHYSICAL MEDICINE & REHABILITATION

## 2022-05-17 PROCEDURE — 6370000000 HC RX 637 (ALT 250 FOR IP): Performed by: PSYCHIATRY & NEUROLOGY

## 2022-05-17 PROCEDURE — 97535 SELF CARE MNGMENT TRAINING: CPT

## 2022-05-17 PROCEDURE — 99233 SBSQ HOSP IP/OBS HIGH 50: CPT | Performed by: PSYCHIATRY & NEUROLOGY

## 2022-05-17 PROCEDURE — 97110 THERAPEUTIC EXERCISES: CPT

## 2022-05-17 PROCEDURE — 97116 GAIT TRAINING THERAPY: CPT

## 2022-05-17 PROCEDURE — 36415 COLL VENOUS BLD VENIPUNCTURE: CPT

## 2022-05-17 PROCEDURE — 6370000000 HC RX 637 (ALT 250 FOR IP): Performed by: STUDENT IN AN ORGANIZED HEALTH CARE EDUCATION/TRAINING PROGRAM

## 2022-05-17 PROCEDURE — 99232 SBSQ HOSP IP/OBS MODERATE 35: CPT | Performed by: INTERNAL MEDICINE

## 2022-05-17 PROCEDURE — 80048 BASIC METABOLIC PNL TOTAL CA: CPT

## 2022-05-17 PROCEDURE — 2060000000 HC ICU INTERMEDIATE R&B

## 2022-05-17 PROCEDURE — 2580000003 HC RX 258: Performed by: STUDENT IN AN ORGANIZED HEALTH CARE EDUCATION/TRAINING PROGRAM

## 2022-05-17 PROCEDURE — 85025 COMPLETE CBC W/AUTO DIFF WBC: CPT

## 2022-05-17 PROCEDURE — 92507 TX SP LANG VOICE COMM INDIV: CPT

## 2022-05-17 PROCEDURE — 97129 THER IVNTJ 1ST 15 MIN: CPT

## 2022-05-17 RX ORDER — AMIODARONE HYDROCHLORIDE 200 MG/1
200 TABLET ORAL 2 TIMES DAILY
Qty: 30 TABLET | Refills: 3 | Status: ON HOLD | OUTPATIENT
Start: 2022-05-17 | End: 2022-07-21 | Stop reason: HOSPADM

## 2022-05-17 RX ORDER — TAMSULOSIN HYDROCHLORIDE 0.4 MG/1
0.4 CAPSULE ORAL DAILY
Qty: 30 CAPSULE | Refills: 3 | Status: SHIPPED | OUTPATIENT
Start: 2022-05-17

## 2022-05-17 RX ORDER — ATORVASTATIN CALCIUM 80 MG/1
80 TABLET, FILM COATED ORAL NIGHTLY
Qty: 30 TABLET | Refills: 3 | Status: SHIPPED | OUTPATIENT
Start: 2022-05-17

## 2022-05-17 RX ORDER — ASPIRIN 81 MG/1
81 TABLET, CHEWABLE ORAL DAILY
Qty: 30 TABLET | Refills: 3 | Status: ON HOLD | OUTPATIENT
Start: 2022-05-17 | End: 2022-07-21 | Stop reason: HOSPADM

## 2022-05-17 RX ORDER — SPIRONOLACTONE 25 MG/1
25 TABLET ORAL DAILY
Qty: 30 TABLET | Refills: 3 | Status: SHIPPED | OUTPATIENT
Start: 2022-05-18

## 2022-05-17 RX ORDER — MIDODRINE HYDROCHLORIDE 10 MG/1
10 TABLET ORAL 3 TIMES DAILY
Qty: 90 TABLET | Refills: 3 | Status: SHIPPED | OUTPATIENT
Start: 2022-05-17

## 2022-05-17 RX ORDER — LACTOBACILLUS RHAMNOSUS GG 10B CELL
1 CAPSULE ORAL
Qty: 30 CAPSULE | Refills: 3 | Status: SHIPPED | OUTPATIENT
Start: 2022-05-17

## 2022-05-17 RX ADMIN — APIXABAN 5 MG: 5 TABLET, FILM COATED ORAL at 08:22

## 2022-05-17 RX ADMIN — METOPROLOL TARTRATE 12.5 MG: 25 TABLET ORAL at 08:21

## 2022-05-17 RX ADMIN — APIXABAN 5 MG: 5 TABLET, FILM COATED ORAL at 21:19

## 2022-05-17 RX ADMIN — AMIODARONE HYDROCHLORIDE 200 MG: 200 TABLET ORAL at 08:22

## 2022-05-17 RX ADMIN — METOPROLOL TARTRATE 12.5 MG: 25 TABLET ORAL at 21:19

## 2022-05-17 RX ADMIN — ATORVASTATIN CALCIUM 80 MG: 80 TABLET, FILM COATED ORAL at 21:19

## 2022-05-17 RX ADMIN — SPIRONOLACTONE 25 MG: 25 TABLET ORAL at 08:25

## 2022-05-17 RX ADMIN — MIDODRINE HYDROCHLORIDE 10 MG: 5 TABLET ORAL at 20:17

## 2022-05-17 RX ADMIN — SODIUM CHLORIDE, PRESERVATIVE FREE 10 ML: 5 INJECTION INTRAVENOUS at 21:21

## 2022-05-17 RX ADMIN — ASPIRIN 81 MG: 81 TABLET, CHEWABLE ORAL at 08:22

## 2022-05-17 RX ADMIN — MIDODRINE HYDROCHLORIDE 10 MG: 5 TABLET ORAL at 08:22

## 2022-05-17 RX ADMIN — AMIODARONE HYDROCHLORIDE 200 MG: 200 TABLET ORAL at 21:19

## 2022-05-17 RX ADMIN — TAMSULOSIN HYDROCHLORIDE 0.4 MG: 0.4 CAPSULE ORAL at 08:25

## 2022-05-17 RX ADMIN — MIDODRINE HYDROCHLORIDE 10 MG: 5 TABLET ORAL at 14:10

## 2022-05-17 RX ADMIN — SODIUM CHLORIDE, PRESERVATIVE FREE 10 ML: 5 INJECTION INTRAVENOUS at 08:24

## 2022-05-17 RX ADMIN — Medication 1 CAPSULE: at 08:22

## 2022-05-17 ASSESSMENT — PAIN SCALES - GENERAL
PAINLEVEL_OUTOF10: 0
PAINLEVEL_OUTOF10: 0

## 2022-05-17 NOTE — PROGRESS NOTES
[facility]  Internal Medicine Teaching Residency Program  Inpatient Daily Progress Note  ______________________________________________________________________________    Patient: Varsha Sparks  YOB: 1934   FEY:[id number]    Acct: [de-identified]     Room: 2004/2004-01  Admit date: 5/6/2022  Today's date: 05/17/22  Number of days in the hospital: 10    SUBJECTIVE   Admitting Diagnosis: Cerebrovascular accident (CVA) ([facility] [address]  CC: Right-sided weakness and speech difficulty    - Pt examined at bedside. Chart & results reviewed. - No acute events overnight   - Pt A&OX4  - Afebrile   - VSS      Plan for today:  -Discharge planning     ROS:  Constitutional:  negative for chills, fevers, sweats  Respiratory:  negative for cough, dyspnea on exertion, hemoptysis, shortness of breath, wheezing  Cardiovascular:  negative for chest pain, chest pressure/discomfort, lower extremity edema, palpitations  Gastrointestinal:  negative for abdominal pain, constipation, diarrhea, nausea, vomiting  Neurological:  negative for dizziness, headache    BRIEF HISTORY     Manju Ames a 80 y. o. male with no significant PMH and not presently on AC who presented via EMS after being noted to have acute onset right sided weakness and speech difficulty. Wife reports he fell asleep on the recliner at 9:30pm and woke up around 10om and couldn't get up from the chair. Family also noted right sided weakness. EMS was called and activated stroke alert. Patient was evaluated by mobile stroke unit. He was noted to have aphasia, dysarthria, and right arm and leg weakness.     LKW: 9:30PM  NIH: 14     Non contrast CT Head obtained.  Negative for any acute bleed  Was given tPa bolus dose at 2311 and infusion started [address] Course:   CTA head and neck: Showed left M1 occlusion.  Patient was taken for mechanical thrombectomy for M1 occlusion.   Patient was given 600 mg aspirin x1. Radha Marie to neuro ICU for further management. Pt found to have hemorrhagic conversion.  Plan for repeat CT head in 24 hours.  Cardiology planning for cardiac cath.  No heparin at this time.  We will continue with Lovenox. OBJECTIVE     Vital Signs:  /67   Pulse 67   Temp 98.4 °F (36.9 °C) (Oral)   Resp 18   Ht 5' 9\" (1.753 m)   Wt 151 lb 7.3 oz (68.7 kg)   SpO2 96%   BMI 22.37 kg/m²     Temp (24hrs), Av.1 °F (36.7 °C), Min:97.3 °F (36.3 °C), Max:98.4 °F (36.9 °C)    In: 400   Out: -     Physical Exam:  Physical Exam  Vitals and nursing note reviewed. Constitutional:       Appearance: Normal appearance. HENT:      Head: Normocephalic and atraumatic. Nose: Nose normal.      Mouth/Throat:      Mouth: Mucous membranes are moist.      Pharynx: Oropharynx is clear. Eyes:      Extraocular Movements: Extraocular movements intact. Conjunctiva/sclera: Conjunctivae normal.      Pupils: Pupils are equal, round, and reactive to light. Cardiovascular:      Rate and Rhythm: Normal rate and regular rhythm. Pulses: Normal pulses. Heart sounds: Normal heart sounds. No murmur heard. No friction rub. No gallop. Pulmonary:      Effort: Pulmonary effort is normal. No respiratory distress. Breath sounds: Normal breath sounds. No stridor. No wheezing, rhonchi or rales. Chest:      Chest wall: No tenderness. Abdominal:      General: Abdomen is flat. Bowel sounds are normal. There is no distension. Palpations: Abdomen is soft. There is no mass. Tenderness: There is no abdominal tenderness. There is no guarding or rebound. Hernia: No hernia is present. Musculoskeletal:         General: No swelling, tenderness, deformity or signs of injury. Normal range of motion. Cervical back: Normal range of motion. Right lower leg: No edema. Left lower leg: No edema. Skin:     General: Skin is warm. Neurological:      General: No focal deficit present.       Mental Status: He is alert and oriented to person, place, and time. Mental status is at baseline. Psychiatric:         Mood and Affect: Mood normal.         Behavior: Behavior normal.         Thought Content: Thought content normal.         Judgment: Judgment normal.           Medications:  Scheduled Medications:    midodrine  10 mg Oral TID    spironolactone  25 mg Oral Daily    metoprolol tartrate  12.5 mg Oral BID    amiodarone  200 mg Oral BID    lactobacillus  1 capsule Oral Daily with breakfast    apixaban  5 mg Oral BID    metoprolol  2.5 mg IntraVENous Once    lactated ringers bolus  250 mL IntraVENous Once    sodium chloride flush  5-40 mL IntraVENous 2 times per day    aspirin  81 mg Oral Daily    tamsulosin  0.4 mg Oral Daily    atorvastatin  80 mg Oral Nightly     Continuous Infusions:    sodium chloride       PRN Medicationssodium chloride flush, 5-40 mL, PRN  sodium chloride, , PRN  acetaminophen, 650 mg, Q4H PRN  ondansetron, 4 mg, Q8H PRN   Or  ondansetron, 4 mg, Q6H PRN  polyethylene glycol, 17 g, Daily PRN  labetalol, 10 mg, Q4H PRN  acetaminophen, 650 mg, Q4H PRN  hydrALAZINE, 10 mg, Q6H PRN        Diagnostic Labs:  CBC:   Recent Labs     05/14/22  0128 05/15/22  0458 05/16/22  0442   WBC 6.2 6.2 6.0   RBC 3.32* 3.30* 3.54*   HGB 10.6* 10.5* 11.4*   HCT 31.3* 31.1* 32.9*   MCV 94.3 94.2 92.9   RDW 12.6 12.8 12.5   PLT See Reflexed IPF Result See Reflexed IPF Result 151     BMP:   Recent Labs     05/14/22  0128 05/15/22  0458 05/16/22  0442   * 134* 135   K 3.5* 4.0 4.1    105 103   CO2 21 21 20   PHOS  --   --  3.4   BUN 14 13 13   CREATININE 0.74 0.80 0.67*     BNP: No results for input(s): BNP in the last 72 hours. PT/INR: No results for input(s): PROTIME, INR in the last 72 hours. APTT:   Recent Labs     05/14/22 0128   APTT 63.0*     CARDIAC ENZYMES: No results for input(s): CKMB, CKMBINDEX, TROPONINI in the last 72 hours.     Invalid input(s): CKTOTAL;3  FASTING LIPID PANEL:  Lab Results   Component Value Date    CHOL 133 05/07/2022    HDL 52 05/07/2022    TRIG 50 05/07/2022     LIVER PROFILE: No results for input(s): AST, ALT, ALB, BILIDIR, BILITOT, ALKPHOS in the last 72 hours. MICROBIOLOGY: No results found for: CULTURE    Imaging:    CT HEAD WO CONTRAST    Result Date: 5/13/2022  1. Decreased density superimposed hemorrhagic area of left temporal lobe infarct. Findings compatible with interval improvement. 2.  Continued mass effect associated with the infarct with effacement of the brain parenchyma bordering the cisterns. 3.  No midline shift. No new areas of hemorrhage. RECOMMENDATIONS: Unavailable     CT HEAD WO CONTRAST    Result Date: 5/11/2022  Left temporal lobe infarct anteriorly with similar superimposed areas of hemorrhage. ASSESSMENT & PLAN     Assessment and Plan:    Principal Problem:    Cerebrovascular accident (CVA) ([address]  Active Problems:    Acute ischemic stroke ([address]    Altered mental status    CHF (congestive heart failure) ([address]    NSTEMI (non-ST elevated myocardial infarction) ([address]    Intracranial bleed ([address]    Right sided weakness    New onset atrial fibrillation ([address]    Leukopenia    Neutropenia ([address]    Encounter for palliative care  Resolved Problems:    * No resolved hospital problems. *      Left MCA infarct with left M1 occlusion status post mechanical thrombectomy  - CT of the head shows evolving left anterior, left temporal lobe infarction with small hemorrhagic conversion.  Repeat CT scan reviewed, stable.   - Continue with aspirin and Eliquis   - Continue with Lipitor 80 mg  - Plavix 75mg > hold per neurology  - Repeat CT head improved > no new focal areas of hemorrhage      Bradycardia   - Asymptomatic   - Awaiting cardiology recommendations      Leukopenia / neutropenia - resolved > possible lab error? > resolved   - Repeat WBC  - Peripheral smear reviewed   - Respiratory viral panel > no viral infection detected     Atrial fibrillation with RVR versus SVT   - Attempted vagal maneuvers were mildly successful  - Patient received Lopressor, total of 750 mL of bolus and digoxin.  On amnio drip  - Heart rate controlled  - ASA and Eliquis   - Cardiology following     Diarrhea - resolved   - Probiotics      Demand ischemia secondary to tachycardia leading to ST elevations in inferior leads  - Troponins 770 > trending down 569 > 857  - Patient denies any chest pain  - Cardiac cath showing MV disease   - Transferred to CVICU for further management Systolic congestive heart failure  - Echocardiogram showing dilated left ventricle with severely reduced systolic function.  EF of 13%.  Moderate mitral regurgitation.  Negative bubble study.  - Cardiology following      Moderate mitral regurgitation     Diet: Regular diet  DVT ppx : ASA and Eliquis   GI ppx: Not indicated     PT/OT/SW: Consulted  Discharge Planning: PM&R consulted, plan for discharge > referrals sent to [facility]. Leisa Parra MD  Internal Medicine Resident, PGY-1  [facility]; [place], [place]   12:11 AM 5/17/2022     Please note that part of this chart was generated using voice recognition dictation software. Although every effort was made to ensure the accuracy of this automated transcription, some errors in transcription may have occurred.

## 2022-05-17 NOTE — PROGRESS NOTES
[facility]  Progress Note                   Date:   5/17/2022  Patient name: Varsha Sparks  Date of admission:  5/6/2022 11:29 PM  MRN:   [id number]  YOB: 1934  PCP: Emerita Alvarado MD    Reason for Admission: Acute ischemic stroke [facility]) [I63.9]  Right sided weakness [R53.1]  Altered mental status, unspecified altered mental status type [R41.82]  Cerebrovascular accident (CVA), unspecified mechanism ([address] [I63.9]    Subjective:       Clinical Changes /Abnormalities:  Patient seen and examined sitting up in chair in room. Denies chest pain and SOB. Off monitor. VSS BP on the low side this am.     Review of Systems    Medications:   Scheduled Meds:   midodrine  10 mg Oral TID    spironolactone  25 mg Oral Daily    metoprolol tartrate  12.5 mg Oral BID    amiodarone  200 mg Oral BID    lactobacillus  1 capsule Oral Daily with breakfast    apixaban  5 mg Oral BID    metoprolol  2.5 mg IntraVENous Once    lactated ringers bolus  250 mL IntraVENous Once    sodium chloride flush  5-40 mL IntraVENous 2 times per day    aspirin  81 mg Oral Daily    tamsulosin  0.4 mg Oral Daily    atorvastatin  80 mg Oral Nightly     Continuous Infusions:   sodium chloride       CBC:   Recent Labs     05/15/22  0458 05/16/22 0442 05/17/22 0429   WBC 6.2 6.0 5.3   HGB 10.5* 11.4* 11.0*   PLT See Reflexed IPF Result 151 153     BMP:    Recent Labs     05/15/22  0458 05/16/22  0442 05/17/22 0429   * 135 139   K 4.0 4.1 4.1    103 105   CO2 21 20 23   BUN 13 13 15   CREATININE 0.80 0.67* 0.71   GLUCOSE 113* 107* 113*     Hepatic:No results for input(s): AST, ALT, ALB, BILITOT, ALKPHOS in the last 72 hours. Troponin: No results for input(s): TROPHS in the last 72 hours. BNP: No results for input(s): BNP in the last 72 hours. Lipids: No results for input(s): CHOL, HDL in the last 72 hours.     Invalid input(s): LDLCALCU  INR: No results for input(s): INR in the last 72 soft, non-tender, bowel sounds active  Extremities: no edema  Neurologic: not done        Assessment / Acute Cardiac Problems:   1.  Acute CVA s/p tPA and mechanical thrombectomy of left MCA thrombus  2   Elevated troponin likely NSTEMI type I.  3.  Severe MVD - medical management  4. 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 New onset Afib with RVR, ChadsVasc of 6, on heparin and amiodarone. Converted to NSR. 6.  DNR-CCA. Patient Active Problem List:     Cerebrovascular accident (CVA) ([address]     Acute ischemic stroke ([address]     Altered mental status     CHF (congestive heart failure) ([address]     NSTEMI (non-ST elevated myocardial infarction) ([address]     Intracranial bleed ([address]     Right sided weakness     New onset atrial fibrillation ([address]     Leukopenia     Neutropenia ([address]     Encounter for palliative care      Plan of Treatment:   1. Continue ASA and Eliquis  Plavix on hold per neurology. 2. Continue PO amiodarone, lopressor, and Midodrine. 3. Continue statin  4. Continue medical management of MVD. 5. ICMP. LVEF 13% on ECHO 5/9/2022. Awaiting lifevest placement by [employer] rep. Repeat ECHO in 90 days to evaluated LVEF and potential need for AICD. Patient agrees to proceed with LifeVest.   Continue BB and Aldactone. Not on diuretic or ACE/ARB/ARNi d/t hypotension. 6. Keep K > 4.0 and Mag > 2.0  K 4.1 today and Mag 2.1 yesterday. 7. Okay for discharge from cardiology standpoint after lifevest placed or can be placed as OP if delayed. Follow up Appt with [facility] in [place] as scheduled.      Electronically signed by SARA Gupta NP on 5/17/2022 at 10:14 AM  [address]  [phone]

## 2022-05-17 NOTE — PROGRESS NOTES
Physical Therapy  Facility/Department: [facility] [place] [facility]  Physical Therapy Daily Treatment Note    Name: Shirley Thomason  : 1934  MRN: [id number]  Date of Service: 2022    Discharge Recommendations:  Patient would benefit from continued therapy after discharge   PT Equipment Recommendations  Equipment Needed: Yes  Mobility Devices: Mirian Call: Rolling      Patient Diagnosis(es): The primary encounter diagnosis was Cerebrovascular accident (CVA), unspecified mechanism ([facility] [address] Diagnoses of Altered mental status, unspecified altered mental status type and Right sided weakness were also pertinent to this visit. Past Medical History:  has a past medical history of Arthritis and Psoriasis. Past Surgical History:  has a past surgical history that includes Prostate surgery. Assessment   Body Structures, Functions, Activity Limitations Requiring Skilled Therapeutic Intervention: Decreased functional mobility ; Decreased coordination;Decreased balance;Decreased strength;Decreased safe awareness;Decreased cognition;Decreased endurance;Decreased posture  Assessment: Pt very pleasant and cooperative with treatment. Pt demonstrates impaired safety awareness, pt mildly confued but able to follow commands this date. Pt able to ambulate 100 feet with a RW and CGA. Pt would benefit from additional therapy upon discharge to address balance, BLE strength, and coodination deficits. Pt will require strict 24/7 supervision and assistance with mobility upon discharge secondary to high fall risk.   Therapy Prognosis: Good  Barriers to Learning: mild confusion  Requires PT Follow-Up: Yes  Activity Tolerance  Activity Tolerance: Patient tolerated treatment well;Patient limited by endurance  Activity Tolerance Comments: mild confusion t/o     Plan   Plan  Plan:  (5-6x/wk)  Current Treatment Recommendations: Strengthening,Balance training,Functional mobility training,Transfer training,Gait training,Neuromuscular re-education,Home exercise program,Cognitive reorientation,Safety education & training,Patient/Caregiver education & training,Equipment evaluation, education, & procurement,Therapeutic activities  Safety Devices  Type of Devices: Call light within reach,All fall risk precautions in place,Nurse notified,Patient at risk for falls,Left in chair,Chair alarm in place  Restraints  Restraints Initially in Place: No  Restraints: . Restrictions  Restrictions/Precautions  Restrictions/Precautions: Fall Risk,General Precautions,Up as Tolerated  Required Braces or Orthoses?: No  Position Activity Restriction  Other position/activity restrictions: SBP goal less than 160. CVA, s/p tPA, s/p thrombectomy. Subjective   Pain: pt denies pain  General  Chart Reviewed: Yes  Patient assessed for rehabilitation services?: Yes  Response To Previous Treatment: Patient with no complaints from previous session. Family / Caregiver Present: No  Follows Commands: Within Functional Limits (pt able to answer all questions appropriately but mildly confused with historical recall)  General Comment  Comments: Pt left in recliner with call light within reach, GRIFFIN present  Subjective  Subjective: Pt and RN agreeable to PT. Pt seated in recliner upon arrival, requesting to use the restroom at this writer's arrival. Very pleasant and cooperative t/o          Cognition   Orientation  Overall Orientation Status: Within Functional Limits  Orientation Level: Oriented X4  Cognition  Overall Cognitive Status: Exceptions  Arousal/Alertness: Appropriate responses to stimuli  Following Commands: Follows multistep commands with increased time; Follows multistep commands with repitition  Attention Span: Attends with cues to redirect  Memory: Decreased recall of biographical Information;Decreased long term memory;Decreased short term memory  Safety Judgement: Decreased awareness of need for assistance;Decreased awareness of need for safety  Problem Solving: Assistance required to generate solutions;Assistance required to implement solutions  Insights: Decreased awareness of deficits  Initiation: Requires cues for some  Sequencing: Requires cues for some      Bed mobility  Bed Mobility Comments: Pt began today's session up in a chair, retired up to a chair at the conclusion of today's session. Transfers  Sit to Stand: Contact guard assistance  Stand to sit: Contact guard assistance  Comment: Transfers performed 2x this date from [address] [place] [address] and then from low toilet seat  Ambulation  Surface: level tile  Device: Rolling Walker  Assistance: Contact guard assistance  Quality of Gait: mild instability, decreased gait speed, increased LEELA, no LOB. Gait Deviations: Staggers;Decreased step length; Slow [place]; Increased LEELA  Distance: 100ft  More Ambulation?: No  Stairs/Curb  Stairs?: No     Balance  Posture: Good  Sitting - Static: Good;-  Sitting - Dynamic: Good;-  Standing - Static: Fair  Standing - Dynamic: Fair  Comments: Assessed with RW.  A/AROM Exercises:   Seated LE exercise program: Long Arc Quads, hip abduction/adduction, heel/toe raises, and marches. Reps: 10x             AM-PAC Score  AM-PAC Inpatient Mobility Raw Score : 17     Goals  Short Term Goals  Time Frame for Short term goals: 14 visits  Short term goal 1: Supine to/from sit with SBA. Short term goal 2: Sit to/from stand with SBA. Short term goal 3: Ambulate 150' with walker with SBA. Short term goal 4: Improve standing balance to stand with fair + balance. Additional Goals?: No       Education  Patient Education  Education Given To: Patient  Education Provided: Role of Therapy;Plan of Care;Transfer Training; Fall Prevention Strategies  Education Method: Verbal  Barriers to Learning: Cognition  Education Outcome: Verbalized understanding;Continued education needed      Therapy Time   Individual Concurrent Group Co-treatment   Time In 1007         Time Out 1030         Minutes 23         Timed Code Treatment Minutes: 23 [place], [place]

## 2022-05-17 NOTE — CARE COORDINATION
Transitional planning:  Called [facility] and spoke with Emily Saenz at [phone], needs cardiac cath and echo reports. 0800 Faxed [facility] at [phone] new demographic sheet because pt's room moved and cardiac reports above.

## 2022-05-17 NOTE — PROGRESS NOTES
PALLIATIVE CARE NURSING ASSESSMENT    Patient: Samir Berry  Room: 2004/2004-01    Reason For Consult   Goals of care evaluation  Distress management  Guidance and support  Facilitate communications  Assistance in coordinating care    Code Status: [facility]       Impression: Samir Berry is a 80y.o. year old male  has a past medical history of Arthritis and Psoriasis. .  Currently hospitalized for the management of CVA. The Palliative Care Team is following to assist with goals of care and patient and family support. Vital Signs  Blood pressure (!) 118/59, pulse 77, temperature 97.6 °F (36.4 °C), temperature source Oral, resp. rate 18, height 5' 9\" (1.753 m), weight 151 lb 7.3 oz (68.7 kg), SpO2 94 %. Patient Active Problem List   Diagnosis    Cerebrovascular accident (CVA) ([address]    Acute ischemic stroke ([address]    Altered mental status    CHF (congestive heart failure) ([address]    NSTEMI (non-ST elevated myocardial infarction) ([address]    Intracranial bleed ([address]    Right sided weakness    New onset atrial fibrillation ([address]    Leukopenia    Neutropenia ([address]    Encounter for palliative care       Palliative Interaction: Reviewed patient's course of care. Update received from Jon Short bedside nurse. Notified patient can do ADL's and ambulation is steady. There is some cognitive dysfunction that family is worried about at home- turning on burner and forgetting it, going outside and getting turned around not knowing which way to go. Pt awaiting LifeVest to be fitted. Jon Short relates they should be there around 2pm.    Plan to discharge to [facility] when precert completed. Pt working with speech therapy upon my arrival.  Will round back    1215: Rounded back to see Denys Reese and family. I sat with Denys Reese and introduced myself. His wife and son went to lunch. Denys Reese is up in chair eating his lunch. I spoke with him for a few minutes. He relates he is feeling good.  He denies problems swallowing. Denies coughing or choking with eating. Speech was in previously and worked with him. He relates he has been busy this am.  When I ask him about something he goes back to people asking him the questions that he really has to think about. Conor Menjivar know that I will round back when his son and wife are back. Rounded back two more times this shift and missed wife and son. 1600-Will try to reach tomorrow.                  Goals/Plan of care  Education/support to staff  Education/support to family  Education/support to patient  Discharge planning/helping to coordinate care  Communications with primary service  Providing support for coping/adaptation/distress of family  Providing support for coping/adaptation/distress of patient  Discussing meaning/purpose   Validating patient/family distress  Continued communication updates  Recognizing, reflecting, and empathizing with family members' anticipatory grief  Recognizing, reflecting, and empathizing with the patient's anticipatory grief      Palliative Care Coordinator  Lisandra KHOURY, RN, ONN-CG    Shaina Qureshi Office: [address] [facility] Office: [phone]  [place] Office: [phone]    For Symptom Management Clinic scheduling please call [phone]

## 2022-05-17 NOTE — CARE COORDINATION
Spoke to [place] at [facility]. Notified her that pt is ready for discharge once precert is received. Voicemail received from Naveed Patel at [facility].  Returned call and left voicemail to notify her that pt has been accepted to another facility    [id number] updated PT, OT, ST notes faxed to [place] [facility]

## 2022-05-17 NOTE — PROGRESS NOTES
Occupational Therapy  Facility/Department: [facility] CAR 2  Occupational Therapy Daily Treatment Note    Name: Jasmeet Ly  : 1934  MRN: [id number]  Date of Service: 2022    Discharge Recommendations:  Patient would benefit from continued therapy after discharge     Patient Diagnosis(es): The primary encounter diagnosis was Cerebrovascular accident (CVA), unspecified mechanism ([address] Diagnoses of Altered mental status, unspecified altered mental status type and Right sided weakness were also pertinent to this visit. Assessment   Performance deficits / Impairments: Decreased functional mobility ; Decreased ADL status; Decreased safe awareness;Decreased cognition;Decreased balance;Decreased endurance  Assessment: Pt continues to make progress towards goals. Pt with some confusion and decreased safety limiting independence this session. Pt will continue to benefir from OT services following discharge from acute care hospital.  Prognosis: Good  Activity Tolerance  Activity Tolerance: Patient Tolerated treatment well  Activity Tolerance Comments: Patient participated in full ADL session with good tolerance        Plan   Plan  Times per Week: 4-5x/week  Current Treatment Recommendations: Strengthening,Balance training,Functional mobility training,Endurance training,Safety education & training,Neuromuscular re-education,Patient/Caregiver education & training,Self-Care / ADL     Restrictions  Restrictions/Precautions  Restrictions/Precautions: Fall Risk,General Precautions,Up as Tolerated  Required Braces or Orthoses?: No  Position Activity Restriction  Other position/activity restrictions: SBP goal less than 160. CVA, s/p tPA, s/p thrombectomy.     Subjective   General  Chart Reviewed: Yes  Patient assessed for rehabilitation services?: Yes  Response to previous treatment: Patient with no complaints from previous session  Family / Caregiver Present: No  Diagnosis: TPA, Subacute anterior L temporal lobe infarct  Subjective  Subjective: RN ok'd patient for OT treatment this morning. Pt finishing with PT upon GRIFFIN arrival. Pt agreeable to session. Pain: Pt had no c/o pain this session    Objective    Safety Devices  Type of Devices: Call light within reach; Chair alarm in place;Nurse notified; Left in chair  Restraints  Restraints Initially in Place: No  Balance  Sitting: Without support (10 minutes supervision seated in recliner upright)  Standing: With support (stood SBA at sink with one UE support on counter for balance and safety x15 minutes)  Standing - Static: Fair  Standing - Dynamic: Fair;Occasional  Gait  Assistive Device: Walker, rolling   ADL  Grooming: Stand by assistance; Increased time to complete;Setup (oral care)  UE Bathing: Setup; Increased time to complete;Stand by assistance  LE Bathing: Contact guard assistance; Increased time to complete;Setup  UE Dressing: Setup;Stand by assistance  LE Dressing: Setup; Increased time to complete;Contact guard assistance  Toileting: Minimal assistance (brief mgt)  Additional Comments: Pt stood at sink for bathing tasks, seated for wash feet and don/doff slipper socks. Pt occasional confusion taking extra time to process task  Activity Tolerance  Activity Tolerance: Patient tolerated treatment well;Patient limited by endurance  Activity Tolerance Comments: mild confusion t/o  Bed mobility  Bed Mobility Comments: Pt began OT session in standing position and concluded seated in recliner  Transfers  Sit to stand: Contact guard assistance  Stand to sit: Contact guard assistance  Transfer Comments: RW CGA for safety use within room to/from bathroom/recliner     Cognition  Overall Cognitive Status: Exceptions  Arousal/Alertness: Appropriate responses to stimuli  Following Commands: Follows multistep commands with increased time; Follows multistep commands with repitition  Attention Span: Attends with cues to redirect  Memory: Decreased recall of biographical Information;Decreased long term memory;Decreased short term memory  Safety Judgement: Decreased awareness of need for assistance;Decreased awareness of need for safety  Problem Solving: Assistance required to generate solutions;Assistance required to implement solutions  Insights: Decreased awareness of deficits  Initiation: Requires cues for some  Sequencing: Requires cues for some  Cognition Comment: Pt with occasional confusion, oriented x4   Education Given To: Patient  Education Provided: ADL Adaptive Strategies;Transfer Training  Education Provided Comments: safety  Education Method: Demonstration;Verbal  Barriers to Learning: Cognition  Education Outcome: Verbalized understanding;Continued education needed      AM-PAC Score   AM-PAC Inpatient Daily Activity Raw Score: 20 (05/17/22 1421)  AM-PAC Inpatient ADL T-Scale Score : 42.03 (05/17/22 1421)  ADL Inpatient CMS 0-100% Score: 38.32 (05/17/22 1421)  ADL Inpatient CMS G-Code Modifier : CJ (05/17/22 1421)    Goals  Short Term Goals  Time Frame for Short term goals: Pt will by discharge  Short Term Goal 1: demo good safety awareness during func mob around room at SUP and LRD PRN  Short Term Goal 2: demo ADL UB bathing/dressing activity at mod I with 2 cues  Short Term Goal 3: demo ADL LB bathing/dressing activity at SBA, AD PRN, with 2 cues  Short Term Goal 4: demo SUP for all bed mobility using bed rails PRN  Short Term Goal 5: demo activity tolerance for 35 min+       Therapy Time   Individual Concurrent Group Co-treatment   Time In 1015         Time Out 1055         Minutes 40         Timed Code Treatment Minutes: Patriciabury, GRIFFIN/L

## 2022-05-17 NOTE — DISCHARGE INSTRUCTIONS
Internal medicine: Follow up with PCP in one week   Follow up with Neurology and neuro endovacular in 2 weeks (Dr. Tesha Shrestha in 2 weeks after discharge and f/up with Dr. Arianna Szymanski in 3 months )  Follow up with cardiology in 1-2 weeks   You have been prescribed Flomax please take as prescribed and discuss continuing medication with PCP   You have been prescribed  Aspirin 81mg and eliquis avoid NSAID's and be careful of falls as these medications thin the blood and increase the risk of bleeding.  If you have signs of bleeding, go to the ED immediatly   You have been started on amiodarone 200 mg twice daily, eliquis 5 mg twice daily, Lisinopril 5mg and Lipitor 80mg, please take as prescribed   Continue to take medications as prescribed   Follow up with complete blood count in 1 weeks

## 2022-05-18 VITALS
OXYGEN SATURATION: 96 % | TEMPERATURE: 98.2 F | SYSTOLIC BLOOD PRESSURE: 127 MMHG | BODY MASS INDEX: 22.43 KG/M2 | HEIGHT: 69 IN | WEIGHT: 151.46 LBS | DIASTOLIC BLOOD PRESSURE: 68 MMHG | HEART RATE: 63 BPM | RESPIRATION RATE: 18 BRPM

## 2022-05-18 LAB
ABSOLUTE EOS #: 0.17 K/UL (ref 0–0.44)
ABSOLUTE IMMATURE GRANULOCYTE: 0.06 K/UL (ref 0–0.3)
ABSOLUTE LYMPH #: 2.04 K/UL (ref 1.1–3.7)
ABSOLUTE MONO #: 1.1 K/UL (ref 0.1–1.2)
ANION GAP SERPL CALCULATED.3IONS-SCNC: 10 MMOL/L (ref 9–17)
BASOPHILS # BLD: 0 % (ref 0–2)
BASOPHILS ABSOLUTE: <0.03 K/UL (ref 0–0.2)
BUN BLDV-MCNC: 16 MG/DL (ref 8–23)
CALCIUM SERPL-MCNC: 9.3 MG/DL (ref 8.6–10.4)
CHLORIDE BLD-SCNC: 102 MMOL/L (ref 98–107)
CO2: 24 MMOL/L (ref 20–31)
CREAT SERPL-MCNC: 0.78 MG/DL (ref 0.7–1.2)
EOSINOPHILS RELATIVE PERCENT: 3 % (ref 1–4)
GFR AFRICAN AMERICAN: >60 ML/MIN
GFR NON-AFRICAN AMERICAN: >60 ML/MIN
GFR SERPL CREATININE-BSD FRML MDRD: ABNORMAL ML/MIN/{1.73_M2}
GLUCOSE BLD-MCNC: 118 MG/DL (ref 70–99)
HCT VFR BLD CALC: 32.1 % (ref 40.7–50.3)
HEMOGLOBIN: 11.2 G/DL (ref 13–17)
IMMATURE GRANULOCYTES: 1 %
LYMPHOCYTES # BLD: 37 % (ref 24–43)
MCH RBC QN AUTO: 32.7 PG (ref 25.2–33.5)
MCHC RBC AUTO-ENTMCNC: 34.9 G/DL (ref 28.4–34.8)
MCV RBC AUTO: 93.9 FL (ref 82.6–102.9)
MONOCYTES # BLD: 20 % (ref 3–12)
NRBC AUTOMATED: 0 PER 100 WBC
PDW BLD-RTO: 12.9 % (ref 11.8–14.4)
PLATELET # BLD: 276 K/UL (ref 138–453)
PMV BLD AUTO: 11.6 FL (ref 8.1–13.5)
POTASSIUM SERPL-SCNC: 4.6 MMOL/L (ref 3.7–5.3)
RBC # BLD: 3.42 M/UL (ref 4.21–5.77)
SEG NEUTROPHILS: 39 % (ref 36–65)
SEGMENTED NEUTROPHILS ABSOLUTE COUNT: 2.18 K/UL (ref 1.5–8.1)
SODIUM BLD-SCNC: 136 MMOL/L (ref 135–144)
WBC # BLD: 5.6 K/UL (ref 3.5–11.3)

## 2022-05-18 PROCEDURE — 99239 HOSP IP/OBS DSCHRG MGMT >30: CPT | Performed by: INTERNAL MEDICINE

## 2022-05-18 PROCEDURE — 6370000000 HC RX 637 (ALT 250 FOR IP): Performed by: NURSE PRACTITIONER

## 2022-05-18 PROCEDURE — 2580000003 HC RX 258: Performed by: STUDENT IN AN ORGANIZED HEALTH CARE EDUCATION/TRAINING PROGRAM

## 2022-05-18 PROCEDURE — 97129 THER IVNTJ 1ST 15 MIN: CPT

## 2022-05-18 PROCEDURE — 6370000000 HC RX 637 (ALT 250 FOR IP): Performed by: PHYSICAL MEDICINE & REHABILITATION

## 2022-05-18 PROCEDURE — 36415 COLL VENOUS BLD VENIPUNCTURE: CPT

## 2022-05-18 PROCEDURE — 85025 COMPLETE CBC W/AUTO DIFF WBC: CPT

## 2022-05-18 PROCEDURE — 97130 THER IVNTJ EA ADDL 15 MIN: CPT

## 2022-05-18 PROCEDURE — 80048 BASIC METABOLIC PNL TOTAL CA: CPT

## 2022-05-18 PROCEDURE — 6370000000 HC RX 637 (ALT 250 FOR IP): Performed by: STUDENT IN AN ORGANIZED HEALTH CARE EDUCATION/TRAINING PROGRAM

## 2022-05-18 RX ADMIN — TAMSULOSIN HYDROCHLORIDE 0.4 MG: 0.4 CAPSULE ORAL at 08:07

## 2022-05-18 RX ADMIN — SPIRONOLACTONE 25 MG: 25 TABLET ORAL at 08:07

## 2022-05-18 RX ADMIN — METOPROLOL TARTRATE 12.5 MG: 25 TABLET ORAL at 08:07

## 2022-05-18 RX ADMIN — ASPIRIN 81 MG: 81 TABLET, CHEWABLE ORAL at 08:07

## 2022-05-18 RX ADMIN — MIDODRINE HYDROCHLORIDE 10 MG: 5 TABLET ORAL at 08:07

## 2022-05-18 RX ADMIN — APIXABAN 5 MG: 5 TABLET, FILM COATED ORAL at 08:07

## 2022-05-18 RX ADMIN — Medication 1 CAPSULE: at 08:07

## 2022-05-18 RX ADMIN — AMIODARONE HYDROCHLORIDE 200 MG: 200 TABLET ORAL at 08:07

## 2022-05-18 RX ADMIN — SODIUM CHLORIDE, PRESERVATIVE FREE 10 ML: 5 INJECTION INTRAVENOUS at 08:06

## 2022-05-18 ASSESSMENT — PAIN SCALES - GENERAL: PAINLEVEL_OUTOF10: 0

## 2022-05-18 NOTE — PROGRESS NOTES
Speech Language Pathology  Speech Language Pathology  [facility]    Cognitive Treatment Note    Date: 5/18/2022  Patients Name: Sindi Green  MRN: [id number]  Diagnosis:   Patient Active Problem List   Diagnosis Code    Cerebrovascular accident (CVA) ([facility] I63.9    Acute ischemic stroke ([facility] I63.9    Altered mental status R41.82    CHF (congestive heart failure) ([facility] I50.9    NSTEMI (non-ST elevated myocardial infarction) ([facility] I21.4    [facility] I62.9    Right sided weakness R53.1    New onset atrial fibrillation (HCC) I48.91    Leukopenia D72.819    Neutropenia ([facility] D70.9    Encounter for palliative care Z51.5       Pain: 0/10    Cognitive Treatment    Treatment time: 8085-7147      Subjective: [x] Alert [x] Cooperative     [] Confused     [] Agitated    [] Lethargic      Objective/Assessment:    Orientation: Pt oriented to first and last name, age, and date of birth     Recall: Word list retention: Recall by attribute; 2/6 increased to 6/6 with repetitions  Immediate memory for four-unit sequences; 5/6    Organization:Category members; 4/6 increased to 6/6 with max verbal cues     Problem Solving/Reasoning: Problem solving: Missing equipment; 1/7 increased to 6/7 with min-max verbal cues and repetitions    Other: Naming words by letter; 1/11 increased to 3/11 with repetitions and mod and phonemic verbal cues    Plan:  [x] Continue ST services    [] Discharge from ST:      Discharge recommendations: []  Further therapy recommended at discharge. The patient should be able to tolerate at least 3 hours of therapy per day over 5 days or 15 hours over 7 days. [x] Further therapy recommended at discharge. [] No therapy recommended at discharge. Treatment completed by: Sondra Mcmahon    Grace Bettencourt, SLP, M.A.  CCC-SLP

## 2022-05-18 NOTE — PROGRESS NOTES
Endovascular Neurosurgery Progress Note    SUBJECTIVE:   No reported events overnight. This am pt awake. Pt has no acute complaints. Review of Systems:  CONSTITUTIONAL:  negative for fevers, chills, fatigue and malaise    EYES:  negative for double vision, blurred vision and photophobia     HEENT:  negative for tinnitus, epistaxis and sore throat    RESPIRATORY:  negative for cough, shortness of breath, wheezing    CARDIOVASCULAR:  negative for chest pain, palpitations, syncope, edema    GASTROINTESTINAL:  negative for nausea, vomiting    GENITOURINARY:  negative for incontinence    MUSCULOSKELETAL:  negative for neck or back pain    NEUROLOGICAL:  Negative for weakness and tingling  negative for headaches and dizziness    PSYCHIATRIC:  negative for anxiety      Review of systems otherwise negative. OBJECTIVE:     Vitals:    22 0806   BP: 118/66   Pulse: 66   Resp:    Temp:    SpO2:         General:  Gen: normal habitus, NAD  HEENT: NCAT, mucosa moist  Cvs: RRR, S1 S2 normal  Resp: symmetric unlabored breathing  Abd: s/nd/nt  Ext: no edema  Skin: no lesions seen, warm and dry    Neuro:   Gen: awake, oriented to self and month, not to place. Lang/speech: no aphasia, follows commands, no dysarthria. Follows commands  CN: PERRL, EOMI, VFF, V1-3 intact, face symmetric, hearing intact  Motor: 5/5 all 4 ext. Sense: LT intact all 4 ext. Coord: f/t intact b/l  DTR: deferred  Gait: deferred    NIH Stroke Scale:   1a  Level of consciousness: 0   1b. LOC questions:  0   1c. LOC commands: 0   2. Best Gaze: 0 - normal   3. Visual: 0 - no visual loss   4. Facial Palsy: 0 - normal symmetric movement   5a. Motor left arm: 0   5b. Motor right arm: 0   6a. Motor left le   6b  Motor right le   7. Limb Ataxia: 0 - absent   8. Sensory: 0   9. Best Language:  0   10. Dysarthria: 0   11. Extinction and Inattention: 0         Total:   0     MRS: 3    LABS:   Reviewed.   Lab Results   Component Value Date HGB 11.2 (L) 05/18/2022    WBC 5.6 05/18/2022     05/18/2022     05/18/2022    BUN 16 05/18/2022    CREATININE 0.78 05/18/2022    MG 2.1 05/16/2022    APTT 63.0 (H) 05/14/2022    INR 1.1 05/07/2022      Lab Results   Component Value Date    COVID19 Not Detected 05/12/2022       RADIOLOGY:   Images were personally reviewed including:   CT head wo con 5/13/2022  1.  Decreased density superimposed hemorrhagic area of left temporal lobe   infarct.  Findings compatible with interval improvement.       2.  Continued mass effect associated with the infarct with effacement of the   brain parenchyma bordering the cisterns.       3.  No midline shift.  No new areas of hemorrhage. MRI Brain 05/08/2022:   1. Acute infarction in the left anterior temporal lobe and left basal   ganglia, consistent with MCA occlusion. 2. Punctate foci of acute infarction in the right frontal cortex. 3. Mild microangiopathic change. IR 5/7/2022:   1. Acute Left MCA proximal M-1 occlusion TICI score 0   2. The above lesion was treated with 8 fr short sheath, flow gate bgc, prowler 21 microcatheter, embo trap 5 mm x 37 mm stent retriever first pass. Final reperfusion score TICI 03    TTE 5/6/2022: EF 10%    ASSESSMENT:   79 y/o M with pmh significant for LEXII presented 5/7/2022 with left M1 acute occlusion s/p tPA and MT TICI 03, first pass. 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 Workup shows HF EF 10%, new afib    Pt is DNR CCA. Pt has residual disorientation. MRI 5/9/2022 shows L MCA stroke and punctate hemo conversion. Cardiac cath 5/10/2022 showed complete occlusion all 3 cardiac arteries with collateralization. New afib rvr and nstemi 5/12/2022. CT head 5/13/2022 imrpoving. PLAN:   --c/w ASA 81  --continue eliquis 5 bid  --continue lipitor 80  ---140  --stable from endovascular perspective for discharge. Pending cardiac issues.   --F/up with Dr. Darus Chalet in 2 weeks after discharge and f/up with Dr. Tata Rainey in 3 months     Case discussed with Dr. Tata Rainey attending.     More Wing MD  Stroke, [facility]  [id number]  Electronically signed 5/18/2022 at 9:21 AM

## 2022-05-18 NOTE — PROGRESS NOTES
[facility]  Internal Medicine Teaching Residency Program  Inpatient Daily Progress Note  ______________________________________________________________________________    Patient: Marianne Chase  YOB: 1934   TIA:[id number]    Acct: [de-identified]     Room: 2004/2004-01  Admit date: 5/6/2022  Today's date: 05/18/22  Number of days in the hospital: 11    SUBJECTIVE   Admitting Diagnosis: Cerebrovascular accident (CVA) ([facility]  CC: Right-sided weakness and speech difficulty    Patient seen and examined in his room. No acute events overnight. Does not have any more loose stools. Plan for discharge today. Plan for today:  -Plan for discharge today    ROS:  Constitutional:  negative for chills, fevers, sweats  Respiratory:  negative for cough, dyspnea on exertion, hemoptysis, shortness of breath, wheezing  Cardiovascular:  negative for chest pain, chest pressure/discomfort, lower extremity edema, palpitations  Gastrointestinal:  negative for abdominal pain, constipation, diarrhea, nausea, vomiting  Neurological:  negative for dizziness, headache    BRIEF HISTORY     Librado Campa a 80 y. o. male with no significant PMH and not presently on AC who presented via EMS after being noted to have acute onset right sided weakness and speech difficulty. Wife reports he fell asleep on the recliner at 9:30pm and woke up around 10om and couldn't get up from the chair. Family also noted right sided weakness. EMS was called and activated stroke alert. Patient was evaluated by mobile stroke unit. He was noted to have aphasia, dysarthria, and right arm and leg weakness.     LKW: 9:30PM  NIH: 14     Non contrast CT Head obtained.  Negative for any acute bleed  Was given tPa bolus dose at 2311 and infusion started [address] Course:   CTA head and neck: Showed left M1 occlusion.  Patient was taken for mechanical thrombectomy for M1 occlusion.   Patient was given 600 mg aspirin x1.  Transfer to neuro ICU for further management. Pt found to have hemorrhagic conversion.  Plan for repeat CT head in 24 hours.  Cardiology planning for cardiac cath.  No heparin at this time.  We will continue with Lovenox. OBJECTIVE     Vital Signs:  /68   Pulse 63   Temp 98.2 °F (36.8 °C) (Oral)   Resp 18   Ht 5' 9\" (1.753 m)   Wt 151 lb 7.3 oz (68.7 kg)   SpO2 96%   BMI 22.37 kg/m²     Temp (24hrs), Av.2 °F (36.8 °C), Min:97.7 °F (36.5 °C), Max:98.5 °F (36.9 °C)    In: 720   Out: 550 [Urine:550]    Physical Exam:  Physical Exam  Vitals and nursing note reviewed. Constitutional:       Appearance: Normal appearance. HENT:      Head: Normocephalic and atraumatic. Nose: Nose normal.      Mouth/Throat:      Mouth: Mucous membranes are moist.      Pharynx: Oropharynx is clear. Eyes:      Extraocular Movements: Extraocular movements intact. Conjunctiva/sclera: Conjunctivae normal.      Pupils: Pupils are equal, round, and reactive to light. Cardiovascular:      Rate and Rhythm: Normal rate and regular rhythm. Pulses: Normal pulses. Heart sounds: Normal heart sounds. No murmur heard. No friction rub. No gallop. Pulmonary:      Effort: Pulmonary effort is normal. No respiratory distress. Breath sounds: Normal breath sounds. No stridor. No wheezing, rhonchi or rales. Chest:      Chest wall: No tenderness. Abdominal:      General: Abdomen is flat. Bowel sounds are normal. There is no distension. Palpations: Abdomen is soft. There is no mass. Tenderness: There is no abdominal tenderness. There is no guarding or rebound. Hernia: No hernia is present. Musculoskeletal:         General: No swelling, tenderness, deformity or signs of injury. Normal range of motion. Cervical back: Normal range of motion. Right lower leg: No edema. Left lower leg: No edema. Skin:     General: Skin is warm. Findings: Bruising present. Neurological:      General: No focal deficit present. Mental Status: He is alert and oriented to person, place, and time. Mental status is at baseline. Psychiatric:         Mood and Affect: Mood normal.         Behavior: Behavior normal.         Thought Content: Thought content normal.         Judgment: Judgment normal.         Diagnostic Labs:  CBC:   Recent Labs     05/16/22 0442 05/17/22 0429 05/18/22  0555   WBC 6.0 5.3 5.6   RBC 3.54* 3.53* 3.42*   HGB 11.4* 11.0* 11.2*   HCT 32.9* 33.0* 32.1*   MCV 92.9 93.5 93.9   RDW 12.5 12.7 12.9    153 276     BMP:   Recent Labs     05/16/22 0442 05/17/22 0429 05/18/22  0555    139 136   K 4.1 4.1 4.6    105 102   CO2 20 23 24   PHOS 3.4  --   --    BUN 13 15 16   CREATININE 0.67* 0.71 0.78     BNP: No results for input(s): BNP in the last 72 hours. PT/INR: No results for input(s): PROTIME, INR in the last 72 hours. APTT:   No results for input(s): APTT in the last 72 hours. CARDIAC ENZYMES: No results for input(s): CKMB, CKMBINDEX, TROPONINI in the last 72 hours. Invalid input(s): CKTOTAL;3  FASTING LIPID PANEL:  Lab Results   Component Value Date    CHOL 133 05/07/2022    HDL 52 05/07/2022    TRIG 50 05/07/2022     LIVER PROFILE: No results for input(s): AST, ALT, ALB, BILIDIR, BILITOT, ALKPHOS in the last 72 hours. MICROBIOLOGY: No results found for: CULTURE    Imaging:    CT HEAD WO CONTRAST    Result Date: 5/13/2022  1. Decreased density superimposed hemorrhagic area of left temporal lobe infarct. Findings compatible with interval improvement. 2.  Continued mass effect associated with the infarct with effacement of the brain parenchyma bordering the cisterns. 3.  No midline shift. No new areas of hemorrhage. RECOMMENDATIONS: Unavailable     CT HEAD WO CONTRAST    Result Date: 5/11/2022  Left temporal lobe infarct anteriorly with similar superimposed areas of hemorrhage.      CT HEAD WO CONTRAST    Result Date: 5/9/2022  Evolving left temporal lobe infarct anteriorly with stable superimposed focus of hemorrhage. ASSESSMENT & PLAN     Assessment and Plan:    Principal Problem:    Cerebrovascular accident (CVA) ([facility] [address]  Active Problems:    Acute ischemic stroke ([address]    Altered mental status    CHF (congestive heart failure) ([address]    NSTEMI (non-ST elevated myocardial infarction) ([address]    Intracranial bleed ([address]    Right sided weakness    New onset atrial fibrillation ([address]    Leukopenia    Neutropenia ([facility] [address]    Encounter for palliative care  Resolved Problems:    * No resolved hospital problems. *      Left MCA infarct with left M1 occlusion status post mechanical thrombectomy  - CT of the head shows evolving left anterior, left temporal lobe infarction with small hemorrhagic conversion.  Repeat CT scan reviewed, stable. - Continue with aspirin and Eliquis   - Continue with Lipitor 80 mg  - Plavix 75mg > hold per neurology  - Repeat CT head improved > no new focal areas of hemorrhage      Bradycardia-resolved  - Asymptomatic   - Awaiting cardiology recommendations      Leukopenia / neutropenia - resolved   - Peripheral smear reviewed   - Respiratory viral panel > no viral infection detected     Atrial fibrillation with RVR: Rate controlled  - Attempted vagal maneuvers were mildly successful  -On amiodarone and Lopressor  - ASA and Eliquis   - Cardiology following     Diarrhea - resolved   - Probiotics      ICM P, LVEF 13% 5/9/2022: On LifeVest.  Repeat echo in 90 days outpatient. Follow-up with cardiology outpatient. Not on ACE I/ARB/ARN I due to hypotension  On beta-blocker and Aldactone  Moderate mitral regurgitation     Diet: Regular diet  DVT ppx : ASA and Eliquis   GI ppx: Not indicated     PT/OT/SW: Consulted  Discharge Planning: PM&R consulted, plan for discharge to acute rehab vs SNF, in process. Will discuss the possibility of home with home care and home PT/OT.     Alta Markham MD  PGY-3, Internal Medicine Resident  [facility] [place]  5/18/2022 4:49 PM      Please note that part of this chart was generated using voice recognition dictation software. Although every effort was made to ensure the accuracy of this automated transcription, some errors in transcription may have occurred.

## 2022-05-18 NOTE — CARE COORDINATION
Notified by Ronnie Snowden at [facility] that precert has been received. They are able to accept pt at any time. Transport request faxed to [facility]. HENS completed. Pt notified and agreeable with plan    [phone] [facility] Pt scheduled for 1230. AVS faxed to [facility]. Pt and wife updated.  Bora Goes at [facility] notified    Discharge [facility] Case Management Department  Written by: Zaire Uribe RN    Patient Name: Janel Diehl  Attending Provider: Evelyn Garcia MD  Admit Date: 2022 11:29 PM  MRN: [id number]  Account: [de-identified]                     : 1934  Discharge Date: 2022      Disposition: [facility]    Zaire Uribe RN

## 2022-05-18 NOTE — PLAN OF CARE

## 2022-05-21 NOTE — DISCHARGE SUMMARY
[address]     Department of Internal Medicine - Staff Internal Medicine Teaching Service    INPATIENT DISCHARGE SUMMARY      Patient Identification:  Sera Robles is a 80 y.o. male. :  1934  MRN: [id number]     Acct: [de-identified]   PCP: Elsa Sierra MD  Admit Date:  2022  Discharge date and time: 2022 12:45 PM   Attending Provider: No att. providers found                                     [address] Problem Lists:  Principal Problem:    Cerebrovascular accident (CVA) ([address]  Active Problems:    Acute ischemic stroke ([address]    Altered mental status    CHF (congestive heart failure) ([address]    NSTEMI (non-ST elevated myocardial infarction) ([address]    Intracranial bleed ([address]    Right sided weakness    New onset atrial fibrillation ([address]    Leukopenia    Neutropenia ([address]    Encounter for palliative care  Resolved Problems:    * No resolved hospital problems. *      HOSPITAL STAY     Brief Inpatient course:   Sera Robles is a 80 y.o. male who was admitted for the management of Cerebrovascular accident (CVA) [facility]), presented to the emergency department with altered mental status. No significant past medical history. Patient presented via EMS after being noted to have acute onset right-sided weakness and speech difficulties. Wife reports he fell asleep on the recliner around 9:30 PM and woke up around 10 AM and could not get up out of the chair. Family also stated that there was some new right-sided weakness. EMS was called and activated stroke alert. Patient was evaluated by the mobile stroke unit. He was noted to have aphasia, dysarthria and right arm and leg weakness. Last known well was 9:30 PM.  NIH of 14. Noncontrast CT head was obtained and negative for any acute bleed. Patient was given tPA bolus at 2311 and infusion started at 2313. CTA head and neck showed left M1 occlusion.   Patient was taken for mechanical thrombectomy for occlusion. Patient was given 600 mg of aspirin x1 and transferred to the neuro ICU for further management. Patient was found to have hemorrhagic conversion and plan for repeat CT head in 24 hours. Patient went into new onset A. fib requiring adenosine and being started on heparin. Neurology was okay with heparin and aspirin but holding Plavix due to hemorrhagic conversion. Multiple head CT showed new focal hemorrhagic conversion. Before discharge patient had final head CT that showed stability of hemorrhage. Cardiology did do cardiac cath which showed severe multivessel coronary artery disease and severe cardiomyopathy. EF was noted to be 13%. After discussion with family recommend medical therapy. Patient's mentation improved over the course of his hospital stay patient was eventually medically stable for discharge. Patient was discharged with follow-up contractions medications listed below. Hospital course:  Left MCA infarct with left M1 occlusion status post mechanical thrombectomy  - CT of the head shows evolving left anterior, left temporal lobe infarction with small hemorrhagic conversion.  Repeat CT scan reviewed, stable. - Continue with aspirin and Eliquis   - Continue with Lipitor 80 mg  - Plavix 75mg > hold per neurology  - Repeat CT head improved > no new focal areas of hemorrhage      Bradycardia-resolved  - Asymptomatic   - Cardiology following      Leukopenia / neutropenia - resolved > lab error?  - Peripheral smear reviewed   - Respiratory viral panel > no viral infection detected     Atrial fibrillation with RVR: Rate controlled  - Attempted vagal maneuvers were mildly successful  - On amiodarone and Lopressor  - ASA and Eliquis   - Cardiology following     Diarrhea - resolved   - Probiotics      ICM P, LVEF 13% 5/9/2022: On LifeVest.  Repeat echo in 90 days outpatient. Follow-up with cardiology outpatient.   Not on ACE I/ARB/ARN I due to hypotension  On beta-blocker and Aldactone    Moderate mitral regurgitation    Discharge diagnosis: NSTEMI and CVA    Procedures/ Significant Interventions:   Mechanical thrombectomy of M1   Cardiac catheterization     Consults:     Consults:     Final Specialist Recommendations/Findings:   IP CONSULT TO NEUROCRITICAL CARE  IP CONSULT TO STROKE TEAM  IP CONSULT TO PHYSICAL MEDICINE REHAB  IP CONSULT TO PHARMACY  PHARMACY TO CHANGE BASE FLUIDS  IP CONSULT TO IV TEAM  IP CONSULT TO CARDIOLOGY  IP CONSULT TO PHYSICAL MEDICINE REHAB  IP CONSULT TO PALLIATIVE CARE  IP CONSULT TO PALLIATIVE CARE      Any Hospital Acquired Infections: none    Discharge Functional Status:  stable    DISCHARGE PLAN     Disposition: SNF    Patient Instructions:   Discharge Medication List as of 5/18/2022 12:12 PM      START taking these medications    Details   aspirin 81 MG chewable tablet Take 1 tablet by mouth daily, Disp-30 tablet, R-3Normal      atorvastatin (LIPITOR) 80 MG tablet Take 1 tablet by mouth nightly, Disp-30 tablet, R-3Normal      tamsulosin (FLOMAX) 0.4 MG capsule Take 1 capsule by mouth daily, Disp-30 capsule, R-3Normal      amiodarone (CORDARONE) 200 MG tablet Take 1 tablet by mouth 2 times daily, Disp-30 tablet, R-3Normal      lactobacillus (CULTURELLE) capsule Take 1 capsule by mouth daily (with breakfast), Disp-30 capsule, R-3Normal      apixaban (ELIQUIS) 5 MG TABS tablet Take 1 tablet by mouth 2 times daily, Disp-60 tablet, R-3Normal      metoprolol tartrate (LOPRESSOR) 25 MG tablet Take 0.5 tablets by mouth 2 times daily, Disp-60 tablet, R-3Normal      spironolactone (ALDACTONE) 25 MG tablet Take 1 tablet by mouth daily Hold if S bp < 130, Disp-30 tablet, R-3Normal      midodrine (PROAMATINE) 10 MG tablet Take 1 tablet by mouth three times daily Hold if systolic bp > 316, [id number]-68 tablet, R-3Normal         CONTINUE these medications which have NOT CHANGED    Details   calcipotriene (DOVONEX) 0.005 % solution Apply topically 2 times daily psoriasis, Topical, 2 TIMES DAILY, Historical Med      clobetasol (TEMOVATE) 0.05 % cream Apply topically 2 times daily Indications: Psoriasis Apply topically 2 times daily. , Topical, 2 TIMES DAILY, Historical Med         STOP taking these medications       meloxicam (MOBIC) 15 MG tablet Comments:   Reason for Stopping:               Activity: activity as tolerated    Diet: cardiac diet    Follow-up:  MD [address]  [phone]    On 6/16/2022  Cardiology -- Thursday, June 16 at 1:45 at [facility] office    Francisco Payne MD  [address] [facility] [address]  [phone]    Schedule an appointment as soon as possible for a visit in 3 days      Fideliaraulito Taylor, [address] [facility]  [phone]    Schedule an appointment as soon as possible for a visit in 1 week      Colten Carlson MD  [address] [facility],  [address]  [address] [facility]  [phone]    Schedule an appointment as soon as possible for a visit in 1 week      Francisco Payne MD  [address] [phone]    In 1 week  Post hospital follow up       Patient Instructions: Follow up with PCP in one week   Follow up with Neurology and neuro endovacular in 2 weeks (Dr. Luo Has in 2 weeks after discharge and f/up with Dr. Fany Bryant in 3 months )  Follow up with cardiology in 1-2 weeks   Echocardiogram in 90 days from discharge   You have been prescribed Flomax please take as prescribed and discuss continuing medication with PCP   You have been prescribed  Aspirin 81mg and eliquis avoid NSAID's and be careful of falls as these medications thin the blood and increase the risk of bleeding.  If you have signs of bleeding, go to the ED immediatly   You have been started on amiodarone 200 mg twice daily, eliquis 5 mg twice daily, Lisinopril 5mg and Lipitor 80mg, please take as prescribed   Continue to take medications as prescribed   Follow up with complete blood count in 1 weeks     Follow up labs: Complete blood count   Follow up imaging: Echocardiogram in 90 days    Note that over 30 minutes was spent in preparing discharge papers, discussing discharge with patient, medication review, etc.    Thank you for allowing me to participate in your care. Shaina Gregg MD  Internal Medicine Resident, PGY-1  [facility]; [facility]  5/21/2022, 10:52 AM    Please note that part of this chart was generated using voice recognition dictation software. Although every effort was made to ensure the accuracy of this automated transcription, some errors in transcription may have occurred.

## 2022-06-10 ENCOUNTER — OFFICE VISIT (OUTPATIENT)
Dept: NEUROLOGY | Age: 87
End: 2022-06-10
Payer: MEDICARE

## 2022-06-10 VITALS
SYSTOLIC BLOOD PRESSURE: 127 MMHG | HEART RATE: 60 BPM | DIASTOLIC BLOOD PRESSURE: 79 MMHG | BODY MASS INDEX: 22.96 KG/M2 | WEIGHT: 155 LBS | HEIGHT: 69 IN

## 2022-06-10 DIAGNOSIS — I63.00 CEREBROVASCULAR ACCIDENT (CVA) DUE TO THROMBOSIS OF PRECEREBRAL ARTERY (HCC): Primary | ICD-10-CM

## 2022-06-10 PROCEDURE — 1123F ACP DISCUSS/DSCN MKR DOCD: CPT | Performed by: STUDENT IN AN ORGANIZED HEALTH CARE EDUCATION/TRAINING PROGRAM

## 2022-06-10 PROCEDURE — 99215 OFFICE O/P EST HI 40 MIN: CPT | Performed by: STUDENT IN AN ORGANIZED HEALTH CARE EDUCATION/TRAINING PROGRAM

## 2022-06-10 NOTE — PROGRESS NOTES
Endovascular Neurosurgery Clinic Note      Reason for evaluation: Hospital follow for left MCA stroke s/p mechanical thrombectomy     SUBJECTIVE:   History of Chief Complaint:    Mr. Jluis Barber is a 79 y/o M with pmh significant for Htn, LEXII, new onset parxysmal A.Fib, Left MCA stroke s/p tPA and MT presented today for first hospital follow up. Briefly, Arnold Santamaria presented to [facility] on 05/06 with acute onset right sided weakness and speech difficulty, initial NIHSS was 10, received IV tPA and underwent emergent mechanical thrombectomy with final TICI score 3. Patient was found to have new onset paroxysmal A. Fib during hospital stay and was discharge on ASA and Eliquis. Since discharge patient has been doing well. Patient receives PT/OT. Allergies  has No Known Allergies. Medications  Prior to Admission medications    Medication Sig Start Date End Date Taking?  Authorizing Provider   aspirin 81 MG chewable tablet Take 1 tablet by mouth daily 5/17/22  Yes Deborah Earl MD   atorvastatin (LIPITOR) 80 MG tablet Take 1 tablet by mouth nightly 5/17/22  Yes Deborah Earl MD   tamsulosin (FLOMAX) 0.4 MG capsule Take 1 capsule by mouth daily 5/17/22  Yes Deborah Earl MD   amiodarone (CORDARONE) 200 MG tablet Take 1 tablet by mouth 2 times daily 5/17/22  Yes Deborah Earl MD   lactobacillus (CULTURELLE) capsule Take 1 capsule by mouth daily (with breakfast) 5/17/22  Yes Deborah Earl MD   apixaban (ELIQUIS) 5 MG TABS tablet Take 1 tablet by mouth 2 times daily 5/17/22  Yes Deborah Earl MD   metoprolol tartrate (LOPRESSOR) 25 MG tablet Take 0.5 tablets by mouth 2 times daily 5/17/22  Yes Deborah Earl MD   spironolactone (ALDACTONE) 25 MG tablet Take 1 tablet by mouth daily Hold if S bp < 130 5/18/22  Yes Deborah Earl MD   midodrine (PROAMATINE) 10 MG tablet Take 1 tablet by mouth three times daily Hold if systolic bp > 400 8/26/07  Yes Deborah Earl MD   calcipotriene (DOVONEX) 0.005 % solution Apply topically 2 times daily psoriasis   Yes Historical Provider, MD   clobetasol (TEMOVATE) 0.05 % cream Apply topically 2 times daily Indications: Psoriasis Apply topically 2 times daily. Yes Historical Provider, MD    Scheduled Meds:  Continuous Infusions:  PRN Meds:.  Past Medical History   has a past medical history of Arthritis and Psoriasis. Past Surgical History   has a past surgical history that includes Prostate surgery. Social History   reports that he has quit smoking. His smoking use included pipe. He has quit using smokeless tobacco.   reports previous alcohol use. reports no history of drug use. Family History  family history is not on file. Review of Systems:  CONSTITUTIONAL:  negative for fevers, chills, fatigue and malaise    EYES:  negative for double vision, blurred vision and photophobia     HEENT:  negative for tinnitus, epistaxis and sore throat    RESPIRATORY:  negative for cough, shortness of breath, wheezing    CARDIOVASCULAR:  negative for chest pain, palpitations, syncope, edema    GASTROINTESTINAL:  negative for nausea, vomiting    GENITOURINARY:  negative for incontinence    MUSCULOSKELETAL:  negative for neck or back pain    NEUROLOGICAL:  Negative for weakness and tingling  negative for headaches and dizziness    PSYCHIATRIC:  negative for anxiety      Review of systems otherwise negative. OBJECTIVE:     Vitals:    06/10/22 1258   BP: 127/79   Pulse: 60        General:  Gen: normal habitus, NAD  HEENT: NCAT, mucosa moist  Cvs: RRR, S1 S2 normal  Resp: symmetric unlabored breathing  Abd: s/nd/nt  Ext: no edema  Skin: no lesions seen, warm and dry    Neuro:  Gen: awake and alert, oriented x3. Lang/speech: no aphasia or dysarthria. Follows commands. CN: PERRL, EOMI, VFF, V1-3 intact, face symmetric, hearing intact, shoulder shrug symmetric, tongue midline  Motor: grossly 5/5 UE and LE b/l  Sense: LT intact in all 4 ext.   Coord: FTN and HTS intact b/l  DTR: deferred  Gait: narrow base gait    NIH Stroke Scale:   1a  Level of consciousness: 0 - alert; keenly responsive   1b. LOC questions:  0 - answers both questions correctly   1c. LOC commands: 0 - performs both tasks correctly   2. Best Gaze: 0 - normal   3. Visual: 0 - no visual loss   4. Facial Palsy: 0 - normal symmetric movement   5a. Motor left arm: 0 - no drift, limb holds 90 (or 45) degrees for full 10 seconds   5b. Motor right arm: 0 - no drift, limb holds 90 (or 45) degrees for full 10 seconds   6a. Motor left le - no drift; leg holds 30 degree position for full 5 seconds   6b  Motor right le - no drift; leg holds 30 degree position for full 5 seconds   7. Limb Ataxia: 0 - absent   8. Sensory: 0 - normal; no sensory loss   9. Best Language:  0 - no aphasia, normal   10. Dysarthria: 0 - normal   11. Extinction and Inattention: 0 - no abnormality         Total:   0     MRS: 04      LABS:   Reviewed. Lab Results   Component Value Date    HGB 11.2 (L) 2022    WBC 5.6 2022     2022     2022    BUN 16 2022    CREATININE 0.78 2022    MG 2.1 2022    APTT 63.0 (H) 2022    INR 1.1 2022      Lab Results   Component Value Date    COVID19 Not Detected 2022       RADIOLOGY:   Images were personally reviewed including:  IR :   1.  Acute Left MCA proximal M-1 occlusion TICI score 0    2.  The above lesion was treated with 8 fr short sheath, flow gate [facility], prowler 21 microcatheter, embo trap 5 mm x 37 mm stent retriever first pass. Final reperfusion score TICI 03   3.  Underlying stenosis of the left MCA M-1 segment measuring about 60% by NASCET criteria      MRI brain :   1. Acute infarction in the left anterior temporal lobe and left basal   ganglia, consistent with MCA occlusion. 2. Punctate foci of acute infarction in the right frontal cortex. 3. Mild microangiopathic change.          ASSESSMENT:    81 y/o M with pmh significant for Htn, LEXII, new onset parxysmal A.Fib, Left MCA stroke s/p tPA and MT presented today for first hospital follow up. Etiology: cardio-embolic with underlying A.Fib. Neuro exam was significantly improved NIHSS was 0. Needs assistance for walking   PLAN:   --C/w ASA 81 mg and Eliquis 5 mg BID  --C/w atorvastatin   --SBP goal 100-140 mm Hg   --PT/OT   --Follow up with Danielito Culp in 3 months     Case discussed with Dr. Magi Carrasquillo attending.     Shakira Francis MD    Stroke, [facility]  [id number]  Electronically signed 6/10/2022 at 1:15 PM

## 2022-07-21 ENCOUNTER — HOSPITAL ENCOUNTER (OUTPATIENT)
Age: 87
Setting detail: OBSERVATION
Discharge: HOME | End: 2022-07-21
Attending: INTERNAL MEDICINE | Admitting: INTERNAL MEDICINE
Payer: MEDICARE

## 2022-07-21 VITALS
OXYGEN SATURATION: 95 % | TEMPERATURE: 98.2 F | RESPIRATION RATE: 11 BRPM | SYSTOLIC BLOOD PRESSURE: 122 MMHG | DIASTOLIC BLOOD PRESSURE: 75 MMHG | HEART RATE: 69 BPM

## 2022-07-21 DIAGNOSIS — K57.31 DIVERTICULAR HEMORRHAGE: Primary | ICD-10-CM

## 2022-07-21 PROBLEM — I48.20 ATRIAL FIBRILLATION, CHRONIC (HCC): Status: ACTIVE | Noted: 2022-05-12

## 2022-07-21 PROBLEM — K92.2 GI BLEED: Status: ACTIVE | Noted: 2022-07-21

## 2022-07-21 LAB
ABSOLUTE EOS #: 0 K/UL (ref 0–0.4)
ABSOLUTE IMMATURE GRANULOCYTE: 0 K/UL (ref 0–0.3)
ABSOLUTE LYMPH #: 1.63 K/UL (ref 1–4.8)
ABSOLUTE MONO #: 1.02 K/UL (ref 0.1–0.8)
ALBUMIN SERPL-MCNC: 4.3 G/DL (ref 3.5–5.2)
ALBUMIN/GLOBULIN RATIO: 1.9 (ref 1–2.5)
ALP BLD-CCNC: 70 U/L (ref 40–129)
ALT SERPL-CCNC: 19 U/L (ref 5–41)
ANION GAP SERPL CALCULATED.3IONS-SCNC: 11 MMOL/L (ref 9–17)
AST SERPL-CCNC: 28 U/L
BASOPHILS # BLD: 0 % (ref 0–2)
BASOPHILS ABSOLUTE: 0 K/UL (ref 0–0.2)
BILIRUB SERPL-MCNC: 0.72 MG/DL (ref 0.3–1.2)
BUN BLDV-MCNC: 13 MG/DL (ref 8–23)
CALCIUM SERPL-MCNC: 9.4 MG/DL (ref 8.6–10.4)
CHLORIDE BLD-SCNC: 105 MMOL/L (ref 98–107)
CO2: 26 MMOL/L (ref 20–31)
CREAT SERPL-MCNC: 0.98 MG/DL (ref 0.7–1.2)
EOSINOPHILS RELATIVE PERCENT: 0 % (ref 1–4)
GFR AFRICAN AMERICAN: >60 ML/MIN
GFR NON-AFRICAN AMERICAN: >60 ML/MIN
GFR SERPL CREATININE-BSD FRML MDRD: ABNORMAL ML/MIN/{1.73_M2}
GLUCOSE BLD-MCNC: 107 MG/DL (ref 70–99)
HCT VFR BLD CALC: 35.4 % (ref 40.7–50.3)
HCT VFR BLD CALC: 36.1 % (ref 40.7–50.3)
HEMOGLOBIN: 11.4 G/DL (ref 13–17)
HEMOGLOBIN: 11.7 G/DL (ref 13–17)
IMMATURE GRANULOCYTES: 0 %
LYMPHOCYTES # BLD: 24 % (ref 24–44)
MCH RBC QN AUTO: 32.7 PG (ref 25.2–33.5)
MCHC RBC AUTO-ENTMCNC: 32.4 G/DL (ref 28.4–34.8)
MCV RBC AUTO: 100.8 FL (ref 82.6–102.9)
MONOCYTES # BLD: 15 % (ref 1–7)
MORPHOLOGY: NORMAL
NRBC AUTOMATED: 0 PER 100 WBC
PDW BLD-RTO: 13 % (ref 11.8–14.4)
PLATELET # BLD: 131 K/UL (ref 138–453)
PMV BLD AUTO: 12.1 FL (ref 8.1–13.5)
POTASSIUM SERPL-SCNC: 3.9 MMOL/L (ref 3.7–5.3)
RBC # BLD: 3.58 M/UL (ref 4.21–5.77)
SEG NEUTROPHILS: 61 % (ref 36–66)
SEGMENTED NEUTROPHILS ABSOLUTE COUNT: 4.15 K/UL (ref 1.8–7.7)
SODIUM BLD-SCNC: 142 MMOL/L (ref 135–144)
TOTAL PROTEIN: 6.6 G/DL (ref 6.4–8.3)
WBC # BLD: 6.8 K/UL (ref 3.5–11.3)

## 2022-07-21 PROCEDURE — G0378 HOSPITAL OBSERVATION PER HR: HCPCS

## 2022-07-21 PROCEDURE — 99217 PR OBSERVATION CARE DISCHARGE MANAGEMENT: CPT | Performed by: NURSE PRACTITIONER

## 2022-07-21 PROCEDURE — G0379 DIRECT REFER HOSPITAL OBSERV: HCPCS

## 2022-07-21 PROCEDURE — 80053 COMPREHEN METABOLIC PANEL: CPT

## 2022-07-21 PROCEDURE — 99222 1ST HOSP IP/OBS MODERATE 55: CPT | Performed by: INTERNAL MEDICINE

## 2022-07-21 PROCEDURE — 85025 COMPLETE CBC W/AUTO DIFF WBC: CPT

## 2022-07-21 PROCEDURE — 85014 HEMATOCRIT: CPT

## 2022-07-21 PROCEDURE — 36415 COLL VENOUS BLD VENIPUNCTURE: CPT

## 2022-07-21 PROCEDURE — 85018 HEMOGLOBIN: CPT

## 2022-07-21 PROCEDURE — 2580000003 HC RX 258: Performed by: NURSE PRACTITIONER

## 2022-07-21 PROCEDURE — 85027 COMPLETE CBC AUTOMATED: CPT

## 2022-07-21 RX ORDER — ONDANSETRON 4 MG/1
4 TABLET, ORALLY DISINTEGRATING ORAL EVERY 8 HOURS PRN
Status: DISCONTINUED | OUTPATIENT
Start: 2022-07-21 | End: 2022-07-21 | Stop reason: HOSPADM

## 2022-07-21 RX ORDER — POLYETHYLENE GLYCOL 3350 17 G/17G
17 POWDER, FOR SOLUTION ORAL 2 TIMES DAILY
Status: DISCONTINUED | OUTPATIENT
Start: 2022-07-21 | End: 2022-07-21 | Stop reason: HOSPADM

## 2022-07-21 RX ORDER — SPIRONOLACTONE 25 MG/1
25 TABLET ORAL DAILY
Status: DISCONTINUED | OUTPATIENT
Start: 2022-07-21 | End: 2022-07-21 | Stop reason: HOSPADM

## 2022-07-21 RX ORDER — MIDODRINE HYDROCHLORIDE 5 MG/1
10 TABLET ORAL 3 TIMES DAILY
Status: DISCONTINUED | OUTPATIENT
Start: 2022-07-21 | End: 2022-07-21 | Stop reason: HOSPADM

## 2022-07-21 RX ORDER — LACTOBACILLUS RHAMNOSUS GG 10B CELL
1 CAPSULE ORAL
Status: DISCONTINUED | OUTPATIENT
Start: 2022-07-22 | End: 2022-07-21 | Stop reason: HOSPADM

## 2022-07-21 RX ORDER — ATORVASTATIN CALCIUM 80 MG/1
80 TABLET, FILM COATED ORAL NIGHTLY
Status: DISCONTINUED | OUTPATIENT
Start: 2022-07-21 | End: 2022-07-21 | Stop reason: HOSPADM

## 2022-07-21 RX ORDER — ACETAMINOPHEN 650 MG/1
650 SUPPOSITORY RECTAL EVERY 6 HOURS PRN
Status: DISCONTINUED | OUTPATIENT
Start: 2022-07-21 | End: 2022-07-21 | Stop reason: HOSPADM

## 2022-07-21 RX ORDER — SODIUM CHLORIDE 9 MG/ML
INJECTION, SOLUTION INTRAVENOUS PRN
Status: DISCONTINUED | OUTPATIENT
Start: 2022-07-21 | End: 2022-07-21 | Stop reason: HOSPADM

## 2022-07-21 RX ORDER — AMIODARONE HYDROCHLORIDE 200 MG/1
200 TABLET ORAL DAILY
Status: DISCONTINUED | OUTPATIENT
Start: 2022-07-21 | End: 2022-07-21 | Stop reason: HOSPADM

## 2022-07-21 RX ORDER — SODIUM CHLORIDE 0.9 % (FLUSH) 0.9 %
5-40 SYRINGE (ML) INJECTION PRN
Status: DISCONTINUED | OUTPATIENT
Start: 2022-07-21 | End: 2022-07-21 | Stop reason: HOSPADM

## 2022-07-21 RX ORDER — TAMSULOSIN HYDROCHLORIDE 0.4 MG/1
0.4 CAPSULE ORAL DAILY
Status: DISCONTINUED | OUTPATIENT
Start: 2022-07-21 | End: 2022-07-21 | Stop reason: HOSPADM

## 2022-07-21 RX ORDER — ACETAMINOPHEN 325 MG/1
650 TABLET ORAL EVERY 6 HOURS PRN
Status: DISCONTINUED | OUTPATIENT
Start: 2022-07-21 | End: 2022-07-21 | Stop reason: HOSPADM

## 2022-07-21 RX ORDER — AMIODARONE HYDROCHLORIDE 200 MG/1
200 TABLET ORAL DAILY
Qty: 30 TABLET | Refills: 0 | Status: SHIPPED | OUTPATIENT
Start: 2022-07-21 | End: 2022-09-12

## 2022-07-21 RX ORDER — SODIUM CHLORIDE 9 MG/ML
INJECTION, SOLUTION INTRAVENOUS CONTINUOUS
Status: DISCONTINUED | OUTPATIENT
Start: 2022-07-21 | End: 2022-07-21 | Stop reason: HOSPADM

## 2022-07-21 RX ORDER — SODIUM CHLORIDE 0.9 % (FLUSH) 0.9 %
5-40 SYRINGE (ML) INJECTION EVERY 12 HOURS SCHEDULED
Status: DISCONTINUED | OUTPATIENT
Start: 2022-07-21 | End: 2022-07-21 | Stop reason: HOSPADM

## 2022-07-21 RX ORDER — ONDANSETRON 2 MG/ML
4 INJECTION INTRAMUSCULAR; INTRAVENOUS EVERY 6 HOURS PRN
Status: DISCONTINUED | OUTPATIENT
Start: 2022-07-21 | End: 2022-07-21 | Stop reason: HOSPADM

## 2022-07-21 RX ORDER — AMIODARONE HYDROCHLORIDE 200 MG/1
200 TABLET ORAL 2 TIMES DAILY
Status: DISCONTINUED | OUTPATIENT
Start: 2022-07-21 | End: 2022-07-21

## 2022-07-21 RX ADMIN — SODIUM CHLORIDE: 9 INJECTION, SOLUTION INTRAVENOUS at 05:56

## 2022-07-21 SDOH — HEALTH STABILITY: PHYSICAL HEALTH: ON AVERAGE, HOW MANY DAYS PER WEEK DO YOU ENGAGE IN MODERATE TO STRENUOUS EXERCISE (LIKE A BRISK WALK)?: 5 DAYS

## 2022-07-21 SDOH — HEALTH STABILITY: MENTAL HEALTH: HOW MANY STANDARD DRINKS CONTAINING ALCOHOL DO YOU HAVE ON A TYPICAL DAY?: PATIENT DOES NOT DRINK

## 2022-07-21 SDOH — SOCIAL STABILITY: SOCIAL INSECURITY
WITHIN THE LAST YEAR, HAVE TO BEEN RAPED OR FORCED TO HAVE ANY KIND OF SEXUAL ACTIVITY BY YOUR PARTNER OR EX-PARTNER?: NO

## 2022-07-21 SDOH — SOCIAL STABILITY: SOCIAL NETWORK: HOW OFTEN DO YOU GET TOGETHER WITH FRIENDS OR RELATIVES?: THREE TIMES A WEEK

## 2022-07-21 SDOH — HEALTH STABILITY: MENTAL HEALTH
STRESS IS WHEN SOMEONE FEELS TENSE, NERVOUS, ANXIOUS, OR CAN'T SLEEP AT NIGHT BECAUSE THEIR MIND IS TROUBLED. HOW STRESSED ARE YOU?: NOT AT ALL

## 2022-07-21 SDOH — SOCIAL STABILITY: SOCIAL NETWORK: HOW OFTEN DO YOU ATTEND CHURCH OR RELIGIOUS SERVICES?: MORE THAN 4 TIMES PER YEAR

## 2022-07-21 SDOH — SOCIAL STABILITY: SOCIAL NETWORK: ARE YOU MARRIED, WIDOWED, DIVORCED, SEPARATED, NEVER MARRIED, OR LIVING WITH A PARTNER?: MARRIED

## 2022-07-21 SDOH — HEALTH STABILITY: PHYSICAL HEALTH: ON AVERAGE, HOW MANY MINUTES DO YOU ENGAGE IN EXERCISE AT THIS LEVEL?: 30 MIN

## 2022-07-21 SDOH — ECONOMIC STABILITY: TRANSPORTATION INSECURITY
IN THE PAST 12 MONTHS, HAS THE LACK OF TRANSPORTATION KEPT YOU FROM MEDICAL APPOINTMENTS OR FROM GETTING MEDICATIONS?: NO

## 2022-07-21 SDOH — ECONOMIC STABILITY: TRANSPORTATION INSECURITY
IN THE PAST 12 MONTHS, HAS LACK OF TRANSPORTATION KEPT YOU FROM MEETINGS, WORK, OR FROM GETTING THINGS NEEDED FOR DAILY LIVING?: NO

## 2022-07-21 SDOH — HEALTH STABILITY: MENTAL HEALTH: HOW OFTEN DO YOU HAVE A DRINK CONTAINING ALCOHOL?: NEVER

## 2022-07-21 SDOH — SOCIAL STABILITY: SOCIAL NETWORK
DO YOU BELONG TO ANY CLUBS OR ORGANIZATIONS SUCH AS CHURCH GROUPS UNIONS, FRATERNAL OR ATHLETIC GROUPS, OR SCHOOL GROUPS?: NO

## 2022-07-21 SDOH — ECONOMIC STABILITY: HOUSING INSECURITY
IN THE LAST 12 MONTHS, WAS THERE A TIME WHEN YOU DID NOT HAVE A STEADY PLACE TO SLEEP OR SLEPT IN A SHELTER (INCLUDING NOW)?: NO

## 2022-07-21 SDOH — ECONOMIC STABILITY: FOOD INSECURITY: WITHIN THE PAST 12 MONTHS, YOU WORRIED THAT YOUR FOOD WOULD RUN OUT BEFORE YOU GOT MONEY TO BUY MORE.: NEVER TRUE

## 2022-07-21 SDOH — ECONOMIC STABILITY: INCOME INSECURITY: IN THE LAST 12 MONTHS, WAS THERE A TIME WHEN YOU WERE NOT ABLE TO PAY THE MORTGAGE OR RENT ON TIME?: NO

## 2022-07-21 SDOH — SOCIAL STABILITY: SOCIAL INSECURITY
WITHIN THE LAST YEAR, HAVE YOU BEEN KICKED, HIT, SLAPPED, OR OTHERWISE PHYSICALLY HURT BY YOUR PARTNER OR EX-PARTNER?: NO

## 2022-07-21 SDOH — SOCIAL STABILITY: SOCIAL NETWORK: HOW OFTEN DO YOU ATTENT MEETINGS OF THE CLUB OR ORGANIZATION YOU BELONG TO?: 1 TO 4 TIMES PER YEAR

## 2022-07-21 SDOH — ECONOMIC STABILITY: FOOD INSECURITY: WITHIN THE PAST 12 MONTHS, THE FOOD YOU BOUGHT JUST DIDN'T LAST AND YOU DIDN'T HAVE MONEY TO GET MORE.: NEVER TRUE

## 2022-07-21 SDOH — SOCIAL STABILITY: SOCIAL INSECURITY: WITHIN THE LAST YEAR, HAVE YOU BEEN AFRAID OF YOUR PARTNER OR EX-PARTNER?: NO

## 2022-07-21 SDOH — ECONOMIC STABILITY: INCOME INSECURITY: HOW HARD IS IT FOR YOU TO PAY FOR THE VERY BASICS LIKE FOOD, HOUSING, MEDICAL CARE, AND HEATING?: NOT HARD AT ALL

## 2022-07-21 SDOH — SOCIAL STABILITY: SOCIAL INSECURITY: WITHIN THE LAST YEAR, HAVE YOU BEEN HUMILIATED OR EMOTIONALLY ABUSED IN OTHER WAYS BY YOUR PARTNER OR EX-PARTNER?: NO

## 2022-07-21 SDOH — SOCIAL STABILITY: SOCIAL NETWORK: IN A TYPICAL WEEK, HOW MANY TIMES DO YOU TALK ON THE PHONE WITH FAMILY, FRIENDS, OR NEIGHBORS?: THREE TIMES A WEEK

## 2022-07-21 ASSESSMENT — LIFESTYLE VARIABLES
HOW OFTEN DO YOU HAVE A DRINK CONTAINING ALCOHOL: NEVER
HOW MANY STANDARD DRINKS CONTAINING ALCOHOL DO YOU HAVE ON A TYPICAL DAY: PATIENT DOES NOT DRINK

## 2022-07-21 NOTE — DISCHARGE SUMMARY
[facility]  Office: [address], DO, Jimbo Simper, DO, Matt Ron, DO, Zaire Nannette Blood, DO, Flora Hunt MD, Veronica Dwyer MD, Liu Montejo MD, Norma Katz MD,  Annie Dang MD, Anika Collazo MD, Angy Villalpando, DO, Maribell Cooper MD,  Allan Sterling MD, Ese Mckeon MD, Noemi Covarrubias, DO, Rosangela Zamudio MD, Cee Singh MD, Carina Munguia MD, Antwan Michael, DO, Damian Ontiveros MD, Odell Anthony MD, Birtha Members, CNP,  Joshua Hill, CNP, Yamil Beatty, CNP, Antonio Goodwin, CNP, Ra Choi PA-C, Dg Grant, DNP, Lynda Botello, CNP, Corina Mojica, CNP, Leopoldo Lopez, CNP, Miguel Ángel Sanchez, CNP, Yissel Morales, CNS, Tomi Castro, [facility], González Espinosa, CNP, Rylee Ceja, CNP, Tati Jiemnez, CNP, Earmiguel [facility],  [facility]    Discharge Summary     Patient ID: Marylen Kief  :  1934   MRN: [id number]     ACCOUNT:  [de-identified]   Patient's PCP: Nereyda Rehman MD  Admit Date: 2022   Discharge Date: 2022   Length of Stay: 1  Code Status:  Full Code  Admitting Physician: Liana Gooden DO  Discharge Physician: Dg Grant APRN - BRANDIN     Active Discharge Diagnoses:     Hospital Problem Lists:  Principal Problem (Resolved):    Diverticular hemorrhage  Active Problems:    Cerebrovascular accident (CVA) [facility])    CHF (congestive heart failure) [facility])    Atrial fibrillation, chronic [facility])    Diverticular hemorrhage      Admission Condition:  good     Discharged Condition: stable    Hospital Stay:     Hospital Course:  Marylen Kief is a 80 y.o. male who was admitted for the management of   Diverticular hemorrhage , presented to ER with constipation    Marylen Kief is a 80 y.o. Non- / non  male who presents with No chief complaint on file. and is admitted to the hospital for the management of Diverticular hemorrhage.      This is an 59-year-old male with a past medical history of arthritis, atrial fibrillation, chronic hypotension, urinary retention, recent CVA who presented to [facility] with constipation. Was transferred to our facility for possible GI bleed. Hemoglobin on admission was 11.4, up trended to 11.7 on repeat. Patient states no episodes of rectal bleeding, no other changes in abdominal symptoms except for some constipation. On anticoagulation for atrial fibrillation. Case discussed with GI, no further inpatient work-up warranted     Significant therapeutic interventions:     Acute diverticular bleed with constipation: Further signs or symptoms. Hemoglobin has been stable since admission, uptrending. Patient wondering if he can be discharged home  History of ischemic CVA: Continue aspirin, Lipitor, Eliquis, beta-blocker no residual deficits noted on physical exam  Atrial fibrillation: Continue amiodarone, beta-blocker, Eliquis  Combined systolic and diastolic CHF: Most recent echo from 5/7/2022 showing an ejection fraction of 13% with significant valvular regurgitation. Patient with life  Dispo: Home today, no further GI work-up indicated, discussed with GI prior to discharge.   CBC in 5 days    Significant Diagnostic Studies:   Labs / Micro:  CBC:   Lab Results   Component Value Date/Time    WBC 6.8 07/21/2022 11:46 AM    RBC 3.58 07/21/2022 11:46 AM    HGB 11.7 07/21/2022 11:46 AM    HCT 36.1 07/21/2022 11:46 AM    .8 07/21/2022 11:46 AM    MCH 32.7 07/21/2022 11:46 AM    MCHC 32.4 07/21/2022 11:46 AM    RDW 13.0 07/21/2022 11:46 AM     07/21/2022 11:46 AM     BMP:    Lab Results   Component Value Date/Time    GLUCOSE 107 07/21/2022 10:01 AM     07/21/2022 10:01 AM    K 3.9 07/21/2022 10:01 AM     07/21/2022 10:01 AM    CO2 26 07/21/2022 10:01 AM    ANIONGAP 11 07/21/2022 10:01 AM    BUN 13 07/21/2022 10:01 AM    CREATININE 0.98 07/21/2022 10:01 AM    CALCIUM 9.4 07/21/2022 10:01 AM LABGLOM >60 07/21/2022 10:01 AM    GFRAA >60 07/21/2022 10:01 AM    GFR      07/21/2022 10:01 AM     HFP:    Lab Results   Component Value Date/Time    PROT 6.6 07/21/2022 10:01 AM     CMP:    Lab Results   Component Value Date/Time    GLUCOSE 107 07/21/2022 10:01 AM     07/21/2022 10:01 AM    K 3.9 07/21/2022 10:01 AM     07/21/2022 10:01 AM    CO2 26 07/21/2022 10:01 AM    BUN 13 07/21/2022 10:01 AM    CREATININE 0.98 07/21/2022 10:01 AM    ANIONGAP 11 07/21/2022 10:01 AM    ALKPHOS 70 07/21/2022 10:01 AM    ALT 19 07/21/2022 10:01 AM    AST 28 07/21/2022 10:01 AM    BILITOT 0.72 07/21/2022 10:01 AM    LABALBU 4.3 07/21/2022 10:01 AM    ALBUMIN 1.9 07/21/2022 10:01 AM    LABGLOM >60 07/21/2022 10:01 AM    GFRAA >60 07/21/2022 10:01 AM    GFR      07/21/2022 10:01 AM    PROT 6.6 07/21/2022 10:01 AM    CALCIUM 9.4 07/21/2022 10:01 AM     PT/INR:    Lab Results   Component Value Date/Time    PROTIME 11.5 05/07/2022 12:00 AM    INR 1.1 05/07/2022 12:00 AM     PTT:   Lab Results   Component Value Date/Time    APTT 63.0 05/14/2022 01:28 AM     FLP:    Lab Results   Component Value Date/Time    CHOL 133 05/07/2022 06:30 AM    TRIG 50 05/07/2022 06:30 AM    HDL 52 05/07/2022 06:30 AM     U/A:    Lab Results   Component Value Date/Time    COLORU Yellow 05/07/2022 01:49 AM    TURBIDITY Clear 05/07/2022 01:49 AM    SPECGRAV 1.048 05/07/2022 01:49 AM    HGBUR NEGATIVE 05/07/2022 01:49 AM    PHUR 7.0 05/07/2022 01:49 AM    PROTEINU NEGATIVE 05/07/2022 01:49 AM    GLUCOSEU NEGATIVE 05/07/2022 01:49 AM    KETUA NEGATIVE 05/07/2022 01:49 AM    BILIRUBINUR NEGATIVE 05/07/2022 01:49 AM    UROBILINOGEN Normal 05/07/2022 01:49 AM    NITRU NEGATIVE 05/07/2022 01:49 AM    LEUKOCYTESUR NEGATIVE 05/07/2022 01:49 AM     TSH:    Lab Results   Component Value Date/Time    TSH 1.30 05/07/2022 06:30 AM        Radiology:  No results found.     Consultations:    Consults:     Final Specialist Recommendations/Findings:   IP CONSULT TO STOP taking these medications      aspirin 81 MG chewable tablet               Where to Get Your Medications        These medications were sent to [address] [facility], [address], [address]      Phone: [phone]   amiodarone 200 MG tablet         Discharge Procedure Orders   CBC with Auto Differential   Standing Status: Future Standing Exp. Date: 07/21/23   Order Comments: Send results to Eric Mccloud MD       Time Spent on discharge is  32 mins in patient examination, evaluation, counseling as well as medication reconciliation, prescriptions for required medications, discharge plan and follow up. Electronically signed by   SARA Randall NP  7/21/2022  4:00 PM      Thank you Dr. Eric Mccloud MD for the opportunity to be involved in this patient's care.

## 2022-07-21 NOTE — PROGRESS NOTES
Congestive Heart Failure Education completed and charted. CHF booklet given. Patient was receptive to education. Discussed the  importance of medication compliance. Discussed the importance of a heart healthy diet. Discussed 2000 mg sodium-restricted daily diet. Patient instructed to limit fluid intake to  1.5 to 2 liters per day. Patient instructed to weigh self at the same time of each day each morning, reinforced teaching to monitor for 3-5 lb weight increase over 1-2 days notify physician if change noted. Signs and symptoms of CHF discussed with patient, such as feeling more tired than normal, feeling short of breath, coughing that increases when lying down, sudden weight gain, swelling of the feet, legs or belly. Patient verbalized understanding to notify physician office if these symptoms occur.   EF 13%   Pt resides in [address] following with [facility] in [facility]

## 2022-07-21 NOTE — CONSULTS
[facility]    GASTROENTEROLOGY CONSULT    Patient:   Angela Pathak   :    1934   Facility:   [address] [place] [address]   Date:    2022  Admission Dx:  GI bleed [K92.2]  Requesting physician: Keith Rainey DO  Reason for consult: Bleeding per rectum   CC : \"Constipation\"    SUBJECTIVE     HISTORY OF PRESENT ILLNESS  This is a 80 y.o.   male who was admitted 2022 with GI bleed [K92.2]. We have been asked to see the patient in consultation by Keith Rainey DO for bleeding per rectum. Patient was examined today at the bedside. Patient is hemodynamically stable. Patient was transferred from [facility] for the management of bleeding per rectum. Patient states that he has constipation for the past 1 week. Patient also states that he has this constipation almost after every 4 to 5 months, but this his first episode of bleeding per rectum. He was drinking a lot of fluids and juices to relieve his constipation. He had 1 bowel movement yesterday and another 2-3 days backs in home having blood on stools. He denies any abdominal pain, nausea, vomiting. He also denies any heartburn, indigestion. Patient denies any fever or chills and weight loss. His CT angiography was done yesterday which showed sigmoid diverticulosis along with large volume of formed stool distending the rectum. He denies drinking alcohol, smoking cigarettes, any illicit drugs but he admits he smokes pipe 3 times a day. He has a past medical history of having ischemic stroke back in . He is on  Eliquis 5 mg  oral 2 times a day. On examination his abdomen is, nontender, nondistended, bowel sounds are normal.    Summary of imaging completed at this time:  2022 :   No contrast extravasation is seen. *  Atherosclerotic changes in the abdominal aorta and iliac arteries without evidence of aneurysm or dissection.   *  Great vessels of the abdominal aorta are patent. *  Multiple gallstones. *  There is a large volume of formed stool distending the rectum. *  Low-lying bladder is suggestive of bladder prolapse. Summary of labs completed at this time:  Hb : 11.7  Hct : 36.1    OBJECTIVE:     PAST MEDICAL/SURGICAL HISTORY  Past Medical History:   Diagnosis Date    Arthritis     Psoriasis      Past Surgical History:   Procedure Laterality Date    PROSTATE SURGERY         ALLERGIES:  Allergies   Allergen Reactions    Neomycin-Bacitracin Zn-Polymyx     Niacin        HOME MEDICATIONS:  Prior to Admission medications    Medication Sig Start Date End Date Taking? Authorizing Provider   aspirin 81 MG chewable tablet Take 1 tablet by mouth daily  Patient not taking: Reported on 7/21/2022 5/17/22   David Crowe MD   atorvastatin (LIPITOR) 80 MG tablet Take 1 tablet by mouth nightly 5/17/22   David Crowe MD   tamsulosin (FLOMAX) 0.4 MG capsule Take 1 capsule by mouth daily 5/17/22   David Crowe MD   amiodarone (CORDARONE) 200 MG tablet Take 1 tablet by mouth 2 times daily 5/17/22   David Crowe MD   lactobacillus (CULTURELLE) capsule Take 1 capsule by mouth daily (with breakfast) 5/17/22   David Crowe MD   apixaban (ELIQUIS) 5 MG TABS tablet Take 1 tablet by mouth 2 times daily 5/17/22   David Crowe MD   metoprolol tartrate (LOPRESSOR) 25 MG tablet Take 0.5 tablets by mouth 2 times daily 5/17/22   David Crowe MD   spironolactone (ALDACTONE) 25 MG tablet Take 1 tablet by mouth daily Hold if S bp < 130 5/18/22   David Crowe MD   midodrine (PROAMATINE) 10 MG tablet Take 1 tablet by mouth three times daily Hold if systolic bp > 058 9/39/13   David Crowe MD   calcipotriene (DOVONEX) 0.005 % solution Apply topically 2 times daily psoriasis    Historical Provider, MD   clobetasol (TEMOVATE) 0.05 % cream Apply topically 2 times daily Indications: Psoriasis Apply topically 2 times daily.     Historical Provider, MD CURRENT MEDICATIONS:  Scheduled Meds:   sodium chloride flush  5-40 mL IntraVENous 2 times per day    pantoprazole (PROTONIX) 40 mg injection  40 mg IntraVENous Daily     Continuous Infusions:   [Held by provider] sodium chloride Stopped (07/21/22 0930)    sodium chloride       PRN Meds:sodium chloride flush, sodium chloride, ondansetron **OR** ondansetron, acetaminophen **OR** acetaminophen    SOCIAL HISTORY:     Tobacco:   reports that he has been smoking pipe. He has quit using smokeless tobacco.  Alcohol:   reports that he does not currently use alcohol. Illicit drugs:  reports no history of drug use. FAMILY HISTORY:     History reviewed. No pertinent family history. REVIEW OF SYSTEMS:    Constitutional: No fever, no chills, no lethargy, no weakness. HEENT:  No headache, otalgia, itchy eyes, nasal discharge or sore throat. Cardiac:  No chest pain, dyspnea, orthopnea or PND. Chest:   No cough, phlegm or wheezing. Abdomen:  No abdominal pain, nausea or vomiting. Neuro:  No focal weakness, abnormal movements or seizure like activity. Skin:   No rashes, no itching. :   No hematuria, no pyuria, no dysuria, no flank pain. Extremities:  No swelling or joint pains. ROS was otherwise negative except as mentioned in the [address] PHYSICAL EXAM:    /75   Pulse 69   Temp 98.2 °F (36.8 °C) (Oral)   Resp 11   SpO2 95%     GENERAL:   Well developed, Well nourished, No apparent distress  HEAD:   Normocephalic, Atraumatic  EENT:   EOMI, Sclera not icteric, Oropharynx moist   NECK:   Supple, Trachea midline  LUNGS:  CTA Bilaterally  HEART:  RRR, No murmur  ABDOMEN:   Soft, Nontender, Nondistended, BS WNL  EXT:   No clubbing. No cyanosis. No edema. SKIN:   No rashes. No jaundice. No stigmata of liver disease.     MUSC/SKEL:  Adequate muscle bulk for patient's age, No significant synovitis, No deformities  NEURO:  A&O x Three, CN II- XII grossly intact      LABS AND IMAGING:     CBC  Recent Labs 07/21/22  0652 07/21/22  1146   WBC  --  6.8   HGB 11.4* 11.7*   HCT 35.4* 36.1*   MCV  --  100.8   MCH  --  32.7   MCHC  --  32.4   PLT  --  131*       IMMATURE PLTs  Lab Results   Component Value Date/Time    PLTFLUORE 122 05/15/2022 04:58 AM    PLTFLUORE 116 05/14/2022 01:28 AM    PLTFLUORE 124 05/13/2022 02:11 AM       BMP  Recent Labs     07/21/22  1001      K 3.9      CO2 26   BUN 13   CREATININE 0.98   GLUCOSE 107*   CALCIUM 9.4       LFTS  Recent Labs     07/21/22  1001   ALKPHOS 70   ALT 19   AST 28   PROT 6.6   BILITOT 0.72   LABALBU 4.3       AMYLASE/LIPASE/AMMONIA  No results for input(s): AMYLASE, LIPASE, AMMONIA in the last 72 hours. PT/INR  No results for input(s): PROTIME, INR in the last 72 hours. ANEMIA STUDIES  No results for input(s): IRON, LABIRON, TIBC, UIBC, FERRITIN, [id number], FOLATE, OCCULTBLD in the last 72 hours. LIVER WORK UP:    Acute Hepatitis Panel   No results found for: HEPBSAG, HEPCAB, HEPBIGM, HEPAIGM    HCV Genotype   No results found for: HEPATITISCGENOTYPE    HCV Quantitative   No results found for: HCVQNT    AFP  No results found for: AFP    Alpha 1 antitrypsin   No results found for: A1A    Anti - Liver/Kidney Ab  No results found for: LIVER-KIDNEYMICROSOMALAB    ALEYDA  No results found for: ALEYDA    AMA  No results found for: MITOAB    ASMA  No results found for: SMOOTHMUSCAB    Ceruloplasmin  No results found for: CERULOPLSM    Celiac panel  No results found for: TISSTRNTIIGG, TTGIGA, IGA    IgG  No results found for: IGG    IgM  No results found for: IGM    GGT   No results found for: LABGGT    PT/INR  No results for input(s): PROTIME, INR in the last 72 hours. Cancer Markers:  CEA:  No results found for: CEA  Ca 125:  No results found for:   Ca 19-9:   No results found for:   AFP: No results found for: AFP    Lactic acid:No results for input(s): LACTACIDWB in the last 72 hours. IMAGING  No results found.     IMPRESSION:

## 2022-07-21 NOTE — H&P
[facility]  Office: [address], DO, Parker Gloria, DO, Alejandrogeo Currie, DO, Brendaaliyah Morse Blood, DO, Saundra Dodd MD, Jose Lobo MD, Nelli Diaz MD, Sabi Velazquez MD,  Maliha Morrison MD, Ibeth Jean MD, Frank Simmonds, DO, Yobani Devine MD,  Vicki Echeverria MD, Armand Cobb MD, Julio Cassidy, DO, Bigg Greco MD, Aniceto Velazquez MD, Naomy Renner MD, Kae Washburn, DO, Marciano Bernheim, MD, Stacie Vann MD, Alissa Guerrero, CNP,  Zenaida Wade, CNP, Candie Manrique, CNP, Rafat Mathew, CNP, Lorne Younger PA-C, Enid Woo, DNP, Woodward Brittle, CNP, Julissa Toro, CNP, Nakita Corona, CNP, Annalisa Mckoy, CNP, Wilver Paige, CNS, Papi Alcantar, [facility], Jorge A Martínez, CNP, Lg Hill, [facility], Renetta Estevez, CNP, Zahraa Menendez, [facility]    HISTORY AND PHYSICAL EXAMINATION            Date:   7/21/2022  Patient name:  Stephen Connors  Date of admission:  7/21/2022  5:13 AM  MRN:   [id number]  Account:  [de-identified]  YOB: 1934  PCP:    Jinny Nazario MD  Room:   2002/2002-01  Code Status:    Full Code    Chief Complaint:     Constipation    History Obtained From:     patient, electronic medical record    History of Present Illness:     Stephen Connors is a 80 y.o. Non- / non  male who presents with No chief complaint on file. and is admitted to the hospital for the management of Diverticular hemorrhage. This is an 80-year-old male with a past medical history of arthritis, atrial fibrillation, chronic hypotension, urinary retention who presented to [facility] with constipation. Was transferred to our facility for possible GI bleed. Hemoglobin on admission was 11.4, up trended to 11.7 on repeat. Patient states no episodes of rectal bleeding, no other changes in abdominal symptoms except for some constipation.     On assessment patient is awake and alert sitting in bed in no acute distress. Vital signs are stable. No acute events overnight. Denies any chest pain, shortness of breath, nausea, vomiting, diarrhea, fever, chills, urinary symptoms or pain. States constipation that was present has now resolved. Patient is wondering if he can be discharged    Past Medical History:     Past Medical History:   Diagnosis Date    Acute ischemic stroke ([facility] [address] 5/7/2022    Arthritis     [facility] 5/8/2022    New onset atrial fibrillation ([facility] 5/12/2022    Psoriasis         Past Surgical History:     Past Surgical History:   Procedure Laterality Date    PROSTATE SURGERY          Medications Prior to Admission:     Prior to Admission medications    Medication Sig Start Date End Date Taking?  Authorizing Provider   aspirin 81 MG chewable tablet Take 1 tablet by mouth daily  Patient not taking: Reported on 7/21/2022 5/17/22   Dex Iniguez MD   atorvastatin (LIPITOR) 80 MG tablet Take 1 tablet by mouth nightly 5/17/22   Dex Iniguez MD   tamsulosin (FLOMAX) 0.4 MG capsule Take 1 capsule by mouth daily 5/17/22   Dex Iniguez MD   amiodarone (CORDARONE) 200 MG tablet Take 1 tablet by mouth 2 times daily 5/17/22   Dex Iniguez MD   lactobacillus (CULTURELLE) capsule Take 1 capsule by mouth daily (with breakfast) 5/17/22   Dex Iniguez MD   apixaban (ELIQUIS) 5 MG TABS tablet Take 1 tablet by mouth 2 times daily 5/17/22   Dex Iniguez MD   metoprolol tartrate (LOPRESSOR) 25 MG tablet Take 0.5 tablets by mouth 2 times daily 5/17/22   Dex Iniguez MD   spironolactone (ALDACTONE) 25 MG tablet Take 1 tablet by mouth daily Hold if S bp < 130 5/18/22   Dex Iniguez MD   midodrine (PROAMATINE) 10 MG tablet Take 1 tablet by mouth three times daily Hold if systolic bp > 410 8/06/75   Dex Iniguez MD   calcipotriene (DOVONEX) 0.005 % solution Apply topically 2 times daily psoriasis    Historical Provider, MD   clobetasol (TEMOVATE) 0.05 % cream Apply topically 2 times daily Indications: Psoriasis Apply topically 2 times daily. Historical Provider, MD        Allergies:     Neomycin-bacitracin zn-polymyx and Niacin    Social History:     Tobacco:    reports that he has been smoking pipe. He has quit using smokeless tobacco.  Alcohol:      reports that he does not currently use alcohol. Drug Use:  reports no history of drug use. Family History:     Family History   Problem Relation Age of Onset    No Known Problems Mother     No Known Problems Father        Review of Systems:     Positive and Negative as described in HPI. CONSTITUTIONAL:  negative for fevers, chills, sweats, fatigue, weight loss  HEENT:  negative for vision, hearing changes, runny nose, throat pain  RESPIRATORY:  negative for shortness of breath, cough, congestion, wheezing  CARDIOVASCULAR:  negative for chest pain, palpitations  GASTROINTESTINAL:  negative for nausea, vomiting, diarrhea, + improving constipation, change in bowel habits, abdominal pain   GENITOURINARY:  negative for difficulty of urination, burning with urination, frequency   INTEGUMENT:  negative for rash, skin lesions, easy bruising   HEMATOLOGIC/LYMPHATIC:  negative for swelling/edema   ALLERGIC/IMMUNOLOGIC:  negative for urticaria , itching  ENDOCRINE:  negative increase in drinking, increase in urination, hot or cold intolerance  MUSCULOSKELETAL:  negative joint pains, muscle aches, swelling of joints  NEUROLOGICAL:  negative for headaches, dizziness, lightheadedness, numbness, pain, tingling extremities  BEHAVIOR/PSYCH:  negative for depression, anxiety    Physical Exam:   /75   Pulse 69   Temp 98.2 °F (36.8 °C) (Oral)   Resp 11   SpO2 95%   Temp (24hrs), Av.2 °F (36.8 °C), Min:98.1 °F (36.7 °C), Max:98.2 °F (36.8 °C)    No results for input(s): POCGLU in the last 72 hours.     Intake/Output Summary (Last 24 hours) at 2022 1521  Last data filed at 2022 1200  Gross per 24 hour   Intake 480 ml   Output 900 ml   Net -420 ml       General Appearance: alert, well appearing, and in no acute distress, LifeVest intact  Mental status: oriented to person, place, and time  Head: normocephalic, atraumatic  Eye: no icterus, redness, pupils equal and reactive, extraocular eye movements intact, conjunctiva clear  Ear: normal external ear, no discharge, hearing intact  Nose: no drainage noted  Mouth: mucous membranes moist  Neck: supple, no carotid bruits, thyroid not palpable  Lungs: Bilateral equal air entry, clear to ausculation, no wheezing, rales or rhonchi, normal effort  Cardiovascular: normal rate, regular rhythm, no murmur, gallop, rub  Abdomen: Soft, nontender, nondistended, normal bowel sounds, no hepatomegaly or splenomegaly  Neurologic: There are no new focal motor or sensory deficits, normal muscle tone and bulk, no abnormal sensation, normal speech, cranial nerves II through XII grossly intact  Skin: No gross lesions, rashes, bruising or bleeding on exposed skin area  Extremities: peripheral pulses palpable, no pedal edema or calf pain with palpation  Psych: normal affect    Investigations:      Laboratory Testing:  Recent Results (from the past 24 hour(s))   Hemoglobin and Hematocrit    Collection Time: 07/21/22  6:52 AM   Result Value Ref Range    Hemoglobin 11.4 (L) 13.0 - 17.0 g/dL    Hematocrit 35.4 (L) 40.7 - 50.3 %   Comprehensive Metabolic Panel w/ Reflex to MG    Collection Time: 07/21/22 10:01 AM   Result Value Ref Range    Glucose 107 (H) 70 - 99 mg/dL    BUN 13 8 - 23 mg/dL    Creatinine 0.98 0.70 - 1.20 mg/dL    Calcium 9.4 8.6 - 10.4 mg/dL    Sodium 142 135 - 144 mmol/L    Potassium 3.9 3.7 - 5.3 mmol/L    Chloride 105 98 - 107 mmol/L    CO2 26 20 - 31 mmol/L    Anion Gap 11 9 - 17 mmol/L    Alkaline Phosphatase 70 40 - 129 U/L    ALT 19 5 - 41 U/L    AST 28 <40 U/L    Total Bilirubin 0.72 0.3 - 1.2 mg/dL    Total Protein 6.6 6.4 - 8.3 g/dL    Albumin 4.3 3.5 - 5.2 g/dL    Albumin/Globulin Ratio 1.9 1.0 - 2.5    GFR Non-African American >60 >60 mL/min    GFR African American >60 >60 mL/min    GFR Comment         CBC with Auto Differential    Collection Time: 07/21/22 11:46 AM   Result Value Ref Range    WBC 6.8 3.5 - 11.3 k/uL    RBC 3.58 (L) 4.21 - 5.77 m/uL    Hemoglobin 11.7 (L) 13.0 - 17.0 g/dL    Hematocrit 36.1 (L) 40.7 - 50.3 %    .8 82.6 - 102.9 fL    MCH 32.7 25.2 - 33.5 pg    MCHC 32.4 28.4 - 34.8 g/dL    RDW 13.0 11.8 - 14.4 %    Platelets 074 (L) 688 - 453 k/uL    MPV 12.1 8.1 - 13.5 fL    NRBC Automated 0.0 0.0 per 100 WBC    Immature Granulocytes 0 0 %    Seg Neutrophils 61 36 - 66 %    Lymphocytes 24 24 - 44 %    Monocytes 15 (H) 1 - 7 %    Eosinophils % 0 (L) 1 - 4 %    Basophils 0 0 - 2 %    Absolute Immature Granulocyte 0.00 0.00 - 0.30 k/uL    Segs Absolute 4.15 1.8 - 7.7 k/uL    Absolute Lymph # 1.63 1.0 - 4.8 k/uL    Absolute Mono # 1.02 (H) 0.1 - 0.8 k/uL    Absolute Eos # 0.00 0.0 - 0.4 k/uL    Basophils Absolute 0.00 0.0 - 0.2 k/uL    Morphology Normal        Imaging/Diagnostics:  No results found. Assessment :      Hospital Problems             Last Modified POA    * (Principal) Diverticular hemorrhage 7/21/2022 Yes    Cerebrovascular accident (CVA) ([facility] 7/21/2022 Yes    CHF (congestive heart failure) ([address] 7/21/2022 Yes    New onset atrial fibrillation ([facility] [address] [facility] 7/21/2022 Yes        Plan:     Patient status observation in the Progressive Unit/Step down    Acute diverticular bleed with constipation: Further signs or symptoms. Hemoglobin has been stable since admission, uptrending.   Patient wondering if he can be discharged home  History of ischemic CVA: Continue aspirin, Lipitor, Eliquis, beta-blocker no residual deficits noted on physical exam  Atrial fibrillation: Continue amiodarone, beta-blocker, Eliquis  Combined systolic and diastolic CHF: Most recent echo from 5/7/2022 showing an ejection fraction of 13% with significant valvular regurgitation.   Patient with life  Dispo: Home today, no further GI work-up indicated, discussed with GI prior to discharge      Consultations:   SARA Watkins NP  7/21/2022  3:21 PM    Copy sent to Dr. Graylin Moritz, MD

## 2022-07-21 NOTE — CARE COORDINATION
07/21/22 0756   Service Assessment   Patient Orientation Alert and Oriented   Cognition Alert   History Provided By Patient   Primary Caregiver Self   Support Systems Spouse/Significant Other   Patient's Healthcare Decision Maker is: Patient Declined (Legal Next of Kin Remains as Decision Maker)   PCP Verified by CM Yes   Last Visit to PCP Within last 3 months   Prior Functional Level Independent in ADLs/IADLs   Current Functional Level Independent in ADLs/IADLs   Can patient return to prior living arrangement Unknown at present   Ability to make needs known: Good   Family able to assist with home care needs: Yes   Would you like for me to discuss the discharge plan with any other family members/significant others, and if so, who? Yes   Financial Resources Medicare   Social/Functional History   Lives With Spouse   Type of [address] Two level;Performs ADL's on one level   Home Access Stairs to enter with rails   Entrance Stairs - Number of Steps 5   Entrance Stairs - Rails Both   Bathroom Shower/Tub Tub/Shower unit   [employer] Grab bars in [address] Cane;Walker, standard  (has, doesn't use)   Receives Help From Family   ADL Assistance Independent   Homemaking Assistance Independent   Homemaking Responsibilities Yes   Ambulation Assistance Independent   Transfer Assistance Independent   Active  Yes   Mode of Transportation Truck   Occupation Retired   Discharge Planning   Type of [address] Prior To Admission None   DME Ordered? No   Type of Home Care Services None   Patient expects to be discharged to: House   One/Two Story Residence Two story   Lift Chair Available No   History of falls? 0   Services At/After Discharge   Transition of Care Consult (CM Consult) Home Health; Other;SNF   Internal Home Health No    Resource Information Provided?  Refused   Mode of Transport at Discharge Self Confirm Follow Up Transport Family   Condition of Participation: Discharge Planning   The Plan for Transition of Care is related to the following treatment goals: manage bleeding   The Patient and/or Patient Representative was provided with a Choice of Provider? Patient   The Patient and/Or Patient Representative agree with the Discharge Plan? Yes     Goal is home, states lives with spouse and has cane and walker if needed. Has been to SNF in past post heart surgery - location in [place] - doesn't remember name.  Following

## 2022-09-12 ENCOUNTER — HOSPITAL ENCOUNTER (INPATIENT)
Dept: CARDIAC CATH/INVASIVE PROCEDURES | Age: 87
LOS: 3 days | Discharge: HOME | DRG: 227 | End: 2022-09-15
Attending: INTERNAL MEDICINE | Admitting: INTERNAL MEDICINE
Payer: MEDICARE

## 2022-09-12 ENCOUNTER — APPOINTMENT (OUTPATIENT)
Dept: GENERAL RADIOLOGY | Age: 87
DRG: 227 | End: 2022-09-12
Attending: INTERNAL MEDICINE
Payer: MEDICARE

## 2022-09-12 DIAGNOSIS — Z95.810 STATUS POST IMPLANTATION OF AUTOMATIC CARDIOVERTER/DEFIBRILLATOR (AICD): Primary | ICD-10-CM

## 2022-09-12 LAB
ANION GAP: 4 MMOL/L (ref 7–16)
GFR NON-AFRICAN AMERICAN: >60 ML/MIN
GFR SERPL CREATININE-BSD FRML MDRD: >60 ML/MIN
GFR SERPL CREATININE-BSD FRML MDRD: NORMAL ML/MIN/{1.73_M2}
GLUCOSE BLD-MCNC: 117 MG/DL (ref 74–100)
HCO3 VENOUS: 26.8 MMOL/L (ref 22–29)
O2 SAT, VEN: 59 % (ref 60–85)
PCO2, VEN: 48.4 MM HG (ref 41–51)
PH VENOUS: 7.35 (ref 7.32–7.43)
PLATELET # BLD: NORMAL K/UL (ref 138–453)
PLATELET, FLUORESCENCE: 137 K/UL (ref 138–453)
PLATELET, IMMATURE FRACTION: 3.9 % (ref 1.1–10.3)
PO2, VEN: 32.4 MM HG (ref 30–50)
POC BUN: 15 MG/DL (ref 8–26)
POC CHLORIDE: 108 MMOL/L (ref 98–107)
POC CREATININE: 0.97 MG/DL (ref 0.51–1.19)
POC HEMATOCRIT: 36 % (ref 41–53)
POC HEMOGLOBIN: 12.3 G/DL (ref 13.5–17.5)
POC IONIZED CALCIUM: 1.28 MMOL/L (ref 1.15–1.33)
POC POTASSIUM: 4.2 MMOL/L (ref 3.5–4.5)
POC SODIUM: 139 MMOL/L (ref 138–146)
POSITIVE BASE EXCESS, VEN: 1 (ref 0–3)

## 2022-09-12 PROCEDURE — 82947 ASSAY GLUCOSE BLOOD QUANT: CPT

## 2022-09-12 PROCEDURE — 99153 MOD SED SAME PHYS/QHP EA: CPT

## 2022-09-12 PROCEDURE — 6360000004 HC RX CONTRAST MEDICATION

## 2022-09-12 PROCEDURE — 2060000000 HC ICU INTERMEDIATE R&B

## 2022-09-12 PROCEDURE — 2500000003 HC RX 250 WO HCPCS

## 2022-09-12 PROCEDURE — 0JH608Z INSERTION OF DEFIBRILLATOR GENERATOR INTO CHEST SUBCUTANEOUS TISSUE AND FASCIA, OPEN APPROACH: ICD-10-PCS | Performed by: STUDENT IN AN ORGANIZED HEALTH CARE EDUCATION/TRAINING PROGRAM

## 2022-09-12 PROCEDURE — 6360000002 HC RX W HCPCS

## 2022-09-12 PROCEDURE — 84132 ASSAY OF SERUM POTASSIUM: CPT

## 2022-09-12 PROCEDURE — 2580000003 HC RX 258: Performed by: STUDENT IN AN ORGANIZED HEALTH CARE EDUCATION/TRAINING PROGRAM

## 2022-09-12 PROCEDURE — C1722 AICD, SINGLE CHAMBER: HCPCS

## 2022-09-12 PROCEDURE — 6370000000 HC RX 637 (ALT 250 FOR IP): Performed by: STUDENT IN AN ORGANIZED HEALTH CARE EDUCATION/TRAINING PROGRAM

## 2022-09-12 PROCEDURE — 85014 HEMATOCRIT: CPT

## 2022-09-12 PROCEDURE — C1777 LEAD, AICD, ENDO SINGLE COIL: HCPCS

## 2022-09-12 PROCEDURE — C1892 INTRO/SHEATH,FIXED,PEEL-AWAY: HCPCS

## 2022-09-12 PROCEDURE — 82803 BLOOD GASES ANY COMBINATION: CPT

## 2022-09-12 PROCEDURE — 2580000003 HC RX 258

## 2022-09-12 PROCEDURE — 99152 MOD SED SAME PHYS/QHP 5/>YRS: CPT

## 2022-09-12 PROCEDURE — 33249 INSJ/RPLCMT DEFIB W/LEAD(S): CPT

## 2022-09-12 PROCEDURE — 71045 X-RAY EXAM CHEST 1 VIEW: CPT

## 2022-09-12 PROCEDURE — C1769 GUIDE WIRE: HCPCS

## 2022-09-12 PROCEDURE — C1874 STENT, COATED/COV W/DEL SYS: HCPCS

## 2022-09-12 PROCEDURE — 84295 ASSAY OF SERUM SODIUM: CPT

## 2022-09-12 PROCEDURE — 94761 N-INVAS EAR/PLS OXIMETRY MLT: CPT

## 2022-09-12 PROCEDURE — 82565 ASSAY OF CREATININE: CPT

## 2022-09-12 PROCEDURE — 85049 AUTOMATED PLATELET COUNT: CPT

## 2022-09-12 PROCEDURE — C1894 INTRO/SHEATH, NON-LASER: HCPCS

## 2022-09-12 PROCEDURE — 2709999900 HC NON-CHARGEABLE SUPPLY

## 2022-09-12 PROCEDURE — 02HK3KZ INSERTION OF DEFIBRILLATOR LEAD INTO RIGHT VENTRICLE, PERCUTANEOUS APPROACH: ICD-10-PCS | Performed by: STUDENT IN AN ORGANIZED HEALTH CARE EDUCATION/TRAINING PROGRAM

## 2022-09-12 PROCEDURE — 82435 ASSAY OF BLOOD CHLORIDE: CPT

## 2022-09-12 PROCEDURE — 82330 ASSAY OF CALCIUM: CPT

## 2022-09-12 PROCEDURE — 85055 RETICULATED PLATELET ASSAY: CPT

## 2022-09-12 PROCEDURE — 84520 ASSAY OF UREA NITROGEN: CPT

## 2022-09-12 PROCEDURE — C1887 CATHETER, GUIDING: HCPCS

## 2022-09-12 RX ORDER — ONDANSETRON 2 MG/ML
4 INJECTION INTRAMUSCULAR; INTRAVENOUS EVERY 6 HOURS PRN
Status: DISCONTINUED | OUTPATIENT
Start: 2022-09-12 | End: 2022-09-15 | Stop reason: HOSPADM

## 2022-09-12 RX ORDER — HYDROCODONE BITARTRATE AND ACETAMINOPHEN 5; 325 MG/1; MG/1
1 TABLET ORAL EVERY 6 HOURS PRN
Status: DISCONTINUED | OUTPATIENT
Start: 2022-09-12 | End: 2022-09-15 | Stop reason: HOSPADM

## 2022-09-12 RX ORDER — SODIUM CHLORIDE 9 MG/ML
INJECTION, SOLUTION INTRAVENOUS CONTINUOUS
Status: DISCONTINUED | OUTPATIENT
Start: 2022-09-12 | End: 2022-09-15 | Stop reason: HOSPADM

## 2022-09-12 RX ORDER — VANCOMYCIN HYDROCHLORIDE 1 G/200ML
1000 INJECTION, SOLUTION INTRAVENOUS ONCE
Status: DISCONTINUED | OUTPATIENT
Start: 2022-09-12 | End: 2022-09-15 | Stop reason: HOSPADM

## 2022-09-12 RX ORDER — SODIUM CHLORIDE 9 MG/ML
INJECTION, SOLUTION INTRAVENOUS PRN
Status: DISCONTINUED | OUTPATIENT
Start: 2022-09-12 | End: 2022-09-15 | Stop reason: HOSPADM

## 2022-09-12 RX ORDER — POLYETHYLENE GLYCOL 3350 17 G/17G
17 POWDER, FOR SOLUTION ORAL DAILY PRN
Status: DISCONTINUED | OUTPATIENT
Start: 2022-09-12 | End: 2022-09-15 | Stop reason: HOSPADM

## 2022-09-12 RX ORDER — AMIODARONE HYDROCHLORIDE 200 MG/1
200 TABLET ORAL DAILY
Status: DISCONTINUED | OUTPATIENT
Start: 2022-09-12 | End: 2022-09-15 | Stop reason: HOSPADM

## 2022-09-12 RX ORDER — ONDANSETRON 4 MG/1
4 TABLET, ORALLY DISINTEGRATING ORAL EVERY 8 HOURS PRN
Status: DISCONTINUED | OUTPATIENT
Start: 2022-09-12 | End: 2022-09-15 | Stop reason: HOSPADM

## 2022-09-12 RX ORDER — SODIUM CHLORIDE 0.9 % (FLUSH) 0.9 %
5-40 SYRINGE (ML) INJECTION EVERY 12 HOURS SCHEDULED
Status: DISCONTINUED | OUTPATIENT
Start: 2022-09-12 | End: 2022-09-15 | Stop reason: HOSPADM

## 2022-09-12 RX ORDER — ACETAMINOPHEN 325 MG/1
650 TABLET ORAL EVERY 6 HOURS PRN
Status: DISCONTINUED | OUTPATIENT
Start: 2022-09-12 | End: 2022-09-15 | Stop reason: HOSPADM

## 2022-09-12 RX ORDER — SODIUM CHLORIDE 0.9 % (FLUSH) 0.9 %
10 SYRINGE (ML) INJECTION PRN
Status: DISCONTINUED | OUTPATIENT
Start: 2022-09-12 | End: 2022-09-15 | Stop reason: HOSPADM

## 2022-09-12 RX ORDER — SODIUM CHLORIDE 0.9 % (FLUSH) 0.9 %
5-40 SYRINGE (ML) INJECTION PRN
Status: DISCONTINUED | OUTPATIENT
Start: 2022-09-12 | End: 2022-09-15 | Stop reason: HOSPADM

## 2022-09-12 RX ORDER — ATORVASTATIN CALCIUM 40 MG/1
40 TABLET, FILM COATED ORAL NIGHTLY
Status: DISCONTINUED | OUTPATIENT
Start: 2022-09-12 | End: 2022-09-15 | Stop reason: HOSPADM

## 2022-09-12 RX ORDER — SODIUM CHLORIDE 0.9 % (FLUSH) 0.9 %
10 SYRINGE (ML) INJECTION EVERY 12 HOURS SCHEDULED
Status: DISCONTINUED | OUTPATIENT
Start: 2022-09-12 | End: 2022-09-15 | Stop reason: HOSPADM

## 2022-09-12 RX ORDER — ACETAMINOPHEN 650 MG/1
650 SUPPOSITORY RECTAL EVERY 6 HOURS PRN
Status: DISCONTINUED | OUTPATIENT
Start: 2022-09-12 | End: 2022-09-15 | Stop reason: HOSPADM

## 2022-09-12 RX ADMIN — DESMOPRESSIN ACETATE 40 MG: 0.2 TABLET ORAL at 19:55

## 2022-09-12 RX ADMIN — SODIUM CHLORIDE: 9 INJECTION, SOLUTION INTRAVENOUS at 11:34

## 2022-09-12 RX ADMIN — METOPROLOL TARTRATE 12.5 MG: 25 TABLET ORAL at 21:07

## 2022-09-12 RX ADMIN — SODIUM CHLORIDE, PRESERVATIVE FREE 10 ML: 5 INJECTION INTRAVENOUS at 19:55

## 2022-09-12 RX ADMIN — SODIUM CHLORIDE, PRESERVATIVE FREE 5 ML: 5 INJECTION INTRAVENOUS at 20:58

## 2022-09-12 RX ADMIN — HYDROCODONE BITARTRATE AND ACETAMINOPHEN 1 TABLET: 5; 325 TABLET ORAL at 19:55

## 2022-09-12 ASSESSMENT — PAIN DESCRIPTION - ORIENTATION: ORIENTATION: LEFT

## 2022-09-12 ASSESSMENT — PAIN DESCRIPTION - DESCRIPTORS: DESCRIPTORS: ACHING

## 2022-09-12 ASSESSMENT — PAIN DESCRIPTION - LOCATION: LOCATION: CHEST

## 2022-09-12 ASSESSMENT — PAIN SCALES - GENERAL: PAINLEVEL_OUTOF10: 7

## 2022-09-12 NOTE — OP NOTE
[facility]             Procedure Note  Single Chamber ICD / CRT-D (Synchronization)      Today's Date:  9/12/2022  Patient name:  Belgica Luna  MRN:   [id number]  YOB: 1934  PCP:    Vanna Pearson MD      Procedure: Single Chamber AICD    Indication: ICM with LVEF 15%. Operators:  Primary:  Dr. Gordon Roque (Attending)  Assistant: Patito Tran MD (CV Fellow)    :        Name Model Serial   Medtronic [id number]          Model # Serial #   RV-Lead [id number]       R VENT   R waves: 15 mV   Threshold:  0.5V                at  1.0 msec   Impedance: 760 Ohms       Sedation monitoring  Left Subclavian Angiogram   RV lead placemenT  Intra-operative  Lead Testing  Pocket Creation  Generator Implant  Fluoro time: 2 min    Estimated Blood Loss:            [x] Minimal 10-25 cc   [] Minimal < 25 cc      [] Moderate 25-50 cc         [] >50 cc      Procedure  After the usual preparation of the left neck and chest, the patient was draped in the usual sterile manner. Local anesthesia was infused below the left clavicle from the midline laterally. An incision was made inferior and parallel to the clavicle. The incision was carried down to the fascia. A pocket was formed inferior using blunt dissection. A thin walled 18 gauge needle was used to puncture the left subclavian vein using the modified Seldinger technique. A guide wire was passed into the right heart under fluoroscopic control. This was repeated with a second guide wire. RV Lead Implant:  A 7 Lao sheath was then passed over the guide wire and the guide wire removed. The ventricular lead was advanced through the sheath into the right heart. The lead was then positioned in the right ventricle under fluoroscopic control. The acute pacing and sensing thresholds were measured and found to be satisfactory. Generator:     The implanted leads were attached to the device using the setscrews. The pocket was irrigated with antibiotic solution. The pulse generator and leads were coiled and placed in the pocket. Fluoroscopy was used to verify the final placement of the pacemaker and leads. The pocket was closed using multiple layers of suture and a dry sterile dressing was applied. There were no complications, patient tolerated the procedure well. The patient left the EP lab in stable condition. Impression / Device:    Successful Implantation of Single Chamber ICD      Plan:    Telemetry monitoring  Post-Device protocol  Interrogate pacemaker prior to discharge. CXR stat. Incision and Device Check at [facility] in 7 days  Observe overnight. Virginie Rivers MD       Cardiovascular Fellow  9/12/2022, 4:41 PM    I have reviewed the case / procedure with resident / fellow  I have examined the patient personally  Patient agree with treatment plan as discussed before, final arrangement based on my evaluation and exam.    Risk and benefit of procedure planned were explained in details. Procedure was performed by me personally, with all aspect of the procedure being done using standard protocol. Note was modified based on my own assessment and treatment.     Denise Goldberg MD  [facility]

## 2022-09-12 NOTE — PROGRESS NOTES
Patient admitted, consent signed and questions answered. Patient ready for procedure. Call light to reach with side rails up 2 of 2. Bilateral groin areas clipped with writer and [place] present. Wife [place] at bedside with patient. History and physical needs updated.

## 2022-09-12 NOTE — H&P
[facility]  Procedure History and Physical Update          Patient Name: Teresa Kc  MRN:    [id number]  YOB: 1934  Date of evaluation:  9/12/2022    Procedure:    ICD for Secondary prevention    Indication for procedure:  ICM with low LVEF of 15%. Please refer to the office note completed by Dr. Tasha Bob  on 08/26/2022 in the medical record and note that:    [x] I have examined the patient and reviewed the H&P/Consult and there are no changes to be made to the assessment or plan. [] I have examined the patient and reviewed the H&P/Consult and have noted the following changes:    Past Medical History:   Diagnosis Date    Acute ischemic stroke ([facility] [address] 5/7/2022    Arthritis     CHF (congestive heart failure) ([facility] [address] 5/8/2022    New onset atrial fibrillation ([facility] [address] 5/12/2022    Psoriasis        Past Surgical History:   Procedure Laterality Date    PROSTATE SURGERY         Family History   Problem Relation Age of Onset    No Known Problems Mother     No Known Problems Father        Allergies   Allergen Reactions    Neomycin-Bacitracin Zn-Polymyx     Niacin        Prior to Admission medications    Medication Sig Start Date End Date Taking?  Authorizing Provider   amiodarone (CORDARONE) 200 MG tablet Take 1 tablet by mouth in the morning. 7/21/22 8/20/22  SARA Ugalde NP   atorvastatin (LIPITOR) 80 MG tablet Take 1 tablet by mouth nightly 5/17/22   Geoff Campbell MD   tamsulosin (FLOMAX) 0.4 MG capsule Take 1 capsule by mouth daily 5/17/22   Geoff Campbell MD   lactobacillus (CULTURELLE) capsule Take 1 capsule by mouth daily (with breakfast) 5/17/22   Geoff Campbell MD   apixaban (ELIQUIS) 5 MG TABS tablet Take 1 tablet by mouth 2 times daily 5/17/22   Geoff Campbell MD   metoprolol tartrate (LOPRESSOR) 25 MG tablet Take 0.5 tablets by mouth 2 times daily 5/17/22   Geoff Campbell MD   spironolactone (ALDACTONE) 25 MG tablet Take 1 tablet by mouth daily Hold if S bp < 130 5/18/22   Gaurav Torres MD   midodrine (PROAMATINE) 10 MG tablet Take 1 tablet by mouth three times daily Hold if systolic bp > 943 5/16/96   Gaurav Torres MD   aspirin 81 MG chewable tablet Take 1 tablet by mouth daily  Patient not taking: Reported on 7/21/2022 5/17/22 7/21/22  Gaurav Torres MD   calcipotriene (DOVONEX) 0.005 % solution Apply topically 2 times daily psoriasis    Historical Provider, MD   clobetasol (TEMOVATE) 0.05 % cream Apply topically 2 times daily Indications: Psoriasis Apply topically 2 times daily. Historical Provider, MD         There were no vitals filed for this visit. Constitutional and General Appearance:   alert, cooperative, no distress and appears stated age  [de-identified]:  PERRL, EOMI  Respiratory:  Normal excursion and expansion without use of accessory muscles  Resp Auscultation:  Good respiratory effort. No for increased work of breathing. On auscultation: clear to auscultation bilaterally  Cardiovascular:  Regular rate and rhythm. S1/S2  No murmurs. The apical impulse is not displaced  Abdomen:  Soft  Bowel sounds present  Non-tender to palpation  Extremities:  No cyanosis or clubbing  Lower extremity edema: No.  Skin:  Warm and dry  Neurological:  Alert and oriented. Moves all extremities well    Assessment     1 New onset of cardiomyopathy EF 15 %, on Life Vest  2 HTN   3 CAD   4 HLD     Plan:  Proceed with Single Chamber ICD. Further orders to follow. Pre Procedure Conscious Sedation Data:     ASA Class:                  [] I [] II [x] III [] IV     Mallampati Class:       [] I [] II [x] III [] IV        Risks, benefits, alternatives, and details discussed extensively. Accepts and consents.       Angela Montano MD       Cardiovascular Fellow  9/12/2022, 4:39 PM

## 2022-09-13 LAB
ABSOLUTE EOS #: 0 K/UL (ref 0–0.4)
ABSOLUTE IMMATURE GRANULOCYTE: 0 K/UL (ref 0–0.3)
ABSOLUTE LYMPH #: 2.74 K/UL (ref 1–4.8)
ABSOLUTE MONO #: 0.71 K/UL (ref 0.1–0.8)
ANION GAP SERPL CALCULATED.3IONS-SCNC: 12 MMOL/L (ref 9–17)
BASOPHILS # BLD: 0 % (ref 0–2)
BASOPHILS ABSOLUTE: 0 K/UL (ref 0–0.2)
BUN BLDV-MCNC: 15 MG/DL (ref 8–23)
CALCIUM SERPL-MCNC: 9 MG/DL (ref 8.6–10.4)
CHLORIDE BLD-SCNC: 103 MMOL/L (ref 98–107)
CO2: 24 MMOL/L (ref 20–31)
CREAT SERPL-MCNC: 0.83 MG/DL (ref 0.7–1.2)
EOSINOPHILS RELATIVE PERCENT: 0 % (ref 1–4)
GFR AFRICAN AMERICAN: >60 ML/MIN
GFR NON-AFRICAN AMERICAN: >60 ML/MIN
GFR SERPL CREATININE-BSD FRML MDRD: ABNORMAL ML/MIN/{1.73_M2}
GLUCOSE BLD-MCNC: 137 MG/DL (ref 70–99)
HCT VFR BLD CALC: 34.2 % (ref 40.7–50.3)
HCT VFR BLD CALC: 34.6 % (ref 40.7–50.3)
HEMOGLOBIN: 11.6 G/DL (ref 13–17)
HEMOGLOBIN: 12 G/DL (ref 13–17)
IMMATURE GRANULOCYTES: 0 %
LYMPHOCYTES # BLD: 23 % (ref 24–44)
MCH RBC QN AUTO: 33.3 PG (ref 25.2–33.5)
MCHC RBC AUTO-ENTMCNC: 34.7 G/DL (ref 28.4–34.8)
MCV RBC AUTO: 96.1 FL (ref 82.6–102.9)
MONOCYTES # BLD: 6 % (ref 1–7)
MORPHOLOGY: NORMAL
NRBC AUTOMATED: 0 PER 100 WBC
PDW BLD-RTO: 12.6 % (ref 11.8–14.4)
PLATELET # BLD: ABNORMAL K/UL (ref 138–453)
PLATELET, FLUORESCENCE: 139 K/UL (ref 138–453)
PLATELET, IMMATURE FRACTION: 4.1 % (ref 1.1–10.3)
POTASSIUM SERPL-SCNC: 4.2 MMOL/L (ref 3.7–5.3)
RBC # BLD: 3.6 M/UL (ref 4.21–5.77)
SEG NEUTROPHILS: 71 % (ref 36–66)
SEGMENTED NEUTROPHILS ABSOLUTE COUNT: 8.45 K/UL (ref 1.8–7.7)
SODIUM BLD-SCNC: 139 MMOL/L (ref 135–144)
WBC # BLD: 11.9 K/UL (ref 3.5–11.3)

## 2022-09-13 PROCEDURE — 36415 COLL VENOUS BLD VENIPUNCTURE: CPT

## 2022-09-13 PROCEDURE — 85014 HEMATOCRIT: CPT

## 2022-09-13 PROCEDURE — 94761 N-INVAS EAR/PLS OXIMETRY MLT: CPT

## 2022-09-13 PROCEDURE — 2580000003 HC RX 258: Performed by: STUDENT IN AN ORGANIZED HEALTH CARE EDUCATION/TRAINING PROGRAM

## 2022-09-13 PROCEDURE — 2060000000 HC ICU INTERMEDIATE R&B

## 2022-09-13 PROCEDURE — 85025 COMPLETE CBC W/AUTO DIFF WBC: CPT

## 2022-09-13 PROCEDURE — 80048 BASIC METABOLIC PNL TOTAL CA: CPT

## 2022-09-13 PROCEDURE — 85018 HEMOGLOBIN: CPT

## 2022-09-13 PROCEDURE — 6370000000 HC RX 637 (ALT 250 FOR IP): Performed by: STUDENT IN AN ORGANIZED HEALTH CARE EDUCATION/TRAINING PROGRAM

## 2022-09-13 PROCEDURE — 85055 RETICULATED PLATELET ASSAY: CPT

## 2022-09-13 RX ORDER — LANOLIN ALCOHOL/MO/W.PET/CERES
3 CREAM (GRAM) TOPICAL NIGHTLY PRN
Status: DISCONTINUED | OUTPATIENT
Start: 2022-09-13 | End: 2022-09-15 | Stop reason: HOSPADM

## 2022-09-13 RX ADMIN — METOPROLOL TARTRATE 12.5 MG: 25 TABLET ORAL at 21:02

## 2022-09-13 RX ADMIN — SODIUM CHLORIDE, PRESERVATIVE FREE 10 ML: 5 INJECTION INTRAVENOUS at 08:54

## 2022-09-13 RX ADMIN — Medication 3 MG: at 21:02

## 2022-09-13 RX ADMIN — APIXABAN 5 MG: 5 TABLET, FILM COATED ORAL at 08:54

## 2022-09-13 RX ADMIN — DESMOPRESSIN ACETATE 40 MG: 0.2 TABLET ORAL at 21:02

## 2022-09-13 RX ADMIN — SODIUM CHLORIDE, PRESERVATIVE FREE 10 ML: 5 INJECTION INTRAVENOUS at 21:02

## 2022-09-13 RX ADMIN — SODIUM CHLORIDE, PRESERVATIVE FREE 10 ML: 5 INJECTION INTRAVENOUS at 09:15

## 2022-09-13 RX ADMIN — AMIODARONE HYDROCHLORIDE 200 MG: 200 TABLET ORAL at 08:54

## 2022-09-13 NOTE — FLOWSHEET NOTE
Patient has large hematoma to left shoulder. Pressure dressing applied, patient instructed to not bear weight on left arm, and not raise arm above should er height.

## 2022-09-13 NOTE — PROGRESS NOTES
[facility] [address]  Occupational Therapy Not Seen Note    DATE: 2022    NAME: Jake Green  MRN: [id number]   : 1934      Patient not seen this date for Occupational Therapy due to:    Per RN, patient is not appropriate for active participation in OT evaluation/treatment at this time d/t hematoma.      Next Scheduled Treatment: Check back 2022     Electronically signed by Anil Coretz OT on 2022 at 10:30 AM

## 2022-09-13 NOTE — PROGRESS NOTES
[facility]  Progress Note                   Date:   9/13/2022  Patient name: Stephen Connors  Date of admission:  9/12/2022  4:52 PM  MRN:   [id number]  YOB: 1934  PCP: Jinny Nazario MD    Reason for Admission: CM (congenital malformation) [Q89.9]  S/P ICD (internal cardiac defibrillator) procedure [Z95.810]  Status post implantation of automatic cardioverter/defibrillator (AICD) [Z95.810]    Subjective:       Clinical Changes /Abnormalities:Patient seen and examined. Denies chest pain or shortness of breath. Tele/vitals/labs reviewed . Discussed case with RN. S/P AICD Developed large hematoma around the sites     Review of Systems    Medications:   Scheduled Meds:   vancomycin  1,000 mg IntraVENous Once    vancomycin irrigation  1,000 mg Irrigation Once    sodium chloride flush  5-40 mL IntraVENous 2 times per day    sodium chloride flush  10 mL IntraVENous 2 times per day    atorvastatin  40 mg Oral Nightly    metoprolol tartrate  12.5 mg Oral BID    apixaban  5 mg Oral BID    amiodarone  200 mg Oral Daily     Continuous Infusions:   sodium chloride 75 mL/hr at 09/12/22 1134    sodium chloride      sodium chloride       CBC:   Recent Labs     09/12/22  1130 09/13/22  0813   WBC  --  11.9*   HGB  --  12.0*   PLT See Reflexed IPF Result See Reflexed IPF Result     BMP:    Recent Labs     09/12/22  1128 09/13/22  0813   NA  --  139   K  --  4.2   CL  --  103   CO2  --  24   BUN  --  15   CREATININE 0.97 0.83   GLUCOSE  --  137*     Hepatic:No results for input(s): AST, ALT, ALB, BILITOT, ALKPHOS in the last 72 hours. Troponin: No results for input(s): TROPHS in the last 72 hours. BNP: No results for input(s): BNP in the last 72 hours. Lipids: No results for input(s): CHOL, HDL in the last 72 hours. Invalid input(s): LDLCALCU  INR: No results for input(s): INR in the last 72 hours.     Objective:   Vitals: /65   Pulse 64   Temp 97.7 °F (36.5 °C) (Oral)   Resp 14 Ht 5' 9\" (1.753 m)   Wt 149 lb (67.6 kg)   SpO2 98%   BMI 22.00 kg/m²   General appearance: alert and cooperative with exam  HEENT: Head: Normocephalic, no lesions, without obvious abnormality.   Neck:no JVD, trachea midline, no adenopathy  Lungs: Clear to auscultation  Heart: Regular rate and rhythm, s1/s2 auscultated, no murmurs, left chest incision with large hematoma with bruising around the incision no bleeding, staples intact   Abdomen: soft, non-tender, bowel sounds active  Extremities: no edema  Neurologic: not done        Assessment / Acute Cardiac Problems:      1 New onset of cardiomyopathy EF 15 %, on Life Vest  2 HTN   3 CAD   4 HLD     Patient Active Problem List:     Cerebrovascular accident (CVA) ([facility] [address]     Acute ischemic stroke ([facility] [address]     Altered mental status     CHF (congestive heart failure) ([facility])     NSTEMI (non-ST elevated myocardial infarction) ([facility] [address]     Intracranial bleed ([facility])     Right sided weakness     Atrial fibrillation, chronic ([facility])     Leukopenia     Neutropenia ([facility])     Encounter for palliative care     Diverticular hemorrhage     S/P ICD (internal cardiac defibrillator) procedure     Status post implantation of automatic cardioverter/defibrillator (AICD)      Plan of Treatment:      S/p ICD placement -developed large hematoma around the site, HGB stable,  chest x-ray negative for pneumothorax, pacer was interrogated and functioning well-pressure dressing on , ice pack we will continue to monitor for today    Hemodynamically stable-continue statin amiodarone and low dose beta-blocker  Will monitor H/H  Electronically signed by SARA Ross NP on 9/13/2022 at 11:29 [address] [facility].  [phone]

## 2022-09-13 NOTE — PROGRESS NOTES
Congestive Heart Failure Education completed and charted. CHF booklet given. Patient was receptive to education. Discussed the  importance of medication compliance. Discussed the importance of a heart healthy diet. Discussed 2000 mg sodium-restricted daily diet. Patient instructed to limit fluid intake to  1.5 to 2 liters per day. Patient instructed to weigh self at the same time of each day each morning, reinforced teaching to monitor for 3-5 lb weight increase over 1-2 days notify physician if change noted. Signs and symptoms of CHF discussed with patient, such as feeling more tired than normal, feeling short of breath, coughing that increases when lying down, sudden weight gain, swelling of the feet, legs or belly. Patient verbalized understanding to notify physician office if these symptoms occur. EF 13%  Pt resides in [facility].

## 2022-09-13 NOTE — PROGRESS NOTES
Physical Therapy        Physical Therapy Cancel Note      DATE: 2022    NAME: Belgica Luna  MRN: [id number]   : 1934      Patient not seen this date for Physical Therapy due to: Other: pt not appropriate at this  time due to developing hematoma. PT will check back 22 for evaluation appropriateness.        Electronically signed by Jenny Morales PT on 2022 at 12:13 PM

## 2022-09-13 NOTE — PROGRESS NOTES
1941: MD notified of patient's vitals, pain, and request for pain med. New orders noted. Writer asked MD for parameters on lopressor, no modified orders noted. 2058: Writer updated MD on patients vitals and per MD to proceed to give medication.

## 2022-09-13 NOTE — FLOWSHEET NOTE
Nurse practitioner ad resident for cardiology paged to bedside to assess patient's hematoma from ICD placement. Order to start ice packs. Eliquis is to be held for at least next three doses.

## 2022-09-14 LAB
ANION GAP SERPL CALCULATED.3IONS-SCNC: 11 MMOL/L (ref 9–17)
BUN BLDV-MCNC: 17 MG/DL (ref 8–23)
CALCIUM SERPL-MCNC: 8.6 MG/DL (ref 8.6–10.4)
CHLORIDE BLD-SCNC: 102 MMOL/L (ref 98–107)
CO2: 21 MMOL/L (ref 20–31)
CREAT SERPL-MCNC: 0.92 MG/DL (ref 0.7–1.2)
GFR AFRICAN AMERICAN: >60 ML/MIN
GFR NON-AFRICAN AMERICAN: >60 ML/MIN
GFR SERPL CREATININE-BSD FRML MDRD: ABNORMAL ML/MIN/{1.73_M2}
GLUCOSE BLD-MCNC: 139 MG/DL (ref 70–99)
HCT VFR BLD CALC: 31.4 % (ref 40.7–50.3)
HEMOGLOBIN: 10.2 G/DL (ref 13–17)
MAGNESIUM: 2.2 MG/DL (ref 1.6–2.6)
MCH RBC QN AUTO: 32.3 PG (ref 25.2–33.5)
MCHC RBC AUTO-ENTMCNC: 32.5 G/DL (ref 28.4–34.8)
MCV RBC AUTO: 99.4 FL (ref 82.6–102.9)
NRBC AUTOMATED: 0 PER 100 WBC
PDW BLD-RTO: 12.8 % (ref 11.8–14.4)
PLATELET # BLD: ABNORMAL K/UL (ref 138–453)
PLATELET, FLUORESCENCE: NORMAL K/UL (ref 138–453)
POTASSIUM SERPL-SCNC: 4.2 MMOL/L (ref 3.7–5.3)
RBC # BLD: 3.16 M/UL (ref 4.21–5.77)
SODIUM BLD-SCNC: 134 MMOL/L (ref 135–144)
WBC # BLD: 9.7 K/UL (ref 3.5–11.3)

## 2022-09-14 PROCEDURE — 6370000000 HC RX 637 (ALT 250 FOR IP): Performed by: STUDENT IN AN ORGANIZED HEALTH CARE EDUCATION/TRAINING PROGRAM

## 2022-09-14 PROCEDURE — 2060000000 HC ICU INTERMEDIATE R&B

## 2022-09-14 PROCEDURE — 85055 RETICULATED PLATELET ASSAY: CPT

## 2022-09-14 PROCEDURE — 2580000003 HC RX 258: Performed by: STUDENT IN AN ORGANIZED HEALTH CARE EDUCATION/TRAINING PROGRAM

## 2022-09-14 PROCEDURE — 83735 ASSAY OF MAGNESIUM: CPT

## 2022-09-14 PROCEDURE — 80048 BASIC METABOLIC PNL TOTAL CA: CPT

## 2022-09-14 PROCEDURE — 85027 COMPLETE CBC AUTOMATED: CPT

## 2022-09-14 PROCEDURE — 36415 COLL VENOUS BLD VENIPUNCTURE: CPT

## 2022-09-14 RX ADMIN — METOPROLOL TARTRATE 12.5 MG: 25 TABLET ORAL at 09:33

## 2022-09-14 RX ADMIN — METOPROLOL TARTRATE 12.5 MG: 25 TABLET ORAL at 21:32

## 2022-09-14 RX ADMIN — AMIODARONE HYDROCHLORIDE 200 MG: 200 TABLET ORAL at 09:34

## 2022-09-14 RX ADMIN — SODIUM CHLORIDE, PRESERVATIVE FREE 10 ML: 5 INJECTION INTRAVENOUS at 09:56

## 2022-09-14 RX ADMIN — SODIUM CHLORIDE, PRESERVATIVE FREE 10 ML: 5 INJECTION INTRAVENOUS at 21:33

## 2022-09-14 RX ADMIN — DESMOPRESSIN ACETATE 40 MG: 0.2 TABLET ORAL at 21:32

## 2022-09-14 ASSESSMENT — PAIN SCALES - GENERAL
PAINLEVEL_OUTOF10: 0

## 2022-09-14 NOTE — PROGRESS NOTES
Periodically checked on patient's hematoma throughout the night. No new findings from beginning of shift. Writer reminded patient to not bear weight on left arm, raise above shoulder height, and to wear sling. Patient resting comfortably, no new complaints. Will continue to monitor.

## 2022-09-14 NOTE — CARE COORDINATION
Met with patient and family to discuss transitional planning. He is returning home with wife.  He denies needs

## 2022-09-14 NOTE — PROGRESS NOTES
[facility]  Progress Note                   Date:   9/14/2022  Patient name: Samir Berry  Date of admission:  9/12/2022  4:52 PM  MRN:   [id number]  YOB: 1934  PCP: Nellie Castro MD    Reason for Admission: CM (congenital malformation) [Q89.9]  S/P ICD (internal cardiac defibrillator) procedure [Z95.810]  Status post implantation of automatic cardioverter/defibrillator (AICD) [Z95.810]    Subjective:       Clinical Changes /Abnormalities:Patient seen and examined. Denies chest pain or shortness of breath. Tele/vitals/labs reviewed . Discussed case with RN. Hematoma remains large. No pain. No active bleeding     Review of Systems    Medications:   Scheduled Meds:   vancomycin  1,000 mg IntraVENous Once    vancomycin irrigation  1,000 mg Irrigation Once    sodium chloride flush  5-40 mL IntraVENous 2 times per day    sodium chloride flush  10 mL IntraVENous 2 times per day    atorvastatin  40 mg Oral Nightly    metoprolol tartrate  12.5 mg Oral BID    apixaban  5 mg Oral BID    amiodarone  200 mg Oral Daily     Continuous Infusions:   sodium chloride 75 mL/hr at 09/12/22 1134    sodium chloride      sodium chloride       CBC:   Recent Labs     09/12/22  1130 09/13/22  0813 09/13/22  1613 09/14/22  0659   WBC  --  11.9*  --  9.7   HGB  --  12.0* 11.6* 10.2*   PLT See Reflexed IPF Result See Reflexed IPF Result  --  See Reflexed IPF Result       BMP:    Recent Labs     09/12/22  1128 09/13/22  0813 09/14/22  0659   NA  --  139 134*   K  --  4.2 4.2   CL  --  103 102   CO2  --  24 21   BUN  --  15 17   CREATININE 0.97 0.83 0.92   GLUCOSE  --  137* 139*       Hepatic:No results for input(s): AST, ALT, ALB, BILITOT, ALKPHOS in the last 72 hours. Troponin: No results for input(s): TROPHS in the last 72 hours. BNP: No results for input(s): BNP in the last 72 hours. Lipids: No results for input(s): CHOL, HDL in the last 72 hours.     Invalid input(s): LDLCALCU  INR: No results for input(s): INR in the last 72 hours. Objective:   Vitals: /62   Pulse 75   Temp 97.2 °F (36.2 °C) (Oral)   Resp 16   Ht 5' 9\" (1.753 m)   Wt 149 lb (67.6 kg)   SpO2 97%   BMI 22.00 kg/m²   General appearance: alert and cooperative with exam  HEENT: Head: Normocephalic, no lesions, without obvious abnormality. Neck:no JVD, trachea midline, no adenopathy  Lungs: Clear to auscultation  Heart: Regular rate and rhythm, s1/s2 auscultated, no murmurs, left chest incision with large hematoma with bruising around the incision no bleeding, staples intact   Abdomen: soft, non-tender, bowel sounds active  Extremities: no edema  Neurologic: not done        Assessment / Acute Cardiac Problems:      1 New onset of cardiomyopathy EF 15 %, on Life Vest  2 HTN   3 CAD   4 HLD     Patient Active Problem List:     Cerebrovascular accident (CVA) ([facility] [address]     Acute ischemic stroke ([facility] [address]     Altered mental status     CHF (congestive heart failure) ([facility])     NSTEMI (non-ST elevated myocardial infarction) ([facility] [address]     Intracranial bleed ([facility])     Right sided weakness     Atrial fibrillation, chronic ([facility])     Leukopenia     Neutropenia ([facility])     Encounter for palliative care     Diverticular hemorrhage     S/P ICD (internal cardiac defibrillator) procedure     Status post implantation of automatic cardioverter/defibrillator (AICD)      Plan of Treatment:      S/p ICD placement -developed large hematoma around the site, HGB stable,  chest x-ray negative for pneumothorax, pacer was interrogated and functioning well-pressure dressing on. Ice pack     Hemodynamically stable-continue statin amiodarone and low dose beta-blocker  Will monitor H/H  Will hold Eliquis.       Electronically signed by SARA Evans CNP on 9/14/2022 at 1:45 PM  [address]  [phone]

## 2022-09-14 NOTE — PLAN OF CARE
Problem: Chronic Conditions and Co-morbidities  Goal: Patient's chronic conditions and co-morbidity symptoms are monitored and maintained or improved  9/14/2022 1245 by Lázaro Woodson RN  Outcome: Progressing  9/13/2022 2253 by Maddie Ward RN  Outcome: Progressing     Problem: Discharge Planning  Goal: Discharge to home or other facility with appropriate resources  9/14/2022 1245 by Lázaro Woodson RN  Outcome: Progressing  9/13/2022 2253 by Maddie Ward RN  Outcome: Progressing     Problem: Safety - Adult  Goal: Free from fall injury  9/14/2022 1245 by Lázaro Woodson RN  Outcome: Progressing  9/13/2022 2253 by Maddie Ward RN  Outcome: Progressing

## 2022-09-15 VITALS
WEIGHT: 149 LBS | RESPIRATION RATE: 16 BRPM | HEART RATE: 65 BPM | SYSTOLIC BLOOD PRESSURE: 113 MMHG | HEIGHT: 69 IN | OXYGEN SATURATION: 98 % | BODY MASS INDEX: 22.07 KG/M2 | TEMPERATURE: 98.2 F | DIASTOLIC BLOOD PRESSURE: 64 MMHG

## 2022-09-15 LAB
ANION GAP SERPL CALCULATED.3IONS-SCNC: 11 MMOL/L (ref 9–17)
BUN BLDV-MCNC: 23 MG/DL (ref 8–23)
CALCIUM SERPL-MCNC: 9.4 MG/DL (ref 8.6–10.4)
CHLORIDE BLD-SCNC: 102 MMOL/L (ref 98–107)
CO2: 25 MMOL/L (ref 20–31)
CREAT SERPL-MCNC: 1 MG/DL (ref 0.7–1.2)
GFR AFRICAN AMERICAN: >60 ML/MIN
GFR NON-AFRICAN AMERICAN: >60 ML/MIN
GFR SERPL CREATININE-BSD FRML MDRD: ABNORMAL ML/MIN/{1.73_M2}
GLUCOSE BLD-MCNC: 137 MG/DL (ref 70–99)
HCT VFR BLD CALC: 29.9 % (ref 40.7–50.3)
HEMOGLOBIN: 9.7 G/DL (ref 13–17)
MCH RBC QN AUTO: 31.8 PG (ref 25.2–33.5)
MCHC RBC AUTO-ENTMCNC: 32.4 G/DL (ref 28.4–34.8)
MCV RBC AUTO: 98 FL (ref 82.6–102.9)
NRBC AUTOMATED: 0 PER 100 WBC
PDW BLD-RTO: 12.9 % (ref 11.8–14.4)
PLATELET # BLD: ABNORMAL K/UL (ref 138–453)
PLATELET, FLUORESCENCE: 108 K/UL (ref 138–453)
PLATELET, IMMATURE FRACTION: 4.9 % (ref 1.1–10.3)
POTASSIUM SERPL-SCNC: 4.4 MMOL/L (ref 3.7–5.3)
RBC # BLD: 3.05 M/UL (ref 4.21–5.77)
SODIUM BLD-SCNC: 138 MMOL/L (ref 135–144)
WBC # BLD: 9.4 K/UL (ref 3.5–11.3)

## 2022-09-15 PROCEDURE — 85055 RETICULATED PLATELET ASSAY: CPT

## 2022-09-15 PROCEDURE — 36415 COLL VENOUS BLD VENIPUNCTURE: CPT

## 2022-09-15 PROCEDURE — 85027 COMPLETE CBC AUTOMATED: CPT

## 2022-09-15 PROCEDURE — 97530 THERAPEUTIC ACTIVITIES: CPT

## 2022-09-15 PROCEDURE — 97162 PT EVAL MOD COMPLEX 30 MIN: CPT

## 2022-09-15 PROCEDURE — 2580000003 HC RX 258: Performed by: STUDENT IN AN ORGANIZED HEALTH CARE EDUCATION/TRAINING PROGRAM

## 2022-09-15 PROCEDURE — 97116 GAIT TRAINING THERAPY: CPT

## 2022-09-15 PROCEDURE — 6370000000 HC RX 637 (ALT 250 FOR IP): Performed by: STUDENT IN AN ORGANIZED HEALTH CARE EDUCATION/TRAINING PROGRAM

## 2022-09-15 PROCEDURE — 97112 NEUROMUSCULAR REEDUCATION: CPT

## 2022-09-15 PROCEDURE — 80048 BASIC METABOLIC PNL TOTAL CA: CPT

## 2022-09-15 RX ADMIN — METOPROLOL TARTRATE 12.5 MG: 25 TABLET ORAL at 08:26

## 2022-09-15 RX ADMIN — AMIODARONE HYDROCHLORIDE 200 MG: 200 TABLET ORAL at 08:26

## 2022-09-15 RX ADMIN — SODIUM CHLORIDE, PRESERVATIVE FREE 10 ML: 5 INJECTION INTRAVENOUS at 08:24

## 2022-09-15 ASSESSMENT — PAIN SCALES - GENERAL
PAINLEVEL_OUTOF10: 0

## 2022-09-15 NOTE — DISCHARGE SUMMARY
[facility]  Discharge Note                 Name:  Jyoti Contreras  YOB: 1934  Social Security Number:  [id number]  Medical Record Number:  [id number]    Date of Admission:  9/12/2022  Date of Discharge:  9/15/2022    Admitting physician: Denise Goldberg MD    Discharge Attending: SARA Holguin CNP, CNP  Primary Care Physician: Belia Zuñiga MD  Consultants: Cardiology  Discharge to home in stable condition    HOSPITAL ADMISSION PROBLEM LIST:  Patient Active Problem List   Diagnosis    Cerebrovascular accident (CVA) ([address]    Acute ischemic stroke ([facility] [address]    Altered mental status    CHF (congestive heart failure) ([facility] [address]    NSTEMI (non-ST elevated myocardial infarction) ([facility] [address]    Intracranial bleed ([facility] [address]    Right sided weakness    Atrial fibrillation, chronic ([facility])    Leukopenia    Neutropenia ([facility] [address]    Encounter for palliative care    Diverticular hemorrhage    S/P ICD (internal cardiac defibrillator) procedure    Status post implantation of automatic cardioverter/defibrillator (AICD)         Procedures: AICD placement    HOSPITAL COURSE :           The patient was admitted for: [facility] Procedures if any: AICD placement  Medications changes recommendation: See List  Follow Up Plan: F/U wound check on Monday      Discharge exam:   Vitals:    09/15/22 1040   BP: 113/64   Pulse: 65   Resp: 16   Temp: 98.2 °F (36.8 °C)   SpO2: 98%     Neuro: normal  Chest: Clear to ausculation. No wheezing. Cardiac: Regular rate. s1 and s2 auscultated. No murmur noted. Abdomen/groin: soft, non-tender, without masses or organomegaly  Lower extremity edema: none    Discharge Medications:     Medication List      CONTINUE taking these medications     amiodarone 200 MG tablet; Commonly known as: CORDARONE; Take 1 tablet by   mouth in the morning.    apixaban 5 MG Tabs tablet; Commonly known as: ELIQUIS; Take 1 tablet by   mouth 2 times daily   atorvastatin 80 MG tablet; Commonly known as: LIPITOR; Take 1 tablet by   mouth nightly   calcipotriene 0.005 % solution; Commonly known as: DOVONEX   clobetasol 0.05 % cream; Commonly known as: TEMOVATE   lactobacillus capsule; Take 1 capsule by mouth daily (with breakfast)   metoprolol tartrate 25 MG tablet; Commonly known as: LOPRESSOR; Take 0.5   tablets by mouth 2 times daily   midodrine 10 MG tablet; Commonly known as: PROAMATINE; Take 1 tablet by   mouth three times daily Hold if systolic bp > 162   spironolactone 25 MG tablet; Commonly known as: ALDACTONE; Take 1 tablet   by mouth daily Hold if S bp < 130   tamsulosin 0.4 MG capsule; Commonly known as: FLOMAX; Take 1 capsule by   mouth daily     STOP taking these medications     aspirin 81 MG chewable tablet      1 New onset of cardiomyopathy EF 15 %, on Life Vest  2 HTN   3 CAD   4 HLD      Plan:  Proceed with Single Chamber ICD. Further orders to follow. Procedure/AICD 9/13/22  After the usual preparation of the left neck and chest, the patient was draped in the usual sterile manner. Local anesthesia was infused below the left clavicle from the midline laterally. An incision was made inferior and parallel to the clavicle. The incision was carried down to the fascia. A pocket was formed inferior using blunt dissection. A thin walled 18 gauge needle was used to puncture the left subclavian vein using the modified Seldinger technique. A guide wire was passed into the right heart under fluoroscopic control. This was repeated with a second guide wire. RV Lead Implant:  A 7 Bangladeshi sheath was then passed over the guide wire and the guide wire removed. The ventricular lead was advanced through the sheath into the right heart. The lead was then positioned in the right ventricle under fluoroscopic control. The acute pacing and sensing thresholds were measured and found to be satisfactory. Generator: The implanted leads were attached to the device using the setscrews.  The pocket was irrigated with antibiotic solution. The pulse generator and leads were coiled and placed in the pocket. Fluoroscopy was used to verify the final placement of the pacemaker and leads. The pocket was closed using multiple layers of suture and a dry sterile dressing was applied. There were no complications, patient tolerated the procedure well. The patient left the EP lab in stable condition. Impression / Device:     Successful Implantation of Single Chamber ICD        Plan:     Telemetry monitoring  Post-Device protocol  Interrogate pacemaker prior to discharge. CXR stat. Incision and Device Check at [facility] in 7 days  Observe overnight. Post device developed large hematoma. Monitored for 2 days. Stable. HGB with slight drop. Will recheck H/H on Monday and f/u for wound check on Monday. Pressure dressing applied on discharge. Hold Eliquis until seen in office on Monday    Discussed with patient and family and nursing. Medications and discharge instructions reviewed with patient and nursing. Discussed in detail with patient post procedure activity restriction including but not limited to site care, diet, exercise/activity restrictions, lifting and arm movement restrictions, pain control, use of ice for swelling, and follow-up. Questions/concerns addressed in detail. OK for d/c home. F/U in office as scheduled in 1 week for wound check.      Electronically signed by SARA Kraft CNP on 9/15/2022 at 3:46 PM  [facility]      [phone]

## 2022-09-15 NOTE — PLAN OF CARE
Problem: Chronic Conditions and Co-morbidities  Goal: Patient's chronic conditions and co-morbidity symptoms are monitored and maintained or improved  9/15/2022 1016 by Angelica Viramontes RN  Outcome: Progressing  9/15/2022 0638 by Sherryle Hobby, RN  Outcome: Progressing  9/15/2022 0420 by Sherryle Hobby, RN  Outcome: Progressing     Problem: Discharge Planning  Goal: Discharge to home or other facility with appropriate resources  9/15/2022 1016 by Angelica Viramontes RN  Outcome: Progressing  9/15/2022 0638 by Sherryle Hobby, RN  Outcome: Progressing  9/15/2022 0420 by Sherryle Hobby, RN  Outcome: Progressing     Problem: Safety - Adult  Goal: Free from fall injury  9/15/2022 1016 by Angelica Viramontes RN  Outcome: Progressing  9/15/2022 0638 by Sherryle Hobby, RN  Outcome: Progressing  9/15/2022 0420 by Sherryle Hobby, RN  Outcome: Progressing  Flowsheets (Taken 9/14/2022 2303)  Free From Fall Injury:   Merle Patterson family/caregiver on patient safety   Based on caregiver fall risk screen, instruct family/caregiver to ask for assistance with transferring infant if caregiver noted to have fall risk factors     Problem: Pain  Goal: Verbalizes/displays adequate comfort level or baseline comfort level  9/15/2022 1016 by Angelica Viramontes RN  Outcome: Progressing  9/15/2022 0638 by Sherryle Hobby, RN  Outcome: Progressing  9/15/2022 0420 by Sherryle Hobby, RN  Outcome: Progressing     Problem: ABCDS Injury Assessment  Goal: Absence of physical injury  9/15/2022 1016 by Angelica Viramontes RN  Outcome: Progressing  9/15/2022 0638 by Sherryle Hobby, RN  Outcome: Progressing  9/15/2022 0420 by Sherryle Hobby, RN  Outcome: Progressing

## 2022-09-15 NOTE — PROGRESS NOTES
Patient discharged home independently with wife. No new meds. Went over discharge instructions with patient and family including medication administration details and all follow up appointments. Patient had no questions. Patient pushed downstairs via wheelchair by nurse and picked up in front of the heart center by family.      Electronically signed by Ary Sawyer RN on 9/15/2022 at 5:09 PM

## 2022-09-15 NOTE — PROGRESS NOTES
Physical Therapy  Facility/Department: [facility] CAR 2- STEPDOWN  Physical Therapy Initial Assessment    Name: Marek Neal  : 1934  MRN: [id number]  Date of Service: 9/15/2022  No chief complaint on file. Pt admit with ICM with LVEF 15% S/P single chamber AICD 22 and developed post op hematoma    Discharge Recommendations:  Outpatient PT, Patient would benefit from continued therapy after discharge   PT Equipment Recommendations  Equipment Needed: No      Patient Diagnosis(es): There were no encounter diagnoses. Past Medical History:  has a past medical history of Acute ischemic stroke ([facility] [address] Arthritis, CHF (congestive heart failure) ([facility] [address] New onset atrial fibrillation ([facility] [address] and Psoriasis. Past Surgical History:  has a past surgical history that includes Prostate surgery and Cardiac catheterization (2022). Assessment   Body Structures, Functions, Activity Limitations Requiring Skilled Therapeutic Intervention: Decreased functional mobility ; Decreased strength  Assessment: Pt presents with general deconditioning, high level balace deficts and left UE post operative limitations.  Pt able to complete bed mobilty with MOD I, ambulate with CGA and will continue to benefit from PT to progress activity, continue gait and stair training needed to facilitate functional indepnedence and return home  Therapy Prognosis: Good  Decision Making: Medium Complexity  Clinical Presentation: evolving  Requires PT Follow-Up: Yes  Activity Tolerance  Activity Tolerance: Patient tolerated evaluation without incident     Plan   Plan  Plan:  (5x/wk)  Current Treatment Recommendations: Strengthening, Balance training, Gait training, Stair training, Therapeutic activities  Safety Devices  Type of Devices: Call light within reach, Gait belt, Left in chair, Nurse notified  Restraints  Restraints Initially in Place: No     Restrictions  Restrictions/Precautions  Restrictions/Precautions: Up as Tolerated, General Precautions, Cardiac, Surgical protocol (Up with assist)  Required Braces or Orthoses?: No  Implants present? : Pacemaker  Position Activity Restriction  Other position/activity restrictions: up with assist, note left surgical site hematoma post cardiac implant restriction     Subjective   General  Chart Reviewed: Yes  Additional Pertinent Hx: S/P ICD (internal cardiac defibrillator) procedure, S/P AICD Developed large hematoma around the sites  Response To Previous Treatment: Not applicable  Family / Caregiver Present: No  Diagnosis: S/p ICD placement -developed large hematoma around the site, HGB stable,  chest x-ray negative for pneumothorax, pacer was interrogated and functioning well-pressure dressing on.   Ice pack  Follows Commands: Within Functional Limits  Other (Comment): Pt awake and alert up to chair upon arrival , pt report up ad frieda in halls  General Comment  Comments: Pt is pleasent and cooperative very talkative  Subjective  Subjective: Pt report no significant pain at surgical site         Social/Functional History  Social/Functional History  Lives With: Spouse  Type of Home: House  Home Layout: Two level, Able to Live on Main level with bedroom/bathroom  Home Access: Stairs to enter with rails  Entrance Stairs - Number of Steps: 4 +  5 steps to access home, full flight to 2nd floor and full flight to basement  Entrance Stairs - Rails: Left  Bathroom Shower/Tub: Tub/Shower unit  Bathroom Toilet: Handicap height  Bathroom Equipment: Grab bars in shower  Bathroom Accessibility: Accessible  Home Equipment: Cane (walking stick)  Has the patient had two or more falls in the past year or any fall with injury in the past year?: No  Receives Help From: Family (spouse)  ADL Assistance: [address] [place] [address] Avenue: Independent  Homemaking Responsibilities: Yes  Meal Prep Responsibility: Secondary  Laundry Responsibility: Secondary  Cleaning Responsibility: Secondary  Bill Paying/Finance Responsibility: Secondary  Shopping Responsibility: Secondary  Dependent Care Responsibility: No  Health Care Management: Secondary  Other (Comment): Pt report spouse manage all household task and meal prep, spouse is the primary   Ambulation Assistance: Independent  Transfer Assistance: Independent  Active : No  Patient's  Info: wife is the primary   Mode of Transportation: Car  Occupation: Retired  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, walking  Additional Comments: Pt report independent at baseline [address]: Within Functional Limits  Hearing  Hearing: Within functional limits    Cognition   Orientation  Overall Orientation Status: Within Normal Limits  Cognition  Overall Cognitive Status: WNL     Objective   Heart Rate: 65  Heart Rate Source: Monitor  BP: 113/64  BP Location: Right upper arm  BP Method: Automatic  Patient Position: High fowlers  MAP (Calculated): 80.33  Resp: 16  SpO2: 98 %  O2 Device: None (Room air)     Observation/Palpation  Posture: Good  Gross Assessment  AROM: Within functional limits  PROM: Within functional limits  Strength:  Within functional limits  Coordination: Within functional limits  Tone: Normal  Sensation: Intact     AROM RLE (degrees)  RLE AROM: WFL  AROM LLE (degrees)  LLE AROM : WFL  AROM RUE (degrees)  RUE AROM : WFL  AROM LUE (degrees)  LUE AROM : WFL  Strength RLE  Strength RLE: WNL  Strength LLE  Strength LLE: WNL  Strength RUE  Strength RUE: WNL  Strength LUE  Strength LUE: WNL  Strength Other  Other: Pt demonstrates general weakness and deconditioning with advanced age           Bed mobility  Rolling to Left: Independent  Rolling to Right: Independent  Supine to Sit: Independent  Sit to Supine: Independent  Scooting: Independent  Bed Mobility Comments: Pt demonstrates functional independence with mobility  Transfers  Sit to Stand: Supervision  Stand to sit: Supervision  Bed to Chair: Supervision  Comment: Pt demonstrates general weakness and deconditioning, advanced age. Ambulation  Surface: level tile  Device: No Device  Assistance: Contact guard assistance  Quality of Gait: slow guarded gait  Distance: 200 ft  Comments: slower guarded gait without AD  More Ambulation?: No  Stairs/Curb  Stairs?: No     Balance  Posture: Good  Sitting - Static: Good  Sitting - Dynamic: Good  Standing - Static: Good  Standing - Dynamic: Good;-  Exercise Treatment: STS x 6. sitting unsupported EOB x 8 min, Tinetti balance assessment        OutComes Score  Balance Score: 16 (09/15/22 1209)  Gait Score: 11 (09/15/22 1209)        Tinetti Total Score: 27 (09/15/22 1209)                                   AM-PAC Score  AM-PAC Inpatient Mobility Raw Score : 22 (09/15/22 1208)  AM-PAC Inpatient T-Scale Score : 53.28 (09/15/22 1208)  Mobility Inpatient CMS 0-100% Score: 20.91 (09/15/22 1208)  Mobility Inpatient CMS G-Code Modifier : CJ (09/15/22 1208)          Tinneti Score  Balance Score: 16 (09/15/22 1209)  Gait Score: 11 (09/15/22 1209)  Tinetti Total Score: 27 (09/15/22 1209)  Tinetti Disability Index: 1-19% (09/15/22 1209)    Goals  Short Term Goals  Time Frame for Short term goals: 5 visisits  Short term goal 1: Pt to ambulate 250 ft indendently without AD  Short term goal 2: Pt to ascend /descend  Long Term Goals  Time Frame for Long term goals : 10 visits  Long term goal 1: Pt to be up ad frieda in room and halls without AD  Patient Goals   Patient goals : to return home       Education  Patient Education  Education Given To: Patient  Education Provided: Role of Therapy;Plan of Care;Home Exercise Program;Transfer Training; Fall Prevention Strategies  Education Provided Comments: Pt educated on pacing with activity  Education Method: Demonstration;Verbal  Barriers to Learning: None  Education Outcome: Verbalized understanding      Therapy Time   Individual Concurrent Group Co-treatment   Time In 0840         Time Out 0935         Minutes 54

## 2022-09-15 NOTE — PLAN OF CARE
Problem: Chronic Conditions and Co-morbidities  Goal: Patient's chronic conditions and co-morbidity symptoms are monitored and maintained or improved  9/15/2022 0638 by Virginie Bell RN  Outcome: Progressing  9/15/2022 0420 by Virginie Bell RN  Outcome: Progressing

## 2022-09-19 ENCOUNTER — HOSPITAL ENCOUNTER (OUTPATIENT)
Age: 87
Discharge: HOME | End: 2022-09-19
Payer: MEDICARE

## 2022-09-19 ENCOUNTER — OFFICE VISIT (OUTPATIENT)
Dept: NEUROLOGY | Age: 87
End: 2022-09-19
Payer: MEDICARE

## 2022-09-19 VITALS
WEIGHT: 149 LBS | HEIGHT: 69 IN | SYSTOLIC BLOOD PRESSURE: 112 MMHG | BODY MASS INDEX: 22.07 KG/M2 | DIASTOLIC BLOOD PRESSURE: 65 MMHG | OXYGEN SATURATION: 96 % | HEART RATE: 100 BPM

## 2022-09-19 DIAGNOSIS — Z95.810 S/P ICD (INTERNAL CARDIAC DEFIBRILLATOR) PROCEDURE: ICD-10-CM

## 2022-09-19 DIAGNOSIS — R53.1 RIGHT SIDED WEAKNESS: ICD-10-CM

## 2022-09-19 DIAGNOSIS — I48.20 ATRIAL FIBRILLATION, CHRONIC (HCC): ICD-10-CM

## 2022-09-19 DIAGNOSIS — I63.00 CEREBROVASCULAR ACCIDENT (CVA) DUE TO THROMBOSIS OF PRECEREBRAL ARTERY (HCC): Primary | ICD-10-CM

## 2022-09-19 LAB
HCT VFR BLD CALC: 27.5 % (ref 40.7–50.3)
HEMOGLOBIN: 9 G/DL (ref 13–17)

## 2022-09-19 PROCEDURE — 85018 HEMOGLOBIN: CPT

## 2022-09-19 PROCEDURE — 36415 COLL VENOUS BLD VENIPUNCTURE: CPT

## 2022-09-19 PROCEDURE — 85014 HEMATOCRIT: CPT

## 2022-09-19 PROCEDURE — 1124F ACP DISCUSS-NO DSCNMKR DOCD: CPT | Performed by: PSYCHIATRY & NEUROLOGY

## 2022-09-19 PROCEDURE — 99215 OFFICE O/P EST HI 40 MIN: CPT | Performed by: PSYCHIATRY & NEUROLOGY

## 2022-09-19 NOTE — PROGRESS NOTES
Physician Progress Note      Taniya Paniagua  CSN #:                  [id number]  :                       1934  ADMIT DATE:       2022 4:52 PM  [employer] DATE:        9/15/2022 5:09 PM  RESPONDING  PROVIDER #:        Sanchez No CNP          QUERY TEXT:    Pt admitted with ischemic cardiomyopathy. Pt noted to have decreased Hgb s/p   AICD placement. If possible, please document in the progress notes and   discharge summary if you are evaluating and/or treating any of the following: The medical record reflects the following:  Risk Factors: s/p AICD placement  Clinical Indicators: Hgb 9.7<12.3 on admission, per cardiology progress note   \"S/p ICD placement -developed large hematoma around the site\"  Treatment: Eliquis on hold, pressure dressing, ice pack, H/H, monitoring    Thank you, PHIL Naik  email - [email or web address]  [email or web address]- [phone]  office hours M-F-7A-3P  Options provided:  -- Acute blood loss anemia  -- Postoperative acute blood loss anemia  -- Other - I will add my own diagnosis  -- Disagree - Not applicable / Not valid  -- Disagree - Clinically unable to determine / Unknown  -- Refer to Clinical Documentation Reviewer    PROVIDER RESPONSE TEXT:    This patient has postoperative acute blood loss anemia.     Query created by: Nanda Mc on 9/15/2022 8:49 AM      Electronically signed by:  Sanchez No CNP 2022 10:41 AM

## 2022-09-19 NOTE — PROGRESS NOTES
Neurocritical Care, Stroke & Neurointerventional Note    [address]   [address] [place] [address], [address], [address] [facility]   P: [phone]      Endovascular Neurosurgery Clinic Note      Reason for evaluation: Hospital follow for left MCA stroke s/p mechanical thrombectomy     SUBJECTIVE:   History of Chief Complaint:    Mr. Anna Lee is a 81 y/o M with pmh significant for Htn, LEXII, new onset parxysmal A.Fib, Left MCA stroke s/p tPA and MT presented today for first hospital follow up. Briefly, Lima Calhoun presented to [facility] on 05/06/2022 with:  Acute onset right sided weakness and speech difficulty, initial NIHSS was 10, received IV tPA and underwent emergent mechanical thrombectomy with final TICI score 3. Patient was found to have new onset paroxysmal A. Fib during hospital stay and was discharge on ASA and Eliquis. Interim Hx:  Bruises since the placement of the Defibrillator Placed 09/12/2022    Allergies  is allergic to neomycin-bacitracin zn-polymyx and niacin. Medications  Prior to Admission medications    Medication Sig Start Date End Date Taking?  Authorizing Provider   apixaban (ELIQUIS) 5 MG TABS tablet Take 1 tablet by mouth 2 times daily HOLD UNTIL SEEN IN OFFICE ON MONDAY 9/15/22  Yes SARA Castellon - CNP   atorvastatin (LIPITOR) 80 MG tablet Take 1 tablet by mouth nightly 5/17/22  Yes Marek Holm MD   tamsulosin (FLOMAX) 0.4 MG capsule Take 1 capsule by mouth daily 5/17/22  Yes Marek Holm MD   lactobacillus (CULTURELLE) capsule Take 1 capsule by mouth daily (with breakfast) 5/17/22  Yes Marek Holm MD   metoprolol tartrate (LOPRESSOR) 25 MG tablet Take 0.5 tablets by mouth 2 times daily 5/17/22  Yes Marek Holm MD   spironolactone (ALDACTONE) 25 MG tablet Take 1 tablet by mouth daily Hold if S bp < 130 5/18/22  Yes Marek Holm MD   midodrine (PROAMATINE) 10 MG tablet Take 1 tablet by mouth three times daily Hold if systolic bp > 408 4/83/11  Yes Joseph Rojas MD   calcipotriene (DOVONEX) 0.005 % solution Apply topically 2 times daily psoriasis   Yes Historical Provider, MD   clobetasol (TEMOVATE) 0.05 % cream Apply topically 2 times daily Indications: Psoriasis Apply topically 2 times daily. Yes Historical Provider, MD   amiodarone (CORDARONE) 200 MG tablet Take 1 tablet by mouth in the morning. 7/21/22 9/12/22  SARA Nation - NP   aspirin 81 MG chewable tablet Take 1 tablet by mouth daily  Patient not taking: No sig reported 5/17/22 7/21/22  Joseph Rojas MD    Scheduled Meds:  Continuous Infusions:  PRN Meds:.  Past Medical History   has a past medical history of Acute ischemic stroke ([facility] [address] Arthritis, CHF (congestive heart failure) ([facility] [address] New onset atrial fibrillation ([facility] [address] and Psoriasis. Past Surgical History   has a past surgical history that includes Prostate surgery and Cardiac catheterization (09/12/2022). Social History   reports that he has been smoking pipe. He has quit using smokeless tobacco.   reports that he does not currently use alcohol. reports no history of drug use. Family History  family history includes No Known Problems in his father and mother. Review of Systems:  CONSTITUTIONAL:  negative for fevers, chills, fatigue and malaise    EYES:  negative for double vision, blurred vision and photophobia     HEENT:  negative for tinnitus, epistaxis and sore throat    RESPIRATORY:  negative for cough, shortness of breath, wheezing    CARDIOVASCULAR:  negative for chest pain, palpitations, syncope, edema    GASTROINTESTINAL:  negative for nausea, vomiting    GENITOURINARY:  negative for incontinence    MUSCULOSKELETAL:  negative for neck or back pain    NEUROLOGICAL:  Negative for weakness and tingling  negative for headaches and dizziness    PSYCHIATRIC:  negative for anxiety      Review of systems otherwise negative.       OBJECTIVE:     Vitals:    09/19/22 1428   BP: 112/65   Pulse: 100 SpO2: 96%        General:  Gen: normal habitus, NAD  HEENT: NCAT, mucosa moist  Cvs: RRR, S1 S2 normal  Resp: symmetric unlabored breathing  Abd: s/nd/nt  Ext: no edema  Skin: no lesions seen, warm and dry    Neuro:  Gen: awake and alert, oriented x3. Lang/speech: no aphasia or dysarthria. Follows commands. CN: PERRL, EOMI, VFF, V1-3 intact, face symmetric, hearing intact, shoulder shrug symmetric, tongue midline  Motor: grossly 5/5 UE and LE b/l  Sense: LT intact in all 4 ext. Coord: FTN and HTS intact b/l  DTR: deferred  Gait: narrow base gait    NIH Stroke Scale:   1a  Level of consciousness: 0 - alert; keenly responsive   1b. LOC questions:  0 - answers both questions correctly   1c. LOC commands: 0 - performs both tasks correctly   2. Best Gaze: 0 - normal   3. Visual: 0 - no visual loss   4. Facial Palsy: 0 - normal symmetric movement   5a. Motor left arm: 0 - no drift, limb holds 90 (or 45) degrees for full 10 seconds   5b. Motor right arm: 0 - no drift, limb holds 90 (or 45) degrees for full 10 seconds   6a. Motor left le - no drift; leg holds 30 degree position for full 5 seconds   6b  Motor right le - no drift; leg holds 30 degree position for full 5 seconds   7. Limb Ataxia: 0 - absent   8. Sensory: 0 - normal; no sensory loss   9. Best Language:  0 - no aphasia, normal   10. Dysarthria: 0 - normal   11. Extinction and Inattention: 0 - no abnormality         Total:   0     MRS: 04      LABS:   Reviewed. Lab Results   Component Value Date    HGB 9.0 (L) 2022    WBC 9.4 09/15/2022    PLT See Reflexed IPF Result 09/15/2022     09/15/2022    BUN 23 09/15/2022    CREATININE 1.00 09/15/2022    AST 28 2022    ALT 19 2022    MG 2.2 2022    APTT 63.0 (H) 2022    INR 1.1 2022      Lab Results   Component Value Date/Time    COVID19 Not Detected 2022 02:44 PM       RADIOLOGY:   Images were personally reviewed including:  IR :   1.   Acute Left MCA proximal M-1 occlusion TICI score 0    2. The above lesion was treated with 8 fr short sheath, flow gate bgc, prowler 21 microcatheter, embo trap 5 mm x 37 mm stent retriever first pass. Final reperfusion score TICI 03   3. Underlying stenosis of the left MCA M-1 segment measuring about 60% by NASCET criteria      MRI brain 05/07:   1. Acute infarction in the left anterior temporal lobe and left basal   ganglia, consistent with MCA occlusion. 2. Punctate foci of acute infarction in the right frontal cortex. 3. Mild microangiopathic change. ASSESSMENT:   81 y/o M with pmh significant for Htn, LEXII, new onset parxysmal A.Fib, Left MCA stroke s/p tPA and MT presented today for first hospital follow up. Etiology: cardio-embolic with underlying A.Fib. Great results following mechanical thrombectomy      Neuro exam was significantly improved NIHSS was 0. Needs assistance for walking     PLAN:   Would restart him on the [facility] as soon as possible given the recent afib cardioembolic stroke  He will see the cardiologist on 9/22/2022  Restart ASA 81 mg until he sees the cardiologist      Consultation Visit Time:  40 minutes  Patient given educational materials - see patient instructions. Discussed use, benefit, and side effects of prescribed medications. Personally reviewed imaging with patients and all questions answered. Pt voiced understanding. Patient agreed with treatment plan. Follow up as directed below. Greater than 50% of the time was for counseling and providing answer to the patient question. The findings and the plan discussed with the patient and all her questions were answered. Thank you very much for your referral, please do not hesitate to contact me with any questions.     Herbert Gutierrez MD Pager: [phone]  Stroke, [facility]  [address] [facility],  [address] Stroke Center     Stroke, Neurocritical Care & [facility]  [address]  Electronically signed 9/19/2022 at 2:59 PM

## 2023-02-20 ENCOUNTER — HOSPITAL ENCOUNTER (OUTPATIENT)
Dept: CARDIAC CATH/INVASIVE PROCEDURES | Age: 88
Discharge: HOME | End: 2023-02-20
Payer: MEDICARE

## 2023-02-20 VITALS
SYSTOLIC BLOOD PRESSURE: 109 MMHG | OXYGEN SATURATION: 96 % | HEART RATE: 47 BPM | RESPIRATION RATE: 16 BRPM | DIASTOLIC BLOOD PRESSURE: 63 MMHG

## 2023-02-20 LAB
EGFR, POC: 57 ML/MIN/1.73M2
GLUCOSE BLD-MCNC: 133 MG/DL (ref 74–100)
PLATELET # BLD AUTO: NORMAL K/UL (ref 138–453)
PLATELET, FLUORESCENCE: 134 K/UL (ref 138–453)
PLATELET, IMMATURE FRACTION: 5 % (ref 1.1–10.3)
POC BUN: 19 MG/DL (ref 8–26)
POC CHLORIDE: 116 MMOL/L (ref 98–107)
POC CREATININE: 1.22 MG/DL (ref 0.51–1.19)
POC HEMATOCRIT: 37 % (ref 41–53)
POC HEMOGLOBIN: 12.6 G/DL (ref 13.5–17.5)
POC IONIZED CALCIUM: 1.22 MMOL/L (ref 1.15–1.33)
POC POTASSIUM: 4.7 MMOL/L (ref 3.5–4.5)
POC SODIUM: 142 MMOL/L (ref 138–146)

## 2023-02-20 PROCEDURE — 82435 ASSAY OF BLOOD CHLORIDE: CPT

## 2023-02-20 PROCEDURE — 6360000002 HC RX W HCPCS

## 2023-02-20 PROCEDURE — 82947 ASSAY GLUCOSE BLOOD QUANT: CPT

## 2023-02-20 PROCEDURE — 82565 ASSAY OF CREATININE: CPT

## 2023-02-20 PROCEDURE — 85014 HEMATOCRIT: CPT

## 2023-02-20 PROCEDURE — 93312 ECHO TRANSESOPHAGEAL: CPT

## 2023-02-20 PROCEDURE — 82330 ASSAY OF CALCIUM: CPT

## 2023-02-20 PROCEDURE — 84295 ASSAY OF SERUM SODIUM: CPT

## 2023-02-20 PROCEDURE — 85049 AUTOMATED PLATELET COUNT: CPT

## 2023-02-20 PROCEDURE — 85055 RETICULATED PLATELET ASSAY: CPT

## 2023-02-20 PROCEDURE — 84520 ASSAY OF UREA NITROGEN: CPT

## 2023-02-20 PROCEDURE — 7100000010 HC PHASE II RECOVERY - FIRST 15 MIN

## 2023-02-20 PROCEDURE — 7100000011 HC PHASE II RECOVERY - ADDTL 15 MIN

## 2023-02-20 PROCEDURE — 2709999900 HC NON-CHARGEABLE SUPPLY

## 2023-02-20 PROCEDURE — 93325 DOPPLER ECHO COLOR FLOW MAPG: CPT

## 2023-02-20 PROCEDURE — 84132 ASSAY OF SERUM POTASSIUM: CPT

## 2023-02-20 PROCEDURE — 33340 PERQ CLSR TCAT L ATR APNDGE: CPT

## 2023-02-20 RX ORDER — SODIUM CHLORIDE 0.9 % (FLUSH) 0.9 %
5-40 SYRINGE (ML) INJECTION EVERY 12 HOURS SCHEDULED
Status: CANCELLED | OUTPATIENT
Start: 2023-02-20

## 2023-02-20 RX ORDER — SODIUM CHLORIDE 9 MG/ML
INJECTION, SOLUTION INTRAVENOUS CONTINUOUS
Status: DISCONTINUED | OUTPATIENT
Start: 2023-02-20 | End: 2023-02-21 | Stop reason: HOSPADM

## 2023-02-20 RX ORDER — SODIUM CHLORIDE 0.9 % (FLUSH) 0.9 %
5-40 SYRINGE (ML) INJECTION PRN
Status: CANCELLED | OUTPATIENT
Start: 2023-02-20

## 2023-02-20 RX ORDER — SODIUM CHLORIDE 9 MG/ML
INJECTION, SOLUTION INTRAVENOUS PRN
Status: CANCELLED | OUTPATIENT
Start: 2023-02-20

## 2023-02-20 RX ADMIN — SODIUM CHLORIDE: 9 INJECTION, SOLUTION INTRAVENOUS at 11:47

## 2023-02-20 NOTE — PROGRESS NOTES
Pt offered ice chips, tolerated well with no complications. All questions answered. Pt ambulated through dominguez with no complications.

## 2023-02-20 NOTE — DISCHARGE INSTRUCTIONS
TRANSESOPHAGEAL ECHOCARDIOGRAM / ALAN DISCHARGE INSTRUCTIONS    If the following occurs call your physician or seek help    -trouble with swallowing    -spitting or coughing up blood    -severe pain in the throat region / your throat can be sore for several days but pain should not increase as days continue    Do not drive or operate heavy machinery today due to sedation            Transesophageal Echocardiogram:  What is a transesophageal echocardiogram?     A transesophageal echocardiogram is a test to help your doctor look at the inside of your heart. A small device called a transducer directs sound waves toward your heart. The sound waves make a picture of the heart's valves and chambers. Your doctor may do this test to look for certain types of heart disease. Or it may be done to see how disease is affecting your heart. You will be given medicine to make you sleepy and comfortable during the test.  The doctor puts a small, flexible tube into your throat and guides it to the esophagus. This is the tube that connects your mouth to your stomach. The doctor will ask you to swallow as the tube goes down. The transducer is at the tip of the tube. It gets close to your heart to make clear pictures. The doctor will look at the ultrasound pictures on a screen. You will not be able to eat or drink until the numbness from the throat spray wears off. Your throat may be sore for a few days after the test.  Follow-up care is a key part of your treatment and safety. Be sure to make and go to all appointments, and call your doctor if you are having problems. It's also a good idea to know your test results and keep a list of your current medications. Going home  Be sure you have someone to drive you home. Anesthesia and pain medicine make it unsafe for you to drive. Where can you learn more? Go to [email or web address]. org and sign in to your [employer] account.  Enter K500 in the [address] Information box to learn more about Transesophageal Echocardiogram: Before Your Procedure.     If you do not have an account, please click on the Sign Up Now link. © 8349-5898 Healthwise, Incorporated. Care instructions adapted under license by [facility] ([facility]). This care instruction is for use with your licensed healthcare professional. If you have questions about a medical condition or this instruction, always ask your healthcare professional. [address] any warranty or liability for your use of this information. Content Version: 70.1.414758; Current as of: February 20, 2015       SEDATION / ANALGESIA INFORMATION / HOME GOING ADVICE  You have received the sedation/analgesia medication during your visit    Sedation/analgesia is used during short medical procedures under controlled supervision. The medication will produce a strong relaxation. You will be able to hear, speak and follow instructions, but your memory and alertness will be decreased. You will be able to swallow and breathe on your own. During sedation/analgesia your blood pressure, heart and breathing will be watched closely. After the procedure, you may not remember what was said or done. You may have the following effects from the medication. \" Drowsiness, dizziness, sleepiness or confusion. \" Difficulty remembering or delayed reaction times. \" Loss of fine muscle control or difficulty with your balance especially while walking. \" Difficulty focusing or blurred vision. You may not be aware of slight changes in your behavior and/or your reaction time because of the medication used during the procedure. Therefore you should follow these instructions. \" Have someone responsible help you with your care. \" Do not drive for 24 hours. \" Do not operate equipment for 24 hours (lawnmowers, power tools, kitchen accessories, stove). \" Do not drink any alcoholic beverages for a minimum of 24 hours.   \" Do not make important personal, legal or business decisions for 24 hours.  \" You may experience dizziness or lightheadedness. Move slowly and carefully, do not make sudden position changes.  \" Drink extra amounts of fluids today.  \" Increase your diet as tolerated (unless you have received specific instructions from your doctor).  \" If you feel nauseated, continue with liquids until the nausea is gone.  \" Notify your physician if you have not urinated within 8 hours after the procedure.  \" Resume your medications unless otherwise instructed.

## 2023-02-20 NOTE — H&P
[facility]    Consult / H&P               Today's Date: 2/20/2023  Patient Name: Cinthia Doherty  Date of admission: No admission date for patient encounter. Patient's age: 80 y. o., 1934  Admission Dx: No admission diagnoses are documented for this encounter. Reason for Consult:  Cardiac evaluation    Requesting Physician: No admitting provider for patient encounter. CHIEF COMPLAINT:  Dyspnea    History Obtained From:  patient, electronic medical record    HISTORY OF PRESENT ILLNESS:      The patient is a 80 y.o. male seen today for ALAN for pre watchman. PMH as below. Past Medical History:   has a past medical history of Acute ischemic stroke ([facility] [address] Arthritis, CHF (congestive heart failure) ([facility] [address] New onset atrial fibrillation ([facility] [address] and Psoriasis. Past Surgical History:   has a past surgical history that includes Prostate surgery and Cardiac catheterization (09/12/2022). Home Medications:    Prior to Admission medications    Medication Sig Start Date End Date Taking?  Authorizing Provider   apixaban (ELIQUIS) 5 MG TABS tablet Take 1 tablet by mouth 2 times daily HOLD UNTIL SEEN IN OFFICE ON MONDAY 9/15/22   SARA Sharp - CNP   amiodarone (CORDARONE) 200 MG tablet Take 1 tablet by mouth in the morning. 7/21/22 9/12/22  SARA Abdullahi NP   atorvastatin (LIPITOR) 80 MG tablet Take 1 tablet by mouth nightly 5/17/22   Ishmael Duff MD   tamsulosin (FLOMAX) 0.4 MG capsule Take 1 capsule by mouth daily 5/17/22   Ishmael Duff MD   lactobacillus (CULTURELLE) capsule Take 1 capsule by mouth daily (with breakfast) 5/17/22   Ishmael Duff MD   metoprolol tartrate (LOPRESSOR) 25 MG tablet Take 0.5 tablets by mouth 2 times daily 5/17/22   Ishmael Duff MD   spironolactone (ALDACTONE) 25 MG tablet Take 1 tablet by mouth daily Hold if S bp < 130 5/18/22   Ishmael Duff MD   midodrine (PROAMATINE) 10 MG tablet Take 1 tablet by mouth three times daily Hold if systolic bp > 395 0/65/72   Juancho Baron MD   aspirin 81 MG chewable tablet Take 1 tablet by mouth daily  Patient not taking: No sig reported 5/17/22 7/21/22  Juancho Baron MD   calcipotriene (DOVONEX) 0.005 % solution Apply topically 2 times daily psoriasis    Historical Provider, MD   clobetasol (TEMOVATE) 0.05 % cream Apply topically 2 times daily Indications: Psoriasis Apply topically 2 times daily. Historical Provider, MD      No current facility-administered medications for this encounter. Allergies:  Neomycin-bacitracin zn-polymyx and Niacin    Social History:   reports that he has been smoking pipe. He has quit using smokeless tobacco. He reports that he does not currently use alcohol. He reports that he does not use drugs. Family History: family history includes No Known Problems in his father and mother. REVIEW OF SYSTEMS:    Constitutional: there has been no unanticipated weight loss. There's been No change in energy level, No change in activity level. Eyes: No visual changes or diplopia. No scleral icterus. ENT: No Headaches  Cardiovascular: As above. Respiratory: No previous pulmonary problems, No cough  Gastrointestinal: No abdominal pain. No change in bowel or bladder habits. Genitourinary: No dysuria, trouble voiding, or hematuria. Musculoskeletal:  No gait disturbance, No weakness or joint complaints. Integumentary: No rash or pruritis. Neurological: No headache, diplopia, change in muscle strength, numbness or tingling. No change in gait, balance, coordination, mood, affect, memory, mentation, behavior. Psychiatric: No anxiety, or depression. Endocrine: No temperature intolerance. No excessive thirst, fluid intake, or urination. No tremor. Hematologic/Lymphatic: No abnormal bruising or bleeding, blood clots or swollen lymph nodes. Allergic/Immunologic: No nasal congestion or hives. PHYSICAL EXAM:      There were no vitals taken for this visit. Constitutional and General Appearance: alert, cooperative, no distress and appears stated age  [de-identified]: PERRL, no cervical lymphadenopathy. No masses palpable. Normal oral mucosa  Respiratory:  Normal excursion and expansion without use of accessory muscles  Resp Auscultation: Good respiratory effort. No for increased work of breathing. On auscultation: clear to auscultation bilaterally  Cardiovascular: The apical impulse is not displaced  Heart tones are crisp and normal. regular S1 and S2.  Jugular venous pulsation Normal  The carotid upstroke is normal in amplitude and contour without delay or bruit  Peripheral pulses are symmetrical and full   Abdomen:   No masses or tenderness  Bowel sounds present  Extremities:   No Cyanosis or Clubbing   Lower extremity edema: No   Skin: Warm and dry  Neurological:  Alert and oriented. Moves all extremities well  No abnormalities of mood, affect, memory, mentation, or behavior are noted        Labs:     CBC: No results for input(s): WBC, HGB, HCT, PLT in the last 72 hours. BMP: No results for input(s): NA, K, CO2, BUN, CREATININE, LABGLOM, GLUCOSE in the last 72 hours. BNP: No results for input(s): BNP in the last 72 hours. PT/INR: No results for input(s): PROTIME, INR in the last 72 hours. APTT:No results for input(s): APTT in the last 72 hours. CARDIAC ENZYMES:No results for input(s): CKTOTAL, CKMB, CKMBINDEX, TROPONINI in the last 72 hours. FASTING LIPID PANEL:  Lab Results   Component Value Date/Time    HDL 52 05/07/2022 06:30 AM    TRIG 50 05/07/2022 06:30 AM     LIVER PROFILE:No results for input(s): AST, ALT, LABALBU in the last 72 hours. IMPRESSION:    1. CVA 5/2022  2. S/P Thrombectomy post CVA. 3. H/O cerebral bleeding  4. Atrial fibrillation - Arnoldo-Vasc score > 4.  5. CHF - Cardiomyopathy with EF 15%. 6. CAD S/P Non Q MI 5/2022. MVCAD treated medically. 7. H/O Leukemia. 8. Echocardiogram 5/22: EF 15%, moderate MR  9.  AICD placement 9/22/22    RECOMMENDATIONS:  Proceed with ALAN pre watchman. Discussed with patient and Nurse.     Electronically signed by Ladan Bradshaw MD on 2/20/2023 at 8:36 AM    [facility]      [phone]

## 2023-02-20 NOTE — PROGRESS NOTES
1340: Pt arrived to cath lab holding. Pt hooked up to monitor. Pt denies any questions about procedure. Telemetry shows SB at a rate of 54. NS infusing at 75 ml/hr. NS on at 2 L NC with sat of 99% /58.  1551: Echo tech, Rommie Denver arrived to cath lab holding. 1605: BP  161/67 HR SB at 56. 99% on 2 L NC  1607: Dr. Kayla Patel and Dr. Danna Doty at bedside. Time out completed. 1607: 30 ml Viscous lidocaine given to pt 3 sprays Cetacaine givien to pt for numbing.  1609: 2mg versed given, 50 mcg fentanyl given both per Dr. Danna Doty. BP  169/64 HR SB at 53. O2 sat 100%  1610: ALAN probe inserted by Dr. Kayla Patel. Pt tolerated well. 1615: Rhythm SB at 49. O2 sats 96% on 2 L NC. /64  1616: ALAN probe removed. Pt tolerated well. Pt asleep but easily arousable. NS infusing at 75 ml/hr.  Telemetry SB at 50.  1618 Report called to [facility].   1619 Pt transported to [facility]

## 2023-02-20 NOTE — PROGRESS NOTES
Pt discharged home with wife and daughter with no complications. All questions answered, discharge instructions discussed, IV removed.

## 2023-02-20 NOTE — PROGRESS NOTES
Pt returned to room, alert but groggy. VSS, family educated on NPO restriction for the time being d/t post ALAN. Call light in reach, 2/2 side rails up.

## 2023-02-20 NOTE — PROGRESS NOTES
Patient admitted, consent signed and questions answered. Patient ready for procedure. Call light to reach with side rails up 2 of 2. present. Family at bedside with patient. History and physical needs completed.

## 2023-02-20 NOTE — OP NOTE
[facility]  Transesophageal Echocardiogram       Today's Date:  2/20/2023  Ordering Physician:  Dr Shayy Darden  Indication:   Pre Watchman     Operators:  Primary:   Dr Shayy Darden MD (Attending Physician)  Assistant:   Jenae Baum MD (Cardiovascular Fellow)      Pre Procedure Conscious Sedation Data:    ASA Class:    [] I [x] II [] III [] IV    Mallampati Class:  [] I [] II [x] III [] IV    Procedure:    Patient seen and examined. History and Physical reviewed. Labs reviewed. After informed consent was obtained with explanation of the risks and benefits, the patient was brought to cardiac cath lab. All sedation was administered by the cardiologist. The oropharynx was pre-anesthesized with viscous lidocaine and cetacaine spray. The ultrasound probe was passed without any difficulty. ALAN findings:    LA:  Dilated. CESAR:  Broccoli shaped, No thrombus   LV:  Dilated. Estimated LVEF:  20%  Aorta: Moderately atheromatous disease arch  Pericardium: No pericardial effusion      Valves:    Mitral Valve: Structurally normal. Moderate regurgitation is identified. Aortic Valve: The aortic valve is trileaflet and opens adequately. Mild regurgitiation is identified    No valvular vegetations or thrombus identified. Summary:     1. A ALAN was performed without complications. 2. LVEF 20%. 3. CESAR visualized with no thrombus, broccoli shaped. 3. No thrombus or valvular vegetation identified  4. There were no complications encountered.     Isaac Wood MD       Cardiovascular Fellow

## 2023-02-21 ENCOUNTER — ANESTHESIA (OUTPATIENT)
Dept: CARDIAC CATH/INVASIVE PROCEDURES | Age: 88
End: 2023-02-21

## 2023-02-21 ENCOUNTER — HOSPITAL ENCOUNTER (OUTPATIENT)
Dept: CARDIAC CATH/INVASIVE PROCEDURES | Age: 88
Setting detail: SURGERY ADMIT
Discharge: HOME | End: 2023-02-21
Admitting: INTERNAL MEDICINE
Payer: MEDICARE

## 2023-02-21 ENCOUNTER — ANESTHESIA EVENT (OUTPATIENT)
Dept: CARDIAC CATH/INVASIVE PROCEDURES | Age: 88
End: 2023-02-21

## 2023-02-21 VITALS
OXYGEN SATURATION: 98 % | DIASTOLIC BLOOD PRESSURE: 70 MMHG | TEMPERATURE: 97.4 F | RESPIRATION RATE: 15 BRPM | SYSTOLIC BLOOD PRESSURE: 137 MMHG | HEART RATE: 52 BPM

## 2023-02-21 LAB
ACTIVATED CLOTTING TIME: 190 SEC (ref 79–149)
ACTIVATED CLOTTING TIME: 228 SEC (ref 79–149)
LV EF: 38 %
LVEF MODALITY: NORMAL

## 2023-02-21 PROCEDURE — C1760 CLOSURE DEV, VASC: HCPCS

## 2023-02-21 PROCEDURE — 2500000003 HC RX 250 WO HCPCS

## 2023-02-21 PROCEDURE — C1889 IMPLANT/INSERT DEVICE, NOC: HCPCS

## 2023-02-21 PROCEDURE — 6360000004 HC RX CONTRAST MEDICATION

## 2023-02-21 PROCEDURE — 3700000000 HC ANESTHESIA ATTENDED CARE

## 2023-02-21 PROCEDURE — 93325 DOPPLER ECHO COLOR FLOW MAPG: CPT

## 2023-02-21 PROCEDURE — 93320 DOPPLER ECHO COMPLETE: CPT

## 2023-02-21 PROCEDURE — 33340 PERQ CLSR TCAT L ATR APNDGE: CPT

## 2023-02-21 PROCEDURE — 93005 ELECTROCARDIOGRAM TRACING: CPT | Performed by: INTERNAL MEDICINE

## 2023-02-21 PROCEDURE — 7100000011 HC PHASE II RECOVERY - ADDTL 15 MIN

## 2023-02-21 PROCEDURE — C1769 GUIDE WIRE: HCPCS

## 2023-02-21 PROCEDURE — C1892 INTRO/SHEATH,FIXED,PEEL-AWAY: HCPCS

## 2023-02-21 PROCEDURE — 93312 ECHO TRANSESOPHAGEAL: CPT

## 2023-02-21 PROCEDURE — C1766 INTRO/SHEATH,STRBLE,NON-PEEL: HCPCS

## 2023-02-21 PROCEDURE — 85347 COAGULATION TIME ACTIVATED: CPT

## 2023-02-21 PROCEDURE — 86850 RBC ANTIBODY SCREEN: CPT

## 2023-02-21 PROCEDURE — 6360000002 HC RX W HCPCS

## 2023-02-21 PROCEDURE — 86901 BLOOD TYPING SEROLOGIC RH(D): CPT

## 2023-02-21 PROCEDURE — 3700000001 HC ADD 15 MINUTES (ANESTHESIA)

## 2023-02-21 PROCEDURE — 86900 BLOOD TYPING SEROLOGIC ABO: CPT

## 2023-02-21 PROCEDURE — 2720000010 HC SURG SUPPLY STERILE

## 2023-02-21 PROCEDURE — C1894 INTRO/SHEATH, NON-LASER: HCPCS

## 2023-02-21 PROCEDURE — 7100000010 HC PHASE II RECOVERY - FIRST 15 MIN

## 2023-02-21 PROCEDURE — 86920 COMPATIBILITY TEST SPIN: CPT

## 2023-02-21 PROCEDURE — 2709999900 HC NON-CHARGEABLE SUPPLY

## 2023-02-21 RX ORDER — SODIUM CHLORIDE 0.9 % (FLUSH) 0.9 %
5-40 SYRINGE (ML) INJECTION PRN
Status: DISCONTINUED | OUTPATIENT
Start: 2023-02-21 | End: 2023-02-22 | Stop reason: HOSPADM

## 2023-02-21 RX ORDER — FENTANYL CITRATE 50 UG/ML
25 INJECTION, SOLUTION INTRAMUSCULAR; INTRAVENOUS EVERY 5 MIN PRN
Status: DISCONTINUED | OUTPATIENT
Start: 2023-02-21 | End: 2023-02-22 | Stop reason: HOSPADM

## 2023-02-21 RX ORDER — SODIUM CHLORIDE 9 MG/ML
INJECTION, SOLUTION INTRAVENOUS PRN
Status: DISCONTINUED | OUTPATIENT
Start: 2023-02-21 | End: 2023-02-22 | Stop reason: HOSPADM

## 2023-02-21 RX ORDER — SODIUM CHLORIDE, SODIUM LACTATE, POTASSIUM CHLORIDE, CALCIUM CHLORIDE 600; 310; 30; 20 MG/100ML; MG/100ML; MG/100ML; MG/100ML
INJECTION, SOLUTION INTRAVENOUS CONTINUOUS PRN
Status: DISCONTINUED | OUTPATIENT
Start: 2023-02-21 | End: 2023-02-21 | Stop reason: SDUPTHER

## 2023-02-21 RX ORDER — ACETAMINOPHEN 325 MG/1
650 TABLET ORAL EVERY 4 HOURS PRN
Status: DISCONTINUED | OUTPATIENT
Start: 2023-02-21 | End: 2023-02-22 | Stop reason: HOSPADM

## 2023-02-21 RX ORDER — PHENYLEPHRINE HCL IN 0.9% NACL 50MG/250ML
PLASTIC BAG, INJECTION (ML) INTRAVENOUS
Status: DISPENSED
Start: 2023-02-21 | End: 2023-02-21

## 2023-02-21 RX ORDER — ONDANSETRON 2 MG/ML
4 INJECTION INTRAMUSCULAR; INTRAVENOUS
Status: DISCONTINUED | OUTPATIENT
Start: 2023-02-21 | End: 2023-02-22 | Stop reason: HOSPADM

## 2023-02-21 RX ORDER — SODIUM CHLORIDE 0.9 % (FLUSH) 0.9 %
5-40 SYRINGE (ML) INJECTION EVERY 12 HOURS SCHEDULED
Status: DISCONTINUED | OUTPATIENT
Start: 2023-02-21 | End: 2023-02-22 | Stop reason: HOSPADM

## 2023-02-21 RX ORDER — CLOPIDOGREL BISULFATE 75 MG/1
75 TABLET ORAL DAILY
Status: DISCONTINUED | OUTPATIENT
Start: 2023-02-21 | End: 2023-02-22 | Stop reason: HOSPADM

## 2023-02-21 RX ORDER — ETOMIDATE 2 MG/ML
INJECTION INTRAVENOUS
Status: COMPLETED
Start: 2023-02-21 | End: 2023-02-21

## 2023-02-21 RX ORDER — FENTANYL CITRATE 50 UG/ML
50 INJECTION, SOLUTION INTRAMUSCULAR; INTRAVENOUS EVERY 5 MIN PRN
Status: DISCONTINUED | OUTPATIENT
Start: 2023-02-21 | End: 2023-02-22 | Stop reason: HOSPADM

## 2023-02-21 RX ORDER — ALBUMIN, HUMAN INJ 5% 5 %
SOLUTION INTRAVENOUS
Status: DISPENSED
Start: 2023-02-21 | End: 2023-02-21

## 2023-02-21 RX ORDER — SODIUM CHLORIDE 9 MG/ML
INJECTION, SOLUTION INTRAVENOUS CONTINUOUS
Status: DISCONTINUED | OUTPATIENT
Start: 2023-02-21 | End: 2023-02-22 | Stop reason: HOSPADM

## 2023-02-21 RX ORDER — EPHEDRINE SULFATE/0.9% NACL/PF 50 MG/5 ML
SYRINGE (ML) INTRAVENOUS PRN
Status: DISCONTINUED | OUTPATIENT
Start: 2023-02-21 | End: 2023-02-21 | Stop reason: SDUPTHER

## 2023-02-21 RX ORDER — HEPARIN SODIUM 1000 [USP'U]/ML
INJECTION, SOLUTION INTRAVENOUS; SUBCUTANEOUS PRN
Status: DISCONTINUED | OUTPATIENT
Start: 2023-02-21 | End: 2023-02-21 | Stop reason: SDUPTHER

## 2023-02-21 RX ORDER — ETOMIDATE 2 MG/ML
INJECTION INTRAVENOUS PRN
Status: DISCONTINUED | OUTPATIENT
Start: 2023-02-21 | End: 2023-02-21 | Stop reason: SDUPTHER

## 2023-02-21 RX ORDER — ONDANSETRON 2 MG/ML
4 INJECTION INTRAMUSCULAR; INTRAVENOUS EVERY 6 HOURS PRN
Status: DISCONTINUED | OUTPATIENT
Start: 2023-02-21 | End: 2023-02-22 | Stop reason: HOSPADM

## 2023-02-21 RX ORDER — PROPOFOL 10 MG/ML
INJECTION, EMULSION INTRAVENOUS PRN
Status: DISCONTINUED | OUTPATIENT
Start: 2023-02-21 | End: 2023-02-21 | Stop reason: SDUPTHER

## 2023-02-21 RX ORDER — FENTANYL CITRATE 50 UG/ML
INJECTION, SOLUTION INTRAMUSCULAR; INTRAVENOUS PRN
Status: DISCONTINUED | OUTPATIENT
Start: 2023-02-21 | End: 2023-02-21 | Stop reason: SDUPTHER

## 2023-02-21 RX ORDER — CLOPIDOGREL BISULFATE 75 MG/1
75 TABLET ORAL DAILY
Qty: 30 TABLET | Refills: 3 | Status: SHIPPED | OUTPATIENT
Start: 2023-02-21

## 2023-02-21 RX ORDER — ONDANSETRON 2 MG/ML
4 INJECTION INTRAMUSCULAR; INTRAVENOUS ONCE
Status: DISCONTINUED | OUTPATIENT
Start: 2023-02-21 | End: 2023-02-21

## 2023-02-21 RX ORDER — DEXAMETHASONE SODIUM PHOSPHATE 10 MG/ML
INJECTION, SOLUTION INTRAMUSCULAR; INTRAVENOUS PRN
Status: DISCONTINUED | OUTPATIENT
Start: 2023-02-21 | End: 2023-02-21 | Stop reason: SDUPTHER

## 2023-02-21 RX ORDER — ROCURONIUM BROMIDE 10 MG/ML
INJECTION, SOLUTION INTRAVENOUS PRN
Status: DISCONTINUED | OUTPATIENT
Start: 2023-02-21 | End: 2023-02-21 | Stop reason: SDUPTHER

## 2023-02-21 RX ORDER — VERAPAMIL HYDROCHLORIDE 2.5 MG/ML
10 INJECTION, SOLUTION INTRAVENOUS ONCE
Status: DISCONTINUED | OUTPATIENT
Start: 2023-02-21 | End: 2023-02-21

## 2023-02-21 RX ADMIN — Medication 5 MG: at 11:54

## 2023-02-21 RX ADMIN — SODIUM CHLORIDE, SODIUM LACTATE, POTASSIUM CHLORIDE, CALCIUM CHLORIDE: 600; 310; 30; 20 INJECTION, SOLUTION INTRAVENOUS at 11:40

## 2023-02-21 RX ADMIN — Medication 5 MG: at 12:09

## 2023-02-21 RX ADMIN — SODIUM CHLORIDE: 9 INJECTION, SOLUTION INTRAVENOUS at 15:52

## 2023-02-21 RX ADMIN — SUGAMMADEX 300 MG: 100 INJECTION, SOLUTION INTRAVENOUS at 12:38

## 2023-02-21 RX ADMIN — Medication 10 MG: at 12:12

## 2023-02-21 RX ADMIN — DEXAMETHASONE SODIUM PHOSPHATE 10 MG: 10 INJECTION INTRAMUSCULAR; INTRAVENOUS at 12:35

## 2023-02-21 RX ADMIN — ETOMIDATE INJECTION 20 MG: 2 SOLUTION INTRAVENOUS at 11:33

## 2023-02-21 RX ADMIN — FENTANYL CITRATE 25 MCG: 50 INJECTION, SOLUTION INTRAMUSCULAR; INTRAVENOUS at 12:39

## 2023-02-21 RX ADMIN — HEPARIN SODIUM 3000 UNITS: 1000 INJECTION INTRAVENOUS; SUBCUTANEOUS at 12:14

## 2023-02-21 RX ADMIN — Medication 10 MG: at 12:22

## 2023-02-21 RX ADMIN — ROCURONIUM BROMIDE 10 MG: 10 INJECTION, SOLUTION INTRAVENOUS at 12:25

## 2023-02-21 RX ADMIN — FENTANYL CITRATE 50 MCG: 50 INJECTION, SOLUTION INTRAMUSCULAR; INTRAVENOUS at 11:33

## 2023-02-21 RX ADMIN — LIDOCAINE HYDROCHLORIDE 50 MG: 10 INJECTION, SOLUTION EPIDURAL; INFILTRATION; INTRACAUDAL; PERINEURAL at 11:33

## 2023-02-21 RX ADMIN — PROPOFOL 20 MG: 10 INJECTION, EMULSION INTRAVENOUS at 11:36

## 2023-02-21 RX ADMIN — ROCURONIUM BROMIDE 40 MG: 10 INJECTION, SOLUTION INTRAVENOUS at 11:33

## 2023-02-21 RX ADMIN — ROCURONIUM BROMIDE 10 MG: 10 INJECTION, SOLUTION INTRAVENOUS at 11:58

## 2023-02-21 RX ADMIN — Medication 5 MG: at 12:07

## 2023-02-21 RX ADMIN — Medication 5 MG: at 11:52

## 2023-02-21 RX ADMIN — PROPOFOL 20 MG: 10 INJECTION, EMULSION INTRAVENOUS at 11:40

## 2023-02-21 RX ADMIN — SODIUM CHLORIDE: 9 INJECTION, SOLUTION INTRAVENOUS at 10:45

## 2023-02-21 RX ADMIN — HEPARIN SODIUM 5000 UNITS: 1000 INJECTION INTRAVENOUS; SUBCUTANEOUS at 12:04

## 2023-02-21 RX ADMIN — FENTANYL CITRATE 25 MCG: 50 INJECTION, SOLUTION INTRAMUSCULAR; INTRAVENOUS at 12:35

## 2023-02-21 NOTE — OP NOTE
[facility]    Watchman Procedure    Date:   2/21/2023  Patient name: Marek Jean  Date of admission:  2/21/2023  9:56 AM  MRN:   [id number]  YOB: 1934    Operators:  Primary:       [x] Mendoza Cabrera M.D. []          Pre Procedure Conscious Sedation Data:    ASA Class:    [] I [] II [x] III [] IV    Mallampati Class:  [] I [] II [x] III [] IV    Procedures performed:     [x] Percutaneous transcatheter closure of the left atrial appendage (CESAR) with endocardial implant (Watchman)    [x] Trans septal Puncture    [x] CESAR Angiogram    [x] ALAN ( Dr: Omi Palacios)        Indication of procedure:      [x] Atrial Fibrillation  [x] CVA  [x] High risk for bleeding  [x] Bleeding episodes  [x] Risk of fall is high with needs for [facility].  [] Patient refusal for Anticoagulation. Patient has been seen by General cardiology and shared decision making has been made for pursuing CESAR closure. Patient here for CESAR closure with Watchman device. Details of procedure: The patient was brought to LAB in stable condition. The patient was in a fasting and non-sedated state. The risks, benefits and alternatives of the procedure were discussed with the patient. The risks including, but not limited to, the risks of vascular injury, bleeding, infection, device malfunction, lead dislodgement, radiation exposure, injury to cardiac and surrounding structures (including pneumothorax), stroke, myocardial infarction and death were discussed in detail. The patient opted to proceed with the device implantation. Written informed consent was signed and placed in the chart. Prophylactic antibiotic was given. The patient was prepped and draped in a sterile fashion. A timeout protocol was completed to identify the patient and the procedure being performed.  Patient underwent general anesthesia by anesthesiology team.       Transesophageal echocardiography was performed and measurements of left atrial appendage, including ostium size and depth were obtained for selection of appropriate watchman device in different positions (6,75,31,540)     ALAN Angle About 0 About 45 About 90 About 135   CESAR Ostial Width 2.14 18.4 17.2 20.6   CESAR Depth 26.7 23.4 22 24.6         Decision was made to use a size 31 mm Watchman device based on ALAN measurement and Watchman guidance protocol: Both groins were prepped in a sterile fashion. We gained access in the  [x] Right  [] Left femoral veins. We gained access in the  [] Right  [x] Left femoral artery for pigtail guidance. Right femoral access was obtained using modified using modified seldinger technique. 12 F sheath was placed Patient received a bolus of heparin prior transseptal puncture. Transseptal punctures through intact septum were done using ALAN guidance as well as pressure monitoring using Tamiko trans-septal system     ALAN and Fluoroscopy  were used to confirm in Albanian and ROSADO projections. We placed  Tamiko  sheath dilator  inside the left atrium. A long wire was placed into the left superior pulmonary vein. Then the watchman access sheath was advanced and carefully paced at the ostium  of PV and positioned into left atrium . Then a pigtail catheter was inserted into the access sheath and was placed inside the left atrial appendage. Angiography of CESAR was performed. Pigtail catheter was removed. Then Watchman delivery sheath was inserted into the access sheath. Using fluoroscopy and live ALAN the anterior lobe of the appendage was located and delivery sheath was advanced into this lobe. Then device was implanted into the appendage under fluoroscopy and live ALAN imaging.  Device positioning was confirmed and compression ratio was confirmed using ALAN in different angles ,     ATug test was done multiple times to insure device stability   ALAN guidance and 3D guidance revealed device to be well seated with excellent occlusion of atrial appendage using contrast injection .      After deployment a tug test was done and stability of device was checked. Position of device was checked. Measurement of device showed appropriate compression of the device. Using color Doppler, no leak around the was noted. PASS criteria for releasing of device were met and device was released. A figure of 8 was used to achieve hemostasis. Patient was extubated and transferred to the floor. No immediate complications noted. Pass Criteria met. [x] Position   [x] [place]   [x] Size   [x] Seal    Assessment and Summary:    Successful deployment of left atrial appendage occlusion device with no complication    WATCHMAN device used 31 mm size     Angiogram revealed good seal and no thrombus     ALAN confirmed placement and seal    EBL Less than 20 mL  No complications        Plan:     The patient will be admitted and have usual post care  Start anticoagulation  for at least next 6 weeks  Start ASA  Echocardiogram tomorrow   Patient is participating in the left atrial appendage occlusion/closure registry.  [address] is approved by the [employer] for Medicare and Medicaid Services (CMS) to meet the registry requirements outlined in the national coverage decisions for Percutaneous Left Atrial Appendage Closure

## 2023-02-21 NOTE — ANESTHESIA PRE PROCEDURE
Department of Anesthesiology  Preprocedure Note       Name:  Eileen Holman   Age:  80 y.o.  :  1934                                          MRN:  [id number]         Date:  2023      Surgeon: * No surgeons listed *    Procedure: IR ANGIOGRAM CAROTID CEREBRAL BILATERAL        Medications prior to admission:   Prior to Admission medications    Medication Sig Start Date End Date Taking? Authorizing Provider   amiodarone (CORDARONE) 200 MG tablet Take 1 tablet by mouth in the morning. 22  SARA Marin NP   atorvastatin (LIPITOR) 80 MG tablet Take 1 tablet by mouth nightly 22   Samia Wong MD   tamsulosin (FLOMAX) 0.4 MG capsule Take 1 capsule by mouth daily 22   Samia Wong MD   metoprolol tartrate (LOPRESSOR) 25 MG tablet Take 0.5 tablets by mouth 2 times daily 22   Samia Wong MD   spironolactone (ALDACTONE) 25 MG tablet Take 1 tablet by mouth daily Hold if S bp < 130 22   Samia Wong MD   midodrine (PROAMATINE) 10 MG tablet Take 1 tablet by mouth three times daily Hold if systolic bp > 805 0/   Samia Wong MD   aspirin 81 MG chewable tablet Take 1 tablet by mouth daily  Patient not taking: No sig reported 22  Samia Wong MD   calcipotriene (DOVONEX) 0.005 % solution Apply topically 2 times daily psoriasis    Historical Provider, MD   clobetasol (TEMOVATE) 0.05 % cream Apply topically 2 times daily Indications: Psoriasis Apply topically 2 times daily. Historical Provider, MD       Current medications:    Current Outpatient Medications   Medication Sig Dispense Refill    amiodarone (CORDARONE) 200 MG tablet Take 1 tablet by mouth in the morning.  30 tablet 0    atorvastatin (LIPITOR) 80 MG tablet Take 1 tablet by mouth nightly 30 tablet 3    tamsulosin (FLOMAX) 0.4 MG capsule Take 1 capsule by mouth daily 30 capsule 3    metoprolol tartrate (LOPRESSOR) 25 MG tablet Take 0.5 tablets by mouth 2 times daily 60 tablet 3  spironolactone (ALDACTONE) 25 MG tablet Take 1 tablet by mouth daily Hold if S bp < 130 30 tablet 3    midodrine (PROAMATINE) 10 MG tablet Take 1 tablet by mouth three times daily Hold if systolic bp > 229 90 tablet 3    calcipotriene (DOVONEX) 0.005 % solution Apply topically 2 times daily psoriasis      clobetasol (TEMOVATE) 0.05 % cream Apply topically 2 times daily Indications: Psoriasis Apply topically 2 times daily. Current Facility-Administered Medications   Medication Dose Route Frequency Provider Last Rate Last Admin    0.9 % sodium chloride infusion   IntraVENous PRN Caitlin Williamson MD        0.9 % sodium chloride infusion   IntraVENous Continuous Caitlin Williamson MD 75 mL/hr at 02/21/23 1045 New Bag at 02/21/23 1045    sugammadex (BRIDION) 500 MG/5ML injection             albumin human 5 % IV solution             ceFAZolin 2000 mg in 20 mL SWFI IV Syringe IV syringe             Phenylephrine (ANDREW-SYNEPHRINE) 50-0.9 MG/250ML-% infusion             etomidate (AMIDATE) 2 MG/ML injection                Allergies:     Allergies   Allergen Reactions    Neomycin-Bacitracin Zn-Polymyx     Niacin        Problem List:    Patient Active Problem List   Diagnosis Code    Cerebrovascular accident (CVA) ([facility] [address] I63.9    Acute ischemic stroke ([facility] [address] I63.9    Altered mental status R41.82    CHF (congestive heart failure) ([facility]) I50.9    NSTEMI (non-ST elevated myocardial infarction) ([facility] [address] I21.4    Intracranial bleed ([facility]) I62.9    Right sided weakness R53.1    Atrial fibrillation, chronic ([facility]) I48.20    Leukopenia D72.819    Neutropenia ([facility] [address] D70.9    Encounter for palliative care Z51.5    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    S/P ICD (internal cardiac defibrillator) procedure Z95.810    Status post implantation of automatic cardioverter/defibrillator (AICD) Z95.810       Past Medical History:        Diagnosis Date    Acute ischemic stroke ([facility] [address] 5/7/2022    Arthritis     [facility]    New onset atrial fibrillation ([facility] 5/12/2022    Psoriasis        Past Surgical History:        Procedure Laterality Date    CARDIAC CATHETERIZATION  09/12/2022    OTHER SURGICAL HISTORY  02/21/2023    Watchman                                 Dr. Eder Mccoy TRANSESOPHAGEAL ECHOCARDIOGRAM  02/20/2023    Pre Watchman       Social History:    Social History     Tobacco Use    Smoking status: Some Days     Types: Pipe    Smokeless tobacco: Former   Substance Use Topics    Alcohol use: Not Currently                                Ready to quit: Not Answered  Counseling given: Not Answered      Vital Signs (Current): There were no vitals filed for this visit.                                            BP Readings from Last 3 Encounters:   02/20/23 109/63   09/19/22 112/65   09/15/22 113/64       NPO Status:                                                                                 BMI:   Wt Readings from Last 3 Encounters:   09/19/22 149 lb (67.6 kg)   09/12/22 149 lb (67.6 kg)   06/10/22 155 lb (70.3 kg)     There is no height or weight on file to calculate BMI.    CBC:   Lab Results   Component Value Date/Time    WBC 9.4 09/15/2022 06:01 AM    RBC 3.05 09/15/2022 06:01 AM    HGB 9.0 09/19/2022 11:08 AM    HCT 27.5 09/19/2022 11:08 AM    MCV 98.0 09/15/2022 06:01 AM    RDW 12.9 09/15/2022 06:01 AM    PLT See Reflexed IPF Result 02/20/2023 11:52 AM       CMP:   Lab Results   Component Value Date/Time     09/15/2022 06:01 AM    K 4.4 09/15/2022 06:01 AM     09/15/2022 06:01 AM    CO2 25 09/15/2022 06:01 AM    BUN 23 09/15/2022 06:01 AM    CREATININE 1.22 02/20/2023 11:41 AM    CREATININE 1.00 09/15/2022 06:01 AM    GFRAA >60 09/15/2022 06:01 AM    LABGLOM >60 09/15/2022 06:01 AM    GLUCOSE 137 09/15/2022 06:01 AM    PROT 6.6 07/21/2022 10:01 AM    CALCIUM 9.4 09/15/2022 06:01 AM    BILITOT 0.72 07/21/2022 10:01 AM    ALKPHOS 70 07/21/2022 10:01 AM AST 28 07/21/2022 10:01 AM    ALT 19 07/21/2022 10:01 AM       POC Tests:   Recent Labs     02/20/23  1141   POCGLU 133*   POCNA 142   POCK 4.7*   POCCL 116*   POCBUN 19   POCHEMO 12.6*   POCHCT 37*       Coags:   Lab Results   Component Value Date/Time    PROTIME 11.5 05/07/2022 12:00 AM    INR 1.1 05/07/2022 12:00 AM    APTT 63.0 05/14/2022 01:28 AM       HCG (If Applicable): No results found for: PREGTESTUR, PREGSERUM, HCG, HCGQUANT     ABGs: No results found for: PHART, PO2ART, ADA0CKF, HVW0CQJ, BEART, B8XQXLRG     Type & Screen (If Applicable):  No results found for: LABABO, LABRH    Drug/Infectious Status (If Applicable):  No results found for: HIV, HEPCAB    COVID-19 Screening (If Applicable):   Lab Results   Component Value Date/Time    COVID19 Not Detected 05/12/2022 02:44 PM           Anesthesia Evaluation    Airway: Mallampati: III  TM distance: >3 FB   Neck ROM: limited  Mouth opening: > = 3 FB   Dental:    (+) other, upper dentures and partials      Pulmonary:normal exam                               Cardiovascular:    (+) past MI:, CHF:,                   Neuro/Psych:   (+) CVA:,             GI/Hepatic/Renal:             Endo/Other:                     Abdominal:             Vascular: Other Findings:             Anesthesia Plan      general     ASA 4       Induction: intravenous. arterial line  MIPS: Postoperative opioids intended. Anesthetic plan and risks discussed with patient, spouse and child/children. Plan discussed with CRNA.                     Juan Carlos Leon MD   2/21/2023

## 2023-02-21 NOTE — DISCHARGE INSTRUCTIONS
Discharge Instructions for angiogram  Israel Long 61 to shower in AM. Discontinue band aid in AM.   Do not apply further band aids. Keep clean, dry and open to air. No powder or lotion. Do not soak in a pool or tub and do not swim for one week. If there is any bleeding at the catheter site, apply firm pressure with your hands until the bleeding stops. If bleeding continues after 3 minutes call 911. If there is any swelling or firm areas at your puncture site, this could be bleeding under the skin(hematoma), and if you have any concerns seek help immediately. Drink plenty of fluids after the test. This will flush the x-ray dye from your system. Return to your normal diet. The sedative will make you sleepy. Rest until the effects have worn off. Ask your doctor when you will be able to return to work. Avoid heavy lifting objects greater than 10  pounds, physically demanding activities, and sexual activity for 5-7 days. Your activity will also depend upon where the catheter was inserted: Do not sit for long periods of time. Try to change positions frequently. Medications   If advised by your doctor, resume taking your normal medicines. Use acetaminophen (Tylenol) for pain relief. Do not take metformin (Glucophage) or glyburide and metformin (Glucovance) for 48 hours after the test.    If you had to stop taking these medications before the procedure, ask your doctor when you can resume taking them:   If youAnti-inflammatory drugs (eg, ibuprofen )   Blood thinners, such as warfarin (Coumadin)   Clopidogrel (Plavix)   If you are taking medicines, follow these general guidelines:   Take your medicine as directed. Do not change the amount or the schedule. Do not stop taking them without talking to your doctor. Do not share them. Know what the results and side effects. Report them to your doctor. Some drugs can be dangerous when mixed.  Talk to a doctor or pharmacist if you are taking more than one drug. This includes over-the-counter medicine and herb or dietary supplements. Plan ahead for refills so you don't run out. Call Your Doctor If Any of the Following Occurs     :   Signs of infection, including fever and chills   Redness, swelling, increasing pain, excessive bleeding, or any discharge from the catheter insertion site   CALL 911 if you have symptoms including:   Drooping facial muscles   Changes in vision or speech   Difficulty walking or using your limbs   Change in sensation to affected leg, including numbness, feeling cold, or change in color   Extreme sweating, nausea or vomiting   Dizziness or lightheadedness   Chest pain   Rapid, irregular heartbeat   Palpitations   Cough, shortness of breath, or difficulty breathing   Weakness or fainting   If you think you have an emergency, CALL 911 . Coronary artery disease (CAD) occurs when plaque builds up in the arteries that bring oxygen-rich blood to your heart. Plaque is a fatty substance made of cholesterol, calcium, and other substances in the blood. This process is called hardening of the arteries, or atherosclerosis. What happens when you have coronary artery disease? Plaque may narrow the coronary arteries. Narrowed arteries cause poor blood flow. This can lead to angina symptoms such as chest pain or discomfort. If blood flow is completely blocked, you could have a heart attack. You can slow CAD and reduce the risk of future problems by making changes in your lifestyle. These include quitting smoking and eating heart-healthy foods. Treatments for CAD, along with changes in your lifestyle, can help you live a longer and healthier life. How can you prevent coronary artery disease? Do not smoke. It may be the best thing you can do to prevent heart disease. If you need help quitting, talk to your doctor about stop-smoking programs and medicines.  These can increase your chances of quitting for good. Be active. Get at least 30 minutes of exercise on most days of the week. Walking is a good choice. You also may want to do other activities, such as running, swimming, cycling, or playing tennis or team sports. Eat heart-healthy foods. Eat more fruits and vegetables and less foods that contain saturated and trans fats. Limit alcohol, sodium, and sweets. Stay at a healthy weight. Lose weight if you need to. Manage other health problems such as diabetes, high blood pressure, and high cholesterol. Talk to your doctor about taking a daily aspirin. Manage stress. Stress can hurt your heart. To keep stress low, talk about your problems and feelings. Don't keep your feelings hidden. How is coronary artery disease treated? Your doctor will suggest that you make lifestyle changes. For example, your doctor may ask you to eat healthy foods, quit smoking, lose extra weight, and be more active. You will have to take medicines. Your doctor may suggest a procedure to open narrowed or blocked arteries. This is called angioplasty. Or your doctor may suggest using healthy blood vessels to create detours around narrowed or blocked arteries. This is called bypass surgery. Follow-up care is a key part of your treatment and safety. Be sure to make and go to all appointments, and call your doctor if you are having problems. It's also a good idea to know your test results and keep a list of the medicines you take. Where can you learn more? Go to https://shivam.[facility]. org and sign in to your [employer] [facility] account. Enter [phone] in the Located within [facility] box to learn more about Learning About Coronary Artery Disease (CAD).     If you do not have an account, please click on the Sign Up Now link. © 1593-1772 Healthwise, Incorporated. Care instructions adapted under license by [facility] ([facility]).  This care instruction is for use with your licensed healthcare professional. If you have questions about a medical condition or this instruction, always ask your healthcare professional. [place] [address] any warranty or liability for your use of this information. Content Version: 06.1.240575; Current as of: February 20, 2015      Discharge Instructions      SEDATION / ANALGESIA INFORMATION / Ortiz Rolanda Garcia have received the sedation/analgesia medication during your visit    Sedation/analgesia is used during short medical procedures under controlled supervision. The medication will produce a strong relaxation. You will be able to hear, speak and follow instructions, but your memory and alertness will be decreased. You will be able to swallow and breathe on your own. During sedation/analgesia your blood pressure, heart and breathing will be watched closely. After the procedure, you may not remember what was said or done. You may have the following effects from the medication. \" Drowsiness, dizziness, sleepiness or confusion. \" Difficulty remembering or delayed reaction times. \" Loss of fine muscle control or difficulty with your balance especially while walking. \" Difficulty focusing or blurred vision. You may not be aware of slight changes in your behavior and/or your reaction time because of the medication used during the procedure. Therefore you should follow these instructions. \" Have someone responsible help you with your care. \" Do not drive for 24 hours. \" Do not operate equipment for 24 hours (lawnmowers, power tools, kitchen accessories, stove). \" Do not drink any alcoholic beverages for a minimum of 24 hours. \" Do not make important personal, legal or business decisions for 24 hours. \" You may experience dizziness or lightheadedness. Move slowly and carefully, do not make sudden position changes. \" Drink extra amounts of fluids today. \" Increase your diet as tolerated (unless you have received specific instructions from your doctor).   \" If you feel nauseated, continue with liquids until the nausea is gone.  \" Notify your physician if you have not urinated within 8 hours after the procedure.  \" Resume your medications unless otherwise instructed          SEDATION / ANALGESIA INFORMATION / HOME GOING ADVICE  You have received the sedation/analgesia medication during your visit    Sedation/analgesia is used during short medical procedures under controlled supervision. The medication will produce a strong relaxation. You will be able to hear, speak and follow instructions, but your memory and alertness will be decreased.    You will be able to swallow and breathe on your own. During sedation/analgesia your blood pressure, heart and breathing will be watched closely. After the procedure, you may not remember what was said or done.    You may have the following effects from the medication.  \" Drowsiness, dizziness, sleepiness or confusion.  \" Difficulty remembering or delayed reaction times.  \" Loss of fine muscle control or difficulty with your balance especially while walking.  \" Difficulty focusing or blurred vision.  You may not be aware of slight changes in your behavior and/or your reaction time because of the medication used during the procedure. Therefore you should follow these instructions.  \" Have someone responsible help you with your care.  \" Do not drive for 24 hours.  \" Do not operate equipment for 24 hours (lawnmowers, power tools, kitchen accessories, stove).  \" Do not drink any alcoholic beverages for a minimum of 24 hours.  \" Do not make important personal, legal or business decisions for 24 hours.  \" You may experience dizziness or lightheadedness. Move slowly and carefully, do not make sudden position changes.  \" Drink extra amounts of fluids today.  \" Increase your diet as tolerated (unless you have received specific instructions from your doctor).  \" If you feel nauseated, continue with liquids until the nausea is gone.  \" Notify your physician if you have not  urinated within 8 hours after the procedure. \" Resume your medications unless otherwise instructed.

## 2023-02-21 NOTE — PROGRESS NOTES
Patient admitted, consent signed and questions answered. Patient ready for procedure. Call light to reach with side rails up 2 of 2. B/L Groin clipped with writer and Priyanka present.  Family at bedside with patient.  History and physical complete.

## 2023-02-21 NOTE — H&P
[facility]    Watchman Procedure    Date:   2/21/2023  Patient name: Daquan Fajardo  Date of admission:  2/21/2023  9:56 AM  MRN:   [id number]  YOB: 1934    Operators:  Primary:       [x] Edward Ashby M.D. Pre Procedure Conscious Sedation Data:    ASA Class:    [] I [x] II [] III [] IV    Mallampati Class:  [] I [x] II [] III [] IV    Patient is documented atrial fibrillation   [x] Chronic  [] Paroxysmal    Treated with anticoagulation but due to multiple reasons considered now not the best candidate to continue and advised alternative measures. Some of the reasons are:    [x] CVA  [x] High risk for bleeding  [x] Recurrent Bleeding episodes  [x] Risk of fall is high with needs for [facility].  [] Patient refusal for Anticoagulation. Patient has been seen by General cardiology and shared decision making has been made for pursuing CESAR closure. Patient here for CESAR closure with Watchman device. Known with:  Reason for Consult:  Cardiac evaluation    Requesting Physician: No admitting provider for patient encounter. CHIEF COMPLAINT:  Atrial fibrillation with difficulty using anticoagulation    History Obtained From:  patient, and family    HISTORY OF PRESENT ILLNESS:      The patient is a 80 y.o.  male who is admitted to the hospital for Arrhythmia  And need for Watchman    Known with:  CVA 5/2022  2. S/P Thrombectomy post CVA. 3. H/O cerebral bleeding  4. Atrial fibrillation - Arnoldo-Vasc score > 4.  5. CHF - Cardiomyopathy with EF 15%. 6. CAD S/P Non Q MI 5/2022. MVCAD treated medically. 7. H/O Leukemia. 8. Echocardiogram 5/22: EF 15%, moderate MR  9.  AICD placement 9/22/22        Transesophageal echocardiography was performed and measurements of left atrial appendage, including ostium size and depth were obtained for selection of appropriate watchman device in different positions (5,96,80,618)     Left atrial appendage measurements:  @ 34 degrees: cm 2.25width by 3.85cm depth  @ 60 degrees: 2.97cm width by 3.60cm depth  @ 119 degrees: 1.46cm width by 3.97cm depth. HISTORY OF PRESENT ILLNESS:      Obtained for patient and records, found with some contr indication for anticoagulation. Other medical issues:    Past Medical History:   has a past medical history of Acute ischemic stroke ([facility] [address] Arthritis, CHF (congestive heart failure) ([facility] [address] New onset atrial fibrillation ([facility] [address] and Psoriasis. Past Surgical History:   has a past surgical history that includes Prostate surgery; Cardiac catheterization (09/12/2022); transesophageal echocardiogram (02/20/2023); and other surgical history (02/21/2023). Home Medications:    Prior to Admission medications    Medication Sig Start Date End Date Taking? Authorizing Provider   amiodarone (CORDARONE) 200 MG tablet Take 1 tablet by mouth in the morning. 7/21/22 9/12/22  SARA Fishman Mai - NP   atorvastatin (LIPITOR) 80 MG tablet Take 1 tablet by mouth nightly 5/17/22   Beverly Can MD   tamsulosin (FLOMAX) 0.4 MG capsule Take 1 capsule by mouth daily 5/17/22   Beverly Can MD   metoprolol tartrate (LOPRESSOR) 25 MG tablet Take 0.5 tablets by mouth 2 times daily 5/17/22   Beverly Can MD   spironolactone (ALDACTONE) 25 MG tablet Take 1 tablet by mouth daily Hold if S bp < 130 5/18/22   Beverly Can MD   midodrine (PROAMATINE) 10 MG tablet Take 1 tablet by mouth three times daily Hold if systolic bp > 148 0/19/80   Beverly Can MD   aspirin 81 MG chewable tablet Take 1 tablet by mouth daily  Patient not taking: No sig reported 5/17/22 7/21/22  Beverly Can MD   calcipotriene (DOVONEX) 0.005 % solution Apply topically 2 times daily psoriasis    Historical Provider, MD   clobetasol (TEMOVATE) 0.05 % cream Apply topically 2 times daily Indications: Psoriasis Apply topically 2 times daily.     Historical Provider, MD       Allergies:  Neomycin-bacitracin zn-polymyx and Niacin    Social History: reports that he has been smoking pipe. He has quit using smokeless tobacco. He reports that he does not currently use alcohol. He reports that he does not use drugs. Family History:   Positive for early CAD    REVIEW OF SYSTEMS:    Constitutional: there has been no unanticipated weight loss. There's been No change in energy level, No change in activity level. Eyes: No visual changes or diplopia. No scleral icterus. ENT: No Headaches, hearing loss or vertigo. No mouth sores or sore throat. Cardiovascular: Atrial fibrillation, CAD  Respiratory: No previous reported problems  Gastrointestinal: No abdominal pain, appetite loss, blood in stools. No change in bowel or bladder habits. Genitourinary: No dysuria, trouble voiding, or hematuria. Musculoskeletal:  No gait disturbance, No weakness or joint complaints. Integumentary: No rash or pruritis. Neurological: H/O CVA and intracranial bleeding  Psychiatric: No anxiety, or depression. Endocrine: No temperature intolerance. No excessive thirst, fluid intake, or urination. No tremor. Hematologic/Lymphatic: No abnormal bruising or bleeding, blood clots or swollen lymph nodes. Allergic/Immunologic: No nasal congestion or hives. PHYSICAL EXAM:    Physical Examination:    There were no vitals taken for this visit. Constitutional and General Appearance: alert, cooperative, no distress and appears stated age  [de-identified]: PERRL, no cervical lymphadenopathy. No masses palpable. Normal oral mucosa  Respiratory:  Normal excursion and expansion without use of accessory muscles  Resp Auscultation: Good respiratory effort. No for increased work of breathing. On auscultation: clear to auscultation bilaterally  Cardiovascular:   The apical impulse is not displaced  Heart tones are crisp and normal. regular S1 and S2. Murmurs:  Jugular venous pulsation Normal  The carotid upstroke is normal in amplitude and contour without delay or bruit  Peripheral pulses are symmetrical and full   Abdomen:  No masses or tenderness  Bowel sounds present  Extremities:   No Cyanosis or Clubbing   Lower extremity edema: Yes   Skin: Warm and dry  Neurological:  Alert and oriented. Moves all extremities well  No abnormalities of mood, affect, memory, mentation, or behavior are noted    DATA:    Diagnostics:        IMPRESSION:    Atrial fibrillation- Chronic  H/O Intracranial bleeding and CVA  CAD    Patient Active Problem List   Diagnosis    Cerebrovascular accident (CVA) ([facility] [address]    Acute ischemic stroke ([facility] [address]    Altered mental status    CHF (congestive heart failure) ([facility])    NSTEMI (non-ST elevated myocardial infarction) ([facility] [address]    Intracranial bleed ([facility] [address]    Right sided weakness    Atrial fibrillation, chronic ([facility])    Leukopenia    Neutropenia ([facility] [address]    Encounter for palliative care    Diverticular hemorrhage    S/P ICD (internal cardiac defibrillator) procedure    Status post implantation of automatic cardioverter/defibrillator (AICD)       RECOMMENDATIONS:  Watchman Procedure. Discussed with patient and nursing.     Electronically signed by Shan Homans, MD on 2/21/2023 at 11:52 AM.  [place] [facility]

## 2023-02-21 NOTE — ANESTHESIA POSTPROCEDURE EVALUATION
Department of Anesthesiology  Postprocedure Note    Patient: Celestina Kramer  MRN: [id number]  YOB: 1934  Date of evaluation: 2/21/2023      Procedure Summary     Date: 02/21/23 Room / Location: [facility] Cath Lab    Anesthesia Start: 7532 Anesthesia Stop: 2617    Procedure: CATH LAB WITH ANESTHESIA Diagnosis: A-fib ([address]    Scheduled Providers:  Responsible Provider: Ferny Serrano MD    Anesthesia Type: general ASA Status: 4          Anesthesia Type: No value filed.     Jonatan Phase I: Jonatan Score: 10    Jonatan Phase II:    POST-OP ANESTHESIA NOTE       BP (!) 141/67   Pulse 50   Temp 97.4 °F (36.3 °C) (Oral)   Resp (!) 9   SpO2 98%               Anesthesia Post Evaluation    Patient location during evaluation: PACU  Patient participation: complete - patient participated  Level of consciousness: awake  Pain score: 0  Airway patency: patent  Nausea & Vomiting: no nausea and no vomiting  Complications: no  Cardiovascular status: hemodynamically stable  Respiratory status: acceptable  Hydration status: stable

## 2023-02-21 NOTE — PROGRESS NOTES
Received post watchman procedure to [facility] room 07. Assessment obtained. Restrictions reviewed with patient. Post procedure pathway initiated. Rt. site soft , dressing dry and intact. No hematoma noted. Lt. groin slightly swollen, manual pressure applied. Patient without complaints. Head of bed flat with B/L legs straight. Recovery started. Pt. Family waiting in waiting room. Dr. Randa Saab updated family.

## 2023-02-21 NOTE — PROCEDURES
[facility]  3D Interventional /Structural ALAN   Op Note for ALAN guidance during Watchman Deployment       02/21/23   Smitha Earing  [id number]  1934    Primary/Ordering Cardiologist: Dr. Bozena Baker  Indication: ALAN guidance during Watchman    Operators:  Primary: Kings Jackson DO    Patient seen and examined. History and Physical reviewed. Labs reviewed. After informed consent was obtained with explanation of the risks and benefits, the patient was brought to [facility] Cath lab. All sedation was administered via the Anesthesia department. Intubation done with single effort. ALAN Pre findings:    Structures:  Normal LV function with EF 35-40%  Normal wall thickness  No significant pericardial effusion  Left Atrium/CESAR- No thrombus  IntraAtrial Septum- intact via color  Significant Valve Diseases: Mild MR, Trivial AI and TR. ECSAR Morphology-  Broccoli    CESAR Sizes:  0 degree- width (mm)- 21.4 - length- 26.7  45 degree- width (mm)- 24 - length- 23.4  90 degree- width (mm)- 17.2 - length- 22  135 degree- width (mm)- 25.5 - length- 24.6    Size 31 chosen based on above plus cine measurements    Post Deployment Watchman Compression Size :  0 degree- size (mm): 26.9 - Compression: 13%, no shoulder  45 degree- size (mm): 25.6 - Compression: 17%, <1/3 shoulder  90 degree- size (mm): 26.2 - Compression: 14%, no shoulder  135 degree- size (mm): 27.1 - Compression: 12.5%, <1/3 shoulder    Post Deployment Leak Check:  0 degree- none  45 degree- none  90 degree-  none  135 degree- none    Post Deployment Tug Test: Passed     Post Deployment Effusion Check: No effusion    Post Deployment Septum Check: Iatrogenic Small ASD noted with L- R shunt    Watchman well seated and in good position confirmed on multiple views/angles. Conclusion:   1. Successful Interventional / Structural ALAN for Watchman Deployment. Size 31 device. 2. See above for full details. 3. No effusion.   4. Mild MR, Trivial AI/TR. 5. Reduced LVEF 35-40% with AICD/pacer wires in R chambers. All of the above was relayed in real time to the Structural Heart Team / , Pre-op, Intra-op, and Post-op. Wesley Danielle DO, [facility], [address], [address] [facility]  [facility]  [phone]

## 2023-02-22 LAB
ABO/RH: NORMAL
ANTIBODY SCREEN: NEGATIVE
ARM BAND NUMBER: NORMAL
BLD PROD TYP BPU: NORMAL
BLD PROD TYP BPU: NORMAL
BPU ID: NORMAL
BPU ID: NORMAL
CROSSMATCH RESULT: NORMAL
CROSSMATCH RESULT: NORMAL
DISPENSE STATUS BLOOD BANK: NORMAL
DISPENSE STATUS BLOOD BANK: NORMAL
EKG ATRIAL RATE: 58 BPM
EKG P AXIS: 35 DEGREES
EKG P-R INTERVAL: 190 MS
EKG Q-T INTERVAL: 554 MS
EKG QRS DURATION: 122 MS
EKG QTC CALCULATION (BAZETT): 543 MS
EKG R AXIS: -17 DEGREES
EKG T AXIS: 90 DEGREES
EKG VENTRICULAR RATE: 58 BPM
EXPIRATION DATE: NORMAL
TRANSFUSION STATUS: NORMAL
TRANSFUSION STATUS: NORMAL
UNIT DIVISION: 0
UNIT DIVISION: 0

## 2023-02-22 PROCEDURE — 93010 ELECTROCARDIOGRAM REPORT: CPT | Performed by: INTERNAL MEDICINE

## 2023-03-03 ENCOUNTER — HOSPITAL ENCOUNTER (OUTPATIENT)
Dept: CARDIAC REHAB | Age: 88
Discharge: HOME | End: 2023-03-03

## 2023-03-03 NOTE — PROGRESS NOTES
Cardiac Rehab Initial History and Assessment    Clem Martinez   11/30/1934  [id number]  3/3/2023    Primary Diagnosis: NSTEMI  Date: 05/06/2022    Participated in cardiac rehab program before:  [] Yes     [x] No    Living Will: [x] Yes   [] No  On File: [] Yes   [x] No   [] N/A  Durable Power of : [x] Yes   [] No    Medical History  Past Medical History:   Diagnosis Date    Acute ischemic stroke ([facility] 5/7/2022    Arthritis     CHF (congestive heart failure) ([facility] 5/8/2022    New onset atrial fibrillation ([facility] 5/12/2022    Psoriasis        Family History  Family History   Problem Relation Age of Onset    No Known Problems Mother     No Known Problems Father        Symptoms:     Angina   [x] None   [] Tightness   [] Shortness of Breath   [] Pressure    [] Nausea   [] Sharp, Stabbing  [] Pallor   [] Indigestion, Heartburn [] Sweaty  Where was discomfort located? Precipitating Factors? Relieved by:     Arrhythmia   [] Yes/describe:      [] No   [] Pacer    [x] AICD  Arrhythmia Medications: Cordarone     Congestive Heart Failure   [] None   [] Pedal Edema  [] Unusual weight gain   [] SOB with mild exertion [x] Fatigue     Vascular   [x] None   [] Peripheral claudication [] R [] L  PAD History:               Location:  Pain Scale (1-5):  [] Carotid Narrowing  [] R [] L     Musculoskeletal    [x] None   [] Back Pain  Where? [] Joint discomfort Where?      Nutrition  Appetite:  []  Too Good    [x]  Good  []  Poor  Diet: Normal Diet  Restaurant food 1-2 times/wk  Dietary Screening:  Rate Your Plate: 54   Daily Food Diary completed:       [x] Yes        [] No  Lipids:    Lab Results   Component Value Date    CHOL 133 05/07/2022     Lab Results   Component Value Date    TRIG 50 05/07/2022     Lab Results   Component Value Date    HDL 52 05/07/2022     Lab Results   Component Value Date    LDLCHOLESTEROL 71 05/07/2022     No results found for: LABVLDL, VLDL  Lab Results   Component Value Date    CHOLHDLRATIO 2.6 2022     Lipid Meds: Lipitor    Alcohol:   Social History     Substance and Sexual Activity   Alcohol Use Not Currently     Caffeine: [x] Yes [] No  Type: Coffee  Amount: 1 cup  Water intake per day: Approximately 16 oz    Psychological  [] Depression    [] Anxiety    [x] None  Treatment: N/A    Tobacco Use  Social History     Tobacco Use   Smoking Status Former    Types: Pipe    Quit date: 9/10/2022    Years since quittin.4   Smokeless Tobacco Former     Current smokers:  Longest quit attempt:  Tobacco triggers:  Barriers to successful cessation:  [] Smoking cessation medication:    Diabetes     [] Yes  [x] No  How Long:  How do you manage your diabetes:    Do you check your blood sugar? [] Yes  [] No  How often:  Have you seen a dietician or diabetic educator?        [] Yes  [] No    HbA1c: No components found for: HGBA1C  FBS:  Glucose (mg/dL)   Date Value   09/15/2022 137 (H)                        Diabetic Medication: N/A    Socio-Economic  Marital Status:     Medication Compliance (stated):    [x] 90%  [] 75%           [] 50%  [] 25%      [] 0%         Stress  Source(s): N/A  Relaxation techniques/hobbies: Wood working    Level of Education  [] 8th Grade  [] Associates  [] Masters  [x] [employer] [] Bachelor  [] Other:    Depression Screening:  [x] PHQ9 score: 8    Psychosocial Screening:   [x] Janel and Xiao Quality of Life Index scores: 28/27/29/30/26    Cardiac Rehab Pre - Test  [] Score:     Physical Findings  Height:   5'9  Weight:159.6  BMI: 23.6  Weight goal: BMI <25  BP Sittin/58  BP Standin/50  Current Exercise -   [x] Yes - 2-3 times per week for about 30 minutes, walking up and down his driveway   [] No    Cardiovascular-  No edema, apical pulse regular to auscultation, strong bilateral upper extremity pulses   12-Lead EKG -     VR- 58  MS- 190  QRS- 122  QT/QTc- 554/543  Sinus bradycardia  Inferior infarct (cited on or before 07-MAY-2022)  Abnormal ECG  When compared with ECG of 12-MAY-2022 10:10,  Sinus rhythm has replaced Atrial fibrillation  Vent. rate has decreased BY 73 BPM    Cardiac Cath- N/A  Ejection Fraction-  35-40%  Pulmonary-   Breath Sounds:  Clear  SpO2: 94%  [] 02   [] CPAP  [] BiPAP   [x] None    Neurological- No deficit noted or reported. Peripheral Vascular- No deficit noted or reported. Muscular Skeletal- No deficit noted or reported. Pain Assessment- Location/Frequency/ Duration-   Pain Level 8/10 on 10 point scale  Endocrine- No deficit noted or reported. Gastrointestinal- No deficit noted or reported. Genitourinary- No deficit noted or reported.   Other-    Barriers to Program Adherence:  [] Transportation   [] Travel distance  [] Insurance/copay    [] Motivation  [] Other  [x] None    Risk Stratification (of untoward event during exercise):  [x] HIGH RISK  LVEF < 40%  Survivor of cardiac arrest or sudden cardiac death  Complex ventricular arrythmias (VT >6 beats, multifocal PVCs at rest or with exercise)  Presence of angina or other significant symptoms (shortness of breath, light-headedness, or dizziness at <5 METs or during recovery  MI or cardiac surgery complicated by cardiogenic shock, CHF, and/or s/s of post-procedure ischemia  Abnormal hemodynamics with exercise, especially flat or decreasing systolic BP or chronotropic incompetence with ^ workload  Significant silent ischemia (ST depression >/= 2mm without symptoms with exercise or in recovery  Signs/symptoms including angina, dizziness, light-headedness or dyspnea with low exercise levels or in recovery  Clinically significant depression   Physically Inactive - <= 5 METs  Implantable cardioverter defibrillator (ICD)  At least 4 of below:   Lipids LDL >130 / Cholesterol > 200  BP >160/100  BMI >30  Smoker who continues to smoke  DM - HgbA1C >=7  [] MODERATE RISK  LVEF 40-49%  Mild to moderate level of silent ischemia during exercise testing or recovery (ST depression <2mm from baseline)  Presence of stable angina or other significant symptoms (unusual shortness of breath, light-headedness, or dizziness occurring only at high level of exertion [>=7 METs])  Functional capacity 5.1-8.9 METs  At least 2-3 of below:  Lipids -129 / Cholesterol 398-041  BP - systolic 617-357/GJMGCXAZF 35-49  BMI >48  DM - HgbA1C 5.8-7.0  [] LOW RISK  Rest LVEF >= 50%  No resting or exercise induced complex dysrhythmias  Uncomplicate MI, CABG, angioplasty, atherectomy, or stent  Normal hemodynamic and EGC response with exercise and in recovery (appropriate increases and decreases in HR and SBP with increasing workloads and recovery  Functional capacity >9 METs  Absence of angina or other significant symptoms (unusual shortness of breath, light-headedness, or dizziness during exercise and recovery)  Absence of complicated ventricular arrhythmias at rest  Absence of HF  Absence of signs/symptoms of post-event or post-procedure ischemia  Absence of clinical depression   Non-smoker   0-1 Uncontrolled risk factors    Fall Risk Assessment:  History of falls with or without injury  [] Yes   [x] No   Use of ambulatory aid  [] Yes  [x] No   Difficulty walking/impaired gait  [] Yes  [x] No   Numbness in feet  [] Yes   [x] No   Vision changes  [] Yes  [x] No   Dizziness  [] Yes  [x] No   Shortness of breath  [] Yes  [x] No   Medications  [x] Anticoagulant/Antiplatelet  [x] Betablocker /ACE/ARB  [] Antidepressant  [] Seizure medication   [] NA  Risk of fall  [] Low (none of above)  [x] Medium (less than 3 above)  [] High (3 or more above)       Patient 90-Day Goals: (Specific, Measurable, Achievable, Relevant, and Time-Bound)  \"Increase hearts strength so I can get back to wood working. \"    Program Goals:   Achieve and progress prescribed exercise frequency, intensity, time, and type based upon initial evaluation and submaximal graded exercise/6MWT results as evidenced by the attaining and maintaining the prescribed target heart rate range, a Lisa rating of perceived exertion between 11 and 16, duration of 31 - 60 minutes using multiple exercise modes for at least 3 days/week, progressing at least 0.5-1.0 METs/week as tolerated, as evidenced by daily session reports and pre/post MET levels. Introduce and progress 8-10 different bilateral UE and/or LE progressive resistance exercises focused on major muscle groups using 1-3 sets each per lift, on 2-3 non-consecutive days implementing free and machine weights and/or TheraBands, as appropriate, with a resistance of 40-60% 1-repetition maximum or 10 -15 repetitions to progressive overload and increasing resistance once repetitions have progressed to 15 reps and feel fairly light on 2 prior occasions. Develop regular home aerobic exercise program for 20 - 60 minutes at least 2 non-rehab days per week, excluding  5 - 10 minutes warm-up and cool-down periods, as tracked on home workout log. Establish and maintain individualized heart healthy eating plan, and gradually lose 10-15 lb of body weight over the program, utilizing dietician recommendations, moderating nutrional intake, and performing regular aerobic and strength training exercises as prescribed, as evidenced by routine weights, pre- and post-program nutriton survey and routine rounding with patient, food diary, and Rate-your-Plate screening survey. Strive for blood lipid optimization with an LDL-C of <100 mg/dL or LDL 70 mg/dL, an HDL-C of > or = 40 mg/dL for men and > or = 50 mg/dL for women, and a triglyceride level of <150 mg/dL via lifestyle education, behavioral modification, and medication compliance, as evidenced by improved post-program lipid results. Strive for HbA1C <= 7.0% via lifestyle education, behavioral modification, and medication compliance as evidenced by post program HbA1C lab results.      Achieve and maintain an optimal average resting blood pressure of <130 / 80 mmHg over the course of the program by educating patient, monitoring medication compliance, introducing and maintaining regular cardiovascular exercise program, as evidenced by routine BP monitoring. Minimize self-reported psycho-social feelings of stress over the program by educating patient, monitoring medication compliance, introducing and maintaining regular cardiovascular exercise as evidenced by pre- and post- surveys and by routine rounding with patient. Demonstrate knowledge about risk factor reduction, lifestyle modification, and heart health strategies with a score of >=75% via education classes and counseling as evidenced by pre- and post-program education assessment screening tool. Complete abstinence from tobacco products over the cardiac rehab program through intensive counseling, behavior modification, and medication compliance as evidenced by routine rounding with patient.     Electronically signed by LORRIE Britton on 3/3/23 at 9:58 AM EST

## 2023-03-03 NOTE — PROGRESS NOTES
Phase II Cardiac Rehab Individualized Treatment Plan-Initial     Patient Name: Amy Marie  Date: 3/3/2023  ACCOUNT #: [de-identified]  Diagnosis: NTSEMI   Onset Date: 05/06/2022  Referring Physician: Dr. Estrella Berman  Session Number: Assessment   Risk Stratification (of untoward event during exercise):  [x] HIGH RISK  LVEF < 40%  Survivor of cardiac arrest or sudden cardiac death  Complex ventricular arrythmias (VT >6 beats, multifocal PVCs at rest or with exercise)  Presence of angina or other significant symptoms (shortness of breath, light-headedness, or dizziness at <5 METs or during recovery  MI or cardiac surgery complicated by cardiogenic shock, CHF, and/or s/s of post-procedure ischemia  Abnormal hemodynamics with exercise, especially flat or decreasing systolic BP or chronotropic incompetence with ^ workload  Significant silent ischemia (ST depression >/= 2mm without symptoms with exercise or in recovery  Signs/symptoms including angina, dizziness, light-headedness or dyspnea with low exercise levels or in recovery  Clinically significant depression   Physically Inactive - <= 5 METs  Implantable cardioverter defibrillator (ICD)  At least 4 of below:   Lipids LDL >130 / Cholesterol > 200  BP >160/100  BMI >30  Smoker who continues to smoke  DM - HgbA1C >=7  [] MODERATE RISK  LVEF 40-49%  Mild to moderate level of silent ischemia during exercise testing or recovery (ST depression <2mm from baseline)  Presence of stable angina or other significant symptoms (unusual shortness of breath, light-headedness, or dizziness occurring only at high level of exertion [>=7 METs])  Functional capacity 5.1-8.9 METs  At least 2-3 of below:  Lipids -129 / Cholesterol 111-235  BP - systolic 738-048/HGITFALXU 81-31  BMI >95  DM - HgbA1C 5.8-7.0  [] LOW RISK  Rest LVEF >= 50%  No resting or exercise induced complex dysrhythmias  Uncomplicate MI, CABG, angioplasty, atherectomy, or stent  Normal hemodynamic and EGC response with exercise and in recovery (appropriate increases and decreases in HR and SBP with increasing workloads and recovery  Functional capacity >9 METs  Absence of angina or other significant symptoms (unusual shortness of breath, light-headedness, or dizziness during exercise and recovery)  Absence of complicated ventricular arrhythmias at rest  Absence of HF  Absence of signs/symptoms of post-event or post-procedure ischemia  Absence of clinical depression   Non-smoker   0-1 Uncontrolled risk factors    EXERCISE    Stages of Change:   [] pre-contemplation  [] Action   [] Contemplate    [] Maintenance   [x] Prep   [] Relapse          Exercise Prescription:  Mode: [x] TM    [x] B    [x] STP   [] R   [x] UBE    Frequency: 3 days per week  Duration: 31-60 minutes  Intensity: 2-3 METs             THRR:    Progression:   Based on risk stratification, may increase duration per F.I.T.T. protocol parameters on average 5-10 minutes every 1-2 weeks for the first 4-6 weeks. After 3-4 weeks completed, continue to gradually increase F.I.T.T. parameters gradually at the established duration on average 0.5-1.0 METs per 30 days over the course of the remaining program, as established by patient centered goals and guidelines, maintaining RPE 12-16. [] Resistance Training  Introduce 8-15 bilateral upper extremity resistance exercise at 1-3 sets per lift, on 2-3 non-consecutive days using TheraBand/Free Weights/Weight machine to 8-15 reps on at lease 2 occasions, maintaining RPE 12-16. Once repetition maximum has been achieved, additional weight sets may be added. Hypertension:  [x] Yes  [] No  Resting BP: 110/58  BP Meds: Lopressor, Aldactone    Intervention:  Home Exercise:  Current Exercise -    [x] Yes - 2-3 times per week, walking up and down his driveway   [] No   [] Resistance Training  Progression:  20-60 minutes of aerobic exercise on at lease 2 non-rehab days.   8-12 bilateral upper extremity resistance exercise at 1-3 sets per lift, on 2 non-consecutive days using TheraBand/Free Weights/Weight Machine to 8-15 reps on at lease 2 occasions.  Once repetition maximum has been achieved, additional weight sets may be added.    Education:   [x] Equipment [place]  [] Understanding BP   [x] S/S to report  [] Sodium     [x] Warm up/ Cool down  [] Exercise Prescription   [x] RPE Scale   [] Hot/Cold weather guidelines   [] Ex Safety   [] Home Exercise     [] Staying Fit    [] Proper use weights/bands    Target Goal:   -Individual Exercise Plan  -Resting BP <130/80 mmHg  -Aerobic active 30 + minutes 5-7 days per week    Nutrition    Stages of Change:   [] pre-contemplation  [] Action   [] Contemplate    [] Maintenance   [x] Prep    [] Relapse    Hyperlipidemia:  [x] Yes  [] No  Lab Results   Component Value Date    CHOL 133 05/07/2022     Lab Results   Component Value Date    TRIG 50 05/07/2022     Lab Results   Component Value Date    HDL 52 05/07/2022     Lab Results   Component Value Date    LDLCHOLESTEROL 71 05/07/2022     No results found for: LABVLDL, VLDL  Lab Results   Component Value Date    CHOLHDLRATIO 2.6 05/07/2022     Lipid Meds: Lipitor    Diabetes:  [] Yes  [x] No  HbA1c: No components found for: HGBA1C  FBS:  Glucose (mg/dL)   Date Value   09/15/2022 137 (H)     [] Monitor BS at home   Frequency?:   Diabetes Medication: N/A    Weight Management:  Height: 5'9  Weight: 159.6 lbs  BMI: 23.6     Wt Goal: BMI <25  Alcohol:   Social History     Substance and Sexual Activity   Alcohol Use Not Currently     Diet Assessment Tool:   [x]  Food Diary  Rate My Plate Score:   55  Special Diet:  2 gram NA+, reduced calorie, 30% total fat, <10% saturated fat, 25-35 grams fiber, < 200 mg cholesterol, balanced nutrition    Intervention:   [] Dietitian Consult         [] Referred to Diabetes Education     Education:  [] Heart Healthy Nutrition  [] Weight Management  [] Portions  [] Fat/Cholesterol  [] Food Labels       [] Food Packaging  Claims  [] Snacks and Substitutes      [] Relate Diabetes/CAD    Target Goal:  -LDL-C<100 if triglycerides are > 200  -LDL-C < 70 for high risk patients  -HbA1c < 7%  -BMI < 25     Education    Stages of Change:    [] pre-contemplation  [] Action   [] Contemplate    [] Maintenance   [x] Prep    [] Relapse    Learning Barriers:   [] Speech   [] Cognitive   [] Literacy   [] Vision   [x] Hearing    [] Readiness to Learn   [] None    Education Assessment Score:       Family support: [x] Yes  [] No    Tobacco use:   Social History     Tobacco Use   Smoking Status Former    Types: Pipe    Quit date: 9/10/2022    Years since quittin.4   Smokeless Tobacco Former     Current smokers:  Longest quit attempt:  Tobacco triggers:  Barriers to successful cessation:  [] Smoking cessation medication:    Intervention:  [] Referred to physician for smoking cessation    [] Individual education and counseling  [] Tobacco adjunct      Education:   [] Cardiac A and P  [] Interventions  [] Risk Factors     [] Angina      [] Medications  [] CHF     Target Goal:  -Complete cessation of tobacco use (if applicable)  -Continued risk factor modifications  -Recognizing signs/symptoms to report  -Proper use of meds    Psychosocial  Stages of Change:    [] pre-contemplation  [] Action   [] Contemplate    [] Maintenance   [x] Prep    [] Relapse    Psychosocial Test:  Tool Used:   Janel Hagen Quality of Life Score: 28/27/29/30/26  PHQ9 score: 8    Intervention:   [] Psych Consult/   [] Physician Referral     Medications: N/A    Education:    [] Stress Management   [] Depression    Target Goal:  -Assess presence or absence of depression using a valid screening tool. -Maximize coping skills.  -Positive support system.     Preventative Medication:   [] Aspirin       [x] Beta Blockade      [x] Statin or other lipid lowering agent     [x] Antiplatelet   [] ACE Inhibitor/ARB/Calcium Channel Blocker   [] Anticoagulant   Medication Compliance (stated):    [x] 90%  [] 75%           [] 50%  [] 25%      [] 0%         Target Goal:  -100% medication adherence    Fall Risk Assessment:  History of falls with or without injury  [] Yes   [x] No   Use of ambulatory aid  [] Yes  [x] No   Difficulty walking/impaired gait  [] Yes  [x] No   Numbness in feet  [] Yes   [x] No   Vision changes  [] Yes  [x] No   Dizziness  [] Yes  [x] No   Shortness of breath  [] Yes  [x] No   Medications  [x] Anticoagulant/Antiplatelet  [x] Betablocker /ACE/ARB/Calcium channel blocker  [] Antidepressant  [] Seizure medication   [] NA  Risk of fall  [] Low (none of above)  [x] Medium (less than 3 above)  [] High (3 or more above)    Patient 90-Day Goals: (Specific, Measurable, Achievable, Relevant, and Time-Bound)  \"Increase hearts strength so I can get back to wood working. \"    Program Goals:   Achieve and progress prescribed exercise frequency, intensity, time, and type based upon initial evaluation and submaximal graded exercise results as evidenced by the attaining and maintaining the prescribed target heart rate range, a Lisa rating of perceived exertion between 11 and 16, duration of 31 - 60 minutes using multiple exercise modes for at least 3 days/week, progressing at least 0.5-1.0 METs/week as tolerated, as evidenced by daily session reports and pre/post MET levels. Introduce and progress 8-10 different bilateral UE and/or LE progressive resistance exercises focused on major muscle groups using 1-3 sets each per lift, on 2-3 non-consecutive days implementing free and machine weights and/or bands, as appropriate, with a resistance of 40-60% 1-repetition maximum or 10 -15 repetitions to progressive overload and increasing resistance once repetitions have progressed to 15 reps and feel fairly light on 2 prior occasions.      Develop regular home aerobic exercise program for 20 - 60 minutes at least 2 non-rehab days per week, excluding  5 - 10 minutes warm-up and cool-down periods, as tracked on home workout log. Establish and maintain individualized heart healthy eating plan, and gradually lose 10-15 lb of body weight over the program, utilizing dietician recommendations, moderating nutrional intake, and performing regular aerobic and strength training exercises as prescribed, as evidenced by pre- and post-program nutriton survey and routine rounding with patient, food diary, and Rate-your-Plate screening survey. Strive for blood lipid optimization with an LDL-C of <100 mg/dL or LDL 70 mg/dL, an HDL-C of > or = 40 mg/dL for men and > or = 50 mg/dL for women, and a triglyceride level of <150 mg/dL via lifestyle education, behavioral modification, and medication compliance, as evidenced by improved post-program lipid results. Strive for HbA1C <= 7.0% via lifestyle education, behavioral modification, and medication compliance as evidenced by post program HbA1C lab results. Achieve and maintain an optimal average resting blood pressure of <130 / 80 mmHg over the course of the program by educating patient, monitoring medication compliance, introducing and maintaining regular cardiovascular exercise program as evidenced by routine BP monitoring. Minimize self-reported psycho-social feelings of stress over the program by educating patient, monitoring medication compliance, introducing and maintaining regular cardiovascular exercise as evidenced by pre- and post- surveys and by routine rounding with patient. Demonstrate knowledge about risk factor reduction, lifestyle modification, and heart health strategies with a score of >=75% via education classes and counseling as evidenced by pre- and post-program education assessment screening tool. Complete abstinence from tobacco products over the cardiac rehab program through intensive counseling, behavior modification, and medication compliance as evidenced by routine rounding with patient.     Electronically signed by LORRIE Chen on 3/3/23 at 10:19 AM EST

## 2023-03-03 NOTE — PROGRESS NOTES
Phase II Cardiac Rehabilitation   Physician Order [facility]  11/30/1934  [id number]  3/3/2023    [x] Phase 2 ECG Monitored Cardiac Rehabilitation    [x] MI     [] PTCA with/without stents  [] CABG    [] Heart Valve Repair/Replaced   [] Stable Angina:      [] CHF:      Onset Date: 05/06/2022    Prescribed Exercise Plan:  Submaximal exercise evaluation at program beginning and completion  Target HR:     Initial MET Level: 2-4 METs     Duration: 31 - 60 Minutes  Frequency: 3 Days per week  Modalities:  Treadmill   Bicycle ergometer/UBE  Seated Stepper  Rowing Machine  Weights/bands  May increase duration per F.I.T.T. protocol parameters on average 5-10 minutes every 1-2 weeks for the first 4-6 weeks. After 3-4 weeks completed, continue to gradually increase F.I.T.T. parameters gradually at the established duration on average 0.5-1.0 METs per 30 days over the course of the remaining program, as established by patient centered goals and guidelines, maintaining RPE 12-16. Introduce 8-15 bilateral upper /lower extremity resistance exercise at 1-3 sets per lift, on 2-3 non-consecutive days using TheraBand/weights to 8-15 reps on at lease 2 occasions, maintaining RPE 12-16. Once repetition maximum has been achieved, additional weight sets may be added. Standing Orders:  Initiate ACLS for cardiac events  Nitroglycerine 0.4mg SL every 5 minutes X 3 for angina pain  12 lead EKG for chest pain or dysrhythmia  O2 per nasal cannula as needed for SpO2 <90% with symptoms  Blood sugar monitoring for hyper/hypoglycemia symptoms  Lipid panel pre/post program as needed.

## 2023-03-06 ENCOUNTER — HOSPITAL ENCOUNTER (OUTPATIENT)
Dept: CARDIAC REHAB | Age: 88
Setting detail: THERAPIES SERIES
Discharge: HOME | End: 2023-03-06
Payer: MEDICARE

## 2023-03-06 PROCEDURE — 93798 PHYS/QHP OP CAR RHAB W/ECG: CPT

## 2023-03-09 ENCOUNTER — HOSPITAL ENCOUNTER (OUTPATIENT)
Dept: CARDIAC REHAB | Age: 88
Setting detail: THERAPIES SERIES
Discharge: HOME | End: 2023-03-09
Payer: MEDICARE

## 2023-03-09 PROCEDURE — 93798 PHYS/QHP OP CAR RHAB W/ECG: CPT

## 2023-03-13 ENCOUNTER — HOSPITAL ENCOUNTER (OUTPATIENT)
Dept: CARDIAC REHAB | Age: 88
Setting detail: THERAPIES SERIES
Discharge: HOME | End: 2023-03-13
Payer: MEDICARE

## 2023-03-13 PROCEDURE — 93798 PHYS/QHP OP CAR RHAB W/ECG: CPT

## 2023-03-16 ENCOUNTER — HOSPITAL ENCOUNTER (OUTPATIENT)
Dept: CARDIAC REHAB | Age: 88
Setting detail: THERAPIES SERIES
Discharge: HOME | End: 2023-03-16
Payer: MEDICARE

## 2023-03-16 PROCEDURE — 93798 PHYS/QHP OP CAR RHAB W/ECG: CPT

## 2023-03-16 NOTE — PROGRESS NOTES
Cardiopulmonary Rehab   Medical Nutrition Therapy Food Diary Evaluation    Patient Name: Elizabeth Porras  Registered Dietitian:  Shima Quiroz RDN, LD  Date:  3/16/2023    Dear Zo Pereyra,    Thank you for sharing your information about your eating patterns, it serves not only to help me see what you are eating, but you as well. Eating 3 meals a day is great way to start, I am pleased to see that you are doing that. Whole grains, fruits and vegetables, along with lean meats and low fat dairy are the cornerstones of your health. According to your food diary, your diet lacks whole grain sources, vegetables, lean meat, low fat dairy, and water. Some suggestions for your provided food record would be to ensure that your cereal is a whole grain source such as shredded wheat, whole grain cheerios, or oatmeal. This goes for your bread and rice sources as well such as whole wheat bread, whole wheat crackers, and brown rice. Lunch meat and chips are both high in sodium. Sodium likes to hang on to water and when we take in more than the recommended amount, the excess fluid makes the heart have to work harder. However, cottage cheese is a great source of protein and a good way to mix your fruit in with it, just look at the sodium content. I also do not see any fried foods or snacks that contain added sugars (other than the chocolate milk), which is great. Chocolate milk does contain added sugars, but small amounts are fine. With that in mind, look below for some suggestions. Recommendations from the Dietitian based on your RYP and Food Diary / activity record to improve health and decrease risk factors  Limit sodium to less than 2,000 mg every day (added salt and hidden sodium combined). Include a minimum of 2 servings of non starchy vegetables such as broccoli, cauliflower, green beans, carrots, etc. every day (1/2 cup cooked, 1 cup raw).    Add 3 servings of fruit every day (fresh, frozen, or canned in lite syrup or own juice). Drink 64 oz water every day. Include sources of whole grains in daily diet (whole wheat bread, buns, English muffins, brown rice, whole wheat pasta, etc.). 6.  Limit cheese to 1 oz serving size, choose white cheeses for lower sodium, lower fat option   7. Avoid foods high in fat such as anguiano, hot dogs, pepperoni, sausage, fried foods, desserts, etc.   8.  Include 150 minutes of physical activity weekly. (mozzarella, swiss, etc.). 9. Eat 3 well balanced meals with a variety of lean meats, fresh fruits, vegetables, whole grains and low fat dairy. If there are many things to change, try making change with one recommendation, then move on from there. Recommendations are based on guidelines from the American Heart Association, American Diabetes Association and current literature.     Feel free to call with questions or concerns [phone] or [address] YUSEF SANTORO

## 2023-03-20 ENCOUNTER — HOSPITAL ENCOUNTER (OUTPATIENT)
Dept: CARDIAC REHAB | Age: 88
Setting detail: THERAPIES SERIES
Discharge: HOME | End: 2023-03-20
Payer: MEDICARE

## 2023-03-20 PROCEDURE — 93798 PHYS/QHP OP CAR RHAB W/ECG: CPT

## 2023-03-23 ENCOUNTER — HOSPITAL ENCOUNTER (OUTPATIENT)
Dept: CARDIAC REHAB | Age: 88
Setting detail: THERAPIES SERIES
Discharge: HOME | End: 2023-03-23
Payer: MEDICARE

## 2023-03-23 PROCEDURE — 93798 PHYS/QHP OP CAR RHAB W/ECG: CPT

## 2023-03-27 ENCOUNTER — HOSPITAL ENCOUNTER (OUTPATIENT)
Dept: CARDIAC REHAB | Age: 88
Setting detail: THERAPIES SERIES
Discharge: HOME | End: 2023-03-27
Payer: MEDICARE

## 2023-03-27 PROCEDURE — 93798 PHYS/QHP OP CAR RHAB W/ECG: CPT

## 2023-03-28 NOTE — PROGRESS NOTES
Phase II Cardiac Rehab Individualized Treatment Plan - 30 Day    Patient Name: Janel March  Date: 3/28/2023  ACCOUNT #: [de-identified]  Diagnosis: NSTEMI  Onset Date: 5/6/2022  Referring Physician: Dr. Mira Barrera  Session Number: 7  EXERCISE    Stages of Change:   [] pre-contemplation  [x] Action   [] Contemplate    [] Maintenance   [] Prep   [] Relapse          Exercise Prescription:  Mode: [x] TM [x] B [x] STP  [x] R   [x] UBE     Frequency: 3 days per week  Duration: 31-60 minutes  Intensity: 3.6-4.1 METs            THRR:  bpm (40-60% HRR)  Progression:   Based on risk stratification, may increase duration per F.I.T.T. protocol parameters on average 5-10 minutes every 1-2 weeks for the first 4-6 weeks. After 3-4 weeks completed, continue to gradually increase F.I.T.T. parameters gradually at the established duration on average 0.5-1.0 METs per 30 days over the course of the remaining program, as established by patient centered goals and guidelines, maintaining RPE 12-16. [x] Resistance Training  Introduce 8-15 bilateral upper extremity resistance exercise at 1-3 sets per lift, on 2-3 non-consecutive days using TheraBand/Weights to 8-15 reps on at lease 2 occasions, maintaining RPE 12-16. Once repetition maximum has been achieved, additional weight sets may be added. Hypertension:  [x] Yes  [] No  Resting BP: 120/56  Peak Exercise BP: 134/62  BP Meds: Aldactone, Lopressor    Intervention:  Home Exercise:  Current Exercise -    [] Yes - type/frequency/intensity/duration   [x] No   [] Resistance Training  Progression:  20-60 minutes of aerobic exercise on at lease 2 non-rehab days. 8-12 bilateral upper extremity resistance exercise at 1-3 sets per lift, on 2 non-consecutive days using TheraBand/Free Weights/Weight Machine to 8-15 reps on at lease 2 occasions. Once repetition maximum has been achieved, additional weight sets may be added.     Education:   [x]  Equipment [place]  [] Understanding BP

## 2023-03-30 ENCOUNTER — HOSPITAL ENCOUNTER (OUTPATIENT)
Dept: CARDIAC REHAB | Age: 88
Setting detail: THERAPIES SERIES
Discharge: HOME | End: 2023-03-30
Payer: MEDICARE

## 2023-03-30 PROCEDURE — 93798 PHYS/QHP OP CAR RHAB W/ECG: CPT

## 2023-04-03 ENCOUNTER — HOSPITAL ENCOUNTER (OUTPATIENT)
Dept: CARDIAC REHAB | Age: 88
Setting detail: THERAPIES SERIES
Discharge: HOME | End: 2023-04-03
Payer: MEDICARE

## 2023-04-03 PROCEDURE — 93798 PHYS/QHP OP CAR RHAB W/ECG: CPT

## 2023-04-06 ENCOUNTER — HOSPITAL ENCOUNTER (OUTPATIENT)
Dept: CARDIAC REHAB | Age: 88
Setting detail: THERAPIES SERIES
Discharge: HOME | End: 2023-04-06
Payer: MEDICARE

## 2023-04-06 PROCEDURE — 93798 PHYS/QHP OP CAR RHAB W/ECG: CPT

## 2023-04-17 ENCOUNTER — HOSPITAL ENCOUNTER (OUTPATIENT)
Dept: CARDIAC REHAB | Age: 88
Setting detail: THERAPIES SERIES
Discharge: HOME | End: 2023-04-17
Payer: MEDICARE

## 2023-04-17 PROCEDURE — 93798 PHYS/QHP OP CAR RHAB W/ECG: CPT

## 2023-04-20 ENCOUNTER — HOSPITAL ENCOUNTER (OUTPATIENT)
Dept: CARDIAC REHAB | Age: 88
Setting detail: THERAPIES SERIES
Discharge: HOME | End: 2023-04-20
Payer: MEDICARE

## 2023-04-20 PROCEDURE — 93798 PHYS/QHP OP CAR RHAB W/ECG: CPT

## 2023-04-24 ENCOUNTER — HOSPITAL ENCOUNTER (OUTPATIENT)
Dept: CARDIAC REHAB | Age: 88
Setting detail: THERAPIES SERIES
Discharge: HOME | End: 2023-04-24
Payer: MEDICARE

## 2023-04-24 PROCEDURE — 93798 PHYS/QHP OP CAR RHAB W/ECG: CPT

## 2023-04-25 NOTE — PROGRESS NOTES
Phase II Cardiac Rehab Individualized Treatment Plan - 61 Day    Patient Name: Annabella Lea  Date: 4/25/2023  ACCOUNT #: [de-identified]  Diagnosis: NSTEMI   Onset Date: 5/6/2022  Referring Physician: Dr. Francisco Cuevas  Session Number: 15   EXERCISE    Stages of Change:   [] pre-contemplation  [x] Action   [] Contemplate    [] Maintenance   [] Prep   [] Relapse          Exercise Prescription:  Mode: [x] TM [x] B [x] STP  [] R   [x] UBE     Frequency: 3 days per week  Duration: 31-60 minutes  Intensity: 2.2-4.5 METs             THRR:   Progression:   Based on risk stratification, may increase duration per F.I.T.T. protocol parameters on average 5-10 minutes every 1-2 weeks for the first 4-6 weeks. After 3-4 weeks completed, continue to gradually increase F.I.T.T. parameters gradually at the established duration on average 0.5-1.0 METs per 30 days over the course of the remaining program, as established by patient centered goals and guidelines, maintaining RPE 12-16. [] Resistance Training  Introduce 8-15 bilateral upper extremity resistance exercise at 1-3 sets per lift, on 2-3 non-consecutive days using TheraBand/Weights to 8-15 reps on at lease 2 occasions, maintaining RPE 12-16. Once repetition maximum has been achieved, additional weight sets may be added. Hypertension:  [x] Yes  [] No  Resting BP: 110/58  Peak Exercise BP: 132/58  BP Meds: Lopressor    Intervention:  Home Exercise:  Current Exercise -    [x] Yes - Walking or Biking, 2-3 days per week   [] No   [] Resistance Training  Progression:  20-60 minutes of aerobic exercise on at lease 2 non-rehab days. 8-12 bilateral upper extremity resistance exercise at 1-3 sets per lift, on 2 non-consecutive days using TheraBand/Free Weights/Weight Machine to 8-15 reps on at lease 2 occasions. Once repetition maximum has been achieved, additional weight sets may be added.     Education:   [x]  Equipment [place]  [] Understanding BP   [x] S/S to report  []

## 2023-04-27 ENCOUNTER — HOSPITAL ENCOUNTER (OUTPATIENT)
Dept: CARDIAC REHAB | Age: 88
Setting detail: THERAPIES SERIES
Discharge: HOME | End: 2023-04-27
Payer: MEDICARE

## 2023-04-27 PROCEDURE — 93798 PHYS/QHP OP CAR RHAB W/ECG: CPT

## 2023-05-01 ENCOUNTER — APPOINTMENT (OUTPATIENT)
Dept: CARDIAC REHAB | Age: 88
End: 2023-05-01
Payer: MEDICARE

## 2023-05-01 ENCOUNTER — OFFICE VISIT (OUTPATIENT)
Dept: NEUROLOGY | Age: 88
End: 2023-05-01
Payer: MEDICARE

## 2023-05-01 VITALS
BODY MASS INDEX: 23.73 KG/M2 | HEIGHT: 69 IN | DIASTOLIC BLOOD PRESSURE: 65 MMHG | SYSTOLIC BLOOD PRESSURE: 120 MMHG | WEIGHT: 160.2 LBS | TEMPERATURE: 97.7 F | OXYGEN SATURATION: 99 % | HEART RATE: 67 BPM

## 2023-05-01 DIAGNOSIS — I63.00 CEREBROVASCULAR ACCIDENT (CVA) DUE TO THROMBOSIS OF PRECEREBRAL ARTERY (HCC): Primary | ICD-10-CM

## 2023-05-01 DIAGNOSIS — Z95.810 S/P ICD (INTERNAL CARDIAC DEFIBRILLATOR) PROCEDURE: ICD-10-CM

## 2023-05-01 DIAGNOSIS — R53.1 RIGHT SIDED WEAKNESS: ICD-10-CM

## 2023-05-01 DIAGNOSIS — I48.20 ATRIAL FIBRILLATION, CHRONIC (HCC): ICD-10-CM

## 2023-05-01 PROCEDURE — 1124F ACP DISCUSS-NO DSCNMKR DOCD: CPT | Performed by: PSYCHIATRY & NEUROLOGY

## 2023-05-01 PROCEDURE — 99215 OFFICE O/P EST HI 40 MIN: CPT | Performed by: PSYCHIATRY & NEUROLOGY

## 2023-05-01 NOTE — PROGRESS NOTES
Vitals:    23 1454   BP: 120/65   Pulse: 67   Temp: 97.7 °F (36.5 °C)   SpO2: 99%        General:  Gen: normal habitus, NAD  HEENT: NCAT, mucosa moist  Cvs: RRR, S1 S2 normal  Resp: symmetric unlabored breathing  Abd: s/nd/nt  Ext: no edema  Skin: no lesions seen, warm and dry    Neuro:  Gen: awake and alert, oriented x3. Lang/speech: no aphasia or dysarthria. Follows commands. CN: PERRL, EOMI, VFF, V1-3 intact, face symmetric, hearing intact, shoulder shrug symmetric, tongue midline  Motor: grossly 5/5 UE and LE b/l  Sense: LT intact in all 4 ext. Coord: FTN and HTS intact b/l  DTR: deferred  Gait: narrow base gait    NIH Stroke Scale:   1a  Level of consciousness: 0 - alert; keenly responsive   1b. LOC questions:  0 - answers both questions correctly   1c. LOC commands: 0 - performs both tasks correctly   2. Best Gaze: 0 - normal   3. Visual: 0 - no visual loss   4. Facial Palsy: 0 - normal symmetric movement   5a. Motor left arm: 0 - no drift, limb holds 90 (or 45) degrees for full 10 seconds   5b. Motor right arm: 0 - no drift, limb holds 90 (or 45) degrees for full 10 seconds   6a. Motor left le - no drift; leg holds 30 degree position for full 5 seconds   6b  Motor right le - no drift; leg holds 30 degree position for full 5 seconds   7. Limb Ataxia: 0 - absent   8. Sensory: 0 - normal; no sensory loss   9. Best Language:  0 - no aphasia, normal   10. Dysarthria: 0 - normal   11. Extinction and Inattention: 0 - no abnormality         Total:   0     MRS: 04      LABS:   Reviewed.   Lab Results   Component Value Date    HGB 9.0 (L) 2022    WBC 9.4 09/15/2022    PLT See Reflexed IPF Result 2023     09/15/2022    BUN 23 09/15/2022    CREATININE 1.22 (H) 2023    AST 28 2022    ALT 19 2022    MG 2.2 2022    APTT 63.0 (H) 2022    INR 1.1 2022      Lab Results   Component Value Date/Time    COVID19 Not Detected 2022 02:44 PM

## 2023-05-04 ENCOUNTER — HOSPITAL ENCOUNTER (OUTPATIENT)
Dept: CARDIAC REHAB | Age: 88
Setting detail: THERAPIES SERIES
Discharge: HOME | End: 2023-05-04
Payer: MEDICARE

## 2023-05-04 PROCEDURE — 93798 PHYS/QHP OP CAR RHAB W/ECG: CPT

## 2023-05-08 ENCOUNTER — HOSPITAL ENCOUNTER (OUTPATIENT)
Dept: CARDIAC REHAB | Age: 88
Setting detail: THERAPIES SERIES
Discharge: HOME | End: 2023-05-08
Payer: MEDICARE

## 2023-05-08 PROCEDURE — 93798 PHYS/QHP OP CAR RHAB W/ECG: CPT

## 2023-05-11 ENCOUNTER — HOSPITAL ENCOUNTER (OUTPATIENT)
Dept: CARDIAC REHAB | Age: 88
Setting detail: THERAPIES SERIES
Discharge: HOME | End: 2023-05-11
Payer: MEDICARE

## 2023-05-11 PROCEDURE — 93798 PHYS/QHP OP CAR RHAB W/ECG: CPT

## 2023-05-15 ENCOUNTER — HOSPITAL ENCOUNTER (OUTPATIENT)
Dept: CARDIAC REHAB | Age: 88
Setting detail: THERAPIES SERIES
Discharge: HOME | End: 2023-05-15
Payer: MEDICARE

## 2023-05-15 PROCEDURE — 93798 PHYS/QHP OP CAR RHAB W/ECG: CPT

## 2023-05-18 ENCOUNTER — HOSPITAL ENCOUNTER (OUTPATIENT)
Dept: CARDIAC REHAB | Age: 88
Setting detail: THERAPIES SERIES
Discharge: HOME | End: 2023-05-18
Payer: MEDICARE

## 2023-05-18 PROCEDURE — 93798 PHYS/QHP OP CAR RHAB W/ECG: CPT

## 2023-05-22 ENCOUNTER — HOSPITAL ENCOUNTER (OUTPATIENT)
Dept: CARDIAC REHAB | Age: 88
Setting detail: THERAPIES SERIES
Discharge: HOME | End: 2023-05-22
Payer: MEDICARE

## 2023-05-22 PROCEDURE — 93798 PHYS/QHP OP CAR RHAB W/ECG: CPT

## 2023-05-23 NOTE — PROGRESS NOTES
triggers:  Barriers to successful cessation:   Ready to quit? [] Yes        [] No  [] Smoking cessation medication:    Intervention:  [] Referred to physician for smoking cessation    [] Individual education and counseling  [] Tobacco adjunct    Education:   [x] Cardiac A and P  [x] CAD/Interventions  [x] Risk Factors     [] Angina      [x] Medications  [] CHF     Target Goal:  -Complete cessation of tobacco use (if applicable)  -Continued risk factor modifications  -Recognizing signs/symptoms to report  -Proper use of meds    Psychosocial  Stages of Change:    [] pre-contemplation  [x] Action   [] Contemplate    [] Maintenance   [] Prep    [] Relapse    Psychosocial Test:  Tool Used:       Janel Hagen Quality of Life/PHQ9    Intervention:   [] Psych Consult/   [] Physician Referral     Medications:     Education:    [] Stress Management   [x] Depression    Target Goal:  -Assess presence or absence of depression using a valid screening tool. -Maximize coping skills.  -Positive support system.     Preventative Medication:  [x] Anticoagulant/Antiplatelet/Aspirin  [x] Betablocker /ACE/ARB/Calcium channel blocker  [x] Lipid medication  [] Diabetic medication  [] Antidepressant  Medication Compliance (stated):    [x] 100%  [] 75%           [] 50%  [] 25%      [] 0%       Target Goal:  -100% medication adherence      Risk Stratification (of untoward event during exercise):  [x] HIGH RISK  LVEF < 40%  Survivor of cardiac arrest or sudden cardiac death  Complex ventricular arrythmias (VT >6 beats, multifocal PVCs at rest or with exercise)  Presence of angina or other significant symptoms (shortness of breath, light-headedness, or dizziness at <5 METs or during recovery  MI or cardiac surgery complicated by cardiogenic shock, CHF, and/or s/s of post-procedure ischemia  Abnormal hemodynamics with exercise, especially flat or decreasing systolic BP or chronotropic incompetence with Chalino Ovi

## 2023-05-25 ENCOUNTER — HOSPITAL ENCOUNTER (OUTPATIENT)
Dept: CARDIAC REHAB | Age: 88
Setting detail: THERAPIES SERIES
Discharge: HOME | End: 2023-05-25
Payer: MEDICARE

## 2023-05-25 PROCEDURE — 93798 PHYS/QHP OP CAR RHAB W/ECG: CPT

## 2023-05-29 ENCOUNTER — APPOINTMENT (OUTPATIENT)
Dept: CARDIAC REHAB | Age: 88
End: 2023-05-29
Payer: MEDICARE

## 2023-06-01 ENCOUNTER — HOSPITAL ENCOUNTER (OUTPATIENT)
Dept: CARDIAC REHAB | Age: 88
Setting detail: THERAPIES SERIES
Discharge: HOME | End: 2023-06-01
Payer: MEDICARE

## 2023-06-01 PROCEDURE — 93798 PHYS/QHP OP CAR RHAB W/ECG: CPT

## 2023-06-05 ENCOUNTER — HOSPITAL ENCOUNTER (OUTPATIENT)
Dept: CARDIAC REHAB | Age: 88
Setting detail: THERAPIES SERIES
Discharge: HOME | End: 2023-06-05
Payer: MEDICARE

## 2023-06-05 PROCEDURE — 93798 PHYS/QHP OP CAR RHAB W/ECG: CPT

## 2023-06-08 ENCOUNTER — HOSPITAL ENCOUNTER (OUTPATIENT)
Dept: CARDIAC REHAB | Age: 88
Setting detail: THERAPIES SERIES
Discharge: HOME | End: 2023-06-08
Payer: MEDICARE

## 2023-06-08 PROCEDURE — 93798 PHYS/QHP OP CAR RHAB W/ECG: CPT

## 2023-06-19 ENCOUNTER — HOSPITAL ENCOUNTER (OUTPATIENT)
Dept: CARDIAC REHAB | Age: 88
Setting detail: THERAPIES SERIES
Discharge: HOME | End: 2023-06-19
Payer: MEDICARE

## 2023-06-19 PROCEDURE — 93798 PHYS/QHP OP CAR RHAB W/ECG: CPT

## 2023-06-20 NOTE — PROGRESS NOTES
Phase II Cardiac Rehab Individualized Treatment Plan - 120 Day    Patient Name: Pelon Babcock  Date: 6/20/2023  ACCOUNT #: [de-identified]  Diagnosis: NSTEMI  Onset Date: 5/6/2022  Referring Physician: Dr. Atiya Mayes  Session Number: 34  EXERCISE    Stages of Change:   [] pre-contemplation  [x] Action   [] Contemplate    [] Maintenance   [] Prep   [] Relapse          Exercise Prescription:  Mode: [x] TM [x] B [x] STP  [x] R   [x] UBE     Frequency: 3 days per week  Duration: 31-60 minutes  Intensity: 4.5-5.0 METs            THRR:  bpm  Progression:   Based on risk stratification, may increase duration per F.I.T.T. protocol parameters on average 5-10 minutes every 1-2 weeks for the first 4-6 weeks. After 3-4 weeks completed, continue to gradually increase F.I.T.T. parameters gradually at the established duration on average 0.5-1.0 METs per 30 days over the course of the remaining program, as established by patient centered goals and guidelines, maintaining RPE 12-16. [] Resistance Training  Introduce 8-15 bilateral upper extremity resistance exercise at 1-3 sets per lift, on 2-3 non-consecutive days using TheraBand/Weights to 8-15 reps on at lease 2 occasions, maintaining RPE 12-16. Once repetition maximum has been achieved, additional weight sets may be added. Hypertension:  [x] Yes  [] No  Resting BP: 112/58  Peak Exercise BP: 160/54  BP Meds: Lopressor, Aldactone    Intervention:  Home Exercise:  Current Exercise -    [x] Yes - walking/cycling 20-30 min on off-rehab days   [] No   [] Resistance Training  Progression:  20-60 minutes of aerobic exercise on at lease 2 non-rehab days. 8-12 bilateral upper extremity resistance exercise at 1-3 sets per lift, on 2 non-consecutive days using TheraBand/Free Weights/Weight Machine to 8-15 reps on at lease 2 occasions. Once repetition maximum has been achieved, additional weight sets may be added.     Education:   [x]  Equipment [place]  [x] Understanding BP

## 2023-06-22 ENCOUNTER — HOSPITAL ENCOUNTER (OUTPATIENT)
Dept: CARDIAC REHAB | Age: 88
Setting detail: THERAPIES SERIES
Discharge: HOME | End: 2023-06-22
Payer: MEDICARE

## 2023-06-22 PROCEDURE — 93798 PHYS/QHP OP CAR RHAB W/ECG: CPT

## 2023-06-23 NOTE — PROGRESS NOTES
Phase II Cardiac Rehab Individualized Treatment Plan-Final    Patient Name: Bibiana Speaker  Date: 6/23/2023  ACCOUNT #: [de-identified]  Diagnosis: NSTEMI  Onset Date: 5/16/2022  Referring Physician:  Dr. Nathalie Lund  Session Number: 30  EXERCISE    Stages of Change:   [] pre-contemplation  [] Action   [] Contemplate    [x] Maintenance   [] Prep   [] Relapse          Exercise Prescription:  Mode: [x] TM   [x] B   [x] STP  [x] R   [x] UBE    Frequency: 3 days per week  Duration: 31-60 minutes  Intensity: 4.8-5.3 METs            THRR:   bpm  Progression:   Per F.I.T.T. protocol parameters on average 5-10 minutes every 1-2 weeks for the first 4-6 weeks. After 3-4 weeks completed, continue to gradually increase F.I.T.T. parameters gradually at the established duration on average 0.5-1.0 METs per 30 days over the course of the remaining program, as established by patient centered goals and guidelines, maintaining RPE 12-16. [] Resistance Training  8-15 bilateral upper extremity resistance exercise at 1-3 sets per lift, on 2-3 non-consecutive days using TheraBand/Free Weights/Weight Machine to 8-15 reps on at lease 2 occasions, maintaining RPE 12-16. Once repetition maximum has been achieved, additional weight sets may be added. Hypertension:  [x] Yes  [] No  Resting BP: 110/58  Peak Exercise BP: 144/56  BP Meds: Lopressor, Aldactone    Intervention:  Home Exercise:  Current Exercise -    [x] Yes - Walked 1-2 miles on off-rehab days   [] No   [] Resistance Training  Progression:  8-12 bilateral upper extremity resistance exercise at 1-3 sets per lift, on 2 non-consecutive days using TheraBand/Free Weights/Weight Machine to 8-15 reps on at lease 2 occasions. Once repetition maximum has been achieved, additional weight sets may be added.     Education:   [x] Equipment [place]  [x] Understanding BP   [x] S/S to report  [] Sodium     [x] Warm up/ Cool down  [] Exercise Prescription   [x] RPE Scale   [] Hot/Cold

## 2023-06-26 ENCOUNTER — APPOINTMENT (OUTPATIENT)
Dept: CARDIAC REHAB | Age: 88
End: 2023-06-26
Payer: MEDICARE

## 2023-06-29 ENCOUNTER — APPOINTMENT (OUTPATIENT)
Dept: CARDIAC REHAB | Age: 88
End: 2023-06-29
Payer: MEDICARE